# Patient Record
Sex: FEMALE | Race: WHITE | NOT HISPANIC OR LATINO
[De-identification: names, ages, dates, MRNs, and addresses within clinical notes are randomized per-mention and may not be internally consistent; named-entity substitution may affect disease eponyms.]

---

## 2018-03-18 ENCOUNTER — TRANSCRIPTION ENCOUNTER (OUTPATIENT)
Age: 73
End: 2018-03-18

## 2018-03-18 ENCOUNTER — INPATIENT (INPATIENT)
Facility: HOSPITAL | Age: 73
LOS: 3 days | Discharge: HOME | DRG: 481 | End: 2018-03-22
Attending: INTERNAL MEDICINE | Admitting: HOSPITALIST
Payer: MEDICARE

## 2018-03-18 VITALS — HEIGHT: 69 IN | WEIGHT: 179.9 LBS

## 2018-03-18 DIAGNOSIS — I77.9 DISORDER OF ARTERIES AND ARTERIOLES, UNSPECIFIED: Chronic | ICD-10-CM

## 2018-03-18 DIAGNOSIS — Z98.49 CATARACT EXTRACTION STATUS, UNSPECIFIED EYE: Chronic | ICD-10-CM

## 2018-03-18 DIAGNOSIS — Z90.49 ACQUIRED ABSENCE OF OTHER SPECIFIED PARTS OF DIGESTIVE TRACT: Chronic | ICD-10-CM

## 2018-03-18 DIAGNOSIS — Z98.51 TUBAL LIGATION STATUS: Chronic | ICD-10-CM

## 2018-03-18 PROCEDURE — 73562 X-RAY EXAM OF KNEE 3: CPT | Mod: 26,LT

## 2018-03-18 PROCEDURE — 99285 EMERGENCY DEPT VISIT HI MDM: CPT

## 2018-03-18 PROCEDURE — 73502 X-RAY EXAM HIP UNI 2-3 VIEWS: CPT | Mod: 26,LT

## 2018-03-18 PROCEDURE — 73552 X-RAY EXAM OF FEMUR 2/>: CPT | Mod: 26,LT

## 2018-03-18 NOTE — ED PROVIDER NOTE - PSH
Carotid artery disease  s/p carotid bypass right side  S/P cataract surgery  both eyes  S/P cholecystectomy    S/P tubal ligation

## 2018-03-18 NOTE — ED ADULT NURSE NOTE - OBJECTIVE STATEMENT
Patient A&Ox4, denies any pain or discomfort at this time. Stated pain occurs when she moves her left leg, describes as "sharp" that radiates to her groin. Stated slipped & fell at home, unable to bear weight on extremity. Positive PMS to extremity, negative obvious deformity noted.

## 2018-03-18 NOTE — ED PROVIDER NOTE - OBJECTIVE STATEMENT
73 y/o F pt with a hx of HTN presents to the ED with c/o groin and LLE pain s/p fall PTA. Pt states she was walking when she suddenly slipped and fell on the floor. Pt is ambulatory and took 2 Tylenols for pain PTA. Denies hitting head or LOC. denies fever. denies HA or neck pain. no chest pain or sob. no abd pain. no n/v/d. no urinary f/u/d. no back pain. no motor or sensory deficits. denies illicit drug use. no recent travel. no rash. no other acute issues symptoms or concerns

## 2018-03-19 DIAGNOSIS — I10 ESSENTIAL (PRIMARY) HYPERTENSION: ICD-10-CM

## 2018-03-19 DIAGNOSIS — R13.10 DYSPHAGIA, UNSPECIFIED: ICD-10-CM

## 2018-03-19 DIAGNOSIS — E78.5 HYPERLIPIDEMIA, UNSPECIFIED: ICD-10-CM

## 2018-03-19 DIAGNOSIS — D72.829 ELEVATED WHITE BLOOD CELL COUNT, UNSPECIFIED: ICD-10-CM

## 2018-03-19 DIAGNOSIS — R73.9 HYPERGLYCEMIA, UNSPECIFIED: ICD-10-CM

## 2018-03-19 DIAGNOSIS — I77.9 DISORDER OF ARTERIES AND ARTERIOLES, UNSPECIFIED: ICD-10-CM

## 2018-03-19 DIAGNOSIS — Z01.810 ENCOUNTER FOR PREPROCEDURAL CARDIOVASCULAR EXAMINATION: ICD-10-CM

## 2018-03-19 DIAGNOSIS — S72.002A FRACTURE OF UNSPECIFIED PART OF NECK OF LEFT FEMUR, INITIAL ENCOUNTER FOR CLOSED FRACTURE: ICD-10-CM

## 2018-03-19 DIAGNOSIS — I63.9 CEREBRAL INFARCTION, UNSPECIFIED: ICD-10-CM

## 2018-03-19 DIAGNOSIS — S72.90XA UNSPECIFIED FRACTURE OF UNSPECIFIED FEMUR, INITIAL ENCOUNTER FOR CLOSED FRACTURE: ICD-10-CM
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
## 2018-03-19 LAB
ALBUMIN SERPL ELPH-MCNC: 4.2 G/DL — SIGNIFICANT CHANGE UP (ref 3.3–5.2)
ALP SERPL-CCNC: 67 U/L — SIGNIFICANT CHANGE UP (ref 40–120)
ALT FLD-CCNC: 12 U/L — SIGNIFICANT CHANGE UP
ANION GAP SERPL CALC-SCNC: 14 MMOL/L — SIGNIFICANT CHANGE UP (ref 5–17)
ANION GAP SERPL CALC-SCNC: 14 MMOL/L — SIGNIFICANT CHANGE UP (ref 5–17)
APTT BLD: 28.2 SEC — SIGNIFICANT CHANGE UP (ref 27.5–37.4)
APTT BLD: 29.8 SEC — SIGNIFICANT CHANGE UP (ref 27.5–37.4)
AST SERPL-CCNC: 15 U/L — SIGNIFICANT CHANGE UP
BASOPHILS # BLD AUTO: 0 K/UL — SIGNIFICANT CHANGE UP (ref 0–0.2)
BASOPHILS # BLD AUTO: 0 K/UL — SIGNIFICANT CHANGE UP (ref 0–0.2)
BASOPHILS NFR BLD AUTO: 0.2 % — SIGNIFICANT CHANGE UP (ref 0–2)
BASOPHILS NFR BLD AUTO: 0.2 % — SIGNIFICANT CHANGE UP (ref 0–2)
BILIRUB SERPL-MCNC: 0.3 MG/DL — LOW (ref 0.4–2)
BLD GP AB SCN SERPL QL: SIGNIFICANT CHANGE UP
BUN SERPL-MCNC: 11 MG/DL — SIGNIFICANT CHANGE UP (ref 8–20)
BUN SERPL-MCNC: 13 MG/DL — SIGNIFICANT CHANGE UP (ref 8–20)
CALCIUM SERPL-MCNC: 9.2 MG/DL — SIGNIFICANT CHANGE UP (ref 8.6–10.2)
CALCIUM SERPL-MCNC: 9.8 MG/DL — SIGNIFICANT CHANGE UP (ref 8.6–10.2)
CHLORIDE SERPL-SCNC: 100 MMOL/L — SIGNIFICANT CHANGE UP (ref 98–107)
CHLORIDE SERPL-SCNC: 102 MMOL/L — SIGNIFICANT CHANGE UP (ref 98–107)
CO2 SERPL-SCNC: 22 MMOL/L — SIGNIFICANT CHANGE UP (ref 22–29)
CO2 SERPL-SCNC: 24 MMOL/L — SIGNIFICANT CHANGE UP (ref 22–29)
CREAT SERPL-MCNC: 0.84 MG/DL — SIGNIFICANT CHANGE UP (ref 0.5–1.3)
CREAT SERPL-MCNC: 0.91 MG/DL — SIGNIFICANT CHANGE UP (ref 0.5–1.3)
EOSINOPHIL # BLD AUTO: 0 K/UL — SIGNIFICANT CHANGE UP (ref 0–0.5)
EOSINOPHIL # BLD AUTO: 0.1 K/UL — SIGNIFICANT CHANGE UP (ref 0–0.5)
EOSINOPHIL NFR BLD AUTO: 0.3 % — SIGNIFICANT CHANGE UP (ref 0–6)
EOSINOPHIL NFR BLD AUTO: 0.5 % — SIGNIFICANT CHANGE UP (ref 0–6)
GLUCOSE SERPL-MCNC: 132 MG/DL — HIGH (ref 70–115)
GLUCOSE SERPL-MCNC: 142 MG/DL — HIGH (ref 70–115)
HBA1C BLD-MCNC: 5.5 % — SIGNIFICANT CHANGE UP (ref 4–5.6)
HCT VFR BLD CALC: 38.1 % — SIGNIFICANT CHANGE UP (ref 37–47)
HCT VFR BLD CALC: 41.2 % — SIGNIFICANT CHANGE UP (ref 37–47)
HGB BLD-MCNC: 12.5 G/DL — SIGNIFICANT CHANGE UP (ref 12–16)
HGB BLD-MCNC: 13.6 G/DL — SIGNIFICANT CHANGE UP (ref 12–16)
INR BLD: 1.04 RATIO — SIGNIFICANT CHANGE UP (ref 0.88–1.16)
INR BLD: 1.11 RATIO — SIGNIFICANT CHANGE UP (ref 0.88–1.16)
LYMPHOCYTES # BLD AUTO: 0.7 K/UL — LOW (ref 1–4.8)
LYMPHOCYTES # BLD AUTO: 1.1 K/UL — SIGNIFICANT CHANGE UP (ref 1–4.8)
LYMPHOCYTES # BLD AUTO: 5.4 % — LOW (ref 20–55)
LYMPHOCYTES # BLD AUTO: 6.2 % — LOW (ref 20–55)
MAGNESIUM SERPL-MCNC: 2.1 MG/DL — SIGNIFICANT CHANGE UP (ref 1.6–2.6)
MCHC RBC-ENTMCNC: 28.3 PG — SIGNIFICANT CHANGE UP (ref 27–31)
MCHC RBC-ENTMCNC: 28.7 PG — SIGNIFICANT CHANGE UP (ref 27–31)
MCHC RBC-ENTMCNC: 32.8 G/DL — SIGNIFICANT CHANGE UP (ref 32–36)
MCHC RBC-ENTMCNC: 33 G/DL — SIGNIFICANT CHANGE UP (ref 32–36)
MCV RBC AUTO: 86.4 FL — SIGNIFICANT CHANGE UP (ref 81–99)
MCV RBC AUTO: 86.9 FL — SIGNIFICANT CHANGE UP (ref 81–99)
MONOCYTES # BLD AUTO: 0.7 K/UL — SIGNIFICANT CHANGE UP (ref 0–0.8)
MONOCYTES # BLD AUTO: 0.8 K/UL — SIGNIFICANT CHANGE UP (ref 0–0.8)
MONOCYTES NFR BLD AUTO: 4.9 % — SIGNIFICANT CHANGE UP (ref 3–10)
MONOCYTES NFR BLD AUTO: 5.3 % — SIGNIFICANT CHANGE UP (ref 3–10)
NEUTROPHILS # BLD AUTO: 11.3 K/UL — HIGH (ref 1.8–8)
NEUTROPHILS # BLD AUTO: 15.2 K/UL — HIGH (ref 1.8–8)
NEUTROPHILS NFR BLD AUTO: 87.7 % — HIGH (ref 37–73)
NEUTROPHILS NFR BLD AUTO: 88.5 % — HIGH (ref 37–73)
PHOSPHATE SERPL-MCNC: 3.5 MG/DL — SIGNIFICANT CHANGE UP (ref 2.4–4.7)
PLATELET # BLD AUTO: 237 K/UL — SIGNIFICANT CHANGE UP (ref 150–400)
PLATELET # BLD AUTO: 255 K/UL — SIGNIFICANT CHANGE UP (ref 150–400)
POTASSIUM SERPL-MCNC: 4.2 MMOL/L — SIGNIFICANT CHANGE UP (ref 3.5–5.3)
POTASSIUM SERPL-MCNC: 4.5 MMOL/L — SIGNIFICANT CHANGE UP (ref 3.5–5.3)
POTASSIUM SERPL-SCNC: 4.2 MMOL/L — SIGNIFICANT CHANGE UP (ref 3.5–5.3)
POTASSIUM SERPL-SCNC: 4.5 MMOL/L — SIGNIFICANT CHANGE UP (ref 3.5–5.3)
PROT SERPL-MCNC: 6.9 G/DL — SIGNIFICANT CHANGE UP (ref 6.6–8.7)
PROTHROM AB SERPL-ACNC: 11.5 SEC — SIGNIFICANT CHANGE UP (ref 9.8–12.7)
PROTHROM AB SERPL-ACNC: 12.2 SEC — SIGNIFICANT CHANGE UP (ref 9.8–12.7)
RBC # BLD: 4.41 M/UL — SIGNIFICANT CHANGE UP (ref 4.4–5.2)
RBC # BLD: 4.74 M/UL — SIGNIFICANT CHANGE UP (ref 4.4–5.2)
RBC # FLD: 12.9 % — SIGNIFICANT CHANGE UP (ref 11–15.6)
RBC # FLD: 13 % — SIGNIFICANT CHANGE UP (ref 11–15.6)
SODIUM SERPL-SCNC: 138 MMOL/L — SIGNIFICANT CHANGE UP (ref 135–145)
SODIUM SERPL-SCNC: 138 MMOL/L — SIGNIFICANT CHANGE UP (ref 135–145)
TROPONIN T SERPL-MCNC: <0.01 NG/ML — SIGNIFICANT CHANGE UP (ref 0–0.06)
TYPE + AB SCN PNL BLD: SIGNIFICANT CHANGE UP
WBC # BLD: 12.8 K/UL — HIGH (ref 4.8–10.8)
WBC # BLD: 17.3 K/UL — HIGH (ref 4.8–10.8)
WBC # FLD AUTO: 12.8 K/UL — HIGH (ref 4.8–10.8)
WBC # FLD AUTO: 17.3 K/UL — HIGH (ref 4.8–10.8)

## 2018-03-19 PROCEDURE — 12345: CPT | Mod: NC

## 2018-03-19 PROCEDURE — 71045 X-RAY EXAM CHEST 1 VIEW: CPT | Mod: 26

## 2018-03-19 PROCEDURE — 99223 1ST HOSP IP/OBS HIGH 75: CPT | Mod: 57

## 2018-03-19 PROCEDURE — 99497 ADVNCD CARE PLAN 30 MIN: CPT | Mod: 25

## 2018-03-19 PROCEDURE — 99223 1ST HOSP IP/OBS HIGH 75: CPT

## 2018-03-19 PROCEDURE — 93010 ELECTROCARDIOGRAM REPORT: CPT

## 2018-03-19 PROCEDURE — 27235 TREAT THIGH FRACTURE: CPT | Mod: LT

## 2018-03-19 PROCEDURE — 73610 X-RAY EXAM OF ANKLE: CPT | Mod: 26,LT

## 2018-03-19 PROCEDURE — 76377 3D RENDER W/INTRP POSTPROCES: CPT | Mod: 26

## 2018-03-19 PROCEDURE — 72192 CT PELVIS W/O DYE: CPT | Mod: 26

## 2018-03-19 RX ORDER — MAGNESIUM HYDROXIDE 400 MG/1
30 TABLET, CHEWABLE ORAL DAILY
Qty: 0 | Refills: 0 | Status: DISCONTINUED | OUTPATIENT
Start: 2018-03-19 | End: 2018-03-22

## 2018-03-19 RX ORDER — METOPROLOL TARTRATE 50 MG
100 TABLET ORAL
Qty: 0 | Refills: 0 | Status: DISCONTINUED | OUTPATIENT
Start: 2018-03-19 | End: 2018-03-19

## 2018-03-19 RX ORDER — FENTANYL CITRATE 50 UG/ML
50 INJECTION INTRAVENOUS
Qty: 0 | Refills: 0 | Status: DISCONTINUED | OUTPATIENT
Start: 2018-03-19 | End: 2018-03-19

## 2018-03-19 RX ORDER — OXYCODONE HYDROCHLORIDE 5 MG/1
10 TABLET ORAL EVERY 4 HOURS
Qty: 0 | Refills: 0 | Status: DISCONTINUED | OUTPATIENT
Start: 2018-03-19 | End: 2018-03-22

## 2018-03-19 RX ORDER — CEFAZOLIN SODIUM 1 G
2000 VIAL (EA) INJECTION EVERY 8 HOURS
Qty: 0 | Refills: 0 | Status: COMPLETED | OUTPATIENT
Start: 2018-03-19 | End: 2018-03-20

## 2018-03-19 RX ORDER — SENNA PLUS 8.6 MG/1
2 TABLET ORAL AT BEDTIME
Qty: 0 | Refills: 0 | Status: DISCONTINUED | OUTPATIENT
Start: 2018-03-19 | End: 2018-03-22

## 2018-03-19 RX ORDER — ONDANSETRON 8 MG/1
4 TABLET, FILM COATED ORAL ONCE
Qty: 0 | Refills: 0 | Status: DISCONTINUED | OUTPATIENT
Start: 2018-03-19 | End: 2018-03-19

## 2018-03-19 RX ORDER — METHOCARBAMOL 500 MG/1
500 TABLET, FILM COATED ORAL ONCE
Qty: 0 | Refills: 0 | Status: COMPLETED | OUTPATIENT
Start: 2018-03-19 | End: 2018-03-19

## 2018-03-19 RX ORDER — SIMVASTATIN 20 MG/1
20 TABLET, FILM COATED ORAL AT BEDTIME
Qty: 0 | Refills: 0 | Status: DISCONTINUED | OUTPATIENT
Start: 2018-03-19 | End: 2018-03-19

## 2018-03-19 RX ORDER — ACETAMINOPHEN 500 MG
650 TABLET ORAL EVERY 6 HOURS
Qty: 0 | Refills: 0 | Status: DISCONTINUED | OUTPATIENT
Start: 2018-03-19 | End: 2018-03-19

## 2018-03-19 RX ORDER — LORATADINE 10 MG/1
10 TABLET ORAL DAILY
Qty: 0 | Refills: 0 | Status: DISCONTINUED | OUTPATIENT
Start: 2018-03-19 | End: 2018-03-19

## 2018-03-19 RX ORDER — HYDROMORPHONE HYDROCHLORIDE 2 MG/ML
2 INJECTION INTRAMUSCULAR; INTRAVENOUS; SUBCUTANEOUS EVERY 4 HOURS
Qty: 0 | Refills: 0 | Status: DISCONTINUED | OUTPATIENT
Start: 2018-03-19 | End: 2018-03-22

## 2018-03-19 RX ORDER — AMLODIPINE BESYLATE 2.5 MG/1
10 TABLET ORAL DAILY
Qty: 0 | Refills: 0 | Status: DISCONTINUED | OUTPATIENT
Start: 2018-03-19 | End: 2018-03-19

## 2018-03-19 RX ORDER — OXYCODONE HYDROCHLORIDE 5 MG/1
5 TABLET ORAL EVERY 4 HOURS
Qty: 0 | Refills: 0 | Status: DISCONTINUED | OUTPATIENT
Start: 2018-03-19 | End: 2018-03-22

## 2018-03-19 RX ORDER — ONDANSETRON 8 MG/1
4 TABLET, FILM COATED ORAL EVERY 6 HOURS
Qty: 0 | Refills: 0 | Status: DISCONTINUED | OUTPATIENT
Start: 2018-03-19 | End: 2018-03-19

## 2018-03-19 RX ORDER — ASPIRIN/CALCIUM CARB/MAGNESIUM 324 MG
81 TABLET ORAL DAILY
Qty: 0 | Refills: 0 | Status: DISCONTINUED | OUTPATIENT
Start: 2018-03-19 | End: 2018-03-19

## 2018-03-19 RX ORDER — DOCUSATE SODIUM 100 MG
100 CAPSULE ORAL THREE TIMES A DAY
Qty: 0 | Refills: 0 | Status: DISCONTINUED | OUTPATIENT
Start: 2018-03-19 | End: 2018-03-22

## 2018-03-19 RX ORDER — SODIUM CHLORIDE 9 MG/ML
1000 INJECTION, SOLUTION INTRAVENOUS
Qty: 0 | Refills: 0 | Status: DISCONTINUED | OUTPATIENT
Start: 2018-03-19 | End: 2018-03-19

## 2018-03-19 RX ORDER — HYDRALAZINE HCL 50 MG
50 TABLET ORAL THREE TIMES A DAY
Qty: 0 | Refills: 0 | Status: DISCONTINUED | OUTPATIENT
Start: 2018-03-19 | End: 2018-03-19

## 2018-03-19 RX ORDER — SODIUM CHLORIDE 9 MG/ML
1000 INJECTION INTRAMUSCULAR; INTRAVENOUS; SUBCUTANEOUS
Qty: 0 | Refills: 0 | Status: DISCONTINUED | OUTPATIENT
Start: 2018-03-19 | End: 2018-03-19

## 2018-03-19 RX ORDER — ONDANSETRON 8 MG/1
4 TABLET, FILM COATED ORAL EVERY 6 HOURS
Qty: 0 | Refills: 0 | Status: DISCONTINUED | OUTPATIENT
Start: 2018-03-19 | End: 2018-03-22

## 2018-03-19 RX ORDER — ACETAMINOPHEN 500 MG
975 TABLET ORAL EVERY 8 HOURS
Qty: 0 | Refills: 0 | Status: DISCONTINUED | OUTPATIENT
Start: 2018-03-19 | End: 2018-03-21

## 2018-03-19 RX ORDER — MORPHINE SULFATE 50 MG/1
4 CAPSULE, EXTENDED RELEASE ORAL EVERY 4 HOURS
Qty: 0 | Refills: 0 | Status: DISCONTINUED | OUTPATIENT
Start: 2018-03-19 | End: 2018-03-19

## 2018-03-19 RX ORDER — POLYETHYLENE GLYCOL 3350 17 G/17G
17 POWDER, FOR SOLUTION ORAL DAILY
Qty: 0 | Refills: 0 | Status: DISCONTINUED | OUTPATIENT
Start: 2018-03-19 | End: 2018-03-22

## 2018-03-19 RX ORDER — ENOXAPARIN SODIUM 100 MG/ML
40 INJECTION SUBCUTANEOUS DAILY
Qty: 0 | Refills: 0 | Status: DISCONTINUED | OUTPATIENT
Start: 2018-03-20 | End: 2018-03-22

## 2018-03-19 RX ORDER — MORPHINE SULFATE 50 MG/1
4 CAPSULE, EXTENDED RELEASE ORAL ONCE
Qty: 0 | Refills: 0 | Status: DISCONTINUED | OUTPATIENT
Start: 2018-03-19 | End: 2018-03-19

## 2018-03-19 RX ADMIN — Medication 100 MILLIGRAM(S): at 06:37

## 2018-03-19 RX ADMIN — Medication 50 MILLIGRAM(S): at 06:37

## 2018-03-19 RX ADMIN — MORPHINE SULFATE 4 MILLIGRAM(S): 50 CAPSULE, EXTENDED RELEASE ORAL at 06:59

## 2018-03-19 RX ADMIN — MORPHINE SULFATE 4 MILLIGRAM(S): 50 CAPSULE, EXTENDED RELEASE ORAL at 03:24

## 2018-03-19 RX ADMIN — METHOCARBAMOL 210 MILLIGRAM(S): 500 TABLET, FILM COATED ORAL at 05:45

## 2018-03-19 RX ADMIN — MORPHINE SULFATE 4 MILLIGRAM(S): 50 CAPSULE, EXTENDED RELEASE ORAL at 03:37

## 2018-03-19 RX ADMIN — Medication 100 MILLIGRAM(S): at 22:58

## 2018-03-19 RX ADMIN — MORPHINE SULFATE 4 MILLIGRAM(S): 50 CAPSULE, EXTENDED RELEASE ORAL at 06:37

## 2018-03-19 RX ADMIN — SODIUM CHLORIDE 80 MILLILITER(S): 9 INJECTION, SOLUTION INTRAVENOUS at 06:38

## 2018-03-19 NOTE — BRIEF OPERATIVE NOTE - PROCEDURE
<<-----Click on this checkbox to enter Procedure Femur fracture surgery  03/19/2018  CRPP left femoral neck fracture  Active  MLINN

## 2018-03-19 NOTE — H&P ADULT - PROBLEM SELECTOR PLAN 3
in setting of recent fall and hip fracture  IVF for gentle hydration   rpt cbc in AM  no s/s of infectious etiology-likely reactive due to trauma  hold abx unless fever or infectious source found  f/u official read of chest xray and urinalysis results

## 2018-03-19 NOTE — H&P ADULT - HISTORY OF PRESENT ILLNESS
73 y/o F pt with a pmh/o HLD CVA carotid artery stenosis dysphagia HTN who presents to the ED with 10/10, severe, constant, left sided, sharp groin and leg pain which occured s/p fall and was not alleviated by OTC medications. Pt was able to walk after fall and denies any preceding symptoms such as dizziness, palpitations, cp, diaphoresis, weakness, nausea, urinary or gi incontinence, HA, confusion, visual or speech changes, numbness, tingling, weakness, or having hit head. 73 y/o F pt with a pmh/o HLD CVA carotid artery stenosis dysphagia HTN who presents to the ED with 10/10, severe, constant, left sided, sharp groin and leg pain which occured s/p mechanical fall after tripping over slippers on carpet floor when transitioning from hard floor and was not alleviated by OTC medications. Pt was able to walk after fall and arrived in own vehicle with . Pt denies any preceding symptoms such as dizziness, palpitations, cp, diaphoresis, weakness, nausea, urinary or gi incontinence, HA, confusion, visual or speech changes, numbness, tingling, weakness, or having hit head. Pt reports previous abnormal stress test and CVA that affected after having endarterectomy however states repeat was normal.

## 2018-03-19 NOTE — H&P ADULT - PROBLEM SELECTOR PLAN 5
baseline s/p CVA  pt reports letting pills dissolve in liquid at home prior to taking medication  home medications ordered-may crush to administer  once diet reinstated would order soft diet with thin liquids and protein shake as pt has this diet at home vs speech and swallow consult for recs

## 2018-03-19 NOTE — CONSULT NOTE ADULT - PROBLEM SELECTOR RECOMMENDATION 9
1. Patient is cleared for hip fracture repair and related procedures from a cardiac perspective   2. Continue with beta-blocker, statin through the perioperative period  3. Cardiology will follow patient

## 2018-03-19 NOTE — H&P ADULT - NEUROLOGICAL DETAILS
responds to verbal commands/no spontaneous movement/responds to pain/sensation intact/cranial nerves intact/alert and oriented x 3

## 2018-03-19 NOTE — CONSULT NOTE ADULT - SUBJECTIVE AND OBJECTIVE BOX
Patient is a 72y old  Female who presents with a chief complaint of fall (19 Mar 2018 06:21)        PAST MEDICAL & SURGICAL HISTORY:  Carotid artery disease, unspecified laterality  Hyperlipidemia, unspecified hyperlipidemia type  Cerebrovascular accident (CVA), unspecified mechanism  Esophageal dysphagia  Hypertension  S/P tubal ligation  S/P cholecystectomy  S/P cataract surgery: both eyes  Carotid artery disease: s/p carotid bypass right side    MEDICATIONS  (STANDING):  acetaminophen  Suppository 650 milliGRAM(s) Rectal every 6 hours  amLODIPine   Tablet 10 milliGRAM(s) Oral daily  aspirin  chewable 81 milliGRAM(s) Oral daily  hydrALAZINE 50 milliGRAM(s) Oral three times a day  lactated ringers. 1000 milliLiter(s) (80 mL/Hr) IV Continuous <Continuous>  loratadine 10 milliGRAM(s) Oral daily  metoprolol     tartrate 100 milliGRAM(s) Oral two times a day  simvastatin 20 milliGRAM(s) Oral at bedtime    MEDICATIONS  (PRN):  bisacodyl Suppository 10 milliGRAM(s) Rectal daily PRN Constipation  morphine  - Injectable 4 milliGRAM(s) IV Push every 4 hours PRN Severe Pain (7 - 10)  ondansetron Injectable 4 milliGRAM(s) IV Push every 6 hours PRN Nausea      FAMILY HISTORY:  No pertinent family history in first degree relatives      CONSTITUTIONAL: No fever, weight loss, chills, shakes, or fatigue  EYES: No eye pain, visual disturbances, or discharge  ENMT:  No difficulty hearing, tinnitus, vertigo; No sinus or throat pain  NECK: No pain or stiffness  BREASTS: No pain, masses, or nipple discharge  RESPIRATORY: No cough, wheezing, hemoptysis, or shortness of breath  CARDIOVASCULAR: No chest pain, dyspnea, palpitations, dizziness, syncope, paroxysmal nocturnal dyspnea, orthopnea, or arm or leg swelling  GASTROINTESTINAL: No abdominal  or epigastric pain, nausea, vomiting, hematemesis, diarrhea, constipation, melena or bright red blood.  GENITOURINARY: No dysuria, nocturia, hematuria, or urinary incontinence  SKIN: No itching, burning, rashes, or lesions   LYMPH NODES: No enlarged glands  ENDOCRINE: No heat or cold intolerance, or hair loss  MUSCULOSKELETAL: +L hip pain  PSYCHIATRIC: No depression, anxiety, or difficulty sleeping  HEME/LYMPH: No easy bruising or bleeding gums  ALLERY AND IMMUNOLOGIC: No hives or rash.      Vital Signs Last 24 Hrs  T(C): 37.4 (19 Mar 2018 07:57), Max: 37.4 (19 Mar 2018 07:57)  T(F): 99.4 (19 Mar 2018 07:57), Max: 99.4 (19 Mar 2018 07:57)  HR: 76 (19 Mar 2018 07:57) (70 - 86)  BP: 129/55 (19 Mar 2018 07:57) (129/55 - 166/63)  BP(mean): --  RR: 17 (19 Mar 2018 07:57) (12 - 19)  SpO2: 97% (19 Mar 2018 07:57) (93% - 97%)    PHYSICAL EXAM:    GENERAL: In no apparent distress, well nourished, and hydrated.  NECK: +R CEA surgical scar  HEART: Regular rate and rhythm; +2/6 systolic murmur,rubs, or gallops.  PULMONARY: Clear to auscultation and perfusion.  No rales, wheezing, or rhonchi bilaterally.  ABDOMEN: Soft, Nontender, Nondistended; Bowel sounds present  EXTREMITIES:  2+ Peripheral Pulses, No clubbing, cyanosis, or edema; +L hip tenderness          INTERPRETATION OF TELEMETRY: *patient not on telemetry     ECG: sinus rhythm + nonspecific ST changes     I&O's Detail    18 Mar 2018 07:01  -  19 Mar 2018 07:00  --------------------------------------------------------  IN:    lactated ringers.: 80 mL  Total IN: 80 mL    OUT:  Total OUT: 0 mL    Total NET: 80 mL          LABS:                        12.5   12.8  )-----------( 237      ( 19 Mar 2018 07:43 )             38.1     03-19    138  |  102  |  11.0  ----------------------------<  132<H>  4.5   |  22.0  |  0.84    Ca    9.2      19 Mar 2018 07:43  Phos  3.5     03-19  Mg     2.1     03-19    TPro  6.9  /  Alb  4.2  /  TBili  0.3<L>  /  DBili  x   /  AST  15  /  ALT  12  /  AlkPhos  67  03-19    CARDIAC MARKERS ( 19 Mar 2018 07:43 )  x     / <0.01 ng/mL / x     / x     / x          PT/INR - ( 19 Mar 2018 07:43 )   PT: 12.2 sec;   INR: 1.11 ratio         PTT - ( 19 Mar 2018 07:43 )  PTT:28.2 sec    BNP  I&O's Detail    18 Mar 2018 07:01  -  19 Mar 2018 07:00  --------------------------------------------------------  IN:    lactated ringers.: 80 mL  Total IN: 80 mL    OUT:  Total OUT: 0 mL    Total NET: 80 mL        Daily Height in cm: 175.26 (19 Mar 2018 06:56)    Daily     RADIOLOGY & ADDITIONAL STUDIES:

## 2018-03-19 NOTE — H&P ADULT - PMH
Carotid artery disease, unspecified laterality    Cerebrovascular accident (CVA), unspecified mechanism    Esophageal dysphagia    Hyperlipidemia, unspecified hyperlipidemia type    Hypertension

## 2018-03-19 NOTE — H&P ADULT - PROBLEM SELECTOR PLAN 1
fracture and fall precautions  pain control  EKG  Chest x ray  coags, cmp, mg, phos  Troponin  TTE  Will need cardiac optimization for OR given pmhx  VCD boots for DVT prophylaxis  NPO for OR in AM  Pain control with IV morphine  IV robaxin x 1 dose for muscular spasm

## 2018-03-19 NOTE — H&P ADULT - ASSESSMENT
71 yo female with pmh/o HLD, HTN, CVA, carotid artery disease, who is admitted with intractable pain secondary to left femoral neck fracture s/p mechanical fall.

## 2018-03-20 DIAGNOSIS — Z29.9 ENCOUNTER FOR PROPHYLACTIC MEASURES, UNSPECIFIED: ICD-10-CM

## 2018-03-20 LAB
ANION GAP SERPL CALC-SCNC: 13 MMOL/L — SIGNIFICANT CHANGE UP (ref 5–17)
APPEARANCE UR: CLEAR — SIGNIFICANT CHANGE UP
BACTERIA # UR AUTO: ABNORMAL
BILIRUB UR-MCNC: NEGATIVE — SIGNIFICANT CHANGE UP
BUN SERPL-MCNC: 11 MG/DL — SIGNIFICANT CHANGE UP (ref 8–20)
CALCIUM SERPL-MCNC: 8.4 MG/DL — LOW (ref 8.6–10.2)
CHLORIDE SERPL-SCNC: 107 MMOL/L — SIGNIFICANT CHANGE UP (ref 98–107)
CO2 SERPL-SCNC: 22 MMOL/L — SIGNIFICANT CHANGE UP (ref 22–29)
COLOR SPEC: YELLOW — SIGNIFICANT CHANGE UP
CREAT SERPL-MCNC: 0.99 MG/DL — SIGNIFICANT CHANGE UP (ref 0.5–1.3)
DIFF PNL FLD: NEGATIVE — SIGNIFICANT CHANGE UP
EPI CELLS # UR: SIGNIFICANT CHANGE UP
GLUCOSE SERPL-MCNC: 134 MG/DL — HIGH (ref 70–115)
GLUCOSE UR QL: NEGATIVE MG/DL — SIGNIFICANT CHANGE UP
HCT VFR BLD CALC: 36.4 % — LOW (ref 37–47)
HGB BLD-MCNC: 11.8 G/DL — LOW (ref 12–16)
KETONES UR-MCNC: NEGATIVE — SIGNIFICANT CHANGE UP
LEUKOCYTE ESTERASE UR-ACNC: ABNORMAL
MCHC RBC-ENTMCNC: 28.3 PG — SIGNIFICANT CHANGE UP (ref 27–31)
MCHC RBC-ENTMCNC: 32.4 G/DL — SIGNIFICANT CHANGE UP (ref 32–36)
MCV RBC AUTO: 87.3 FL — SIGNIFICANT CHANGE UP (ref 81–99)
NITRITE UR-MCNC: NEGATIVE — SIGNIFICANT CHANGE UP
PH UR: 6.5 — SIGNIFICANT CHANGE UP (ref 5–8)
PLATELET # BLD AUTO: 191 K/UL — SIGNIFICANT CHANGE UP (ref 150–400)
POTASSIUM SERPL-MCNC: 3.8 MMOL/L — SIGNIFICANT CHANGE UP (ref 3.5–5.3)
POTASSIUM SERPL-SCNC: 3.8 MMOL/L — SIGNIFICANT CHANGE UP (ref 3.5–5.3)
PROT UR-MCNC: 15 MG/DL
RBC # BLD: 4.17 M/UL — LOW (ref 4.4–5.2)
RBC # FLD: 13.3 % — SIGNIFICANT CHANGE UP (ref 11–15.6)
RBC CASTS # UR COMP ASSIST: ABNORMAL /HPF (ref 0–4)
SODIUM SERPL-SCNC: 142 MMOL/L — SIGNIFICANT CHANGE UP (ref 135–145)
SP GR SPEC: 1 — LOW (ref 1.01–1.02)
UROBILINOGEN FLD QL: NEGATIVE MG/DL — SIGNIFICANT CHANGE UP
WBC # BLD: 12 K/UL — HIGH (ref 4.8–10.8)
WBC # FLD AUTO: 12 K/UL — HIGH (ref 4.8–10.8)
WBC UR QL: SIGNIFICANT CHANGE UP

## 2018-03-20 PROCEDURE — 99233 SBSQ HOSP IP/OBS HIGH 50: CPT

## 2018-03-20 PROCEDURE — 71045 X-RAY EXAM CHEST 1 VIEW: CPT | Mod: 26

## 2018-03-20 PROCEDURE — 99222 1ST HOSP IP/OBS MODERATE 55: CPT | Mod: GC

## 2018-03-20 RX ORDER — AMLODIPINE BESYLATE 2.5 MG/1
10 TABLET ORAL DAILY
Qty: 0 | Refills: 0 | Status: DISCONTINUED | OUTPATIENT
Start: 2018-03-20 | End: 2018-03-22

## 2018-03-20 RX ORDER — ASPIRIN/CALCIUM CARB/MAGNESIUM 324 MG
81 TABLET ORAL DAILY
Qty: 0 | Refills: 0 | Status: DISCONTINUED | OUTPATIENT
Start: 2018-03-20 | End: 2018-03-22

## 2018-03-20 RX ORDER — SIMVASTATIN 20 MG/1
20 TABLET, FILM COATED ORAL AT BEDTIME
Qty: 0 | Refills: 0 | Status: DISCONTINUED | OUTPATIENT
Start: 2018-03-20 | End: 2018-03-22

## 2018-03-20 RX ORDER — FOLIC ACID 0.8 MG
1 TABLET ORAL DAILY
Qty: 0 | Refills: 0 | Status: DISCONTINUED | OUTPATIENT
Start: 2018-03-20 | End: 2018-03-22

## 2018-03-20 RX ORDER — HYDRALAZINE HCL 50 MG
50 TABLET ORAL THREE TIMES A DAY
Qty: 0 | Refills: 0 | Status: DISCONTINUED | OUTPATIENT
Start: 2018-03-20 | End: 2018-03-21

## 2018-03-20 RX ORDER — LORATADINE 10 MG/1
10 TABLET ORAL DAILY
Qty: 0 | Refills: 0 | Status: DISCONTINUED | OUTPATIENT
Start: 2018-03-20 | End: 2018-03-22

## 2018-03-20 RX ORDER — FERROUS SULFATE 325(65) MG
325 TABLET ORAL DAILY
Qty: 0 | Refills: 0 | Status: DISCONTINUED | OUTPATIENT
Start: 2018-03-20 | End: 2018-03-22

## 2018-03-20 RX ORDER — METOPROLOL TARTRATE 50 MG
100 TABLET ORAL
Qty: 0 | Refills: 0 | Status: DISCONTINUED | OUTPATIENT
Start: 2018-03-20 | End: 2018-03-22

## 2018-03-20 RX ADMIN — Medication 100 MILLIGRAM(S): at 21:34

## 2018-03-20 RX ADMIN — Medication 50 MILLIGRAM(S): at 21:34

## 2018-03-20 RX ADMIN — Medication 1 TABLET(S): at 11:58

## 2018-03-20 RX ADMIN — Medication 100 MILLIGRAM(S): at 05:00

## 2018-03-20 RX ADMIN — ENOXAPARIN SODIUM 40 MILLIGRAM(S): 100 INJECTION SUBCUTANEOUS at 11:59

## 2018-03-20 RX ADMIN — Medication 50 MILLIGRAM(S): at 17:48

## 2018-03-20 RX ADMIN — AMLODIPINE BESYLATE 10 MILLIGRAM(S): 2.5 TABLET ORAL at 11:58

## 2018-03-20 RX ADMIN — SIMVASTATIN 20 MILLIGRAM(S): 20 TABLET, FILM COATED ORAL at 21:34

## 2018-03-20 RX ADMIN — Medication 1 MILLIGRAM(S): at 11:58

## 2018-03-20 RX ADMIN — LORATADINE 10 MILLIGRAM(S): 10 TABLET ORAL at 11:58

## 2018-03-20 RX ADMIN — Medication 100 MILLIGRAM(S): at 17:48

## 2018-03-20 RX ADMIN — Medication 81 MILLIGRAM(S): at 11:58

## 2018-03-20 RX ADMIN — Medication 325 MILLIGRAM(S): at 11:58

## 2018-03-20 NOTE — CONSULT NOTE ADULT - ATTENDING COMMENTS
Chart reviewed. Patient is a 72 year old previously independent female admitted after a fall and found to have a left hip subcapital fracture. Underwent orthopedic intervention and tolerated procedure. WBAT. On lovenox for DVT prophylaxis. Chart reviewed. Patient is a 72 year old previously independent female admitted after a fall and found to have a left hip subcapital fracture. Underwent orthopedic intervention and tolerated procedure. WBAT. On lovenox for DVT prophylaxis.  POD #1 today and PT evaluation limited due to symptoms of dizziness/ nausea    Most likely will need a course of inpatient rehab. will fu in am and make further recommendations.   Continue bedside therapy. Thank you.

## 2018-03-20 NOTE — PROGRESS NOTE ADULT - SUBJECTIVE AND OBJECTIVE BOX
Patient seen and eval at bedside. Patient has no complaints. Patient denies any pain. Denies CP, SOB, dizziness, numbness/tingling.    Vital Signs Last 24 Hrs  T(C): 38 (20 Mar 2018 04:45), Max: 38.1 (19 Mar 2018 23:13)  T(F): 100.4 (20 Mar 2018 04:45), Max: 100.5 (19 Mar 2018 23:13)  HR: 101 (20 Mar 2018 04:45) (70 - 101)  BP: 159/61 (20 Mar 2018 04:45) (129/55 - 179/81)  RR: 18 (20 Mar 2018 04:45) (14 - 23)  SpO2: 94% (20 Mar 2018 04:45) (90% - 98%)    PE: NAD, alert awake  Right LE: Dressing C/D/I, mild mikaela-incisional ecchymosis, no s/o infx, thigh soft, compressible, mild swelling  EHL/TA/GS/FHL intact, Gross SILT distally  DP pulse 2+, calf soft, NT B/L                          11.8   12.0  )-----------( 191      ( 20 Mar 2018 06:21 )             36.4     03-20    142  |  107  |  11.0  ----------------------------<  134<H>  3.8   |  22.0  |  0.99    A/P: s/p left hip CRPP POD#1  ·	pain control  ·	Tylenol PRN for fever  ·	DVT propx: Lov  ·	PT - WBAT  ·	Cont care as per primary team

## 2018-03-20 NOTE — PHYSICAL THERAPY INITIAL EVALUATION ADULT - PERTINENT HX OF CURRENT PROBLEM, REHAB EVAL
71 y/o F pt with a pmh/o HLD CVA carotid artery stenosis dysphagia HTN who presents to the ED with 10/10, severe, constant, left sided, sharp groin and leg pain which occurred s/p mechanical fall after tripping over slippers on carpet floor when transitioning from hard floor and was not alleviated by OTC medications. left femoral neck fx s/p ORIF.

## 2018-03-20 NOTE — PHYSICAL THERAPY INITIAL EVALUATION ADULT - CRITERIA FOR SKILLED THERAPEUTIC INTERVENTIONS
anticipated equipment needs at discharge/rehab potential/anticipated discharge recommendation/impairments found/functional limitations in following categories/risk reduction/prevention

## 2018-03-20 NOTE — PHYSICAL THERAPY INITIAL EVALUATION ADULT - ADDITIONAL COMMENTS
pt states she lives with her  in a 1-story house with 2 steps to enter (+left rail). pt was independent prior to admit. +driving.  works part-time. has cane at home.

## 2018-03-20 NOTE — PHYSICAL THERAPY INITIAL EVALUATION ADULT - RANGE OF MOTION EXAMINATION, REHAB EVAL
left hip 90 degrees in sitting, knee/ankle WNL/bilateral upper extremity ROM was WNL (within normal limits)/Right LE ROM was WNL (within normal limits)

## 2018-03-20 NOTE — PROGRESS NOTE ADULT - SUBJECTIVE AND OBJECTIVE BOX
HOSPITALIST PROGRESS NOTE    CHARANJIT GREENWOOD  [id number]  72yFemale    Patient is a 72y old  Female who presents with a chief complaint of fall (19 Mar 2018 06:21)      SUBJECTIVE:   Chart reviewed since last visit.  Patient seen and examined at bedside for left femur fracture.  Underwent Left Femur CRPP yesterday.  Denies any hip pain currently.  Denies any chest pain, dyspnea, cough or chills but feels cold  No flatus or BM yet, voiding on bedpan.  Hasn't mobilized yet      OBJECTIVE:  Vital Signs Last 24 Hrs  T(C): 37.8 (20 Mar 2018 07:43), Max: 38.1 (19 Mar 2018 23:13)  T(F): 100.1 (20 Mar 2018 07:43), Max: 100.5 (19 Mar 2018 23:13)  HR: 94 (20 Mar 2018 07:43) (70 - 101)  BP: 142/61 (20 Mar 2018 07:43) (134/63 - 179/81)   RR: 18 (20 Mar 2018 07:43) (14 - 23)  SpO2: 95% (20 Mar 2018 07:43) (90% - 98%)    PHYSICAL EXAMINATION  General: NAD[+]   Obese[+] Lying in bed  HEENT: AT/NC[]  PERRLA[]  EOMI[+]   Moist oral mucosa[+]  Pharyngeal exudates[]  NECK: Supple[+]  JVD[-] Carotid bruit[]  CVS: RRR[+]  Irregular[]  S1+S2[+]   Murmur[]  RESP: Fair air entry bilaterally[+]   Clear sounds[+]   poor effort []  wheeze[]   Crackles[]  GI: Soft[+]  Nondistended[]   Nontender[+]   Bowel Sounds[+]  Mass[]   HSM[]   Ascites[]  : suprapubic tenderness[-]   CVA Tenderness[]   Boone[-]  MS: Left hip dressing C/D/I  CNS: AAOx3[+]    Grossly intact  INTEG: Skin is Warm[+]  dry[] Lesion[] Decubitus[]  PSYCH: Fair Mood, Affect    MONITOR:  CAPILLARY BLOOD GLUCOSE            I&O's Summary                          11.8   12.0  )-----------( 191      ( 20 Mar 2018 06:21 )             36.4     PT/INR - ( 19 Mar 2018 07:43 )   PT: 12.2 sec;   INR: 1.11 ratio         PTT - ( 19 Mar 2018 07:43 )  PTT:28.2 sec      142  |  107  |  11.0  ----------------------------<  134<H>  3.8   |  22.0  |  0.99    Ca    8.4<L>      20 Mar 2018 06:21  Phos  3.5       Mg     2.1         TPro  6.9  /  Alb  4.2  /  TBili  0.3<L>  /  DBili  x   /  AST  15  /  ALT  12  /  AlkPhos  67      CARDIAC MARKERS ( 19 Mar 2018 07:43 )  x     / <0.01 ng/mL / x     / x     / x          TTE:  < from: TTE Echo Complete w/Doppler (18 @ 08:39) >    EXAM:  ECHO TRANSTHORACIC COMP W DOPP      PROCEDURE DATE:  Mar 19 2018   .      INTERPRETATION:  REPORT:    TRANSTHORACIC ECHOCARDIOGRAM REPORT         Patient Name:   CHARANJIT GREENWOOD Patient Location: Inpatient  Medical Rec #:  [id number]   Accession #:     [id number]  Account #:                 Height:           68.9 in 175.0 cm  YOB: 1945  Weight:           178.6 lb 81.00 kg  Patient Age:    72 years   BSA:              1.97 m²  Patient Gender: F          BP:               150/59 mmHg       Date of Exam:        3/19/2018 8:39:30 AM  Sonographer:         Beatriz Hernandez  Referring Physician: Jeaneth Napoles MD    Procedure:     2D Echo/Doppler/Color Doppler Complete.  Indications:   Chronic ischemic heart disease, unspecified - I25.9  Diagnosis:     Nonrheumatic mitral (valve) insufficiency - I34.0  Study Details: Technically adequate study.         2D AND M-MODE MEASUREMENTS (normal ranges within parentheses):  Left                Normal    Aorta/Left           Normal  Ventricle:                    Atrium:  IVSd (2D):    0.97  (0.7-1.1) Aortic Root  2.90 cm (2.4-3.7)                cm              (2D):  LVPWd (2D):   1.03  (0.7-1.1) Left Atrium  3.71 cm (1.9-4.0)                cm              (2D):  LVIDd (2D):   3.84(3.4-5.7) LA Volume    22.4                cm              Index        ml/m²  LVIDs (2D):   2.52            Right Ventricle:                cm              TAPSE:           2.50 cm  LV FS (2D):   34.3  (>25%)                %  Relative Wall 0.54  (<0.42)  Thickness    LV SYSTOLIC FUNCTION BY 2D PLANIMETRY (MOD):  EF-Biplane: 69 %    LV DIASTOLIC FUNCTION:  MV Peak E: 0.88 m/s e', MV Della: 0.08 m/s  MV Peak A: 1.04 m/s E/e' Ratio: 10.74  E/A Ratio: 0.85    SPECTRAL DOPPLER ANALYSIS (where applicable):  Aortic Valve: AoV Max [place]:  AoV Peak PG:  AoV Mean P.0 mmHg    LVOT Vmax:  LVOT VTI: 0.225 m LVOT Diameter: 1.97 cm    AoV Area, Vmax:  AoV Area, VTI: 2.30 cm² AoV Area, Vmn: 2.40 cm²  Ao VTI: 0.298     PHYSICIAN INTERPRETATION:  Left Ventricle: The left ventricular internal cavity size is normal.  Global LV systolic function was normal. Left ventricular ejection   fraction, by visual estimation, is 65 to 70%. Spectral Doppler shows   impaired relaxation pattern of left ventricular myocardial filling (Grade   I diastolic dysfunction).  Right Ventricle: The right ventricular size is normal. RV systolic   function is normal.  Left Atrium: Normal left atrial size.  Right Atrium: Normal right atrial size.  Pericardium: There is no evidence of pericardial effusion.  Mitral Valve: Thickening of the anterior and posterior mitral valve   leaflets. Mild mitral valve regurgitation is seen.  Tricuspid Valve: Trivial tricuspid regurgitation is visualized. Adequate   TR velocity was not obtained to accurately assess RVSP.  Aortic Valve: No evidence of aortic valve regurgitation is seen.  Pulmonic Valve: Structurally normal pulmonic valve, with normal leaflet   excursion. Trace pulmonic valve regurgitation.  Aorta: The aortic root is normal in size and structure.  Pulmonary Artery: The main pulmonary artery is normal in size.  Venous: The inferior vena cava was normal sized, with respiratory size   variation greater than 50%.       Summary:   1. Left ventricular ejection fraction, by visual estimation, is 65 to   70%.   2. Normal global left ventricular systolic function.   3. Spectral Doppler shows impaired relaxation pattern of left   ventricular myocardial filling (Grade I diastolic dysfunction).   4. There is no evidence of pericardial effusion.   5. Mild mitral valve regurgitation.   6. Thickening of the anterior and posterior mitral valve leaflets.    [id number] Boone Ho MD, MD Electronically signed on 3/19/2018 at   12:01:39 PM              *** Final ***                  BOONE HO   This document has been electronically signed. Mar 19 2018  8:39AM    < end of copied text >    RADIOLOGY        MEDICATIONS  (STANDING):  docusate sodium 100 milliGRAM(s) Oral three times a day  enoxaparin Injectable 40 milliGRAM(s) SubCutaneous daily  ferrous    sulfate 325 milliGRAM(s) Oral daily  folic acid 1 milliGRAM(s) Oral daily  multivitamin 1 Tablet(s) Oral daily  polyethylene glycol 3350 17 Gram(s) Oral daily      MEDICATIONS  (PRN):  acetaminophen   Tablet 975 milliGRAM(s) Oral every 8 hours PRN For Temp over 38.3 C (100.94 F)  aluminum hydroxide/magnesium hydroxide/simethicone Suspension 30 milliLiter(s) Oral four times a day PRN Indigestion  HYDROmorphone   Tablet 2 milliGRAM(s) Oral every 4 hours PRN Severe Pain (7 - 10)  magnesium hydroxide Suspension 30 milliLiter(s) Oral daily PRN Constipation  ondansetron Injectable 4 milliGRAM(s) IV Push every 6 hours PRN Nausea and/or Vomiting  oxyCODONE    IR 5 milliGRAM(s) Oral every 4 hours PRN Mild Pain  oxyCODONE    IR 10 milliGRAM(s) Oral every 4 hours PRN Moderate Pain  senna 2 Tablet(s) Oral at bedtime PRN Constipation HOSPITALIST PROGRESS NOTE    CHARANJIT GREENWOOD  [id number]  72yFemale    Patient is a 72y old  Female who presents with a chief complaint of fall (19 Mar 2018 06:21)      SUBJECTIVE:   Chart reviewed since last visit.  Patient seen and examined at bedside for left femur fracture.  Underwent Left Femur CRPP yesterday.  Denies any hip pain currently.  Denies any chest pain, dyspnea, cough or chills but feels cold  No flatus or BM yet, voiding on bedpan.  Hasn't mobilized yet      OBJECTIVE:  Vital Signs Last 24 Hrs  T(C): 37.8 (20 Mar 2018 07:43), Max: 38.1 (19 Mar 2018 23:13)  T(F): 100.1 (20 Mar 2018 07:43), Max: 100.5 (19 Mar 2018 23:13)  HR: 94 (20 Mar 2018 07:43) (70 - 101)  BP: 142/61 (20 Mar 2018 07:43) (134/63 - 179/81)   RR: 18 (20 Mar 2018 07:43) (14 - 23)  SpO2: 95% (20 Mar 2018 07:43) (90% - 98%)    PHYSICAL EXAMINATION  General: NAD[+]   Obese[+] Lying in bed  HEENT: AT/NC[]  PERRLA[]  EOMI[+]   Moist oral mucosa[+]  Pharyngeal exudates[]  NECK: Supple[+]  JVD[-] Akil neck well healed scar  CVS: RRR[+]  Irregular[]  S1+S2[+]   Murmur[]  RESP: Fair air entry bilaterally[+]   Clear sounds[+]   poor effort []  wheeze[]   Crackles[]  GI: Soft[+]  Nondistended[]   Nontender[+]   Bowel Sounds[+]  Mass[]   HSM[]   Ascites[]  : suprapubic tenderness[-]   CVA Tenderness[]   Boone[-]  MS: Left hip dressing C/D/I  CNS: AAOx3[+]    Grossly intact  INTEG: Skin is Warm[+]  dry[] Lesion[] Decubitus[]  PSYCH: Fair Mood, Affect    MONITOR:  CAPILLARY BLOOD GLUCOSE            I&O's Summary                          11.8   12.0  )-----------( 191      ( 20 Mar 2018 06:21 )             36.4     PT/INR - ( 19 Mar 2018 07:43 )   PT: 12.2 sec;   INR: 1.11 ratio         PTT - ( 19 Mar 2018 07:43 )  PTT:28.2 sec      142  |  107  |  11.0  ----------------------------<  134<H>  3.8   |  22.0  |  0.99    Ca    8.4<L>      20 Mar 2018 06:21  Phos  3.5       Mg     2.1         TPro  6.9  /  Alb  4.2  /  TBili  0.3<L>  /  DBili  x   /  AST  15  /  ALT  12  /  AlkPhos  67      CARDIAC MARKERS ( 19 Mar 2018 07:43 )  x     / <0.01 ng/mL / x     / x     / x          TTE:  < from: TTE Echo Complete w/Doppler (18 @ 08:39) >    EXAM:  ECHO TRANSTHORACIC COMP W DOPP      PROCEDURE DATE:  Mar 19 2018   .      INTERPRETATION:  REPORT:    TRANSTHORACIC ECHOCARDIOGRAM REPORT         Patient Name:   CHARANJIT GREENWOOD Patient Location: Inpatient  Medical Rec #:  [id number]   Accession #:     [id number]  Account #:                 Height:           68.9 in 175.0 cm  YOB: 1945  Weight:           178.6 lb 81.00 kg  Patient Age:    72 years   BSA:              1.97 m²  Patient Gender: F          BP:               150/59 mmHg       Date of Exam:        3/19/2018 8:39:30 AM  Sonographer:         Beatriz Hernandez  Referring Physician: Jeaneth Napoles MD    Procedure:     2D Echo/Doppler/Color Doppler Complete.  Indications:   Chronic ischemic heart disease, unspecified - I25.9  Diagnosis:     Nonrheumatic mitral (valve) insufficiency - I34.0  Study Details: Technically adequate study.         2D AND M-MODE MEASUREMENTS (normal ranges within parentheses):  Left                Normal    Aorta/Left           Normal  Ventricle:                    Atrium:  IVSd (2D):    0.97  (0.7-1.1) Aortic Root  2.90 cm (2.4-3.7)                cm              (2D):  LVPWd (2D):   1.03  (0.7-1.1) Left Atrium  3.71 cm (1.9-4.0)                cm              (2D):  LVIDd (2D):   3.84(3.4-5.7) LA Volume    22.4                cm              Index        ml/m²  LVIDs (2D):   2.52            Right Ventricle:                cm              TAPSE:           2.50 cm  LV FS (2D):   34.3  (>25%)                %  Relative Wall 0.54  (<0.42)  Thickness    LV SYSTOLIC FUNCTION BY 2D PLANIMETRY (MOD):  EF-Biplane: 69 %    LV DIASTOLIC FUNCTION:  MV Peak E: 0.88 m/s e', MV Della: 0.08 m/s  MV Peak A: 1.04 m/s E/e' Ratio: 10.74  E/A Ratio: 0.85    SPECTRAL DOPPLER ANALYSIS (where applicable):  Aortic Valve: AoV Max [place]:  AoV Peak PG:  AoV Mean P.0 mmHg    LVOT Vmax:  LVOT VTI: 0.225 m LVOT Diameter: 1.97 cm    AoV Area, Vmax:  AoV Area, VTI: 2.30 cm² AoV Area, Vmn: 2.40 cm²  Ao VTI: 0.298     PHYSICIAN INTERPRETATION:  Left Ventricle: The left ventricular internal cavity size is normal.  Global LV systolic function was normal. Left ventricular ejection   fraction, by visual estimation, is 65 to 70%. Spectral Doppler shows   impaired relaxation pattern of left ventricular myocardial filling (Grade   I diastolic dysfunction).  Right Ventricle: The right ventricular size is normal. RV systolic   function is normal.  Left Atrium: Normal left atrial size.  Right Atrium: Normal right atrial size.  Pericardium: There is no evidence of pericardial effusion.  Mitral Valve: Thickening of the anterior and posterior mitral valve   leaflets. Mild mitral valve regurgitation is seen.  Tricuspid Valve: Trivial tricuspid regurgitation is visualized. Adequate   TR velocity was not obtained to accurately assess RVSP.  Aortic Valve: No evidence of aortic valve regurgitation is seen.  Pulmonic Valve: Structurally normal pulmonic valve, with normal leaflet   excursion. Trace pulmonic valve regurgitation.  Aorta: The aortic root is normal in size and structure.  Pulmonary Artery: The main pulmonary artery is normal in size.  Venous: The inferior vena cava was normal sized, with respiratory size   variation greater than 50%.       Summary:   1. Left ventricular ejection fraction, by visual estimation, is 65 to   70%.   2. Normal global left ventricular systolic function.   3. Spectral Doppler shows impaired relaxation pattern of left   ventricular myocardial filling (Grade I diastolic dysfunction).   4. There is no evidence of pericardial effusion.   5. Mild mitral valve regurgitation.   6. Thickening of the anterior and posterior mitral valve leaflets.    [id number] Boone Ho MD, MD Electronically signed on 3/19/2018 at   12:01:39 PM              *** Final ***                  BOONE HO   This document has been electronically signed. Mar 19 2018  8:39AM    < end of copied text >    RADIOLOGY        MEDICATIONS  (STANDING):  docusate sodium 100 milliGRAM(s) Oral three times a day  enoxaparin Injectable 40 milliGRAM(s) SubCutaneous daily  ferrous    sulfate 325 milliGRAM(s) Oral daily  folic acid 1 milliGRAM(s) Oral daily  multivitamin 1 Tablet(s) Oral daily  polyethylene glycol 3350 17 Gram(s) Oral daily      MEDICATIONS  (PRN):  acetaminophen   Tablet 975 milliGRAM(s) Oral every 8 hours PRN For Temp over 38.3 C (100.94 F)  aluminum hydroxide/magnesium hydroxide/simethicone Suspension 30 milliLiter(s) Oral four times a day PRN Indigestion  HYDROmorphone   Tablet 2 milliGRAM(s) Oral every 4 hours PRN Severe Pain (7 - 10)  magnesium hydroxide Suspension 30 milliLiter(s) Oral daily PRN Constipation  ondansetron Injectable 4 milliGRAM(s) IV Push every 6 hours PRN Nausea and/or Vomiting  oxyCODONE    IR 5 milliGRAM(s) Oral every 4 hours PRN Mild Pain  oxyCODONE    IR 10 milliGRAM(s) Oral every 4 hours PRN Moderate Pain  senna 2 Tablet(s) Oral at bedtime PRN Constipation HOSPITALIST PROGRESS NOTE    CHARANJIT GREENWOOD  [id number]  72yFemale    Patient is a 72y old  Female who presents with a chief complaint of fall (19 Mar 2018 06:21)      SUBJECTIVE:   Chart reviewed since last visit.  Patient seen and examined at bedside for left femur fracture.  Underwent Left Femur CRPP yesterday.  Denies any hip pain currently.  Denies any chest pain, dyspnea, cough or chills but feels cold  No flatus or BM yet, voiding on bedpan without dysuria  Hasn't mobilized yet      OBJECTIVE:  Vital Signs Last 24 Hrs  T(C): 37.8 (20 Mar 2018 07:43), Max: 38.1 (19 Mar 2018 23:13)  T(F): 100.1 (20 Mar 2018 07:43), Max: 100.5 (19 Mar 2018 23:13)  HR: 94 (20 Mar 2018 07:43) (70 - 101)  BP: 142/61 (20 Mar 2018 07:43) (134/63 - 179/81)   RR: 18 (20 Mar 2018 07:43) (14 - 23)  SpO2: 95% (20 Mar 2018 07:43) (90% - 98%)    PHYSICAL EXAMINATION  General: NAD[+]   Obese[+] Lying in bed  HEENT: AT/NC[]  PERRLA[]  EOMI[+]   Moist oral mucosa[+]  Pharyngeal exudates[]  NECK: Supple[+]  JVD[-] Akil neck well healed scar  CVS: RRR[+]  Irregular[]  S1+S2[+]   Murmur[]  RESP: Fair air entry bilaterally[+]   Clear sounds[+]   poor effort []  wheeze[]   Crackles[]  GI: Soft[+]  Nondistended[]   Nontender[+]   Bowel Sounds[+]  Mass[]   HSM[]   Ascites[]  : suprapubic tenderness[-]   CVA Tenderness[]   Boone[-]  MS: Left hip dressing C/D/I  CNS: AAOx3[+]    Grossly intact  INTEG: Skin is Warm[+]  dry[] Lesion[] Decubitus[]  PSYCH: Fair Mood, Affect    MONITOR:  CAPILLARY BLOOD GLUCOSE            I&O's Summary                          11.8   12.0  )-----------( 191      ( 20 Mar 2018 06:21 )             36.4     PT/INR - ( 19 Mar 2018 07:43 )   PT: 12.2 sec;   INR: 1.11 ratio         PTT - ( 19 Mar 2018 07:43 )  PTT:28.2 sec      142  |  107  |  11.0  ----------------------------<  134<H>  3.8   |  22.0  |  0.99    Ca    8.4<L>      20 Mar 2018 06:21  Phos  3.5       Mg     2.1         TPro  6.9  /  Alb  4.2  /  TBili  0.3<L>  /  DBili  x   /  AST  15  /  ALT  12  /  AlkPhos  67      CARDIAC MARKERS ( 19 Mar 2018 07:43 )  x     / <0.01 ng/mL / x     / x     / x          TTE:  < from: TTE Echo Complete w/Doppler (18 @ 08:39) >    EXAM:  ECHO TRANSTHORACIC COMP W DOPP      PROCEDURE DATE:  Mar 19 2018   .      INTERPRETATION:  REPORT:    TRANSTHORACIC ECHOCARDIOGRAM REPORT         Patient Name:   CHARANJIT GREENWOOD Patient Location: Inpatient  Medical Rec #:  [id number]   Accession #:     [id number]  Account #:                 Height:           68.9 in 175.0 cm  YOB: 1945  Weight:           178.6 lb 81.00 kg  Patient Age:    72 years   BSA:              1.97 m²  Patient Gender: F          BP:               150/59 mmHg       Date of Exam:        3/19/2018 8:39:30 AM  Sonographer:         Beatriz Hernandez  Referring Physician: Jeaneth Napoles MD    Procedure:     2D Echo/Doppler/Color Doppler Complete.  Indications:   Chronic ischemic heart disease, unspecified - I25.9  Diagnosis:     Nonrheumatic mitral (valve) insufficiency - I34.0  Study Details: Technically adequate study.         2D AND M-MODE MEASUREMENTS (normal ranges within parentheses):  Left                Normal    Aorta/Left           Normal  Ventricle:                    Atrium:  IVSd (2D):    0.97  (0.7-1.1) Aortic Root  2.90 cm (2.4-3.7)                cm              (2D):  LVPWd (2D):   1.03  (0.7-1.1) Left Atrium  3.71 cm (1.9-4.0)                cm              (2D):  LVIDd (2D):   3.84(3.4-5.7) LA Volume    22.4                cm              Index        ml/m²  LVIDs (2D):   2.52            Right Ventricle:                cm              TAPSE:           2.50 cm  LV FS (2D):   34.3  (>25%)                %  Relative Wall 0.54  (<0.42)  Thickness    LV SYSTOLIC FUNCTION BY 2D PLANIMETRY (MOD):  EF-Biplane: 69 %    LV DIASTOLIC FUNCTION:  MV Peak E: 0.88 m/s e', MV Della: 0.08 m/s  MV Peak A: 1.04 m/s E/e' Ratio: 10.74  E/A Ratio: 0.85    SPECTRAL DOPPLER ANALYSIS (where applicable):  Aortic Valve: AoV Max [place]:  AoV Peak PG:  AoV Mean P.0 mmHg    LVOT Vmax:  LVOT VTI: 0.225 m LVOT Diameter: 1.97 cm    AoV Area, Vmax:  AoV Area, VTI: 2.30 cm² AoV Area, Vmn: 2.40 cm²  Ao VTI: 0.298     PHYSICIAN INTERPRETATION:  Left Ventricle: The left ventricular internal cavity size is normal.  Global LV systolic function was normal. Left ventricular ejection   fraction, by visual estimation, is 65 to 70%. Spectral Doppler shows   impaired relaxation pattern of left ventricular myocardial filling (Grade   I diastolic dysfunction).  Right Ventricle: The right ventricular size is normal. RV systolic   function is normal.  Left Atrium: Normal left atrial size.  Right Atrium: Normal right atrial size.  Pericardium: There is no evidence of pericardial effusion.  Mitral Valve: Thickening of the anterior and posterior mitral valve   leaflets. Mild mitral valve regurgitation is seen.  Tricuspid Valve: Trivial tricuspid regurgitation is visualized. Adequate   TR velocity was not obtained to accurately assess RVSP.  Aortic Valve: No evidence of aortic valve regurgitation is seen.  Pulmonic Valve: Structurally normal pulmonic valve, with normal leaflet   excursion. Trace pulmonic valve regurgitation.  Aorta: The aortic root is normal in size and structure.  Pulmonary Artery: The main pulmonary artery is normal in size.  Venous: The inferior vena cava was normal sized, with respiratory size   variation greater than 50%.       Summary:   1. Left ventricular ejection fraction, by visual estimation, is 65 to   70%.   2. Normal global left ventricular systolic function.   3. Spectral Doppler shows impaired relaxation pattern of left   ventricular myocardial filling (Grade I diastolic dysfunction).   4. There is no evidence of pericardial effusion.   5. Mild mitral valve regurgitation.   6. Thickening of the anterior and posterior mitral valve leaflets.    [id number] Boone Ho MD, MD Electronically signed on 3/19/2018 at   12:01:39 PM              *** Final ***                  BOONE HO   This document has been electronically signed. Mar 19 2018  8:39AM    < end of copied text >    RADIOLOGY        MEDICATIONS  (STANDING):  docusate sodium 100 milliGRAM(s) Oral three times a day  enoxaparin Injectable 40 milliGRAM(s) SubCutaneous daily  ferrous    sulfate 325 milliGRAM(s) Oral daily  folic acid 1 milliGRAM(s) Oral daily  multivitamin 1 Tablet(s) Oral daily  polyethylene glycol 3350 17 Gram(s) Oral daily      MEDICATIONS  (PRN):  acetaminophen   Tablet 975 milliGRAM(s) Oral every 8 hours PRN For Temp over 38.3 C (100.94 F)  aluminum hydroxide/magnesium hydroxide/simethicone Suspension 30 milliLiter(s) Oral four times a day PRN Indigestion  HYDROmorphone   Tablet 2 milliGRAM(s) Oral every 4 hours PRN Severe Pain (7 - 10)  magnesium hydroxide Suspension 30 milliLiter(s) Oral daily PRN Constipation  ondansetron Injectable 4 milliGRAM(s) IV Push every 6 hours PRN Nausea and/or Vomiting  oxyCODONE    IR 5 milliGRAM(s) Oral every 4 hours PRN Mild Pain  oxyCODONE    IR 10 milliGRAM(s) Oral every 4 hours PRN Moderate Pain  senna 2 Tablet(s) Oral at bedtime PRN Constipation

## 2018-03-20 NOTE — PROGRESS NOTE ADULT - SUBJECTIVE AND OBJECTIVE BOX
S: Patient denies CP/SOB.     TELEMETRY: sr    MEDICATIONS  (STANDING):  amLODIPine   Tablet 10 milliGRAM(s) Oral daily  aspirin  chewable 81 milliGRAM(s) Oral daily  docusate sodium 100 milliGRAM(s) Oral three times a day  enoxaparin Injectable 40 milliGRAM(s) SubCutaneous daily  ferrous    sulfate 325 milliGRAM(s) Oral daily  folic acid 1 milliGRAM(s) Oral daily  hydrALAZINE 50 milliGRAM(s) Oral three times a day  loratadine 10 milliGRAM(s) Oral daily  metoprolol     tartrate 100 milliGRAM(s) Oral two times a day  multivitamin 1 Tablet(s) Oral daily  polyethylene glycol 3350 17 Gram(s) Oral daily  simvastatin 20 milliGRAM(s) Oral at bedtime    MEDICATIONS  (PRN):  acetaminophen   Tablet 975 milliGRAM(s) Oral every 8 hours PRN For Temp over 38.3 C (100.94 F)  aluminum hydroxide/magnesium hydroxide/simethicone Suspension 30 milliLiter(s) Oral four times a day PRN Indigestion  HYDROmorphone   Tablet 2 milliGRAM(s) Oral every 4 hours PRN Severe Pain (7 - 10)  magnesium hydroxide Suspension 30 milliLiter(s) Oral daily PRN Constipation  ondansetron Injectable 4 milliGRAM(s) IV Push every 6 hours PRN Nausea and/or Vomiting  oxyCODONE    IR 5 milliGRAM(s) Oral every 4 hours PRN Mild Pain  oxyCODONE    IR 10 milliGRAM(s) Oral every 4 hours PRN Moderate Pain  senna 2 Tablet(s) Oral at bedtime PRN Constipation        Vital Signs Last 24 Hrs  T(C): 37.8 (20 Mar 2018 07:43), Max: 38.1 (19 Mar 2018 23:13)  T(F): 100.1 (20 Mar 2018 07:43), Max: 100.5 (19 Mar 2018 23:13)  HR: 94 (20 Mar 2018 07:43) (70 - 101)  BP: 142/61 (20 Mar 2018 07:43) (134/63 - 179/81)  BP(mean): --  RR: 18 (20 Mar 2018 07:43) (14 - 23)  SpO2: 95% (20 Mar 2018 07:43) (90% - 98%)        PHYSICAL EXAM:  Appearance: Normal, well nourished	  Neck: No JVD,   Cardiovascular: Normal S1 S2  Respiratory: Lungs clear to auscultation	  Gastrointestinal:  Soft, Non-tender, + BS, no bruits	  Neurologic: Grossly non-focal.  Extremities: No edema    LABS:                        11.8   12.0  )-----------( 191      ( 20 Mar 2018 06:21 )             36.4     03-20    142  |  107  |  11.0  ----------------------------<  134<H>  3.8   |  22.0  |  0.99    Ca    8.4<L>      20 Mar 2018 06:21  Phos  3.5     03-19  Mg     2.1     03-19    TPro  6.9  /  Alb  4.2  /  TBili  0.3<L>  /  DBili  x   /  AST  15  /  ALT  12  /  AlkPhos  67  03-19    CARDIAC MARKERS ( 19 Mar 2018 07:43 )  x     / <0.01 ng/mL / x     / x     / x          PT/INR - ( 19 Mar 2018 07:43 )   PT: 12.2 sec;   INR: 1.11 ratio         PTT - ( 19 Mar 2018 07:43 )  PTT:28.2 sec    I&O's Summary

## 2018-03-20 NOTE — PHYSICAL THERAPY INITIAL EVALUATION ADULT - MANUAL MUSCLE TESTING RESULTS, REHAB EVAL
except left hip flex 2/5, knee flex 2/5, knee ext 3-/5, ankle df 4+/5/no strength deficits were identified

## 2018-03-20 NOTE — CONSULT NOTE ADULT - SUBJECTIVE AND OBJECTIVE BOX
Patient is a 72y old  Female who presents with a chief complaint of fall (19 Mar 2018 06:21)      HPI:  71 y/o F pt with a pmh/o HLD CVA carotid artery stenosis dysphagia HTN who presents to the ED with 10/10, severe, constant, left sided, sharp groin and leg pain which occured s/p mechanical fall after tripping over slippers on carpet floor when transitioning from hard floor and was not alleviated by OTC medications. Pt was able to walk after fall and arrived in own vehicle with . Pt denies any preceding symptoms such as dizziness, palpitations, cp, diaphoresis, weakness, nausea, urinary or gi incontinence, HA, confusion, visual or speech changes, numbness, tingling, weakness, or having hit head. Pt reports previous abnormal stress test and CVA that affected after having endarterectomy however states repeat was normal. (19 Mar 2018 03:59)      PCP:     PT/OT EVALUATION:  BED MOBILITY:   TRANSFERS:   GAIT:   ADLS:     PAST MEDICAL & SURGICAL HISTORY:  Carotid artery disease, unspecified laterality  Hyperlipidemia, unspecified hyperlipidemia type  Cerebrovascular accident (CVA), unspecified mechanism  Esophageal dysphagia  Hypertension  S/P tubal ligation  S/P cholecystectomy  S/P cataract surgery: both eyes  Carotid artery disease: s/p carotid bypass right side      FAMILY HISTORY:  No pertinent family history in first degree relatives      SOCIAL HISTORY:  TOBACCO: denies history  ALCOHOL: denies abuse  IVDA: denies history    FUNCTIONAL, ENVIRONMENTAL HISTORY:  WORK HISTORY:   LIVES WITH:   HOME LAYOUT:   STAIRS TO ENTER:   STAIRS INSIDE:   FUNCTIONAL HISTORY: independent with ambulation and ADLs    Allergies    No Known Allergies    Intolerances    Biaxin (Other)      MEDICATIONS  (STANDING):  amLODIPine   Tablet 10 milliGRAM(s) Oral daily  aspirin  chewable 81 milliGRAM(s) Oral daily  docusate sodium 100 milliGRAM(s) Oral three times a day  enoxaparin Injectable 40 milliGRAM(s) SubCutaneous daily  ferrous    sulfate 325 milliGRAM(s) Oral daily  folic acid 1 milliGRAM(s) Oral daily  hydrALAZINE 50 milliGRAM(s) Oral three times a day  loratadine 10 milliGRAM(s) Oral daily  metoprolol     tartrate 100 milliGRAM(s) Oral two times a day  multivitamin 1 Tablet(s) Oral daily  polyethylene glycol 3350 17 Gram(s) Oral daily  simvastatin 20 milliGRAM(s) Oral at bedtime    MEDICATIONS  (PRN):  acetaminophen   Tablet 975 milliGRAM(s) Oral every 8 hours PRN For Temp over 38.3 C (100.94 F)  aluminum hydroxide/magnesium hydroxide/simethicone Suspension 30 milliLiter(s) Oral four times a day PRN Indigestion  HYDROmorphone   Tablet 2 milliGRAM(s) Oral every 4 hours PRN Severe Pain (7 - 10)  magnesium hydroxide Suspension 30 milliLiter(s) Oral daily PRN Constipation  ondansetron Injectable 4 milliGRAM(s) IV Push every 6 hours PRN Nausea and/or Vomiting  oxyCODONE    IR 5 milliGRAM(s) Oral every 4 hours PRN Mild Pain  oxyCODONE    IR 10 milliGRAM(s) Oral every 4 hours PRN Moderate Pain  senna 2 Tablet(s) Oral at bedtime PRN Constipation      REVIEW OF SYSTEMS:    CONSTITUTIONAL: No fever  EYES: No visual disturbances  ENMT:  No difficulty hearing, No throat pain  RESPIRATORY: No cough, No shortness of breath  CARDIOVASCULAR: No chest pain, No palpitations  GASTROINTESTINAL: No abdominal pain, No nausea, No vomiting, No diarrhea, No constipation  GENITOURINARY: No dysuria, No frequency, No incontinence  NEUROLOGICAL: No headaches, No dizziness, No loss of strength, No numbness  SKIN: No itching, No rashes  MUSCULOSKELETAL: No joint/muscle pain; No back pain  PSYCHIATRIC: No depression, No anxiety    Vital Signs Last 24 Hrs  T(C): 37.8 (20 Mar 2018 07:43), Max: 38.1 (19 Mar 2018 23:13)  T(F): 100.1 (20 Mar 2018 07:43), Max: 100.5 (19 Mar 2018 23:13)  HR: 94 (20 Mar 2018 07:43) (70 - 101)  BP: 142/61 (20 Mar 2018 07:43) (134/63 - 179/81)  BP(mean): --  RR: 18 (20 Mar 2018 07:43) (14 - 23)  SpO2: 95% (20 Mar 2018 07:43) (93% - 98%)    PHYSICAL EXAM:    GENERAL: NAD, well-groomed, well-developed  HEENT:  Atraumatic, normocephalic, conjunctiva clear, moist mucous membranes  HEART: Regular rate and rhythm, No murmurs  CHEST/LUNG: Clear to auscultation bilaterally, normal respiratory effort  ABDOMEN: Soft, Nontender, Nondistended, Bowel sounds present  EXTREMITIES:  2+ Peripheral Pulses, No edema  SKIN: No rashes or lesions  NERVOUS SYSTEM:  Alert & Oriented X3, speech intact  CNs II-XII grossly intact  Motor Strength 5/5 B/L upper and lower extremities  Sensation intact to light touch symmetrically  DTRs 2+ intact and symmetric  FUNCTIONAL EXAM:      LABS:                        11.8   12.0  )-----------( 191      ( 20 Mar 2018 06:21 )             36.4     03-    142  |  107  |  11.0  ----------------------------<  134<H>  3.8   |  22.0  |  0.99    Ca    8.4<L>      20 Mar 2018 06:21  Phos  3.5       Mg     2.1         TPro  6.9  /  Alb  4.2  /  TBili  0.3<L>  /  DBili  x   /  AST  15  /  ALT  12  /  AlkPhos  67      PT/INR - ( 19 Mar 2018 07:43 )   PT: 12.2 sec;   INR: 1.11 ratio         PTT - ( 19 Mar 2018 07:43 )  PTT:28.2 sec  Urinalysis Basic - ( 20 Mar 2018 13:20 )    Color: Yellow / Appearance: Clear / S.005 / pH: x  Gluc: x / Ketone: Negative  / Bili: Negative / Urobili: Negative mg/dL   Blood: x / Protein: 15 mg/dL / Nitrite: Negative   Leuk Esterase: Trace / RBC: 3-5 /HPF / WBC 3-5   Sq Epi: x / Non Sq Epi: Few / Bacteria: Few        RADIOLOGY & ADDITIONAL STUDIES:  3/19/18 TTE Summary:    1. Left ventricular ejection fraction, by visual estimation, is 65 to 70%.    2. Normal global left ventricular systolic function.    3. Spectral Doppler shows impaired relaxation pattern of left ventricular myocardial filling (Grade I diastolic dysfunction).    4. There is no evidence of pericardial effusion.    5. Mild mitral valve regurgitation.    6. Thickening of the anterior and posterior mitral valve leaflets.     3/19/18 Left Ankle XR Impression:   No evidence of fracture.    3/19/18 CT Pelvis IMPRESSION:   Impacted subcapital fracture of the left proximal femur which extends slightly distally into the transcervical region of the femoral neck.    3/18/18 Left Hip/Femur XR Impression:   Acute fracture of the left femoral neck.    3/18/18 Left Knee XR Impression:   Unremarkable exam.      ASSESSMENT & PLAN:  72F with functional deficits s/p closed reduction percutaneous pinning of left femur due to left femoral neck fracture s/p mechanical fall.  ~Medical management as per primary team  ~OT eval ordered  ~Rehab: Anticipate that patient may be able to make functional gains with daily PT/OT while in acute hospital that she will be able to be discharged to home with home care. Will follow along and monitor functional progress to keep rehab dispo updated. Patient is a 72y old  Female who presents with a chief complaint of fall (19 Mar 2018 06:21)      HPI:  71 yo RHD woman w/PMHx of HTN, HLD, CAD, and CVA with residual esophageal dysphagia who presented to [facility] on 3/18/18 s/p mechanical fall. She recalls slipping on her slippers when floor surface changed and fell onto her left side. Denies head trauma/LOC. She was able to walk after the fall with 's cane and arrived to [facility] ED in own vehicle, driven by . Denies syncope, CP, palpitations, headaches, or other symptoms prior to falling. Had a fall where she slipped due to footwear years ago but does not fall often.    She was found to have impacted subcapital fracture of the left proximal femur which extends slightly distally into the transcervical region of the femoral neck. On 3/19/18, she underwent uncomplicated closed reduction percutaneous pinning of left femur by Dr. Mayer. Post-op, she had an episode of dizziness with vomiting with improvement of symptoms post-emesis, which she attributes to the anesthesia as similar episode happened in the past.    Currently, she feels well and offers no complaints.      PCP: Dr. Celestin  Cardiologist: Dr. Dudley    PT/OT EVALUATION:  BED MOBILITY: min assist  TRANSFERS: sit-to-stand min assist  GAIT: 5' RW min assist (limited by lightheadedness/dizziness)  ADLS: eval pending    PAST MEDICAL & SURGICAL HISTORY:  Carotid artery disease, unspecified laterality  Hyperlipidemia, unspecified hyperlipidemia type  Cerebrovascular accident (CVA), unspecified mechanism  Esophageal dysphagia  Hypertension  S/P tubal ligation  S/P cholecystectomy  S/P cataract surgery: both eyes  Carotid artery disease: s/p carotid bypass right side      FAMILY HISTORY:  No pertinent family history in first degree relatives      SOCIAL HISTORY:  TOBACCO: denies history  ALCOHOL: denies abuse  IVDA: denies history    FUNCTIONAL, ENVIRONMENTAL HISTORY:  WORK HISTORY: Retired   LIVES WITH: spouse ( works part time as school  but is taking time off to help at home and can do so indefinitely)  HOME LAYOUT: 1-story private home  STAIRS TO ENTER: 2-3 (+HR)  STAIRS INSIDE: none  FUNCTIONAL HISTORY: independent without AD for ambulation, transfers, and ADLs; +    Allergies    No Known Allergies    Intolerances    Biaxin (Other)      MEDICATIONS  (STANDING):  amLODIPine   Tablet 10 milliGRAM(s) Oral daily  aspirin  chewable 81 milliGRAM(s) Oral daily  docusate sodium 100 milliGRAM(s) Oral three times a day  enoxaparin Injectable 40 milliGRAM(s) SubCutaneous daily  ferrous    sulfate 325 milliGRAM(s) Oral daily  folic acid 1 milliGRAM(s) Oral daily  hydrALAZINE 50 milliGRAM(s) Oral three times a day  loratadine 10 milliGRAM(s) Oral daily  metoprolol     tartrate 100 milliGRAM(s) Oral two times a day  multivitamin 1 Tablet(s) Oral daily  polyethylene glycol 3350 17 Gram(s) Oral daily  simvastatin 20 milliGRAM(s) Oral at bedtime    MEDICATIONS  (PRN):  acetaminophen   Tablet 975 milliGRAM(s) Oral every 8 hours PRN For Temp over 38.3 C (100.94 F)  aluminum hydroxide/magnesium hydroxide/simethicone Suspension 30 milliLiter(s) Oral four times a day PRN Indigestion  HYDROmorphone   Tablet 2 milliGRAM(s) Oral every 4 hours PRN Severe Pain (7 - 10)  magnesium hydroxide Suspension 30 milliLiter(s) Oral daily PRN Constipation  ondansetron Injectable 4 milliGRAM(s) IV Push every 6 hours PRN Nausea and/or Vomiting  oxyCODONE    IR 5 milliGRAM(s) Oral every 4 hours PRN Mild Pain  oxyCODONE    IR 10 milliGRAM(s) Oral every 4 hours PRN Moderate Pain  senna 2 Tablet(s) Oral at bedtime PRN Constipation      REVIEW OF SYSTEMS:    CONSTITUTIONAL: No fever  EYES: No visual disturbances  ENMT:  No difficulty hearing, No throat pain  RESPIRATORY: No cough, No shortness of breath  CARDIOVASCULAR: No chest pain, No palpitations  GASTROINTESTINAL: No abdominal pain, No nausea, No vomiting, No diarrhea, No constipation  GENITOURINARY: No dysuria, No frequency, No incontinence  NEUROLOGICAL: +Lightheadedness/dizziness when standing up with episode of vomiting but feels fine now; No headaches, No loss of strength, No numbness  SKIN: No itching, No rashes  MUSCULOSKELETAL: +Incisional pain with movement, No joint/muscle pain; No back pain  PSYCHIATRIC: No depression, No anxiety    Vital Signs Last 24 Hrs  T(C): 37.8 (20 Mar 2018 07:43), Max: 38.1 (19 Mar 2018 23:13)  T(F): 100.1 (20 Mar 2018 07:43), Max: 100.5 (19 Mar 2018 23:13)  HR: 94 (20 Mar 2018 07:43) (70 - 101)  BP: 142/61 (20 Mar 2018 07:43) (134/63 - 179/81)  BP(mean): --  RR: 18 (20 Mar 2018 07:43) (14 - 23)  SpO2: 95% (20 Mar 2018 07:43) (93% - 98%)    PHYSICAL EXAM:    GENERAL: NAD, well-groomed, well-developed  HEENT:  Atraumatic, normocephalic  HEART: S1/S2  CHEST/LUNG: Normal respiratory effort  ABDOMEN: Soft, Nontender  EXTREMITIES:  2+ Peripheral Pulses, No edema  SKIN: Left hip surgical dressing c/d/i  NERVOUS SYSTEM:  Alert & Oriented X3, speech intact  CNs II-XII grossly intact  Motor Strength 5/5 B/L upper and lower extremities except left hip flexion <3/5  Sensation intact to light touch symmetrically  DTRs 2+ intact and symmetric    LABS:                        11.8   12.0  )-----------( 191      ( 20 Mar 2018 06:21 )             36.4     03-20    142  |  107  |  11.0  ----------------------------<  134<H>  3.8   |  22.0  |  0.99    Ca    8.4<L>      20 Mar 2018 06:21  Phos  3.5     -19  Mg     2.1     -19    TPro  6.9  /  Alb  4.2  /  TBili  0.3<L>  /  DBili  x   /  AST  15  /  ALT  12  /  AlkPhos  67  03-19    PT/INR - ( 19 Mar 2018 07:43 )   PT: 12.2 sec;   INR: 1.11 ratio         PTT - ( 19 Mar 2018 07:43 )  PTT:28.2 sec  Urinalysis Basic - ( 20 Mar 2018 13:20 )    Color: Yellow / Appearance: Clear / S.005 / pH: x  Gluc: x / Ketone: Negative  / Bili: Negative / Urobili: Negative mg/dL   Blood: x / Protein: 15 mg/dL / Nitrite: Negative   Leuk Esterase: Trace / RBC: 3-5 /HPF / WBC 3-5   Sq Epi: x / Non Sq Epi: Few / Bacteria: Few        RADIOLOGY & ADDITIONAL STUDIES:  3/19/18 TTE Summary:    1. Left ventricular ejection fraction, by visual estimation, is 65 to 70%.    2. Normal global left ventricular systolic function.    3. Spectral Doppler shows impaired relaxation pattern of left ventricular myocardial filling (Grade I diastolic dysfunction).    4. There is no evidence of pericardial effusion.    5. Mild mitral valve regurgitation.    6. Thickening of the anterior and posterior mitral valve leaflets.     3/19/18 Left Ankle XR Impression:   No evidence of fracture.    3/19/18 CT Pelvis IMPRESSION:   Impacted subcapital fracture of the left proximal femur which extends slightly distally into the transcervical region of the femoral neck.    3/18/18 Left Hip/Femur XR Impression:   Acute fracture of the left femoral neck.    3/18/18 Left Knee XR Impression:   Unremarkable exam.      ASSESSMENT & PLAN:  72F with functional deficits s/p closed reduction percutaneous pinning of left femur due to left femoral neck fracture s/p mechanical fall.  ~Medical management as per primary team  ~OT eval ordered, would benefit from daily PT/OT while at [facility]  ~Rehab: Will follow along and monitor functional progress to update rehab dispo recommendation. Possible that patient makes enough functional gain while in hospital for discharge to home with home care; if not, would be acute rehab candidate, as she can tolerate and would benefit from 3 hours PT/OT per day. cc: rehab evaluation : Patient is a 72y old  Female who presents with a chief complaint of fall       HPI:  73 yo RHD woman w/PMHx of HTN, HLD, CAD, and CVA with residual esophageal dysphagia who presented to [facility] on 3/18/18 s/p mechanical fall. She recalls slipping on her slippers when floor surface changed and fell onto her left side. Denies head trauma/LOC. She was able to walk after the fall with 's cane and arrived to [facility] ED in own vehicle, driven by . Denies syncope, CP, palpitations, headaches, or other symptoms prior to falling. Had a fall where she slipped due to footwear years ago but does not fall often.    She was found to have impacted subcapital fracture of the left proximal femur which extends slightly distally into the transcervical region of the femoral neck. On 3/19/18, she underwent uncomplicated closed reduction percutaneous pinning of left femur by Dr. Mayer. Post-op, she had an episode of dizziness with vomiting with improvement of symptoms post-emesis, which she attributes to the anesthesia as similar episode happened in the past.    Currently, she feels well and offers no complaints.      PCP: Dr. Celestin  Cardiologist: Dr. Dudley    PT/OT EVALUATION:  BED MOBILITY: min assist  TRANSFERS: sit-to-stand min assist  GAIT: 5' RW min assist (limited by lightheadedness/dizziness)  ADLS: eval pending    PAST MEDICAL & SURGICAL HISTORY:  Carotid artery disease, unspecified laterality  Hyperlipidemia, unspecified hyperlipidemia type  Cerebrovascular accident (CVA), unspecified mechanism  Esophageal dysphagia  Hypertension  S/P tubal ligation  S/P cholecystectomy  S/P cataract surgery: both eyes  Carotid artery disease: s/p carotid bypass right side      FAMILY HISTORY:  No pertinent family history in first degree relatives      SOCIAL HISTORY:  TOBACCO: denies history  ALCOHOL: denies abuse  IVDA: denies history    FUNCTIONAL, ENVIRONMENTAL HISTORY:  WORK HISTORY: Retired   LIVES WITH: spouse ( works part time as school  but is taking time off to help at home and can do so indefinitely)  HOME LAYOUT: 1-story private home  STAIRS TO ENTER: 2-3 (+HR)  STAIRS INSIDE: none  FUNCTIONAL HISTORY: independent without AD for ambulation, transfers, and ADLs; +    Allergies    No Known Allergies    Intolerances    Biaxin (Other)      MEDICATIONS  (STANDING):  amLODIPine   Tablet 10 milliGRAM(s) Oral daily  aspirin  chewable 81 milliGRAM(s) Oral daily  docusate sodium 100 milliGRAM(s) Oral three times a day  enoxaparin Injectable 40 milliGRAM(s) SubCutaneous daily  ferrous    sulfate 325 milliGRAM(s) Oral daily  folic acid 1 milliGRAM(s) Oral daily  hydrALAZINE 50 milliGRAM(s) Oral three times a day  loratadine 10 milliGRAM(s) Oral daily  metoprolol     tartrate 100 milliGRAM(s) Oral two times a day  multivitamin 1 Tablet(s) Oral daily  polyethylene glycol 3350 17 Gram(s) Oral daily  simvastatin 20 milliGRAM(s) Oral at bedtime    MEDICATIONS  (PRN):  acetaminophen   Tablet 975 milliGRAM(s) Oral every 8 hours PRN For Temp over 38.3 C (100.94 F)  aluminum hydroxide/magnesium hydroxide/simethicone Suspension 30 milliLiter(s) Oral four times a day PRN Indigestion  HYDROmorphone   Tablet 2 milliGRAM(s) Oral every 4 hours PRN Severe Pain (7 - 10)  magnesium hydroxide Suspension 30 milliLiter(s) Oral daily PRN Constipation  ondansetron Injectable 4 milliGRAM(s) IV Push every 6 hours PRN Nausea and/or Vomiting  oxyCODONE    IR 5 milliGRAM(s) Oral every 4 hours PRN Mild Pain  oxyCODONE    IR 10 milliGRAM(s) Oral every 4 hours PRN Moderate Pain  senna 2 Tablet(s) Oral at bedtime PRN Constipation      REVIEW OF SYSTEMS:    CONSTITUTIONAL: No fever  EYES: No visual disturbances  ENMT:  No difficulty hearing, No throat pain  RESPIRATORY: No cough, No shortness of breath  CARDIOVASCULAR: No chest pain, No palpitations  GASTROINTESTINAL: No abdominal pain, No nausea, No vomiting, No diarrhea, No constipation  GENITOURINARY: No dysuria, No frequency, No incontinence  NEUROLOGICAL: +Lightheadedness/dizziness when standing up with episode of vomiting but feels fine now; No headaches, No loss of strength, No numbness  SKIN: No itching, No rashes  MUSCULOSKELETAL: +Incisional pain with movement, No joint/muscle pain; No back pain  PSYCHIATRIC: No depression, No anxiety    Vital Signs Last 24 Hrs  T(C): 37.8 (20 Mar 2018 07:43), Max: 38.1 (19 Mar 2018 23:13)  T(F): 100.1 (20 Mar 2018 07:43), Max: 100.5 (19 Mar 2018 23:13)  HR: 94 (20 Mar 2018 07:43) (70 - 101)  BP: 142/61 (20 Mar 2018 07:43) (134/63 - 179/81)  BP(mean): --  RR: 18 (20 Mar 2018 07:43) (14 - 23)  SpO2: 95% (20 Mar 2018 07:43) (93% - 98%)    PHYSICAL EXAM:    GENERAL: NAD, well-groomed, well-developed  HEENT:  Atraumatic, normocephalic  HEART: S1/S2  CHEST/LUNG: Normal respiratory effort  ABDOMEN: Soft, Nontender  EXTREMITIES:  2+ Peripheral Pulses, No edema  SKIN: Left hip surgical dressing c/d/i  NERVOUS SYSTEM:  Alert & Oriented X3, speech intact  CNs II-XII grossly intact  Motor Strength 5/5 B/L upper and lower extremities except left hip flexion <3/5  Sensation intact to light touch symmetrically  DTRs 2+ intact and symmetric    LABS:                        11.8   12.0  )-----------( 191      ( 20 Mar 2018 06:21 )             36.4     03-20    142  |  107  |  11.0  ----------------------------<  134<H>  3.8   |  22.0  |  0.99    Ca    8.4<L>      20 Mar 2018 06:21  Phos  3.5     -19  Mg     2.1     -19    TPro  6.9  /  Alb  4.2  /  TBili  0.3<L>  /  DBili  x   /  AST  15  /  ALT  12  /  AlkPhos  67  03-19    PT/INR - ( 19 Mar 2018 07:43 )   PT: 12.2 sec;   INR: 1.11 ratio         PTT - ( 19 Mar 2018 07:43 )  PTT:28.2 sec  Urinalysis Basic - ( 20 Mar 2018 13:20 )    Color: Yellow / Appearance: Clear / S.005 / pH: x  Gluc: x / Ketone: Negative  / Bili: Negative / Urobili: Negative mg/dL   Blood: x / Protein: 15 mg/dL / Nitrite: Negative   Leuk Esterase: Trace / RBC: 3-5 /HPF / WBC 3-5   Sq Epi: x / Non Sq Epi: Few / Bacteria: Few        RADIOLOGY & ADDITIONAL STUDIES:  3/19/18 TTE Summary:    1. Left ventricular ejection fraction, by visual estimation, is 65 to 70%.    2. Normal global left ventricular systolic function.    3. Spectral Doppler shows impaired relaxation pattern of left ventricular myocardial filling (Grade I diastolic dysfunction).    4. There is no evidence of pericardial effusion.    5. Mild mitral valve regurgitation.    6. Thickening of the anterior and posterior mitral valve leaflets.     3/19/18 Left Ankle XR Impression:   No evidence of fracture.    3/19/18 CT Pelvis IMPRESSION:   Impacted subcapital fracture of the left proximal femur which extends slightly distally into the transcervical region of the femoral neck.    3/18/18 Left Hip/Femur XR Impression:   Acute fracture of the left femoral neck.    3/18/18 Left Knee XR Impression:   Unremarkable exam.      ASSESSMENT & PLAN:  72F with functional deficits s/p closed reduction percutaneous pinning of left femur due to left femoral neck fracture s/p mechanical fall.  ~Medical management as per primary team  ~OT eval ordered, would benefit from daily PT/OT while at [facility]  ~Rehab: Will follow along and monitor functional progress to update rehab dispo recommendation. Possible that patient makes enough functional gain while in hospital for discharge to home with home care; if not, would be acute rehab candidate, as she can tolerate and would benefit from 3 hours PT/OT per day. cc: rehab evaluation : Patient is a 72y old  Female who presents with a chief complaint of fall       HPI:  71 yo RHD woman w/PMHx of HTN, HLD, CAD, and CVA with residual esophageal dysphagia who presented to [facility] on 3/18/18 s/p mechanical fall. She recalls slipping on her slippers when floor surface changed and fell onto her left side. Denies head trauma/LOC. She was able to walk after the fall with 's cane and arrived to [facility] ED in own vehicle, driven by . Denies syncope, CP, palpitations, headaches, or other symptoms prior to falling. Had a fall where she slipped due to footwear years ago but does not fall often.    She was found to have impacted subcapital fracture of the left proximal femur which extends slightly distally into the transcervical region of the femoral neck. On 3/19/18, she underwent uncomplicated closed reduction percutaneous pinning of left femur by Dr. Mayer. Post-op, she had an episode of dizziness with vomiting with improvement of symptoms post-emesis, which she attributes to the anesthesia as similar episode happened in the past.    Currently, she feels well and offers no complaints.      PCP: Dr. Celestin  Cardiologist: Dr. Dudley    PT/OT EVALUATION:  BED MOBILITY: min assist  TRANSFERS: sit-to-stand min assist  GAIT: 5' RW min assist (limited by lightheadedness/dizziness)  ADLS: eval pending    PAST MEDICAL & SURGICAL HISTORY:  Carotid artery disease, unspecified laterality  Hyperlipidemia, unspecified hyperlipidemia type  Cerebrovascular accident (CVA), unspecified mechanism  Esophageal dysphagia  Hypertension  S/P tubal ligation  S/P cholecystectomy  S/P cataract surgery: both eyes  Carotid artery disease: s/p carotid bypass right side      FAMILY HISTORY:  No pertinent family history in first degree relatives      SOCIAL HISTORY:  TOBACCO: denies history  ALCOHOL: denies abuse  IVDA: denies history    FUNCTIONAL, ENVIRONMENTAL HISTORY:  WORK HISTORY: Retired   LIVES WITH: spouse ( works part time as school  but is taking time off to help at home and can do so indefinitely)  HOME LAYOUT: 1-story private home  STAIRS TO ENTER: 2-3 (+HR)  STAIRS INSIDE: none  FUNCTIONAL HISTORY: independent without AD for ambulation, transfers, and ADLs; +    Allergies    No Known Allergies    Intolerances    Biaxin (Other)      MEDICATIONS  (STANDING):  amLODIPine   Tablet 10 milliGRAM(s) Oral daily  aspirin  chewable 81 milliGRAM(s) Oral daily  docusate sodium 100 milliGRAM(s) Oral three times a day  enoxaparin Injectable 40 milliGRAM(s) SubCutaneous daily  ferrous    sulfate 325 milliGRAM(s) Oral daily  folic acid 1 milliGRAM(s) Oral daily  hydrALAZINE 50 milliGRAM(s) Oral three times a day  loratadine 10 milliGRAM(s) Oral daily  metoprolol     tartrate 100 milliGRAM(s) Oral two times a day  multivitamin 1 Tablet(s) Oral daily  polyethylene glycol 3350 17 Gram(s) Oral daily  simvastatin 20 milliGRAM(s) Oral at bedtime    MEDICATIONS  (PRN):  acetaminophen   Tablet 975 milliGRAM(s) Oral every 8 hours PRN For Temp over 38.3 C (100.94 F)  aluminum hydroxide/magnesium hydroxide/simethicone Suspension 30 milliLiter(s) Oral four times a day PRN Indigestion  HYDROmorphone   Tablet 2 milliGRAM(s) Oral every 4 hours PRN Severe Pain (7 - 10)  magnesium hydroxide Suspension 30 milliLiter(s) Oral daily PRN Constipation  ondansetron Injectable 4 milliGRAM(s) IV Push every 6 hours PRN Nausea and/or Vomiting  oxyCODONE    IR 5 milliGRAM(s) Oral every 4 hours PRN Mild Pain  oxyCODONE    IR 10 milliGRAM(s) Oral every 4 hours PRN Moderate Pain  senna 2 Tablet(s) Oral at bedtime PRN Constipation      REVIEW OF SYSTEMS:    CONSTITUTIONAL: No fever  EYES: No visual disturbances  ENMT:  No difficulty hearing, No throat pain  RESPIRATORY: No cough, No shortness of breath  CARDIOVASCULAR: No chest pain, No palpitations  GASTROINTESTINAL: No abdominal pain, No nausea, No vomiting, No diarrhea, No constipation  GENITOURINARY: No dysuria, No frequency, No incontinence  NEUROLOGICAL: +Lightheadedness/dizziness when standing up with episode of vomiting but feels fine now; No headaches, No loss of strength, No numbness  SKIN: No itching, No rashes  MUSCULOSKELETAL: +Incisional pain with movement, No joint/muscle pain; No back pain  PSYCHIATRIC: No depression, No anxiety    Vital Signs Last 24 Hrs  T(C): 37.8 (20 Mar 2018 07:43), Max: 38.1 (19 Mar 2018 23:13)  T(F): 100.1 (20 Mar 2018 07:43), Max: 100.5 (19 Mar 2018 23:13)  HR: 94 (20 Mar 2018 07:43) (70 - 101)  BP: 142/61 (20 Mar 2018 07:43) (134/63 - 179/81)  BP(mean): --  RR: 18 (20 Mar 2018 07:43) (14 - 23)  SpO2: 95% (20 Mar 2018 07:43) (93% - 98%)    PHYSICAL EXAM:    GENERAL: NAD, well-groomed, well-developed  HEENT:  Atraumatic, normocephalic  HEART: S1/S2  CHEST/LUNG: Normal respiratory effort  ABDOMEN: Soft, Nontender  EXTREMITIES:  2+ Peripheral Pulses, No edema  SKIN: Left hip surgical dressing c/d/i  NERVOUS SYSTEM:  Alert & Oriented X3, speech intact  CNs II-XII grossly intact  Motor Strength 5/5 B/L upper and lower extremities except left hip flexion <3/5  Sensation intact to light touch symmetrically  DTRs 2+ intact and symmetric    LABS:                        11.8   12.0  )-----------( 191      ( 20 Mar 2018 06:21 )             36.4     03-20    142  |  107  |  11.0  ----------------------------<  134<H>  3.8   |  22.0  |  0.99    Ca    8.4<L>      20 Mar 2018 06:21  Phos  3.5     -19  Mg     2.1     -19    TPro  6.9  /  Alb  4.2  /  TBili  0.3<L>  /  DBili  x   /  AST  15  /  ALT  12  /  AlkPhos  67  03-19    PT/INR - ( 19 Mar 2018 07:43 )   PT: 12.2 sec;   INR: 1.11 ratio         PTT - ( 19 Mar 2018 07:43 )  PTT:28.2 sec  Urinalysis Basic - ( 20 Mar 2018 13:20 )    Color: Yellow / Appearance: Clear / S.005 / pH: x  Gluc: x / Ketone: Negative  / Bili: Negative / Urobili: Negative mg/dL   Blood: x / Protein: 15 mg/dL / Nitrite: Negative   Leuk Esterase: Trace / RBC: 3-5 /HPF / WBC 3-5   Sq Epi: x / Non Sq Epi: Few / Bacteria: Few        RADIOLOGY & ADDITIONAL STUDIES:  3/19/18 TTE Summary:    1. Left ventricular ejection fraction, by visual estimation, is 65 to 70%.    2. Normal global left ventricular systolic function.    3. Spectral Doppler shows impaired relaxation pattern of left ventricular myocardial filling (Grade I diastolic dysfunction).    4. There is no evidence of pericardial effusion.    5. Mild mitral valve regurgitation.    6. Thickening of the anterior and posterior mitral valve leaflets.     3/19/18 Left Ankle XR Impression:   No evidence of fracture.    3/19/18 CT Pelvis IMPRESSION:   Impacted subcapital fracture of the left proximal femur which extends slightly distally into the transcervical region of the femoral neck.    3/18/18 Left Hip/Femur XR Impression:   Acute fracture of the left femoral neck.    3/18/18 Left Knee XR Impression:   Unremarkable exam.      ASSESSMENT & PLAN:  72F with functional deficits s/p closed reduction percutaneous pinning of left femur due to left femoral neck fracture s/p mechanical fall.  ~Medical management as per primary team  ~OT eval ordered, would benefit from daily PT/OT while at [facility]  ~Rehab: Will follow along and monitor functional progress to update rehab dispo recommendation.

## 2018-03-21 ENCOUNTER — TRANSCRIPTION ENCOUNTER (OUTPATIENT)
Age: 73
End: 2018-03-21

## 2018-03-21 LAB
CULTURE RESULTS: SIGNIFICANT CHANGE UP
HCT VFR BLD CALC: 34.3 % — LOW (ref 37–47)
HGB BLD-MCNC: 11.1 G/DL — LOW (ref 12–16)
MCHC RBC-ENTMCNC: 28.2 PG — SIGNIFICANT CHANGE UP (ref 27–31)
MCHC RBC-ENTMCNC: 32.4 G/DL — SIGNIFICANT CHANGE UP (ref 32–36)
MCV RBC AUTO: 87.3 FL — SIGNIFICANT CHANGE UP (ref 81–99)
PLATELET # BLD AUTO: 172 K/UL — SIGNIFICANT CHANGE UP (ref 150–400)
RBC # BLD: 3.93 M/UL — LOW (ref 4.4–5.2)
RBC # FLD: 13.2 % — SIGNIFICANT CHANGE UP (ref 11–15.6)
SPECIMEN SOURCE: SIGNIFICANT CHANGE UP
WBC # BLD: 9.3 K/UL — SIGNIFICANT CHANGE UP (ref 4.8–10.8)
WBC # FLD AUTO: 9.3 K/UL — SIGNIFICANT CHANGE UP (ref 4.8–10.8)

## 2018-03-21 PROCEDURE — 99233 SBSQ HOSP IP/OBS HIGH 50: CPT

## 2018-03-21 PROCEDURE — 99232 SBSQ HOSP IP/OBS MODERATE 35: CPT | Mod: GC

## 2018-03-21 RX ORDER — HYDRALAZINE HCL 50 MG
50 TABLET ORAL EVERY 8 HOURS
Qty: 0 | Refills: 0 | Status: DISCONTINUED | OUTPATIENT
Start: 2018-03-21 | End: 2018-03-22

## 2018-03-21 RX ORDER — ACETAMINOPHEN 500 MG
650 TABLET ORAL EVERY 6 HOURS
Qty: 0 | Refills: 0 | Status: DISCONTINUED | OUTPATIENT
Start: 2018-03-21 | End: 2018-03-22

## 2018-03-21 RX ADMIN — SIMVASTATIN 20 MILLIGRAM(S): 20 TABLET, FILM COATED ORAL at 22:23

## 2018-03-21 RX ADMIN — Medication 100 MILLIGRAM(S): at 17:21

## 2018-03-21 RX ADMIN — Medication 81 MILLIGRAM(S): at 12:01

## 2018-03-21 RX ADMIN — AMLODIPINE BESYLATE 10 MILLIGRAM(S): 2.5 TABLET ORAL at 05:56

## 2018-03-21 RX ADMIN — Medication 100 MILLIGRAM(S): at 12:01

## 2018-03-21 RX ADMIN — Medication 100 MILLIGRAM(S): at 05:56

## 2018-03-21 RX ADMIN — ENOXAPARIN SODIUM 40 MILLIGRAM(S): 100 INJECTION SUBCUTANEOUS at 12:01

## 2018-03-21 RX ADMIN — Medication 1 TABLET(S): at 12:01

## 2018-03-21 RX ADMIN — Medication 100 MILLIGRAM(S): at 05:55

## 2018-03-21 RX ADMIN — Medication 325 MILLIGRAM(S): at 12:01

## 2018-03-21 RX ADMIN — Medication 650 MILLIGRAM(S): at 20:07

## 2018-03-21 RX ADMIN — Medication 50 MILLIGRAM(S): at 05:55

## 2018-03-21 RX ADMIN — Medication 1 MILLIGRAM(S): at 12:01

## 2018-03-21 RX ADMIN — Medication 650 MILLIGRAM(S): at 21:07

## 2018-03-21 NOTE — DISCHARGE NOTE ADULT - CARE PROVIDER_API CALL
Joe Mayer), Orthopaedic Surgery  [address] [place] [address]  Phone: [phone]  Fax: [phone]    Alisa Beal), Cardiovascular Disease; Internal Medicine; Nuclear Cardiology  [facility] [place] [address]  Phone: [phone]  Fax: [phone]    primary care,   Phone: (   )    -  Fax: (   )    -

## 2018-03-21 NOTE — DISCHARGE NOTE ADULT - SECONDARY DIAGNOSIS.
Carotid artery disease Essential hypertension Esophageal dysphagia Hyperlipidemia, unspecified hyperlipidemia type Leukocytosis, unspecified type

## 2018-03-21 NOTE — OCCUPATIONAL THERAPY INITIAL EVALUATION ADULT - ADDITIONAL COMMENTS
Pt lives in single level private home with 2 SANDRA and no steps inside. Bathroom has tub with curtains with 2 grab bars. Pt does not own any DME. Pt is right handed. Pt drives. Pt's  is retired and drives; pt states  is able and available to assist upon discharge if needed.

## 2018-03-21 NOTE — DISCHARGE NOTE ADULT - ADDITIONAL INSTRUCTIONS
The patient will be seen in the office between 2-3 weeks for wound check.  Patient may remove dressing on 3/29/18. The patient will contact the office if the wound becomes red, has increasing pain, develops bleeding or discharge, an injury occurs, or has other concerns. The patient will continue LOVENOX while inpatient and then begin ASPIRIN when discharged for DVTP. The patient will take Oxycodone and Tylenol for pain control and titrate according to prescription and patient needs. The patient is weight bearing as tolerated on the left lower extremity. The patient is recommended to elevated the affected extremity to reduce swelling.

## 2018-03-21 NOTE — DISCHARGE NOTE ADULT - MEDICATION SUMMARY - MEDICATIONS TO TAKE
I will START or STAY ON the medications listed below when I get home from the hospital:    Rolling walker   -- ICD 10 - S72.90XA  -- Indication: For Closed fracture of femur    commode  -- ICD 10 - S72.90XA  -- Indication: For Closed fracture of femur    acetaminophen 325 mg oral tablet  -- 2 tab(s) by mouth every 6 hours, As needed, pain  -- Indication: For Closed fracture of femur    aspirin 325 mg oral delayed release tablet  -- 1 tab(s) by mouth 2 times a day   -- Swallow whole.  Do not crush.  Take with food or milk.    -- Indication: For DVT PPX for 4 weeks     Claritin 10 mg oral tablet  -- 1 tab(s) by mouth once a day  -- Indication: For Home medication    simvastatin 20 mg oral tablet  -- 1 tab(s) by mouth once a day (at bedtime)  -- Indication: For Hld    metoprolol tartrate 100 mg oral tablet  -- 1 tab(s) by mouth 2 times a day  -- Indication: For Htn    amLODIPine 10 mg oral tablet  -- 1 tab(s) by mouth once a day  -- Indication: For Htn    NexIUM 40 mg oral delayed release capsule  -- 1 cap(s) by mouth once a day  -- Indication: For Prophylactic measure    hydrALAZINE 50 mg oral tablet  -- 50 milligram(s) by mouth 3 times a day  -- Indication: For Htn

## 2018-03-21 NOTE — DISCHARGE NOTE ADULT - HOSPITAL COURSE
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 Pt was chin by PT and acute rehab, clear by PT to go home and patient wishes to go home. Denied chest pain, SOB, abd. pain, nausea and vomiting, urinary problem,      Problem/Plan - 1:  ·  Problem: Closed fracture of neck of left femur, initial encounter.  Plan: appreciate ortho input, s/p  CRPP left femur by Dr Mayer on 03/19/18,  discussed with Ortho PA - clear to discharge home, suggest aspirin 325 bid for 4 weeks for DVT PPX  clear by PT - home with home care      Problem/Plan - 2:  ·  Problem: Essential hypertension.  Plan: Resume home antihypertensives.      Problem/Plan - 3:  ·  Problem: Leukocytosis, unspecified type.  Plan: resolved  xray negative, u/a negative   afebrile today.      Problem/Plan - 4:  ·  Problem: Esophageal dysphagia.  Plan: Continue diet  Add Ensure three times a day (initially patient concerned that Ensure rises K level and she has hyperkalemia - after reviewed labs agrees to Ensure.      Problem/Plan - 5:  ·  Problem: Carotid artery disease, unspecified laterality.  Plan: Resume ASA, Statin.     ICU Vital Signs Last 24 Hrs  T(C): 36.7 (22 Mar 2018 09:40), Max: 37.4 (21 Mar 2018 20:15)  T(F): 98 (22 Mar 2018 09:40), Max: 99.3 (21 Mar 2018 20:15)  HR: 86 (22 Mar 2018 09:40) (65 - 86)  BP: 132/55 (22 Mar 2018 09:40) (106/40 - 143/57)  RR: 18 (22 Mar 2018 09:40) (18 - 22)  SpO2: 93% (22 Mar 2018 09:40) (93% - 97%)    PHYSICAL EXAM:    GENERAL: NAD,  CHEST/LUNG: CTA b.l   HEART: s1/s2 audible   ABDOMEN: Soft, Nontender, Nondistended; Bowel sounds present  EXTREMITIES:  no edema     Time spent 36minutes

## 2018-03-21 NOTE — DISCHARGE NOTE ADULT - CARE PLAN
Principal Discharge DX:	Closed fracture of neck of left femur, initial encounter  Goal:	.  Assessment and plan of treatment:	s/p  CRPP left femur by Dr Mayer on 03/19/18  c/w aspirin 325 bid for 4 weeks plus nexium  f/u Orthpedic  Secondary Diagnosis:	Carotid artery disease  Assessment and plan of treatment:	c/w aspirin and statin  Secondary Diagnosis:	Essential hypertension  Assessment and plan of treatment:	c/w home medication  Secondary Diagnosis:	Esophageal dysphagia  Secondary Diagnosis:	Hyperlipidemia, unspecified hyperlipidemia type  Secondary Diagnosis:	Leukocytosis, unspecified type  Assessment and plan of treatment:	resolved

## 2018-03-21 NOTE — PROGRESS NOTE ADULT - PROBLEM SELECTOR PLAN 6
VTE ppx - LMWH  Post-perative PNA/Atelectasis - Incentive spirometry  Constipation - Bowel regimen
VTE ppx - LMWH  Post-perative PNA/Atelectasis - Incentive spirometry  Constipation - Bowel regimen

## 2018-03-21 NOTE — PROGRESS NOTE ADULT - SUBJECTIVE AND OBJECTIVE BOX
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 Patient doing well post-op. Patient should have f/u at [facility] over next few weeks.

## 2018-03-21 NOTE — DISCHARGE NOTE ADULT - PATIENT PORTAL LINK FT
You can access the [facility] Patient Portal, offered by [facility], by registering with the following website: [email or web address]

## 2018-03-21 NOTE — PROGRESS NOTE ADULT - SUBJECTIVE AND OBJECTIVE BOX
Ortho Progress note    Name: CHARANJIT GREENWOOD    MR #: [id number]    Procedure: left hip CRPP  Surgeon: Dr. Mayer    Pt comfortable without complaints, pain controlled  states she is doing well  Denies CP, SOB, N/V, numbness/tingling     General Exam:  Vital Signs Last 24 Hrs  T(C): 37.5 (03-21-18 @ 05:09), Max: 37.5 (03-21-18 @ 05:09)  T(F): 99.5 (03-21-18 @ 05:09), Max: 99.5 (03-21-18 @ 05:09)  HR: 77 (03-21-18 @ 05:09) (77 - 77)  BP: 137/58 (03-21-18 @ 05:09) (137/58 - 137/58)  BP(mean): --  RR: 18 (03-21-18 @ 05:09) (18 - 18)  SpO2: 94% (03-21-18 @ 05:09) (94% - 94%)    General: Pt Alert and oriented, NAD, controlled pain.  Dressing C/D/I.   Dressing removed  Incision healing well  No drainage  New dressing applied  Pulses: 2+ dorsalis pedis pulse. Cap refill < 2 sec.  Sensation: Grossly intact to light touch without deficit.  Motor: + EHL/FHL/TA/GS                          11.1   9.3   )-----------( 172      ( 21 Mar 2018 06:10 )             34.3   20 Mar 2018 06:21    142    |  107    |  11.0   ----------------------------<  134    3.8     |  22.0   |  0.99     Phos  3.5       19 Mar 2018 07:43  Mg     2.1       19 Mar 2018 07:43    TPro  6.9    /  Alb  4.2    /  TBili  0.3    /  DBili  x      /  AST  15     /  ALT  12     /  AlkPhos  67     19 Mar 2018 07:43    A/P: 72yFemale POD#2 s/p left hip CRPP    - Pain Control  - DVT ppx: Lovenox 40 QD, SCD's  - Continue PT  - Weight bearing status: WBAT LLE   - Continue care per primary team  - Discharge planning

## 2018-03-21 NOTE — PROGRESS NOTE ADULT - PROBLEM SELECTOR PLAN 1
Continue analgesics as needed  Mobilize with PT  Orthopedics follow up
appreciate ortho input, s/p  CRPP left femur by Dr Mayer on 03/19/18,  PT  pain control  Lovenox for DVT PPX

## 2018-03-21 NOTE — PROGRESS NOTE ADULT - SUBJECTIVE AND OBJECTIVE BOX
Internal Medicine Hospitalist - Dr. Karoline GREENWOOD    [id number]    72y      Female    Patient is a 72y old  Female who presents with a chief complaint of fall (21 Mar 2018 07:29)    INTERVAL HPI/ OVERNIGHT EVENTS: Patient is seen and examined, report mild to moderate hip pain, worse with activity, denied fever, chills, cough, dysuria     REVIEW OF SYSTEMS:    Denied fever, chills, abd. pain, nausea, vomiting, chest pain, SOB, headache, dizziness    PHYSICAL EXAM:    Vital Signs Last 24 Hrs  T(C): 37.6 (21 Mar 2018 08:58), Max: 37.7 (20 Mar 2018 21:30)  T(F): 99.7 (21 Mar 2018 08:58), Max: 99.9 (20 Mar 2018 21:30)  HR: 68 (21 Mar 2018 08:58) (68 - 93)  BP: 136/56 (21 Mar 2018 08:58) (136/56 - 144/65)  BP(mean): --  RR: 18 (21 Mar 2018 08:58) (17 - 18)  SpO2: 97% (21 Mar 2018 08:58) (94% - 98%)    GENERAL: NAD  HEENT: EOMI  Neck: supple  CHEST/LUNG: CTA b/l   HEART: S1S2+ audible  ABDOMEN: Soft, Nontender, Nondistended; Bowel sounds present  EXTREMITIES:  left hip dressing   CNS: AAO X 3  Psychiatry: normal mood    LABS:                        11.1   9.3   )-----------( 172      ( 21 Mar 2018 06:10 )             34.3     03-20    142  |  107  |  11.0  ----------------------------<  134<H>  3.8   |  22.0  |  0.99    Ca    8.4<L>      20 Mar 2018 06:21        Urinalysis Basic - ( 20 Mar 2018 13:20 )    Color: Yellow / Appearance: Clear / S.005 / pH: x  Gluc: x / Ketone: Negative  / Bili: Negative / Urobili: Negative mg/dL   Blood: x / Protein: 15 mg/dL / Nitrite: Negative   Leuk Esterase: Trace / RBC: 3-5 /HPF / WBC 3-5   Sq Epi: x / Non Sq Epi: Few / Bacteria: Few          MEDICATIONS  (STANDING):  amLODIPine   Tablet 10 milliGRAM(s) Oral daily  aspirin  chewable 81 milliGRAM(s) Oral daily  docusate sodium 100 milliGRAM(s) Oral three times a day  enoxaparin Injectable 40 milliGRAM(s) SubCutaneous daily  ferrous    sulfate 325 milliGRAM(s) Oral daily  folic acid 1 milliGRAM(s) Oral daily  hydrALAZINE 50 milliGRAM(s) Oral three times a day  loratadine 10 milliGRAM(s) Oral daily  metoprolol     tartrate 100 milliGRAM(s) Oral two times a day  multivitamin 1 Tablet(s) Oral daily  polyethylene glycol 3350 17 Gram(s) Oral daily  simvastatin 20 milliGRAM(s) Oral at bedtime    MEDICATIONS  (PRN):  acetaminophen   Tablet 650 milliGRAM(s) Oral every 6 hours PRN For Temp greater than 38 C (100.4 F)  acetaminophen   Tablet. 650 milliGRAM(s) Oral every 6 hours PRN Mild Pain (1 - 3)  aluminum hydroxide/magnesium hydroxide/simethicone Suspension 30 milliLiter(s) Oral four times a day PRN Indigestion  bisacodyl Suppository 10 milliGRAM(s) Rectal daily PRN If no bowel movement by postoperative day #2  HYDROmorphone   Tablet 2 milliGRAM(s) Oral every 4 hours PRN Severe Pain (7 - 10)  magnesium hydroxide Suspension 30 milliLiter(s) Oral daily PRN Constipation  ondansetron Injectable 4 milliGRAM(s) IV Push every 6 hours PRN Nausea and/or Vomiting  oxyCODONE    IR 5 milliGRAM(s) Oral every 4 hours PRN Mild Pain  oxyCODONE    IR 10 milliGRAM(s) Oral every 4 hours PRN Moderate Pain  senna 2 Tablet(s) Oral at bedtime PRN Constipation      RADIOLOGY & ADDITIONAL TEST    < from: Xray Chest 1 View- PORTABLE-Routine (18 @ 10:12) >    IMPRESSION:   No evidence of active chest disease.          < end of copied text >

## 2018-03-21 NOTE — PROGRESS NOTE ADULT - PROBLEM SELECTOR PROBLEM 1
Closed fracture of neck of left femur, initial encounter
Closed fracture of neck of left femur, initial encounter

## 2018-03-21 NOTE — PROGRESS NOTE ADULT - PROBLEM SELECTOR PLAN 4
Continue diet  Add Ensure three times a day (initially patient concerned that Ensure rises K level and she has hyperkalemia - after reviewed labs agrees to Ensure
Continue diet  Add Ensure three times a day (initially patient concerned that Ensure rises K level and she has hyperkalemia - after reviewed labs agrees to Ensure

## 2018-03-21 NOTE — PROGRESS NOTE ADULT - ASSESSMENT
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  CRPP left femur by Dr Mayer on 03/19/18, without any significant complications.  Leukocytosis likely reactive - stable, though now with low grade fevers, without any apparent source.  Mild anemia, asymptomatic  HTN - suboptimal control  Dysphagia, on soft diet.  Constipation, post operative.
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 Left femur fracture after  mechanical fall.  2. s/p CRPP left femur by Dr Mayer on 03/19/18, without any significant complications.  3. Uncontrolled HTN.     CV:  Home antihypertensives resumed.   Case discussed with Dr. Perez at bedside.
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

## 2018-03-21 NOTE — DISCHARGE NOTE ADULT - PROVIDER TOKENS
TOKEN:'[id number]',TOKEN:'[id number]',FREE:[LAST:[primary care],PHONE:[(   )    -],FAX:[(   )    -]]

## 2018-03-21 NOTE — DISCHARGE NOTE ADULT - CARE PROVIDERS DIRECT ADDRESSES
,[email or web address]

## 2018-03-21 NOTE — PROGRESS NOTE ADULT - SUBJECTIVE AND OBJECTIVE BOX
CURRENT FUNCTIONAL STATUS      REVIEW OF SYSTEMS  Constitutional - No fever, No weight loss, No fatigue  HEENT - No eye pain, No visual disturbances, No difficulty hearing, No tinnitus, No vertigo, No neck pain  Neurological - No headaches, No memory loss, No loss of strength, No numbness, No tremors  Skin - No itching, No rashes, No lesions   Musculoskeletal - No joint pain, No joint swelling, No muscle pain  Psychiatric - No depression, No anxiety      VITALS  T(C): 37.6 (18 @ 08:58), Max: 37.7 (18 @ 21:30)  HR: 68 (18 @ 08:58) (68 - 93)  BP: 136/56 (18 @ 08:58) (136/56 - 144/65)  RR: 18 (18 @ 08:58) (17 - 18)  SpO2: 97% (18 @ 08:58) (94% - 98%)  Wt(kg): --      PHYSICAL EXAM        RECENT LABS/IMAGING  CBC Full  -  ( 21 Mar 2018 06:10 )  WBC Count : 9.3 K/uL  Hemoglobin : 11.1 g/dL  Hematocrit : 34.3 %  Platelet Count - Automated : 172 K/uL  Mean Cell Volume : 87.3 fl  Mean Cell Hemoglobin : 28.2 pg  Mean Cell Hemoglobin Concentration : 32.4 g/dL  Auto Neutrophil # : x  Auto Lymphocyte # : x  Auto Monocyte # : x  Auto Eosinophil # : x  Auto Basophil # : x  Auto Neutrophil % : x  Auto Lymphocyte % : x  Auto Monocyte % : x  Auto Eosinophil % : x  Auto Basophil % : x        142  |  107  |  11.0  ----------------------------<  134<H>  3.8   |  22.0  |  0.99    Ca    8.4<L>      20 Mar 2018 06:21      Urinalysis Basic - ( 20 Mar 2018 13:20 )    Color: Yellow / Appearance: Clear / S.005 / pH: x  Gluc: x / Ketone: Negative  / Bili: Negative / Urobili: Negative mg/dL   Blood: x / Protein: 15 mg/dL / Nitrite: Negative   Leuk Esterase: Trace / RBC: 3-5 /HPF / WBC 3-5   Sq Epi: x / Non Sq Epi: Few / Bacteria: Few    No new imaging        MEDICATIONS   acetaminophen   Tablet 650 milliGRAM(s) every 6 hours PRN  acetaminophen   Tablet. 650 milliGRAM(s) every 6 hours PRN  aluminum hydroxide/magnesium hydroxide/simethicone Suspension 30 milliLiter(s) four times a day PRN  amLODIPine   Tablet 10 milliGRAM(s) daily  aspirin  chewable 81 milliGRAM(s) daily  bisacodyl Suppository 10 milliGRAM(s) daily PRN  docusate sodium 100 milliGRAM(s) three times a day  enoxaparin Injectable 40 milliGRAM(s) daily  ferrous    sulfate 325 milliGRAM(s) daily  folic acid 1 milliGRAM(s) daily  hydrALAZINE 50 milliGRAM(s) three times a day  HYDROmorphone   Tablet 2 milliGRAM(s) every 4 hours PRN  loratadine 10 milliGRAM(s) daily  magnesium hydroxide Suspension 30 milliLiter(s) daily PRN  metoprolol     tartrate 100 milliGRAM(s) two times a day  multivitamin 1 Tablet(s) daily  ondansetron Injectable 4 milliGRAM(s) every 6 hours PRN  oxyCODONE    IR 5 milliGRAM(s) every 4 hours PRN  oxyCODONE    IR 10 milliGRAM(s) every 4 hours PRN  polyethylene glycol 3350 17 Gram(s) daily  senna 2 Tablet(s) at bedtime PRN  simvastatin 20 milliGRAM(s) at bedtime      --------------------------------------------------------------------      ASSESSMENT & PLAN:  72F with functional deficits s/p closed reduction percutaneous pinning of left femur due to left femoral neck fracture s/p mechanical fall.  ~Medical management as per primary team  ~Rehab: When medically stable, recommend acute rehab. Patient would benefit from short course of AR and can tolerate 3 hours PT/OT per day. INTERVAL HISTORY  No acute events overnight. She reports feeling a bit of dizziness when she stood up with OT, but it was significantly better than yesterday and seems to be resolving. She would like to go home and reports that she has good support and assistance at home ( and daughter). She reports no pain at rest and some left hip/buttock pain with walking though the pain is tolerable.        CURRENT FUNCTIONAL STATUS  Gait: PT pending  Transfers: contact guard  ADLs: supervision    REVIEW OF SYSTEMS  Constitutional - No fever, No weight loss, No fatigue  HEENT - No eye pain, No visual disturbances, No difficulty hearing, No tinnitus, No vertigo, No neck pain  Neurological - +Dizziness with standing significantly improved from yesterday, No headaches, No memory loss, No loss of strength, No numbness, No tremors  Skin - No itching, No rashes, No lesions   Musculoskeletal - +Left hip pain only with walking but tolerable, No joint pain, No joint swelling, No muscle pain  Psychiatric - No depression, No anxiety      VITALS  T(C): 37.6 (18 @ 08:58), Max: 37.7 (18 @ 21:30)  HR: 68 (18 @ 08:58) (68 - 93)  BP: 136/56 (18 @ 08:58) (136/56 - 144/65)  RR: 18 (18 @ 08:58) (17 - 18)  SpO2: 97% (18 @ 08:58) (94% - 98%)  Wt(kg): --      PHYSICAL EXAM  General: NAD, sitting comfortably  HEENT: NCAT  Cardio: S1/S2, warm & well perfused  Pulm: Normal respiratory effort  Abd: Soft  Ext: 2+ peripheral pulses, no edema  Neuro: A+Ox3, moves extremities well, no focal deficits      RECENT LABS/IMAGING  CBC Full  -  ( 21 Mar 2018 06:10 )  WBC Count : 9.3 K/uL  Hemoglobin : 11.1 g/dL  Hematocrit : 34.3 %  Platelet Count - Automated : 172 K/uL  Mean Cell Volume : 87.3 fl  Mean Cell Hemoglobin : 28.2 pg  Mean Cell Hemoglobin Concentration : 32.4 g/dL  Auto Neutrophil # : x  Auto Lymphocyte # : x  Auto Monocyte # : x  Auto Eosinophil # : x  Auto Basophil # : x  Auto Neutrophil % : x  Auto Lymphocyte % : x  Auto Monocyte % : x  Auto Eosinophil % : x  Auto Basophil % : x    03-    142  |  107  |  11.0  ----------------------------<  134<H>  3.8   |  22.0  |  0.99    Ca    8.4<L>      20 Mar 2018 06:21      Urinalysis Basic - ( 20 Mar 2018 13:20 )    Color: Yellow / Appearance: Clear / S.005 / pH: x  Gluc: x / Ketone: Negative  / Bili: Negative / Urobili: Negative mg/dL   Blood: x / Protein: 15 mg/dL / Nitrite: Negative   Leuk Esterase: Trace / RBC: 3-5 /HPF / WBC 3-5   Sq Epi: x / Non Sq Epi: Few / Bacteria: Few    No new imaging        MEDICATIONS   acetaminophen   Tablet 650 milliGRAM(s) every 6 hours PRN  acetaminophen   Tablet. 650 milliGRAM(s) every 6 hours PRN  aluminum hydroxide/magnesium hydroxide/simethicone Suspension 30 milliLiter(s) four times a day PRN  amLODIPine   Tablet 10 milliGRAM(s) daily  aspirin  chewable 81 milliGRAM(s) daily  bisacodyl Suppository 10 milliGRAM(s) daily PRN  docusate sodium 100 milliGRAM(s) three times a day  enoxaparin Injectable 40 milliGRAM(s) daily  ferrous    sulfate 325 milliGRAM(s) daily  folic acid 1 milliGRAM(s) daily  hydrALAZINE 50 milliGRAM(s) three times a day  HYDROmorphone   Tablet 2 milliGRAM(s) every 4 hours PRN  loratadine 10 milliGRAM(s) daily  magnesium hydroxide Suspension 30 milliLiter(s) daily PRN  metoprolol     tartrate 100 milliGRAM(s) two times a day  multivitamin 1 Tablet(s) daily  ondansetron Injectable 4 milliGRAM(s) every 6 hours PRN  oxyCODONE    IR 5 milliGRAM(s) every 4 hours PRN  oxyCODONE    IR 10 milliGRAM(s) every 4 hours PRN  polyethylene glycol 3350 17 Gram(s) daily  senna 2 Tablet(s) at bedtime PRN  simvastatin 20 milliGRAM(s) at bedtime      --------------------------------------------------------------------      ASSESSMENT & PLAN:  72F with functional deficits s/p closed reduction percutaneous pinning of left femur due to left femoral neck fracture s/p mechanical fall.  ~Medical management as per primary team  ~Rehab: When medically stable, recommend acute rehab. Patient would benefit from short course of AR and can tolerate 3 hours PT/OT per day. INTERVAL HISTORY  No acute events overnight. She reports feeling a bit of dizziness when she stood up with OT, but it was significantly better than yesterday and seems to be resolving. She would like to go home and reports that she has good support and assistance at home ( and daughter). She reports no pain at rest and some left hip/buttock pain with walking though the pain is tolerable.        CURRENT FUNCTIONAL STATUS  Transfers: contact guard  ADLs: supervision    REVIEW OF SYSTEMS  Constitutional - No fever, No weight loss, No fatigue  HEENT - No eye pain, No visual disturbances, No difficulty hearing, No tinnitus, No vertigo, No neck pain  Neurological - +Dizziness with standing significantly improved from yesterday, No headaches, No memory loss, No loss of strength, No numbness, No tremors  Skin - No itching, No rashes, No lesions   Musculoskeletal - +Left hip pain only with walking but tolerable, No joint pain, No joint swelling, No muscle pain  Psychiatric - No depression, No anxiety      VITALS  T(C): 37.6 (18 @ 08:58), Max: 37.7 (18 @ 21:30)  HR: 68 (18 @ 08:58) (68 - 93)  BP: 136/56 (18 @ 08:58) (136/56 - 144/65)  RR: 18 (18 @ 08:58) (17 - 18)  SpO2: 97% (18 @ 08:58) (94% - 98%)  Wt(kg): --      PHYSICAL EXAM  General: NAD, sitting comfortably  HEENT: NCAT  Cardio: S1/S2, warm & well perfused  Pulm: Normal respiratory effort  Abd: Soft  Ext: 2+ peripheral pulses, no edema  Neuro: A+Ox3, moves extremities well, no focal deficits      RECENT LABS/IMAGING  CBC Full  -  ( 21 Mar 2018 06:10 )  WBC Count : 9.3 K/uL  Hemoglobin : 11.1 g/dL  Hematocrit : 34.3 %  Platelet Count - Automated : 172 K/uL  Mean Cell Volume : 87.3 fl  Mean Cell Hemoglobin : 28.2 pg  Mean Cell Hemoglobin Concentration : 32.4 g/dL  Auto Neutrophil # : x  Auto Lymphocyte # : x  Auto Monocyte # : x  Auto Eosinophil # : x  Auto Basophil # : x  Auto Neutrophil % : x  Auto Lymphocyte % : x  Auto Monocyte % : x  Auto Eosinophil % : x  Auto Basophil % : x    03-    142  |  107  |  11.0  ----------------------------<  134<H>  3.8   |  22.0  |  0.99    Ca    8.4<L>      20 Mar 2018 06:21      Urinalysis Basic - ( 20 Mar 2018 13:20 )    Color: Yellow / Appearance: Clear / S.005 / pH: x  Gluc: x / Ketone: Negative  / Bili: Negative / Urobili: Negative mg/dL   Blood: x / Protein: 15 mg/dL / Nitrite: Negative   Leuk Esterase: Trace / RBC: 3-5 /HPF / WBC 3-5   Sq Epi: x / Non Sq Epi: Few / Bacteria: Few    No new imaging        MEDICATIONS   acetaminophen   Tablet 650 milliGRAM(s) every 6 hours PRN  acetaminophen   Tablet. 650 milliGRAM(s) every 6 hours PRN  aluminum hydroxide/magnesium hydroxide/simethicone Suspension 30 milliLiter(s) four times a day PRN  amLODIPine   Tablet 10 milliGRAM(s) daily  aspirin  chewable 81 milliGRAM(s) daily  bisacodyl Suppository 10 milliGRAM(s) daily PRN  docusate sodium 100 milliGRAM(s) three times a day  enoxaparin Injectable 40 milliGRAM(s) daily  ferrous    sulfate 325 milliGRAM(s) daily  folic acid 1 milliGRAM(s) daily  hydrALAZINE 50 milliGRAM(s) three times a day  HYDROmorphone   Tablet 2 milliGRAM(s) every 4 hours PRN  loratadine 10 milliGRAM(s) daily  magnesium hydroxide Suspension 30 milliLiter(s) daily PRN  metoprolol     tartrate 100 milliGRAM(s) two times a day  multivitamin 1 Tablet(s) daily  ondansetron Injectable 4 milliGRAM(s) every 6 hours PRN  oxyCODONE    IR 5 milliGRAM(s) every 4 hours PRN  oxyCODONE    IR 10 milliGRAM(s) every 4 hours PRN  polyethylene glycol 3350 17 Gram(s) daily  senna 2 Tablet(s) at bedtime PRN  simvastatin 20 milliGRAM(s) at bedtime      --------------------------------------------------------------------      ASSESSMENT & PLAN:  72F with functional deficits s/p closed reduction percutaneous pinning of left femur due to left femoral neck fracture s/p mechanical fall.  ~Medical management as per primary team  ~Rehab: When medically stable, recommend acute rehab. Patient would benefit from short course of AR and can tolerate 3 hours PT/OT per day.

## 2018-03-21 NOTE — DISCHARGE NOTE ADULT - PLAN OF CARE
. s/p  CRPP left femur by Dr Mayer on 03/19/18  c/w aspirin 325 bid for 4 weeks plus nexium  f/u Orthpedic c/w aspirin and statin c/w home medication resolved

## 2018-03-22 VITALS
HEART RATE: 81 BPM | DIASTOLIC BLOOD PRESSURE: 63 MMHG | SYSTOLIC BLOOD PRESSURE: 145 MMHG | TEMPERATURE: 98 F | RESPIRATION RATE: 18 BRPM | OXYGEN SATURATION: 96 %

## 2018-03-22 PROCEDURE — 93306 TTE W/DOPPLER COMPLETE: CPT

## 2018-03-22 PROCEDURE — 97535 SELF CARE MNGMENT TRAINING: CPT

## 2018-03-22 PROCEDURE — 85027 COMPLETE CBC AUTOMATED: CPT

## 2018-03-22 PROCEDURE — 99239 HOSP IP/OBS DSCHRG MGMT >30: CPT

## 2018-03-22 PROCEDURE — 99285 EMERGENCY DEPT VISIT HI MDM: CPT | Mod: 25

## 2018-03-22 PROCEDURE — 71045 X-RAY EXAM CHEST 1 VIEW: CPT

## 2018-03-22 PROCEDURE — 83036 HEMOGLOBIN GLYCOSYLATED A1C: CPT

## 2018-03-22 PROCEDURE — 73610 X-RAY EXAM OF ANKLE: CPT

## 2018-03-22 PROCEDURE — 96374 THER/PROPH/DIAG INJ IV PUSH: CPT

## 2018-03-22 PROCEDURE — 97163 PT EVAL HIGH COMPLEX 45 MIN: CPT

## 2018-03-22 PROCEDURE — 97530 THERAPEUTIC ACTIVITIES: CPT

## 2018-03-22 PROCEDURE — 76000 FLUOROSCOPY <1 HR PHYS/QHP: CPT

## 2018-03-22 PROCEDURE — 86901 BLOOD TYPING SEROLOGIC RH(D): CPT

## 2018-03-22 PROCEDURE — 86850 RBC ANTIBODY SCREEN: CPT

## 2018-03-22 PROCEDURE — 97116 GAIT TRAINING THERAPY: CPT

## 2018-03-22 PROCEDURE — 36415 COLL VENOUS BLD VENIPUNCTURE: CPT

## 2018-03-22 PROCEDURE — 72192 CT PELVIS W/O DYE: CPT

## 2018-03-22 PROCEDURE — 83735 ASSAY OF MAGNESIUM: CPT

## 2018-03-22 PROCEDURE — C1713: CPT

## 2018-03-22 PROCEDURE — 84100 ASSAY OF PHOSPHORUS: CPT

## 2018-03-22 PROCEDURE — 73552 X-RAY EXAM OF FEMUR 2/>: CPT

## 2018-03-22 PROCEDURE — 86900 BLOOD TYPING SEROLOGIC ABO: CPT

## 2018-03-22 PROCEDURE — 81001 URINALYSIS AUTO W/SCOPE: CPT

## 2018-03-22 PROCEDURE — 97167 OT EVAL HIGH COMPLEX 60 MIN: CPT

## 2018-03-22 PROCEDURE — 80048 BASIC METABOLIC PNL TOTAL CA: CPT

## 2018-03-22 PROCEDURE — 76377 3D RENDER W/INTRP POSTPROCES: CPT

## 2018-03-22 PROCEDURE — 85730 THROMBOPLASTIN TIME PARTIAL: CPT

## 2018-03-22 PROCEDURE — 93005 ELECTROCARDIOGRAM TRACING: CPT

## 2018-03-22 PROCEDURE — 80053 COMPREHEN METABOLIC PANEL: CPT

## 2018-03-22 PROCEDURE — 73502 X-RAY EXAM HIP UNI 2-3 VIEWS: CPT

## 2018-03-22 PROCEDURE — 73562 X-RAY EXAM OF KNEE 3: CPT

## 2018-03-22 PROCEDURE — 84484 ASSAY OF TROPONIN QUANT: CPT

## 2018-03-22 PROCEDURE — 87086 URINE CULTURE/COLONY COUNT: CPT

## 2018-03-22 PROCEDURE — 85610 PROTHROMBIN TIME: CPT

## 2018-03-22 RX ORDER — ASPIRIN/CALCIUM CARB/MAGNESIUM 324 MG
1 TABLET ORAL
Qty: 60 | Refills: 0
Start: 2018-03-22 | End: 2018-04-20

## 2018-03-22 RX ORDER — ACETAMINOPHEN 500 MG
2 TABLET ORAL
Qty: 0 | Refills: 0 | DISCHARGE
Start: 2018-03-22

## 2018-03-22 RX ORDER — ASPIRIN/CALCIUM CARB/MAGNESIUM 324 MG
1 TABLET ORAL
Qty: 0 | Refills: 0 | COMMUNITY

## 2018-03-22 RX ADMIN — Medication 325 MILLIGRAM(S): at 11:54

## 2018-03-22 RX ADMIN — Medication 1 TABLET(S): at 11:54

## 2018-03-22 RX ADMIN — Medication 100 MILLIGRAM(S): at 11:54

## 2018-03-22 RX ADMIN — Medication 81 MILLIGRAM(S): at 11:54

## 2018-03-22 RX ADMIN — Medication 1 MILLIGRAM(S): at 11:54

## 2018-03-22 RX ADMIN — LORATADINE 10 MILLIGRAM(S): 10 TABLET ORAL at 11:54

## 2018-03-22 RX ADMIN — ENOXAPARIN SODIUM 40 MILLIGRAM(S): 100 INJECTION SUBCUTANEOUS at 11:55

## 2018-03-22 RX ADMIN — Medication 650 MILLIGRAM(S): at 11:55

## 2018-03-22 RX ADMIN — Medication 50 MILLIGRAM(S): at 14:40

## 2018-03-22 RX ADMIN — Medication 650 MILLIGRAM(S): at 12:55

## 2018-03-22 NOTE — PROGRESS NOTE ADULT - SUBJECTIVE AND OBJECTIVE BOX
[facility]    [id number]    History:  The patient is status post left hip CRPP, POD # 3. Patient is doing well. The patient's pain is controlled using the prescribed pain medications. The patient is participating in physical therapy. She ambulated with PT yesterday and did a few stairs. Denies nausea, vomiting, chest pain, shortness of breath, abdominal pain or fever. No new complaints. No acute motor or sensory changes are reported.    Vital Signs Last 24 Hrs  T(C): 37.3 (22 Mar 2018 06:13), Max: 37.6 (21 Mar 2018 08:58)  T(F): 99.1 (22 Mar 2018 06:13), Max: 99.7 (21 Mar 2018 08:58)  HR: 69 (22 Mar 2018 06:13) (65 - 83)  BP: 143/57 (22 Mar 2018 06:13) (106/40 - 143/57)  BP(mean): --  RR: 22 (22 Mar 2018 06:13) (18 - 22)  SpO2: 97% (22 Mar 2018 06:13) (95% - 97%)  I&O's Summary    21 Mar 2018 07:01  -  22 Mar 2018 07:00  --------------------------------------------------------  IN: 720 mL / OUT: 0 mL / NET: 720 mL                              11.1   9.3   )-----------( 172      ( 21 Mar 2018 06:10 )             34.3             MEDICATIONS  (STANDING):  amLODIPine   Tablet 10 milliGRAM(s) Oral daily  aspirin  chewable 81 milliGRAM(s) Oral daily  docusate sodium 100 milliGRAM(s) Oral three times a day  enoxaparin Injectable 40 milliGRAM(s) SubCutaneous daily  ferrous    sulfate 325 milliGRAM(s) Oral daily  folic acid 1 milliGRAM(s) Oral daily  hydrALAZINE 50 milliGRAM(s) Oral every 8 hours  loratadine 10 milliGRAM(s) Oral daily  metoprolol     tartrate 100 milliGRAM(s) Oral two times a day  multivitamin 1 Tablet(s) Oral daily  polyethylene glycol 3350 17 Gram(s) Oral daily  simvastatin 20 milliGRAM(s) Oral at bedtime    MEDICATIONS  (PRN):  acetaminophen   Tablet 650 milliGRAM(s) Oral every 6 hours PRN For Temp greater than 38 C (100.4 F)  acetaminophen   Tablet. 650 milliGRAM(s) Oral every 6 hours PRN Mild Pain (1 - 3)  aluminum hydroxide/magnesium hydroxide/simethicone Suspension 30 milliLiter(s) Oral four times a day PRN Indigestion  bisacodyl Suppository 10 milliGRAM(s) Rectal daily PRN If no bowel movement by postoperative day #2  HYDROmorphone   Tablet 2 milliGRAM(s) Oral every 4 hours PRN Severe Pain (7 - 10)  magnesium hydroxide Suspension 30 milliLiter(s) Oral daily PRN Constipation  ondansetron Injectable 4 milliGRAM(s) IV Push every 6 hours PRN Nausea and/or Vomiting  oxyCODONE    IR 5 milliGRAM(s) Oral every 4 hours PRN Mild Pain  oxyCODONE    IR 10 milliGRAM(s) Oral every 4 hours PRN Moderate Pain  senna 2 Tablet(s) Oral at bedtime PRN Constipation    NAD  Physical exam: Left lower extremity- The dressing is clean, dry and intact. No wound erythema, discharge, drainage is noted. Calf soft. No calf tenderness. Sensation to light touch is grossly intact distally. Motor function distally is 5/5. +ehl/fhl. No foot drop. 2+ dorsalis pedis pulse. Capillary refill is less than 2 seconds. No cyanosis.    Primary Orthopedic Assessment:  • S/P Left hip CRPP, POD#3    Plan:   • DVT prophylaxis as prescribed- Lovenox, including use of compression devices and ankle pumps  • Continue physical therapy  • WBAT  • Pain control as clinically indicated  • Incentive spirometry encouraged  • Discharge planning – anticipated discharge is Home when cleared by PT

## 2018-04-02 PROBLEM — Z00.00 ENCOUNTER FOR PREVENTIVE HEALTH EXAMINATION: Status: ACTIVE | Noted: 2018-04-02

## 2018-04-09 PROBLEM — S72.002A HIP FRACTURE, LEFT: Status: ACTIVE | Noted: 2018-04-09

## 2018-04-10 ENCOUNTER — TRANSCRIPTION ENCOUNTER (OUTPATIENT)
Age: 73
End: 2018-04-10

## 2018-04-10 ENCOUNTER — APPOINTMENT (OUTPATIENT)
Dept: ORTHOPEDIC SURGERY | Facility: CLINIC | Age: 73
End: 2018-04-10
Payer: MEDICARE

## 2018-04-10 DIAGNOSIS — Z78.9 OTHER SPECIFIED HEALTH STATUS: ICD-10-CM

## 2018-04-10 DIAGNOSIS — S72.002A FRACTURE OF UNSPECIFIED PART OF NECK OF LEFT FEMUR, INITIAL ENCOUNTER FOR CLOSED FRACTURE: ICD-10-CM

## 2018-04-10 DIAGNOSIS — Z86.79 PERSONAL HISTORY OF OTHER DISEASES OF THE CIRCULATORY SYSTEM: ICD-10-CM

## 2018-04-10 DIAGNOSIS — Z86.73 PERSONAL HISTORY OF TRANSIENT ISCHEMIC ATTACK (TIA), AND CEREBRAL INFARCTION W/OUT RESIDUAL DEFICITS: ICD-10-CM

## 2018-04-10 PROCEDURE — 73502 X-RAY EXAM HIP UNI 2-3 VIEWS: CPT | Mod: LT

## 2018-04-10 PROCEDURE — 99024 POSTOP FOLLOW-UP VISIT: CPT

## 2018-04-10 RX ORDER — METOPROLOL TARTRATE 100 MG/1
100 TABLET, FILM COATED ORAL
Refills: 0 | Status: ACTIVE | COMMUNITY

## 2018-04-10 RX ORDER — ESOMEPRAZOLE MAGNESIUM 10 MG/1
10 GRANULE, DELAYED RELEASE ORAL
Refills: 0 | Status: ACTIVE | COMMUNITY

## 2018-04-10 RX ORDER — ASPIRIN 325 MG/1
325 TABLET, FILM COATED ORAL
Refills: 0 | Status: ACTIVE | COMMUNITY

## 2018-04-26 ENCOUNTER — OUTPATIENT (OUTPATIENT)
Dept: OUTPATIENT SERVICES | Facility: HOSPITAL | Age: 73
LOS: 1 days | End: 2018-04-26
Payer: MEDICARE

## 2018-04-26 DIAGNOSIS — S72.002D FRACTURE OF UNSPECIFIED PART OF NECK OF LEFT FEMUR, SUBSEQUENT ENCOUNTER FOR CLOSED FRACTURE WITH ROUTINE HEALING: ICD-10-CM

## 2018-04-26 DIAGNOSIS — Z51.89 ENCOUNTER FOR OTHER SPECIFIED AFTERCARE: ICD-10-CM

## 2018-04-26 DIAGNOSIS — Z98.51 TUBAL LIGATION STATUS: Chronic | ICD-10-CM

## 2018-04-26 DIAGNOSIS — I77.9 DISORDER OF ARTERIES AND ARTERIOLES, UNSPECIFIED: Chronic | ICD-10-CM

## 2018-04-26 DIAGNOSIS — Z90.49 ACQUIRED ABSENCE OF OTHER SPECIFIED PARTS OF DIGESTIVE TRACT: Chronic | ICD-10-CM

## 2018-04-26 DIAGNOSIS — Z98.49 CATARACT EXTRACTION STATUS, UNSPECIFIED EYE: Chronic | ICD-10-CM

## 2018-06-28 PROCEDURE — 97163 PT EVAL HIGH COMPLEX 45 MIN: CPT

## 2018-06-28 PROCEDURE — 97110 THERAPEUTIC EXERCISES: CPT

## 2018-06-28 PROCEDURE — G8979: CPT | Mod: CJ

## 2018-06-28 PROCEDURE — 97010 HOT OR COLD PACKS THERAPY: CPT

## 2018-06-28 PROCEDURE — G8978: CPT | Mod: CK

## 2018-07-23 PROBLEM — Z86.73 HISTORY OF STROKE: Status: RESOLVED | Noted: 2018-04-10 | Resolved: 2018-07-23

## 2018-09-13 ENCOUNTER — OTHER (OUTPATIENT)
Age: 73
End: 2018-09-13

## 2019-07-05 PROBLEM — I63.9 CEREBRAL INFARCTION, UNSPECIFIED: Chronic | Status: ACTIVE | Noted: 2018-03-19

## 2019-07-05 PROBLEM — I77.9 DISORDER OF ARTERIES AND ARTERIOLES, UNSPECIFIED: Chronic | Status: ACTIVE | Noted: 2018-03-19

## 2019-07-05 PROBLEM — R13.10 DYSPHAGIA, UNSPECIFIED: Chronic | Status: ACTIVE | Noted: 2018-03-19

## 2019-07-10 ENCOUNTER — APPOINTMENT (OUTPATIENT)
Dept: ORTHOPEDIC SURGERY | Facility: CLINIC | Age: 74
End: 2019-07-10
Payer: MEDICARE

## 2019-07-10 VITALS
WEIGHT: 168 LBS | BODY MASS INDEX: 24.88 KG/M2 | SYSTOLIC BLOOD PRESSURE: 150 MMHG | HEART RATE: 59 BPM | DIASTOLIC BLOOD PRESSURE: 61 MMHG | HEIGHT: 69 IN

## 2019-07-10 DIAGNOSIS — S72.002D FRACTURE OF UNSPECIFIED PART OF NECK OF LEFT FEMUR, SUBSEQUENT ENCOUNTER FOR CLOSED FRACTURE WITH ROUTINE HEALING: ICD-10-CM

## 2019-07-10 PROCEDURE — 73502 X-RAY EXAM HIP UNI 2-3 VIEWS: CPT | Mod: LT

## 2019-07-10 PROCEDURE — 99214 OFFICE O/P EST MOD 30 MIN: CPT

## 2019-07-10 NOTE — PHYSICAL EXAM
[de-identified] : Physical Exam:\par General: Well appearing, no acute distress, A&O\par Neurologic: A&Ox3, No focal deficits\par Head: NCAT without abrasions, lacerations, or ecchymosis to head, face, or scalp\par Respiratory: Equal chest wall expansion bilaterally, no accessory muscle use\par Lymphatic: No lymphadenopathy palpated\par Skin: Warm and dry\par Psychiatric: Normal mood and affect\par \par Lower Extremities:\par RIGHT LE: No deformities, abrasions, or other signs of trauma at hip, thigh, knee, leg, ankle or foot.  Full ROM without pain at hip, knee, ankle and toe with negative log-roll and Stinchfield tests.\par \par RLE strength: 		Hip flexion		5/5\par 			Quads			5/5\par 			Hamstrings		5/5\par 			Tib Anterior		5/5\par 			Gastroc		5/5\par 			EHL			5/5\par 			FHL			5/5\par RLE sensation intact to sural/saphenous/sup peroneal/deep peroneal/post tib distributions\par 2+ DP/PT pulses with good cap refill distally\par \par LEFT LE: No deformities, abrasions, or other signs of trauma at hip, thigh, knee, leg, ankle or foot.  Full ROM without pain at hip, knee, ankle and toe with negative log-roll and Stinchfield tests.\par \par LLE strength: 		Hip flexion		5/5\par 			Quads			5/5\par 			Hamstrings		5/5\par 			Tib Anterior		5/5\par 			Gastroc		5/5\par 			EHL			5/5\par 			FHL			5/5\par LLE sensation intact to sural/saphenous/sup peroneal/deep peroneal/post tib distributions\par 2+ DP/PT pulses with good cap refill distally [de-identified] : plain films of the left hip were obtained today. They show a well-healed valgus impacted femoral neck fracture. There is some slight irregularity of the femoral head possibly concerning for avascular necrosis

## 2019-07-10 NOTE — DISCUSSION/SUMMARY
[de-identified] : 74-year-old female with left hip pain. We discussed that she may have some arthritis or early avascular necrosis of the hip. I recommend starting with conservative therapy as well as physical therapy. If this is unsuccessful, we will refer her to the arthroplasty service for discussion of alternative treatments.  \par \par Conservative measures of treatment include rest until asymptomatic, activity avoidance, NSAID's PRN, acetaminophen, application to ice to the area 2-3x daily for 20 minutes, with gradual return to activities.\par \par The patient was given the opportunity to ask questions and all questions were answered to their satisfaction.\par \par Joe Mayer MD\par Orthopaedic Trauma Surgeon\par [facility]\par [facility] [place]\par \par \par \par

## 2019-07-10 NOTE — REASON FOR VISIT
[Follow-Up Visit] : a follow-up visit for [Femur Fracture] : femur fracture [FreeTextEntry2] : S/P closed reduction percutaneous pinning of left femoral neck fracture. DOS 3/19/18.

## 2019-07-10 NOTE — HISTORY OF PRESENT ILLNESS
[de-identified] : 74 year old female presents for long-term followup after CRPP left hip. She has been having some increasing complaints of left groin pain and pain with range of motion. She also describes some left buttock pain. She has not done any physical therapy lately.The patient states the pain is made worse with activity and relieved with rest.  Aching, 4/10

## 2019-09-20 ENCOUNTER — OTHER (OUTPATIENT)
Age: 74
End: 2019-09-20

## 2019-09-27 ENCOUNTER — APPOINTMENT (OUTPATIENT)
Dept: ORTHOPEDIC SURGERY | Facility: CLINIC | Age: 74
End: 2019-09-27

## 2020-01-17 NOTE — ED ADULT NURSE NOTE - PLAN OF CARE DISCUSSED WITH:
01/17/20 1551   Discharge Reassessment   Assessment Type Discharge Planning Reassessment   Provided patient/caregiver education on the expected discharge date and the discharge plan Yes   TN spoke with son-in-law, Dav and informed him that [facility] did no accept patient.  The only accepting facility is [facility].  TN called Molly at [facility] who stated that she will call him to schedule a tour.      1403:  Message received from Molly at [facility] that Mr Demarco will meet with her on Monday to tour and sign paper work.     Patient

## 2020-03-17 DIAGNOSIS — M25.559 PAIN IN UNSPECIFIED HIP: ICD-10-CM

## 2020-03-19 ENCOUNTER — APPOINTMENT (OUTPATIENT)
Dept: ORTHOPEDIC SURGERY | Facility: CLINIC | Age: 75
End: 2020-03-19
Payer: MEDICARE

## 2020-03-19 VITALS
WEIGHT: 175 LBS | BODY MASS INDEX: 25.92 KG/M2 | HEIGHT: 69 IN | HEART RATE: 60 BPM | SYSTOLIC BLOOD PRESSURE: 190 MMHG | DIASTOLIC BLOOD PRESSURE: 67 MMHG

## 2020-03-19 PROCEDURE — 99215 OFFICE O/P EST HI 40 MIN: CPT

## 2020-03-19 PROCEDURE — 73521 X-RAY EXAM HIPS BI 2 VIEWS: CPT

## 2020-03-19 NOTE — PHYSICAL EXAM
[de-identified] : The patient appears well nourished  and in no apparent distress.  The patient is alert and oriented to person, place, and time.   Affect and mood appear normal. The head is normocephalic and atraumatic.  The eyes reveal normal sclera and extra ocular muscles are intact. The tongue is midline with no apparent lesions.  Skin shows normal turgor with no evidence of eczema or psoriasis.  No respiratory distress noted.  Sensation grossly intact.\par   [de-identified] : Exam of the left hip shows a healed scar over the lateral hip. Pain with hip flexion of 90 degrees, hip external rotation of 30 degrees, internal rotation of 10 degrees. Pain in the groin extending the hip from a flexed position.  Severe groin pain with any left hip range of motion.  The left leg is short by 2 cm compared to the right.\par Exam of the right hip shows no pain with hip ROM. 5/5 motor strength bilaterally distally. Sensation intact distally.  [de-identified] : Xray- AP pelvis and 2 views bilateral hips shows well-preserved joint space of the right hip.  Previous screw fixation of the left femoral neck fracture with avascular necrosis and femoral head collapse with the superior screw prominent at the joint space.

## 2020-03-19 NOTE — REVIEW OF SYSTEMS
[Joint Pain] : joint pain [Joint Stiffness] : joint stiffness [Feeling Tired] : fatigue [Urinary Frequency] : urinary frequency [Sleep Disturbances] : ~T sleep disturbances [Feeling Weak] : feeling weak [Muscle Weakness] : muscle weakness

## 2020-03-19 NOTE — CONSULT LETTER
[Dear  ___] : Dear  [unfilled], [Consult Letter:] : I had the pleasure of evaluating your patient, [unfilled]. [Please see my note below.] : Please see my note below. [Consult Closing:] : Thank you very much for allowing me to participate in the care of this patient.  If you have any questions, please do not hesitate to contact me. [Sincerely,] : Sincerely, [FreeTextEntry2] : MERCY ALFARO MD\par  [FreeTextEntry3] : Osmin Cisneros MD\par Chief of Joint Replacement\par [facility] \par [facility] [place]\par \par

## 2020-03-19 NOTE — DISCUSSION/SUMMARY
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  The screw hardware is now prominent in the joint space.  She is having severe pain from this.  Based upon the patients continued symptoms and failure to respond to conservative treatment I have recommended a conversion left total hip replacement for the patient. A discussion was had with the patient regarding a left total hip replacement. Anterior and posterior exposures were discussed.  A posterior approach would be more appropriate for this so that we can access the screws for removal of the hardware.  A long discussion was had with the patient as what the total joint replacement would entail. A model was used to demonstrate the operation and to discuss bearing surfaces of the implants. The hospitalization and rehabilitation were discussed. The use of perioperative antibiotics and DVT prophylaxis were discussed. The risks, benefits and alternatives to surgical intervention were discussed at length with the patient. Specific risks discussed included: infection, wound breakdown, numbness and damage to nerves, tendon, muscle, arteries or other blood vessels, and the possibility of leg length discrepancy. The possibility of recurrent pain, no improvement in pain and actual worsening of the pain were also mentioned in conversation with the patient. Medical complications related to the patient's general medical health including deep vein thrombosis, pulmonary embolus, heart attack, stroke, death and other complications from anesthesia were discussed as well. The patient was told that we will take steps to minimize these risks by using sterile technique, antibiotics and DVT prophylaxis when appropriate and following the patient postoperatively in the clinic setting to monitor progress. The benefits of surgery were discussed with the patient including the potential to improve the current clinical condition through operative intervention. Alternatives to surgical intervention include continued conservative management which may yield less than optimal results in this particular patient. All questions were answered to the satisfaction of the patient. Models were used as an educational tool. We did discuss implant choices and fixation, with shared decision making with the patient.\par \par Due to the coronavirus outbreak as of now we are not scheduling nonemergent procedures at the hospital.  This currently is effective through April 15.  I will have the patient return in 2 weeks for a follow-up x-ray and if the screw penetration is progressing and worsening we may consider moving the surgery sooner on a more urgent basis.  Otherwise we will wait until after April 15 for surgery.\par

## 2020-03-19 NOTE — ADDENDUM
[FreeTextEntry1] : This note was authored by [place]ico [place] working as a medical scribe for Dr. Osmin Cisneros. The note was reviewed, edited, and revised by Dr. Osmin Cisneros whom is in agreement with the exam findings, imaging findings, and treatment plan. 03/19/2020.

## 2020-03-19 NOTE — HISTORY OF PRESENT ILLNESS
[de-identified] : The patient is a 74 year old female being seen for evaluation of her bilateral hips. She denies injury, trauma, or change of activity. She is 2 years s/p closed reduction percutaneous pinning of left femoral neck fracture. She reports pain has progressed and become severe recently. She is ambulating and transferring with a cane and notes severe pain and stiffness. She reports limping at times. She reports pain is centered in the back with radiation down both legs laterally and into the groin of the left hip. She reports feelings of weakness of the left hip. Pain is worse with transferring and weightbearing activities, most especially stair climbing. She has difficulty donning/doffing shoes and socks and difficulty with transitioning in and out of the car. She reports attending physical therapy with mild relief of her symptoms. She reports a history of stroke in 2013. She reports her quality of life is severely diminished due to her left hip pain. She comes in today for evaluation of her left hip and for treatment options.

## 2020-04-02 ENCOUNTER — APPOINTMENT (OUTPATIENT)
Dept: ORTHOPEDIC SURGERY | Facility: CLINIC | Age: 75
End: 2020-04-02

## 2020-04-09 PROBLEM — M25.559 HIP PAIN: Status: ACTIVE | Noted: 2020-03-17

## 2020-04-16 ENCOUNTER — APPOINTMENT (OUTPATIENT)
Dept: ORTHOPEDIC SURGERY | Facility: CLINIC | Age: 75
End: 2020-04-16
Payer: MEDICARE

## 2020-04-16 VITALS — BODY MASS INDEX: 25.92 KG/M2 | WEIGHT: 175 LBS | HEIGHT: 69 IN

## 2020-04-16 PROCEDURE — 99214 OFFICE O/P EST MOD 30 MIN: CPT

## 2020-04-16 PROCEDURE — 73502 X-RAY EXAM HIP UNI 2-3 VIEWS: CPT | Mod: LT

## 2020-04-16 NOTE — HISTORY OF PRESENT ILLNESS
[de-identified] : The patient is a 74 year old female being seen for evaluation of her left hip. 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 She is 2 years s/p closed reduction percutaneous pinning of left femoral neck fracture. She is ambulating and transferring with a cane and notes severe pain and stiffness. She reports limping at times. She reports pain is centered in the back with radiation down the left leg and into the groin of the left hip. She reports difficulty standing from a seated position. She has difficulty donning/doffing shoes and socks and difficulty with transitioning in and out of the car. Pain is worse with transferring and weightbearing activities, most especially stair climbing. She reports her left hip pain has severely limited her quality of life at this time.  Since last office visit her pain has not improved.  She feels it is getting worse.  She comes in today for repeat evaluation of her left hip and for further treatment options.

## 2020-04-16 NOTE — PHYSICAL EXAM
[de-identified] : The patient appears well nourished  and in no apparent distress.  The patient is alert and oriented to person, place, and time.   Affect and mood appear normal. The head is normocephalic and atraumatic.  The eyes reveal normal sclera and extra ocular muscles are intact. The tongue is midline with no apparent lesions.  Skin shows normal turgor with no evidence of eczema or psoriasis.  No respiratory distress noted.  Sensation grossly intact.\par   [de-identified] : Exam of the left hip shows she is unable to flex the hip actively in a sitting position due to pain.  Groin pain with hip attempted range of motion.  5/5 motor strength bilaterally distally. Sensation intact distally.  [de-identified] : Xray- AP pelvis and 2 views left hip shows previous screw fixation of the left femoral neck fracture with avascular necrosis and femoral head collapse with the superior screw prominence at the joint space.  There does not appear to be further screw penetration compared to prior x-rays.

## 2020-04-16 NOTE — ADDENDUM
[FreeTextEntry1] : This note was authored by Federico Beach working as a medical scribe for Dr. Osmin Cisneros. The note was reviewed, edited, and revised by Dr. Osmin Cisneros whom is in agreement with the exam findings, imaging findings, and treatment plan. 04/16/2020.

## 2020-04-16 NOTE — DISCUSSION/SUMMARY
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 She was recommended to use a walker and to limit left leg weightbearing as much as possible.  She is using a cane in the office today.  Compared to x-rays at the last visit there does not appear to be further penetration of the screw in the intra-articular space.  Because of this I think it is safe to wait a few more weeks for surgery given the coronavirus outbreak.  I would like to see her back in 3 weeks for another follow-up x-ray.  If she develops a significant acute increase in her pain she will call us.  We discussed that we are hoping to be able to proceed with surgery sometime in May.  Another follow-up x-ray will help us determine if we need to do her surgery more urgently.  \par \par Because of patient's progressive pain with avascular necrosis and collapse of the femoral head with prominent intra-articular hardware, I have recommended a conversion left posterior total hip replacement.  We will plan for removal of the screws and conversion to total hip replacement.  We will plan to do this via a posterior approach.  A long discussion was had with the patient as what the total joint replacement would entail. A model was used to demonstrate the operation and to discuss bearing surfaces of the implants. The hospitalization and rehabilitation were discussed. The use of perioperative antibiotics and DVT prophylaxis were discussed. The risks, benefits and alternatives to surgical intervention were discussed at length with the patient. Specific risks discussed included: infection, wound breakdown, numbness and damage to nerves, tendon, muscle, arteries or other blood vessels, and the possibility of leg length discrepancy. The possibility of recurrent pain, no improvement in pain and actual worsening of the pain were also mentioned in conversation with the patient. Medical complications related to the patient's general medical health including deep vein thrombosis, pulmonary embolus, heart attack, stroke, death and other complications from anesthesia were discussed as well. The patient was told that we will take steps to minimize these risks by using sterile technique, antibiotics and DVT prophylaxis when appropriate and following the patient postoperatively in the clinic setting to monitor progress. The benefits of surgery were discussed with the patient including the potential to improve the current clinical condition through operative intervention. Alternatives to surgical intervention include continued conservative management which may yield less than optimal results in this particular patient. All questions were answered to the satisfaction of the patient. Models were used as an educational tool. We did discuss implant choices and fixation, with shared decision making with the patient.\par

## 2020-05-04 ENCOUNTER — APPOINTMENT (OUTPATIENT)
Dept: ORTHOPEDIC SURGERY | Facility: CLINIC | Age: 75
End: 2020-05-04
Payer: MEDICARE

## 2020-05-04 VITALS — BODY MASS INDEX: 25.92 KG/M2 | HEIGHT: 69 IN | WEIGHT: 175 LBS

## 2020-05-04 DIAGNOSIS — M87.052 IDIOPATHIC ASEPTIC NECROSIS OF LEFT FEMUR: ICD-10-CM

## 2020-05-04 PROCEDURE — 99213 OFFICE O/P EST LOW 20 MIN: CPT

## 2020-05-04 PROCEDURE — 73502 X-RAY EXAM HIP UNI 2-3 VIEWS: CPT | Mod: LT

## 2020-05-04 NOTE — ADDENDUM
[FreeTextEntry1] : This note was authored by Federico Beach working as a medical scribe for Dr. Osmin Cisneros. The note was reviewed, edited, and revised by Dr. Osimn Cisneros whom is in agreement with the exam findings, imaging findings, and treatment plan. 05/04/2020.

## 2020-05-04 NOTE — HISTORY OF PRESENT ILLNESS
[de-identified] : The patient is a 74 year old female being seen for evaluation of her left hip. 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 She reports pain has been progressive over the last month. She is ambulating and transferring with a cane and notes severe pain and stiffness. She reports limping all the time. She reports she is unable to walk without a cane at this time.  The last 2 weeks have been significantly worse.  She can walk only minimal short distances.

## 2020-05-04 NOTE — PHYSICAL EXAM
[de-identified] : The patient appears well nourished  and in no apparent distress.  The patient is alert and oriented to person, place, and time.   Affect and mood appear normal. The head is normocephalic and atraumatic.  The eyes reveal normal sclera and extra ocular muscles are intact. The tongue is midline with no apparent lesions.  Skin shows normal turgor with no evidence of eczema or psoriasis.  No respiratory distress noted.  Sensation grossly intact.\par   [de-identified] : Exam of the left hip shows hip external rotation of 20 degrees, internal rotation is neutral and painful, unable to fully extend the left leg. 5/5 motor strength bilaterally distally. Sensation intact distally. Difficulty getting legs onto the exam table. [de-identified] : Xray- AP pelvis and 2 views left hip shows previous screw fixation of the left femoral neck fracture with avascular necrosis and femoral head collapse with the superior screw prominent at the joint space.

## 2020-05-04 NOTE — DISCUSSION/SUMMARY
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  The patient's pain is progressing and at this point is unbearable.  She is walking minimal short distances.  Her surgery has been postponed because of the coronavirus outbreak.  We have put her on our list of surgical cases with the highest level priority so that we can proceed with her surgery as soon as possible.

## 2020-06-08 ENCOUNTER — OUTPATIENT (OUTPATIENT)
Dept: OUTPATIENT SERVICES | Facility: HOSPITAL | Age: 75
LOS: 1 days | End: 2020-06-08
Payer: MEDICARE

## 2020-06-08 VITALS
TEMPERATURE: 98 F | HEIGHT: 69 IN | WEIGHT: 167.55 LBS | SYSTOLIC BLOOD PRESSURE: 146 MMHG | HEART RATE: 67 BPM | RESPIRATION RATE: 18 BRPM | DIASTOLIC BLOOD PRESSURE: 58 MMHG

## 2020-06-08 DIAGNOSIS — Z01.818 ENCOUNTER FOR OTHER PREPROCEDURAL EXAMINATION: ICD-10-CM

## 2020-06-08 DIAGNOSIS — Z91.89 OTHER SPECIFIED PERSONAL RISK FACTORS, NOT ELSEWHERE CLASSIFIED: ICD-10-CM

## 2020-06-08 DIAGNOSIS — Z98.49 CATARACT EXTRACTION STATUS, UNSPECIFIED EYE: Chronic | ICD-10-CM

## 2020-06-08 DIAGNOSIS — I63.9 CEREBRAL INFARCTION, UNSPECIFIED: ICD-10-CM

## 2020-06-08 DIAGNOSIS — Z90.49 ACQUIRED ABSENCE OF OTHER SPECIFIED PARTS OF DIGESTIVE TRACT: Chronic | ICD-10-CM

## 2020-06-08 DIAGNOSIS — M87.052 IDIOPATHIC ASEPTIC NECROSIS OF LEFT FEMUR: ICD-10-CM

## 2020-06-08 DIAGNOSIS — I77.9 DISORDER OF ARTERIES AND ARTERIOLES, UNSPECIFIED: Chronic | ICD-10-CM

## 2020-06-08 DIAGNOSIS — Z98.51 TUBAL LIGATION STATUS: Chronic | ICD-10-CM

## 2020-06-08 LAB
A1C WITH ESTIMATED AVERAGE GLUCOSE RESULT: 5.3 % — SIGNIFICANT CHANGE UP (ref 4–5.6)
ANION GAP SERPL CALC-SCNC: 12 MMOL/L — SIGNIFICANT CHANGE UP (ref 5–17)
APTT BLD: 29.1 SEC — SIGNIFICANT CHANGE UP (ref 27.5–36.3)
BASOPHILS # BLD AUTO: 0.07 K/UL — SIGNIFICANT CHANGE UP (ref 0–0.2)
BASOPHILS NFR BLD AUTO: 0.9 % — SIGNIFICANT CHANGE UP (ref 0–2)
BLD GP AB SCN SERPL QL: SIGNIFICANT CHANGE UP
BUN SERPL-MCNC: 16 MG/DL — SIGNIFICANT CHANGE UP (ref 8–20)
CALCIUM SERPL-MCNC: 9.9 MG/DL — SIGNIFICANT CHANGE UP (ref 8.6–10.2)
CHLORIDE SERPL-SCNC: 98 MMOL/L — SIGNIFICANT CHANGE UP (ref 98–107)
CO2 SERPL-SCNC: 25 MMOL/L — SIGNIFICANT CHANGE UP (ref 22–29)
CREAT SERPL-MCNC: 0.97 MG/DL — SIGNIFICANT CHANGE UP (ref 0.5–1.3)
EOSINOPHIL # BLD AUTO: 0.22 K/UL — SIGNIFICANT CHANGE UP (ref 0–0.5)
EOSINOPHIL NFR BLD AUTO: 2.8 % — SIGNIFICANT CHANGE UP (ref 0–6)
ESTIMATED AVERAGE GLUCOSE: 105 MG/DL — SIGNIFICANT CHANGE UP (ref 68–114)
GLUCOSE SERPL-MCNC: 93 MG/DL — SIGNIFICANT CHANGE UP (ref 70–99)
HCT VFR BLD CALC: 44.1 % — SIGNIFICANT CHANGE UP (ref 34.5–45)
HGB BLD-MCNC: 14.1 G/DL — SIGNIFICANT CHANGE UP (ref 11.5–15.5)
IMM GRANULOCYTES NFR BLD AUTO: 0.4 % — SIGNIFICANT CHANGE UP (ref 0–1.5)
LYMPHOCYTES # BLD AUTO: 1.21 K/UL — SIGNIFICANT CHANGE UP (ref 1–3.3)
LYMPHOCYTES # BLD AUTO: 15.6 % — SIGNIFICANT CHANGE UP (ref 13–44)
MCHC RBC-ENTMCNC: 29 PG — SIGNIFICANT CHANGE UP (ref 27–34)
MCHC RBC-ENTMCNC: 32 GM/DL — SIGNIFICANT CHANGE UP (ref 32–36)
MCV RBC AUTO: 90.7 FL — SIGNIFICANT CHANGE UP (ref 80–100)
MONOCYTES # BLD AUTO: 0.59 K/UL — SIGNIFICANT CHANGE UP (ref 0–0.9)
MONOCYTES NFR BLD AUTO: 7.6 % — SIGNIFICANT CHANGE UP (ref 2–14)
MRSA PCR RESULT.: SIGNIFICANT CHANGE UP
NEUTROPHILS # BLD AUTO: 5.66 K/UL — SIGNIFICANT CHANGE UP (ref 1.8–7.4)
NEUTROPHILS NFR BLD AUTO: 72.7 % — SIGNIFICANT CHANGE UP (ref 43–77)
PLATELET # BLD AUTO: 337 K/UL — SIGNIFICANT CHANGE UP (ref 150–400)
POTASSIUM SERPL-MCNC: 4.5 MMOL/L — SIGNIFICANT CHANGE UP (ref 3.5–5.3)
POTASSIUM SERPL-SCNC: 4.5 MMOL/L — SIGNIFICANT CHANGE UP (ref 3.5–5.3)
RBC # BLD: 4.86 M/UL — SIGNIFICANT CHANGE UP (ref 3.8–5.2)
RBC # FLD: 13.5 % — SIGNIFICANT CHANGE UP (ref 10.3–14.5)
S AUREUS DNA NOSE QL NAA+PROBE: SIGNIFICANT CHANGE UP
SODIUM SERPL-SCNC: 135 MMOL/L — SIGNIFICANT CHANGE UP (ref 135–145)
WBC # BLD: 7.78 K/UL — SIGNIFICANT CHANGE UP (ref 3.8–10.5)
WBC # FLD AUTO: 7.78 K/UL — SIGNIFICANT CHANGE UP (ref 3.8–10.5)

## 2020-06-08 PROCEDURE — 93005 ELECTROCARDIOGRAM TRACING: CPT

## 2020-06-08 PROCEDURE — 93010 ELECTROCARDIOGRAM REPORT: CPT

## 2020-06-08 PROCEDURE — G0463: CPT

## 2020-06-08 RX ORDER — SODIUM CHLORIDE 9 MG/ML
3 INJECTION INTRAMUSCULAR; INTRAVENOUS; SUBCUTANEOUS EVERY 8 HOURS
Refills: 0 | Status: DISCONTINUED | OUTPATIENT
Start: 2020-06-29 | End: 2020-06-29

## 2020-06-08 NOTE — ASU PATIENT PROFILE, ADULT - HEALTHCARE QUESTIONS, PROFILE
Patient called stating she did not get a prescription for a muscle relaxant that was discussed at [address] yesterday. Advised Rx was sent to Pharmacy yesterday evening. Instructed to check with Pharmacy today for Rx and to return call if they don't have it. none

## 2020-06-08 NOTE — H&P PST ADULT - NSICDXPROBLEM_GEN_ALL_CORE_FT
PROBLEM DIAGNOSES  Problem: Idiopathic aseptic necrosis of femur, left  Assessment and Plan:     Problem: Cerebrovascular accident (CVA), unspecified mechanism  Assessment and Plan:     Problem: At moderate risk for venous thromboembolism (VTE)  Assessment and Plan: caprini score is moderate VTE risk surgical team to assess the need for pharm proph PROBLEM DIAGNOSES  Problem: Cerebrovascular accident (CVA), unspecified mechanism  Assessment and Plan:  going for medical clearance     Problem: Idiopathic aseptic necrosis of femur, left  Assessment and Plan: pt is having left hip repair with Dr Cisneros     Problem: At moderate risk for venous thromboembolism (VTE)  Assessment and Plan: caprini score is moderate VTE risk surgical team to assess the need for pharm proph

## 2020-06-08 NOTE — H&P PST ADULT - ASSESSMENT
OPIOID RISK TOOL    JANE EACH BOX THAT APPLIES AND ADD TOTALS AT THE END    FAMILY HISTORY OF SUBSTANCE ABUSE                 FEMALE         MALE                                                Alcohol                             [  ]1 pt          [  ]3pts                                               Illegal Drugs                     [  ]2 pts        [  ]3pts                                               Rx Drugs                           [  ]4 pts        [  ]4 pts    PERSONAL HISTORY OF SUBSTANCE ABUSE                                                                                          Alcohol                             [  ]3 pts       [  ]3 pts                                               Illegal Drugs                     [  ]4 pts        [  ]4 pts                                               Rx Drugs                           [  ]5 pts        [  ]5 pts    AGE BETWEEN 16-45 YEARS                                      [  ]1 pt         [  ]1 pt    HISTORY OF PREADOLESCENT   SEXUAL ABUSE                                                             [  ]3 pts        [  ]0pts    PSYCHOLOGICAL DISEASE                     ADD, OCD, Bipolar, Schizophrenia        [  ]2 pts         [  ]2 pts                      Depression                                               [  ]1 pt           [  ]1 pt           SCORING TOTAL   (add numbers and type here)              (*0**)                                     CAPRINI SCORE [CLOT]    AGE RELATED RISK FACTORS                                                       MOBILITY RELATED FACTORS  [ ] Age 41-60 years                                            (1 Point)                  [ ] Bed rest                                                        (1 Point)  [ ] Age: 61-74 years                                           (2 Points)                 [ ] Plaster cast                                                   (2 Points)  [x ] Age= 75 years                                              (3 Points)                 [ ] Bed bound for more than 72 hours                 (2 Points)    DISEASE RELATED RISK FACTORS                                               GENDER SPECIFIC FACTORS  [ ] Edema in the lower extremities                       (1 Point)                  [ ] Pregnancy                                                     (1 Point)  [ ] Varicose veins                                               (1 Point)                  [ ] Post-partum < 6 weeks                                   (1 Point)             [ x] BMI > 25 Kg/m2                                            (1 Point)                  [ ] Hormonal therapy  or oral contraception          (1 Point)                 [ ] Sepsis (in the previous month)                        (1 Point)                  [ ] History of pregnancy complications                 (1 point)  [ ] Pneumonia or serious lung disease                                               [ ] Unexplained or recurrent                     (1 Point)           (in the previous month)                               (1 Point)  [ ] Abnormal pulmonary function test                     (1 Point)                 SURGERY RELATED RISK FACTORS  [ ] Acute myocardial infarction                              (1 Point)                 [ ]  Section                                             (1 Point)  [ ] Congestive heart failure (in the previous month)  (1 Point)               [ ] Minor surgery                                                  (1 Point)   [ ] Inflammatory bowel disease                             (1 Point)                 [ ] Arthroscopic surgery                                        (2 Points)  [ ] Central venous access                                      (2 Points)                [x ] General surgery lasting more than 45 minutes   (2 Points)       [ ] Stroke (in the previous month)                          (5 Points)               [ ] Elective arthroplasty                                         (5 Points)                                                                                                                                               HEMATOLOGY RELATED FACTORS                                                 TRAUMA RELATED RISK FACTORS  [ ] Prior episodes of VTE                                     (3 Points)                [ ] Fracture of the hip, pelvis, or leg                       (5 Points)  [ ] Positive family history for VTE                         (3 Points)                 [ ] Acute spinal cord injury (in the previous month)  (5 Points)  [ ] Prothrombin 00285 A                                     (3 Points)                 [ ] Paralysis  (less than 1 month)                             (5 Points)  [ ] Factor V Leiden                                             (3 Points)                  [ ] Multiple Trauma within 1 month                        (5 Points)  [ ] Lupus anticoagulants                                     (3 Points)                                                           [ ] Anticardiolipin antibodies                               (3 Points)                                                       [ ] High homocysteine in the blood                      (3 Points)                                             [ ] Other congenital or acquired thrombophilia      (3 Points)                                                [ ] Heparin induced thrombocytopenia                  (3 Points)                                          Total Score [   6       ]    Caprini Score 0 - 2:  Low Risk, No VTE Prophylaxis required for most patients, encourage ambulation  Caprini Score 3 - 6:  At Risk, pharmacologic VTE prophylaxis is indicated for most patients (in the absence of a contraindication)  Caprini Score Greater than or = 7:  High Risk, pharmacologic VTE prophylaxis is indicated for most patients (in the absence of a contraindication)  A score of 3 or lower indicated LOW risk for future opioid abuse  A score of 4 to 7 indicated moderate risk for future opioid abuse  A score of 8 or higher indicates a high risk for opioid abuse    Patient is a 76 y/o female aox3, retired  .Patient uses a cane for balance walks with limp. Patient c/o left hip pain. Patient reports having a fall in 3/18/18  patient fell to the floor causing a femoral neck fracture" .Patient had surgery with  Dr Mayer for repair  . Patient  reports that last year she started to feel pain and discomfort walking and standing .Patient tried physical therapy with no relief from pain ,her pain level ranges from 4/10 to 7/10. Patient reports relief from rest and OTC medication. Patient went to DR Cisneros for a consult and was sent for an  x ray with findings as per patient of a" Pin in her hip moved  ". Patient presents to [facility] for evaluation for a left total hip replacement , posterior approach removal of hardware with Dr Cisneros on 20     .  Patient educated on pre op prep with written and verbal instructions

## 2020-06-08 NOTE — H&P PST ADULT - NSICDXPASTMEDICALHX_GEN_ALL_CORE_FT
Awake/Alert PAST MEDICAL HISTORY:  Carotid artery disease, unspecified laterality     Cerebrovascular accident (CVA), unspecified mechanism     Esophageal dysphagia     Hyperlipidemia, unspecified hyperlipidemia type     Hypertension

## 2020-06-08 NOTE — H&P PST ADULT - HISTORY OF PRESENT ILLNESS
Patient is a 76 y/o female aox3 . Patient c./o left hip pain         History of Present Illness   The patient is a 74 year old female being seen for evaluation of her left hip. 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 She reports pain has been progressive over the last month. She is ambulating and transferring with a cane and notes severe pain and stiffness. She reports limping all the time. She reports she is unable to walk without a cane at this time. The last 2 weeks have been significantly worse. She can walk only minimal short distances.     History of Present Illness   The patient is a 74 year old female being seen for evaluation of her left hip. 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 She is 2 years s/p closed reduction percutaneous pinning of left femoral neck fracture. She is ambulating and transferring with a cane and notes severe pain and stiffness. She reports limping at times. She reports pain is centered in the back with radiation down the left leg and into the groin of the left hip. She reports difficulty standing from a seated position. She has difficulty donning/doffing shoes and socks and difficulty with transitioning in and out of the car. Pain is worse with transferring and weightbearing activities, most especially stair climbing. She reports her left hip pain has severely limited her quality of life at this time. Since last office visit her pain has not improved. She feels it is getting worse. She comes in today for repeat evaluation of her left hip and for further treatment options. Patient is a 74 y/o female aox3, retired  .Patient uses a cane for balance walks with limp. Patient c/o left hip pain. Patient reports having a fall in 3/18/18  patient fell to the floor causing a femoral neck fracture" .Patient had surgery with  Dr Mayer for repair  . Patient  reports that last year she started to feel pain and discomfort walking and standing .Patient tried physical therapy with no relief from pain ,her pain level ranges from 4/10 to 7/10. Patient reports relief from rest and OTC medication. Patient went to DR Cisneros for a consult and was sent for an  x ray with findings as per patient of a" Pin in her hip moved  ". Patient presents to [facility] for evaluation for a left total hip replacement , posterior approach removal of hardware with Dr Cisneros on 06/29/20     .

## 2020-06-08 NOTE — ASU PATIENT PROFILE, ADULT - VISION (WITH CORRECTIVE LENSES IF THE PATIENT USUALLY WEARS THEM):
Partially impaired: cannot see medication labels or newsprint, but can see obstacles in path, and the surrounding layout; can count fingers at arm's length/wears glasses as needed

## 2020-06-08 NOTE — H&P PST ADULT - NSICDXPASTSURGICALHX_GEN_ALL_CORE_FT
PAST SURGICAL HISTORY:  Carotid artery disease s/p carotid bypass right side    S/P cataract surgery both eyes    S/P cholecystectomy     S/P tubal ligation

## 2020-06-09 PROBLEM — E78.5 HYPERLIPIDEMIA, UNSPECIFIED: Chronic | Status: ACTIVE | Noted: 2018-03-19

## 2020-06-16 NOTE — OCCUPATIONAL THERAPY INITIAL EVALUATION ADULT - PERSONAL SAFETY AND JUDGMENT, REHAB EVAL
intact Double O-Z Flap Text: The defect edges were debeveled with a #15 scalpel blade.  Given the location of the defect, shape of the defect and the proximity to free margins a Double O-Z flap was deemed most appropriate.  Using a sterile surgical marker, an appropriate transposition flap was drawn incorporating the defect and placing the expected incisions within the relaxed skin tension lines where possible. The area thus outlined was incised deep to adipose tissue with a #15 scalpel blade.  The skin margins were undermined to an appropriate distance in all directions utilizing iris scissors.

## 2020-06-24 DIAGNOSIS — Z01.818 ENCOUNTER FOR OTHER PREPROCEDURAL EXAMINATION: ICD-10-CM

## 2020-06-24 RX ORDER — CEFAZOLIN SODIUM 1 G
2000 VIAL (EA) INJECTION ONCE
Refills: 0 | Status: DISCONTINUED | OUTPATIENT
Start: 2020-06-29 | End: 2020-06-29

## 2020-06-26 ENCOUNTER — APPOINTMENT (OUTPATIENT)
Dept: DISASTER EMERGENCY | Facility: CLINIC | Age: 75
End: 2020-06-26

## 2020-06-26 RX ORDER — TRANEXAMIC ACID 100 MG/ML
1 INJECTION, SOLUTION INTRAVENOUS ONCE
Refills: 0 | Status: DISCONTINUED | OUTPATIENT
Start: 2020-06-29 | End: 2020-06-29

## 2020-06-27 LAB — SARS-COV-2 N GENE NPH QL NAA+PROBE: NOT DETECTED

## 2020-06-28 ENCOUNTER — TRANSCRIPTION ENCOUNTER (OUTPATIENT)
Age: 75
End: 2020-06-28

## 2020-06-29 ENCOUNTER — APPOINTMENT (OUTPATIENT)
Dept: ORTHOPEDIC SURGERY | Facility: HOSPITAL | Age: 75
End: 2020-06-29

## 2020-06-29 ENCOUNTER — TRANSCRIPTION ENCOUNTER (OUTPATIENT)
Age: 75
End: 2020-06-29

## 2020-06-29 ENCOUNTER — INPATIENT (INPATIENT)
Facility: HOSPITAL | Age: 75
LOS: 1 days | Discharge: HOME | DRG: 470 | End: 2020-07-01
Attending: ORTHOPAEDIC SURGERY | Admitting: ORTHOPAEDIC SURGERY
Payer: MEDICARE

## 2020-06-29 ENCOUNTER — RESULT REVIEW (OUTPATIENT)
Age: 75
End: 2020-06-29

## 2020-06-29 VITALS
HEART RATE: 73 BPM | RESPIRATION RATE: 18 BRPM | WEIGHT: 175.93 LBS | DIASTOLIC BLOOD PRESSURE: 70 MMHG | HEIGHT: 69 IN | TEMPERATURE: 98 F | OXYGEN SATURATION: 97 % | SYSTOLIC BLOOD PRESSURE: 170 MMHG

## 2020-06-29 DIAGNOSIS — I77.9 DISORDER OF ARTERIES AND ARTERIOLES, UNSPECIFIED: Chronic | ICD-10-CM

## 2020-06-29 DIAGNOSIS — Z90.49 ACQUIRED ABSENCE OF OTHER SPECIFIED PARTS OF DIGESTIVE TRACT: Chronic | ICD-10-CM

## 2020-06-29 DIAGNOSIS — Z98.51 TUBAL LIGATION STATUS: Chronic | ICD-10-CM

## 2020-06-29 DIAGNOSIS — Z98.49 CATARACT EXTRACTION STATUS, UNSPECIFIED EYE: Chronic | ICD-10-CM

## 2020-06-29 DIAGNOSIS — M87.052 IDIOPATHIC ASEPTIC NECROSIS OF LEFT FEMUR: ICD-10-CM

## 2020-06-29 LAB
BLD GP AB SCN SERPL QL: SIGNIFICANT CHANGE UP
GLUCOSE BLDC GLUCOMTR-MCNC: 129 MG/DL — HIGH (ref 70–99)
GLUCOSE BLDC GLUCOMTR-MCNC: 91 MG/DL — SIGNIFICANT CHANGE UP (ref 70–99)

## 2020-06-29 PROCEDURE — 27132 TOTAL HIP ARTHROPLASTY: CPT | Mod: AS,LT

## 2020-06-29 PROCEDURE — 88300 SURGICAL PATH GROSS: CPT | Mod: 26

## 2020-06-29 PROCEDURE — 71045 X-RAY EXAM CHEST 1 VIEW: CPT | Mod: 26

## 2020-06-29 PROCEDURE — 27132 TOTAL HIP ARTHROPLASTY: CPT | Mod: LT

## 2020-06-29 PROCEDURE — 73501 X-RAY EXAM HIP UNI 1 VIEW: CPT | Mod: 26,LT

## 2020-06-29 RX ORDER — POLYETHYLENE GLYCOL 3350 17 G/17G
17 POWDER, FOR SOLUTION ORAL DAILY
Refills: 0 | Status: DISCONTINUED | OUTPATIENT
Start: 2020-06-29 | End: 2020-07-01

## 2020-06-29 RX ORDER — HYDROMORPHONE HYDROCHLORIDE 2 MG/ML
4 INJECTION INTRAMUSCULAR; INTRAVENOUS; SUBCUTANEOUS
Refills: 0 | Status: DISCONTINUED | OUTPATIENT
Start: 2020-06-29 | End: 2020-07-01

## 2020-06-29 RX ORDER — SIMVASTATIN 20 MG/1
20 TABLET, FILM COATED ORAL AT BEDTIME
Refills: 0 | Status: DISCONTINUED | OUTPATIENT
Start: 2020-06-29 | End: 2020-07-01

## 2020-06-29 RX ORDER — PANTOPRAZOLE SODIUM 20 MG/1
40 TABLET, DELAYED RELEASE ORAL
Refills: 0 | Status: DISCONTINUED | OUTPATIENT
Start: 2020-06-29 | End: 2020-07-01

## 2020-06-29 RX ORDER — CEFAZOLIN SODIUM 1 G
2000 VIAL (EA) INJECTION
Refills: 0 | Status: COMPLETED | OUTPATIENT
Start: 2020-06-29 | End: 2020-06-30

## 2020-06-29 RX ORDER — OXYCODONE HYDROCHLORIDE 5 MG/1
5 TABLET ORAL
Refills: 0 | Status: DISCONTINUED | OUTPATIENT
Start: 2020-06-29 | End: 2020-07-01

## 2020-06-29 RX ORDER — HYDRALAZINE HCL 50 MG
50 TABLET ORAL THREE TIMES A DAY
Refills: 0 | Status: DISCONTINUED | OUTPATIENT
Start: 2020-07-01 | End: 2020-07-01

## 2020-06-29 RX ORDER — METOPROLOL TARTRATE 50 MG
100 TABLET ORAL
Refills: 0 | Status: DISCONTINUED | OUTPATIENT
Start: 2020-06-30 | End: 2020-07-01

## 2020-06-29 RX ORDER — ACETAMINOPHEN 500 MG
975 TABLET ORAL ONCE
Refills: 0 | Status: COMPLETED | OUTPATIENT
Start: 2020-06-29 | End: 2020-06-29

## 2020-06-29 RX ORDER — ONDANSETRON 8 MG/1
4 TABLET, FILM COATED ORAL EVERY 6 HOURS
Refills: 0 | Status: DISCONTINUED | OUTPATIENT
Start: 2020-06-29 | End: 2020-07-01

## 2020-06-29 RX ORDER — SODIUM CHLORIDE 9 MG/ML
1000 INJECTION, SOLUTION INTRAVENOUS
Refills: 0 | Status: DISCONTINUED | OUTPATIENT
Start: 2020-06-29 | End: 2020-06-29

## 2020-06-29 RX ORDER — FENTANYL CITRATE 50 UG/ML
25 INJECTION INTRAVENOUS
Refills: 0 | Status: DISCONTINUED | OUTPATIENT
Start: 2020-06-29 | End: 2020-06-29

## 2020-06-29 RX ORDER — SODIUM CHLORIDE 9 MG/ML
500 INJECTION INTRAMUSCULAR; INTRAVENOUS; SUBCUTANEOUS ONCE
Refills: 0 | Status: COMPLETED | OUTPATIENT
Start: 2020-06-29 | End: 2020-06-29

## 2020-06-29 RX ORDER — HYDROMORPHONE HYDROCHLORIDE 2 MG/ML
0.5 INJECTION INTRAMUSCULAR; INTRAVENOUS; SUBCUTANEOUS
Refills: 0 | Status: DISCONTINUED | OUTPATIENT
Start: 2020-06-29 | End: 2020-07-01

## 2020-06-29 RX ORDER — SODIUM CHLORIDE 9 MG/ML
1000 INJECTION INTRAMUSCULAR; INTRAVENOUS; SUBCUTANEOUS
Refills: 0 | Status: DISCONTINUED | OUTPATIENT
Start: 2020-06-29 | End: 2020-07-01

## 2020-06-29 RX ORDER — KETOROLAC TROMETHAMINE 30 MG/ML
15 SYRINGE (ML) INJECTION EVERY 6 HOURS
Refills: 0 | Status: DISCONTINUED | OUTPATIENT
Start: 2020-06-29 | End: 2020-06-30

## 2020-06-29 RX ORDER — ONDANSETRON 8 MG/1
4 TABLET, FILM COATED ORAL ONCE
Refills: 0 | Status: DISCONTINUED | OUTPATIENT
Start: 2020-06-29 | End: 2020-06-29

## 2020-06-29 RX ORDER — OXYCODONE HYDROCHLORIDE 5 MG/1
10 TABLET ORAL
Refills: 0 | Status: DISCONTINUED | OUTPATIENT
Start: 2020-06-29 | End: 2020-07-01

## 2020-06-29 RX ORDER — MAGNESIUM HYDROXIDE 400 MG/1
30 TABLET, CHEWABLE ORAL AT BEDTIME
Refills: 0 | Status: DISCONTINUED | OUTPATIENT
Start: 2020-06-29 | End: 2020-07-01

## 2020-06-29 RX ORDER — ACETAMINOPHEN 500 MG
975 TABLET ORAL EVERY 8 HOURS
Refills: 0 | Status: DISCONTINUED | OUTPATIENT
Start: 2020-06-29 | End: 2020-07-01

## 2020-06-29 RX ORDER — ENOXAPARIN SODIUM 100 MG/ML
40 INJECTION SUBCUTANEOUS DAILY
Refills: 0 | Status: DISCONTINUED | OUTPATIENT
Start: 2020-06-30 | End: 2020-07-01

## 2020-06-29 RX ORDER — CELECOXIB 200 MG/1
200 CAPSULE ORAL
Refills: 0 | Status: DISCONTINUED | OUTPATIENT
Start: 2020-07-01 | End: 2020-07-01

## 2020-06-29 RX ORDER — AMLODIPINE BESYLATE 2.5 MG/1
10 TABLET ORAL DAILY
Refills: 0 | Status: DISCONTINUED | OUTPATIENT
Start: 2020-07-01 | End: 2020-07-01

## 2020-06-29 RX ORDER — CELECOXIB 200 MG/1
400 CAPSULE ORAL ONCE
Refills: 0 | Status: COMPLETED | OUTPATIENT
Start: 2020-06-29 | End: 2020-06-29

## 2020-06-29 RX ORDER — LORATADINE 10 MG/1
10 TABLET ORAL DAILY
Refills: 0 | Status: DISCONTINUED | OUTPATIENT
Start: 2020-06-29 | End: 2020-07-01

## 2020-06-29 RX ORDER — APREPITANT 80 MG/1
40 CAPSULE ORAL ONCE
Refills: 0 | Status: COMPLETED | OUTPATIENT
Start: 2020-06-29 | End: 2020-06-29

## 2020-06-29 RX ADMIN — Medication 975 MILLIGRAM(S): at 13:33

## 2020-06-29 RX ADMIN — APREPITANT 40 MILLIGRAM(S): 80 CAPSULE ORAL at 13:29

## 2020-06-29 RX ADMIN — SODIUM CHLORIDE 500 MILLILITER(S): 9 INJECTION INTRAMUSCULAR; INTRAVENOUS; SUBCUTANEOUS at 21:30

## 2020-06-29 RX ADMIN — Medication 15 MILLIGRAM(S): at 23:40

## 2020-06-29 RX ADMIN — Medication 100 MILLIGRAM(S): at 22:21

## 2020-06-29 RX ADMIN — SODIUM CHLORIDE 3 MILLILITER(S): 9 INJECTION INTRAMUSCULAR; INTRAVENOUS; SUBCUTANEOUS at 13:44

## 2020-06-29 RX ADMIN — CELECOXIB 400 MILLIGRAM(S): 200 CAPSULE ORAL at 13:30

## 2020-06-29 NOTE — DISCHARGE NOTE PROVIDER - NSDCFUADDINST_GEN_ALL_CORE_FT
The patient will be seen in the office between 2-3 weeks for wound check. PLEASE CONTACT OFFICE TO ARRANGE FOLLOW-UP APPOINTMENT DATE. Sutures/Staples/Tape will be removed at that time. Patient may shower after post-op day #5 (7/4/20). The dressing is to be removed on post op day #9 (7/11/20). IF THE DRESSING BECOMES SOILED BEFORE THE REMOVAL DATE, CHANGE WITH A SIMILAR DRESSING. IF THE DRESSING BECOMES STAINED WITH DISCHARGE, CONTACT THE OFFICE FOR FURTHER DIRECTIONS.  The patient will contact the office if the wound becomes red, has increasing pain, develops bleeding or discharge, an injury occurs, or has other concerns. The patient will continue PT consistent with posterior total hip replacement protocol. The patient will continue to practice posterior total hip precautions for a minimum of 6 week. The patient will continue LOVENOX for 2 weeks and then begin ASPIRIN for blood clot prevention. The patient will take OXYCODONE AND TYLENOL for pain control and titrate according to prescription and patient needs. The patient will take Senna-S while taking oxycodone to prevent narcotic associated constipation.  Additionally, increase water intake (drink at least 8 glasses of water daily) and try adding fiber to the diet by eating fruits, vegetables and foods that are rich in grains. If constipation is experienced, contact the medical/primary care provider to discuss further treatment options. The patient is PARTIAL weight bearing of the LEFT LOWER EXTREMITY. The patient will be seen in the office between 2-3 weeks for wound check.   **Your first post-operative visit has been scheduled prior to your admission. PLEASE CONTACT OFFICE TO CONFIRM THE APPOINTMENT DATE. Sutures/Staples/Tape will be removed at that time.  **  The silver based dressing is to be removed 7 days from the date of surgery (7/6/20).   ** CONTACT THE OFFICE IF THE FOLLOWING DEVELOP:  - the dressing becomes soiled or saturated  - you develop a fever greater that 101F  - the wound becomes red or you develop blistering around the wound  * Patient may shower after post-op day #3 (7/2/20).   * The patient will continue home PT consistent with posterior total hip replacement protocol and will continue to practice posterior total hip precautions for a minimum of 6 week. Transition to outpatient PT will occur at the time of the first office visit.   * The patient is PARTIAL weight bearing of the LEFT LOWER EXTREMITY.  * The patient will continue LOVENOX for 2 weeks and then begin ASPIRIN for blood clot prevention. *** While on aspirin, the patient will take daily omeprazole or other similar medication to protect the stomach from irritation.   * The patient will take OXYCODONE AND TYLENOL for pain control and adjust according to prescription and patient needs. Contact the office if pain increases while taking prescribed pain medications or related concerns develop.  * Celebrex will be taken twice daily for 3 weeks for pain control and prevention of excessive bone growth. Additional prescription may be requested at your office follow-up visit.  * The patient will take Senna S while taking oxycodone to prevent narcotic associated constipation.  Additionally, increase water intake (drink at least 8 glasses of water daily) and try adding fiber to the diet by eating fruits, vegetables and foods that are rich in grains. If constipation is experienced, contact the medical/primary care provider to discuss further treatment options.  * To avoid injury at home:  - continue use of rolling walker until cleared by physical therapist  - have family or friend remove all throw rug or objects in hallways that may present a trip hazard.  - if you experience any dizziness or medical concerns, call your medical doctor or  911.  * The implant may activate metal detection devices.

## 2020-06-29 NOTE — DISCHARGE NOTE PROVIDER - NSDCFUSCHEDAPPT_GEN_ALL_CORE_FT
CHARANJIT GREENWOOD ; 07/23/2020 ; [facility] [address] CHARANJIT [address] ; 08/13/2020 ; [facility] [address]

## 2020-06-29 NOTE — DISCHARGE NOTE PROVIDER - NSDCCPCAREPLAN_GEN_ALL_CORE_FT
PRINCIPAL DISCHARGE DIAGNOSIS  Diagnosis: S/P total hip arthroplasty  Assessment and Plan of Treatment:       SECONDARY DISCHARGE DIAGNOSES  Diagnosis: S/P hardware removal  Assessment and Plan of Treatment:

## 2020-06-29 NOTE — DISCHARGE NOTE PROVIDER - CARE PROVIDER_API CALL
Osmin Cisneros  ORTHOPAEDIC SURGERY  [address]  [place] [address]  Phone: [phone]  Fax: [phone]  Follow Up Time:

## 2020-06-29 NOTE — PROGRESS NOTE ADULT - SUBJECTIVE AND OBJECTIVE BOX
Orthopedic PA Postop Note  Patient S/P LEFT POSTERIOR ANSON  Patient in bed comfortable   LEFT Leg  Dressing C/D/I  DP Pulse intact  Calf Soft NT  Dorsi/Plantar Flexion/EHL/FHL Intact  Sensation intact to light touch  Abduction Pillow in place     Vital Signs Last 24 Hrs  T(C): 36.3 (29 Jun 2020 20:25), Max: 36.5 (29 Jun 2020 13:08)  T(F): 97.4 (29 Jun 2020 20:25), Max: 97.7 (29 Jun 2020 13:08)  HR: 80 (29 Jun 2020 22:00) (66 - 82)  BP: 106/48 (29 Jun 2020 22:00) (106/48 - 170/70)  BP(mean): 63 (29 Jun 2020 22:00) (63 - 84)  RR: 19 (29 Jun 2020 22:00) (12 - 19)  SpO2: 96% (29 Jun 2020 22:00) (95% - 98%)    A/P:  S/P LEFT POSTERIOR ANSON  1. DVTP - LOVENOX  2. Physical Therapy - Posterior Precautions - PWB LLE  3. Pain Control as clinically indicated

## 2020-06-29 NOTE — DISCHARGE NOTE PROVIDER - NSDCMRMEDTOKEN_GEN_ALL_CORE_FT
acetaminophen 325 mg oral tablet: 2 tab(s) orally every 6 hours, As needed, pain  amLODIPine 10 mg oral tablet: 1 tab(s) orally once a day  aspirin 81 mg oral tablet, chewable: 1 tab(s) orally once a day  Claritin 10 mg oral tablet: 1 tab(s) orally once a day  commode: ICD 10 - S72.90XA  hydrALAZINE 50 mg oral tablet: 50 milligram(s) orally 3 times a day  metoprolol tartrate 100 mg oral tablet: 1 tab(s) orally 2 times a day  NexIUM 40 mg oral delayed release capsule: 1 cap(s) orally once a day  Rolling walker : ICD 10 - S72.90XA  simvastatin 20 mg oral tablet: 1 tab(s) orally once a day (at bedtime) acetaminophen 325 mg oral tablet: 3 tab(s) orally every 8 hours  amLODIPine 10 mg oral tablet: 1 tab(s) orally once a day  aspirin 81 mg oral tablet, chewable: 1 tab(s) orally once a day  Aspirin Enteric Coated 325 mg oral delayed release tablet: 1 tab(s) orally 2 times a day x 4 weeks, to begin after completion of Lovenox/ home dose Aspirin, may resume home dose after completion  celecoxib 200 mg oral capsule: 1 cap(s) orally 2 times a day  Claritin 10 mg oral tablet: 1 tab(s) orally once a day  commode: ICD 10 - S72.90XA  hydrALAZINE 50 mg oral tablet: 50 milligram(s) orally 3 times a day  Lovenox 40 mg/0.4 mL injectable solution: 40 milligram(s) subcutaneously once a day   metoprolol tartrate 100 mg oral tablet: 1 tab(s) orally 2 times a day  NexIUM 40 mg oral delayed release capsule: 1 cap(s) orally once a day  oxyCODONE 5 mg oral tablet: 1-2 tab(s) orally every 4 to 6 hours, As Needed -Mild to Moderate Pain (1 - 3) MDD:6  Rolling walker : ICD 10 - S72.90XA  Senna S 50 mg-8.6 mg oral tablet: 2 tab(s) orally once a day (at bedtime)   simvastatin 20 mg oral tablet: 1 tab(s) orally once a day (at bedtime)

## 2020-06-29 NOTE — DISCHARGE NOTE PROVIDER - HOSPITAL COURSE
The patient underwent a REMOVAL OF LEFT HIP HARDWARE & CONVERSION TO POSTERIOR TOTAL HIP REPLACEMENT on 6/29/20. The patient received antibiotics consistent with SCIP guidelines. The patient underwent the procedure and had no intra-operative complications. Post-operatively, the patient was seen by medicine and PT. The patient received LOVENOX for DVTP. The patient received pain medications per orthopedic pain management pathway and the pain was appropriately controlled. Patient was instructed on posterior total hip precautions by PT. The patient did not have any post-operative medical complications. The patient was discharged in stable condition.

## 2020-06-29 NOTE — DISCHARGE NOTE PROVIDER - NSDCCPTREATMENT_GEN_ALL_CORE_FT
PRINCIPAL PROCEDURE  Procedure: Conversion to total prosthetic replacement of hip joint  Findings and Treatment:

## 2020-06-29 NOTE — BRIEF OPERATIVE NOTE - NSICDXBRIEFPROCEDURE_GEN_ALL_CORE_FT
PROCEDURES:  Conversion to total prosthetic replacement of hip joint 29-Jun-2020 19:47:15  Osmin Cisneros

## 2020-06-30 ENCOUNTER — TRANSCRIPTION ENCOUNTER (OUTPATIENT)
Age: 75
End: 2020-06-30

## 2020-06-30 DIAGNOSIS — D62 ACUTE POSTHEMORRHAGIC ANEMIA: ICD-10-CM

## 2020-06-30 DIAGNOSIS — I65.29 OCCLUSION AND STENOSIS OF UNSPECIFIED CAROTID ARTERY: ICD-10-CM

## 2020-06-30 DIAGNOSIS — R13.10 DYSPHAGIA, UNSPECIFIED: ICD-10-CM

## 2020-06-30 DIAGNOSIS — Z96.642 PRESENCE OF LEFT ARTIFICIAL HIP JOINT: ICD-10-CM

## 2020-06-30 DIAGNOSIS — E78.5 HYPERLIPIDEMIA, UNSPECIFIED: ICD-10-CM

## 2020-06-30 DIAGNOSIS — I10 ESSENTIAL (PRIMARY) HYPERTENSION: ICD-10-CM

## 2020-06-30 DIAGNOSIS — Z98.890 OTHER SPECIFIED POSTPROCEDURAL STATES: ICD-10-CM

## 2020-06-30 DIAGNOSIS — D72.829 ELEVATED WHITE BLOOD CELL COUNT, UNSPECIFIED: ICD-10-CM

## 2020-06-30 DIAGNOSIS — M87.052 IDIOPATHIC ASEPTIC NECROSIS OF LEFT FEMUR: ICD-10-CM

## 2020-06-30 LAB
ANION GAP SERPL CALC-SCNC: 10 MMOL/L — SIGNIFICANT CHANGE UP (ref 5–17)
BUN SERPL-MCNC: 10 MG/DL — SIGNIFICANT CHANGE UP (ref 8–20)
CALCIUM SERPL-MCNC: 8.4 MG/DL — LOW (ref 8.6–10.2)
CHLORIDE SERPL-SCNC: 106 MMOL/L — SIGNIFICANT CHANGE UP (ref 98–107)
CO2 SERPL-SCNC: 22 MMOL/L — SIGNIFICANT CHANGE UP (ref 22–29)
CREAT SERPL-MCNC: 0.88 MG/DL — SIGNIFICANT CHANGE UP (ref 0.5–1.3)
GLUCOSE SERPL-MCNC: 113 MG/DL — HIGH (ref 70–99)
HCT VFR BLD CALC: 31.9 % — LOW (ref 34.5–45)
HGB BLD-MCNC: 10.4 G/DL — LOW (ref 11.5–15.5)
MCHC RBC-ENTMCNC: 29.2 PG — SIGNIFICANT CHANGE UP (ref 27–34)
MCHC RBC-ENTMCNC: 32.6 GM/DL — SIGNIFICANT CHANGE UP (ref 32–36)
MCV RBC AUTO: 89.6 FL — SIGNIFICANT CHANGE UP (ref 80–100)
PLATELET # BLD AUTO: 232 K/UL — SIGNIFICANT CHANGE UP (ref 150–400)
POTASSIUM SERPL-MCNC: 4.1 MMOL/L — SIGNIFICANT CHANGE UP (ref 3.5–5.3)
POTASSIUM SERPL-SCNC: 4.1 MMOL/L — SIGNIFICANT CHANGE UP (ref 3.5–5.3)
RBC # BLD: 3.56 M/UL — LOW (ref 3.8–5.2)
RBC # FLD: 13.2 % — SIGNIFICANT CHANGE UP (ref 10.3–14.5)
SODIUM SERPL-SCNC: 138 MMOL/L — SIGNIFICANT CHANGE UP (ref 135–145)
WBC # BLD: 11.93 K/UL — HIGH (ref 3.8–10.5)
WBC # FLD AUTO: 11.93 K/UL — HIGH (ref 3.8–10.5)

## 2020-06-30 PROCEDURE — 99222 1ST HOSP IP/OBS MODERATE 55: CPT

## 2020-06-30 RX ADMIN — Medication 975 MILLIGRAM(S): at 21:13

## 2020-06-30 RX ADMIN — Medication 100 MILLIGRAM(S): at 05:15

## 2020-06-30 RX ADMIN — Medication 975 MILLIGRAM(S): at 13:08

## 2020-06-30 RX ADMIN — SIMVASTATIN 20 MILLIGRAM(S): 20 TABLET, FILM COATED ORAL at 21:12

## 2020-06-30 RX ADMIN — PANTOPRAZOLE SODIUM 40 MILLIGRAM(S): 20 TABLET, DELAYED RELEASE ORAL at 05:16

## 2020-06-30 RX ADMIN — Medication 100 MILLIGRAM(S): at 17:39

## 2020-06-30 RX ADMIN — Medication 15 MILLIGRAM(S): at 05:15

## 2020-06-30 RX ADMIN — Medication 15 MILLIGRAM(S): at 17:39

## 2020-06-30 RX ADMIN — LORATADINE 10 MILLIGRAM(S): 10 TABLET ORAL at 13:08

## 2020-06-30 RX ADMIN — ENOXAPARIN SODIUM 40 MILLIGRAM(S): 100 INJECTION SUBCUTANEOUS at 13:12

## 2020-06-30 RX ADMIN — POLYETHYLENE GLYCOL 3350 17 GRAM(S): 17 POWDER, FOR SOLUTION ORAL at 13:08

## 2020-06-30 RX ADMIN — Medication 15 MILLIGRAM(S): at 13:07

## 2020-06-30 RX ADMIN — Medication 975 MILLIGRAM(S): at 05:15

## 2020-06-30 NOTE — OCCUPATIONAL THERAPY INITIAL EVALUATION ADULT - VISUAL ACUITY
Patient states wears glasses for distance; patient does not offer any complaints or changes in vision

## 2020-06-30 NOTE — OCCUPATIONAL THERAPY INITIAL EVALUATION ADULT - PHYSICAL ASSIST/NONPHYSICAL ASSIST:DRESS LOWER BODY, OT EVAL
verbal cues/Instruction in use of LB dressing devices, reminders for PWB status of Left LE and Posterior THP/1 person assist

## 2020-06-30 NOTE — PHYSICAL THERAPY INITIAL EVALUATION ADULT - ACTIVE RANGE OF MOTION EXAMINATION, REHAB EVAL
left hip grossly 2/5 throughout/bilateral upper extremity Active ROM was WFL (within functional limits)/deficits as listed below/bilateral  lower extremity Active ROM was WFL (within functional limits)

## 2020-06-30 NOTE — DISCHARGE NOTE NURSING/CASE MANAGEMENT/SOCIAL WORK - PATIENT PORTAL LINK FT
You can access the FollowMyHealth Patient Portal offered by [facility] by registering at the following website: [email or web address]. By joining [facility] [employer]’s FollowMyHealth portal, you will also be able to view your health information using other applications (apps) compatible with our system.

## 2020-06-30 NOTE — OCCUPATIONAL THERAPY INITIAL EVALUATION ADULT - LIVES WITH, PROFILE
spouse/Patient lives in private house with  who is retired and available to assist as needed upon discharge. Patient states 3 SANDRA with left handrail, no steps inside to negotiate

## 2020-06-30 NOTE — OCCUPATIONAL THERAPY INITIAL EVALUATION ADULT - PLANNED THERAPY INTERVENTIONS, OT EVAL
bed mobility training/motor coordination training/neuromuscular re-education/ADL retraining/balance training/transfer training

## 2020-06-30 NOTE — OCCUPATIONAL THERAPY INITIAL EVALUATION ADULT - SENSORY TESTS
Patient with +capillary refill in Left toes. Patient with +pedal pulse in Left foot. Patient does not offer any complaints or changes in sensation

## 2020-06-30 NOTE — OCCUPATIONAL THERAPY INITIAL EVALUATION ADULT - PHYSICAL ASSIST/NONPHYSICAL ASSIST: TOILET, REHAB EVAL
Cues for sequencing of movement and for safety for proper hand placement prior to/during transfer. Cues/reminders to maintain PWB status/verbal cues

## 2020-06-30 NOTE — CONSULT NOTE ADULT - PROBLEM SELECTOR RECOMMENDATION 9
s/p fracture and repair 2 yrs ago ,  c/o pain getting worst for more - no S/S of infection - likely reactive

## 2020-06-30 NOTE — CONSULT NOTE ADULT - PROBLEM SELECTOR RECOMMENDATION 2
PT/OT/pain mgmt  DVT prophylaxis- as per ortho  Abx as per SCIP- given   Incentive spirometry  Prophylaxis of opioid  induced constipation.

## 2020-06-30 NOTE — PHYSICAL THERAPY INITIAL EVALUATION ADULT - ADDITIONAL COMMENTS
Pt lives in a house  with 3 steps to enter with  rails and 0 stairs inside with  rails.  Pt owns medical equipment: RW, SAC, Commode  Pt lives with: Spouse   Someone is always available to provide assist.

## 2020-06-30 NOTE — PHYSICAL THERAPY INITIAL EVALUATION ADULT - GAIT TRAINING, PT EVAL
Time Frame:   1-2  days   Goal: Modified Independent   with RW x  150  feet / Stairs: pt will navigate 6+6  stairs with 1 rail independently with device

## 2020-06-30 NOTE — PROGRESS NOTE ADULT - SUBJECTIVE AND OBJECTIVE BOX
CHARANJIT [facility]    [id number]    History: Patient is status post left posterior total hip arthroplasty with removal of hardware on 6/29/2020. Patient is doing well. The patient's pain is controlled using the prescribed pain medications. The patient is participating in physical therapy. Denies nausea, vomiting, chest pain, shortness of breath, abdominal pain or fever. No new complaints.    Vital Signs Last 24 Hrs  T(C): 36.8 (30 Jun 2020 04:58), Max: 36.8 (30 Jun 2020 04:58)  T(F): 98.2 (30 Jun 2020 04:58), Max: 98.2 (30 Jun 2020 04:58)  HR: 75 (30 Jun 2020 04:58) (66 - 82)  BP: 119/56 (30 Jun 2020 04:58) (106/48 - 170/70)  BP(mean): 73 (29 Jun 2020 22:30) (63 - 84)  RR: 18 (30 Jun 2020 04:58) (12 - 19)  SpO2: 95% (30 Jun 2020 04:58) (93% - 98%)      MEDICATIONS  (STANDING):  acetaminophen   Tablet .. 975 milliGRAM(s) Oral every 8 hours  enoxaparin Injectable 40 milliGRAM(s) SubCutaneous daily  ketorolac   Injectable 15 milliGRAM(s) IV Push every 6 hours  loratadine 10 milliGRAM(s) Oral daily  metoprolol tartrate 100 milliGRAM(s) Oral two times a day  pantoprazole    Tablet 40 milliGRAM(s) Oral before breakfast  polyethylene glycol 3350 17 Gram(s) Oral daily  simvastatin 20 milliGRAM(s) Oral at bedtime  sodium chloride 0.9%. 1000 milliLiter(s) (100 mL/Hr) IV Continuous <Continuous>    MEDICATIONS  (PRN):  aluminum hydroxide/magnesium hydroxide/simethicone Suspension 30 milliLiter(s) Oral four times a day PRN Indigestion  HYDROmorphone   Tablet 4 milliGRAM(s) Oral every 3 hours PRN Severe Pain (7 - 10)  HYDROmorphone  Injectable 0.5 milliGRAM(s) IV Push every 3 hours PRN breakthrough  magnesium hydroxide Suspension 30 milliLiter(s) Oral at bedtime PRN Constipation  ondansetron Injectable 4 milliGRAM(s) IV Push every 6 hours PRN Nausea and/or Vomiting  oxyCODONE    IR 5 milliGRAM(s) Oral every 3 hours PRN Mild Pain (1 - 3)  oxyCODONE    IR 10 milliGRAM(s) Oral every 3 hours PRN Moderate Pain (4 - 6)      Physical exam: The left hip dressing is clean, dry and intact. No drainage or discharge. No erythema is noted. No blistering. No ecchymosis. The calf is supple nontender. Passive range of motion is acceptable to due postoperative pain. No calf tenderness. Sensation to light touch is grossly intact distally. Motor function distally is 5/5. No foot drop. 2+ dorsalis pedis pulse. Capillary refill is less than 2 seconds. No cyanosis.    Primary Orthopedic Assessment:  • s/p LEFT POSTERIOR total hip replacement    Secondary  Orthopedic Assessment(s):   •     Secondary  Medical Assessment(s):   Cerebrovascular accident (CVA), unspecified mechanism        Plan:   • DVT prophylaxis as prescribed, including use of compression devices and ankle pumps  • Continue physical therapy  • PARTIAL Weightbearing as tolerated of the LEFT lower extremity with assistance of a walker  • Incentive spirometry encouraged  • Pain control as clinically indicated  • Posterior hip precautions reviewed with patient  • Discharge planning – anticipated discharge is Home tomorrow

## 2020-06-30 NOTE — CONSULT NOTE ADULT - SUBJECTIVE AND OBJECTIVE BOX
PMD: Dr Carlos Amaya    CC: Left hip pain    HPI:  74 y/o female with PMH HTN, HLD, CVA, CAD, CABG, left femoral neck fracture after fall, s/p repair, presents with worsening left hip pain x1year, now s/p Left ANSON with removal of hardware POD#1.       PAST MEDICAL & SURGICAL HISTORY:  Carotid artery disease, unspecified laterality  Hyperlipidemia, unspecified hyperlipidemia type  Cerebrovascular accident (CVA), unspecified mechanism  Esophageal dysphagia  Hypertension  S/P tubal ligation  S/P cholecystectomy  S/P cataract surgery: both eyes  Carotid artery disease: s/p carotid bypass right side    FAMILY HISTORY:  FH: heart disease  FH: HTN (hypertension)    SOCIAL HISTORY:  Lives with spouse  Tobacco - Quit, 2PPD [id number]  ETOH - Denies  Drug use - Denies    ALLERGIES:   Biaxin (AMS)    HOME MEDICATIONS:  acetaminophen 325 mg oral tablet: 2 tab(s) orally every 6 hours, As needed, pain (29 Jun 2020 13:25)  amLODIPine 10 mg oral tablet: 1 tab(s) orally once a day (29 Jun 2020 13:25)  aspirin 81 mg oral tablet, chewable: 1 tab(s) orally once a day (29 Jun 2020 13:25)  Claritin 10 mg oral tablet: 1 tab(s) orally once a day (29 Jun 2020 13:25)  hydrALAZINE 50 mg oral tablet: 50 milligram(s) orally 3 times a day (29 Jun 2020 13:25)  metoprolol tartrate 100 mg oral tablet: 1 tab(s) orally 2 times a day (29 Jun 2020 13:25)  NexIUM 40 mg oral delayed release capsule: 1 cap(s) orally once a day (29 Jun 2020 13:25)  simvastatin 20 mg oral tablet: 1 tab(s) orally once a day (at bedtime) (29 Jun 2020 13:25)    MEDICATIONS  (STANDING):  acetaminophen   Tablet .. 975 milliGRAM(s) Oral every 8 hours  enoxaparin Injectable 40 milliGRAM(s) SubCutaneous daily  ketorolac   Injectable 15 milliGRAM(s) IV Push every 6 hours  loratadine 10 milliGRAM(s) Oral daily  metoprolol tartrate 100 milliGRAM(s) Oral two times a day  pantoprazole    Tablet 40 milliGRAM(s) Oral before breakfast  polyethylene glycol 3350 17 Gram(s) Oral daily  simvastatin 20 milliGRAM(s) Oral at bedtime  sodium chloride 0.9%. 1000 milliLiter(s) (100 mL/Hr) IV Continuous <Continuous>    MEDICATIONS  (PRN):  aluminum hydroxide/magnesium hydroxide/simethicone Suspension 30 milliLiter(s) Oral four times a day PRN Indigestion  HYDROmorphone   Tablet 4 milliGRAM(s) Oral every 3 hours PRN Severe Pain (7 - 10)  HYDROmorphone  Injectable 0.5 milliGRAM(s) IV Push every 3 hours PRN breakthrough  magnesium hydroxide Suspension 30 milliLiter(s) Oral at bedtime PRN Constipation  ondansetron Injectable 4 milliGRAM(s) IV Push every 6 hours PRN Nausea and/or Vomiting  oxyCODONE    IR 5 milliGRAM(s) Oral every 3 hours PRN Mild Pain (1 - 3)  oxyCODONE    IR 10 milliGRAM(s) Oral every 3 hours PRN Moderate Pain (4 - 6)    REVIEW OF SYSTEMS      General:	    Skin/Breast:  	  Ophthalmologic:  	  ENMT:	    Respiratory and Thorax:  	  Cardiovascular:	    Gastrointestinal:	    Genitourinary:	    Musculoskeletal:	    Neurological:	    Psychiatric:	    Hematology/Lymphatics:	    Endocrine:	    Allergic/Immunologic:	      Vital Signs Last 24 Hrs  T(C): 36.7 (30 Jun 2020 07:23), Max: 36.8 (30 Jun 2020 04:58)  T(F): 98.1 (30 Jun 2020 07:23), Max: 98.2 (30 Jun 2020 04:58)  HR: 62 (30 Jun 2020 07:23) (62 - 82)  BP: 145/63 (30 Jun 2020 07:23) (106/48 - 170/70)  BP(mean): 73 (29 Jun 2020 22:30) (63 - 84)  RR: 18 (30 Jun 2020 07:23) (12 - 19)  SpO2: 96% (30 Jun 2020 07:23) (93% - 98%)    PHYSICAL EXAM:      Constitutional:    Eyes:    ENMT:    Neck:    Breasts:    Back:    Respiratory:    Cardiovascular:    Gastrointestinal:    Genitourinary:    Rectal:    Extremities:    Vascular:    Neurological:    Skin:    Lymph Nodes:    Musculoskeletal:    Psychiatric:        LABS:                        10.4   11.93 )-----------( 232      ( 30 Jun 2020 07:35 )             31.9     06-30    138  |  106  |  10.0  ----------------------------<  113<H>  4.1   |  22.0  |  0.88    Ca    8.4<L>      30 Jun 2020 07:35            RADIOLOGY & ADDITIONAL STUDIES:  < from: Xray Chest 1 View- PORTABLE-Urgent (06.29.20 @ 21:11) >   EXAM:  XR CHEST PORTABLE URGENT 1V                          PROCEDURE DATE:  06/29/2020          INTERPRETATION:  Portable chest radiograph        CLINICAL INFORMATION:   A postoperative chest radiograph    TECHNIQUE:  Portable  AP view of the chest was obtained.    COMPARISON: 3/20/2018 available for review.    FINDINGS:   The lungs are clear of airspace consolidations or effusions. No pneumothorax. There is mild diffuse vascular congestion.     The heart and mediastinum are within normal limits.    Visualized osseous structures are intact.        IMPRESSION:   No evidence of active chest disease.                    VALENTINO JAMES M.D., ATTENDING RADIOLOGIST  This document has been electronically signed. Jun 30 2020  9:01AM        < end of copied text >    < from: Xray Hip w/ Pelvis 1 View, Left (06.29.20 @ 21:10) >   EXAM:  XR HIP WITH PELV 1V LT                          PROCEDURE DATE:  06/29/2020          INTERPRETATION:  HISTORY: Total hip replacement    3 views of the LEFT hip are submitted.    Evaluation demonstrates both femoral and acetabular componentswell-seated and in good anatomic alignment. There is no evidence of fracture. The visualized pelvis appears within normal limits.  Impression:  Hip replacement as described above.                VALENTINO JAMES M.D., ATTENDING RADIOLOGIST  This document has been electronically signed. Jun 30 2020  9:03AM                < end of copied text > PMD: Dr Carlos Amaya    Patient seen and examined , s/p L ANSON with hardware removal . Pain well controlled , states she felt some chest pressure yesterday post op , resolved with use of neb  , c/o chronic cough due to post CVC esophagal dysphagia  , no n/v , voiding without difficulties .     CC: Left hip pain , chronic dysphagia     HPI:  76 y/o female with PMH HTN, HLD, CVA, CAD, CABG, left femoral neck fracture after fall, s/p repair( 3/18 )- 3 screws placed  , had physical therapy couple times , presents with worsening left hip pain x1year,  now s/p Left ANSON with removal of hardware POD#1.       PAST MEDICAL & SURGICAL HISTORY:  Carotid artery disease, unspecified laterality  Hyperlipidemia, unspecified hyperlipidemia type  Cerebrovascular accident (CVA), unspecified mechanism  Esophageal dysphagia  Hypertension  S/P tubal ligation  S/P cholecystectomy  S/P cataract surgery: both eyes  Carotid artery disease: s/p carotid bypass right side    FAMILY HISTORY:  FH: heart disease  FH: HTN (hypertension)    SOCIAL HISTORY:  Lives with spouse  Tobacco - Quit, 2PPD [id number]  ETOH - Denies  Drug use - Denies    ALLERGIES:   Biaxin (AMS)    HOME MEDICATIONS:  acetaminophen 325 mg oral tablet: 2 tab(s) orally every 6 hours, As needed, pain (29 Jun 2020 13:25)  amLODIPine 10 mg oral tablet: 1 tab(s) orally once a day (29 Jun 2020 13:25)  aspirin 81 mg oral tablet, chewable: 1 tab(s) orally once a day (29 Jun 2020 13:25)  Claritin 10 mg oral tablet: 1 tab(s) orally once a day (29 Jun 2020 13:25)  hydrALAZINE 50 mg oral tablet: 50 milligram(s) orally 3 times a day (29 Jun 2020 13:25)  metoprolol tartrate 100 mg oral tablet: 1 tab(s) orally 2 times a day (29 Jun 2020 13:25)  NexIUM 40 mg oral delayed release capsule: 1 cap(s) orally once a day (29 Jun 2020 13:25)  simvastatin 20 mg oral tablet: 1 tab(s) orally once a day (at bedtime) (29 Jun 2020 13:25)    MEDICATIONS  (STANDING):  acetaminophen   Tablet .. 975 milliGRAM(s) Oral every 8 hours  enoxaparin Injectable 40 milliGRAM(s) SubCutaneous daily  ketorolac   Injectable 15 milliGRAM(s) IV Push every 6 hours  loratadine 10 milliGRAM(s) Oral daily  metoprolol tartrate 100 milliGRAM(s) Oral two times a day  pantoprazole    Tablet 40 milliGRAM(s) Oral before breakfast  polyethylene glycol 3350 17 Gram(s) Oral daily  simvastatin 20 milliGRAM(s) Oral at bedtime  sodium chloride 0.9%. 1000 milliLiter(s) (100 mL/Hr) IV Continuous <Continuous>    MEDICATIONS  (PRN):  aluminum hydroxide/magnesium hydroxide/simethicone Suspension 30 milliLiter(s) Oral four times a day PRN Indigestion  HYDROmorphone   Tablet 4 milliGRAM(s) Oral every 3 hours PRN Severe Pain (7 - 10)  HYDROmorphone  Injectable 0.5 milliGRAM(s) IV Push every 3 hours PRN breakthrough  magnesium hydroxide Suspension 30 milliLiter(s) Oral at bedtime PRN Constipation  ondansetron Injectable 4 milliGRAM(s) IV Push every 6 hours PRN Nausea and/or Vomiting  oxyCODONE    IR 5 milliGRAM(s) Oral every 3 hours PRN Mild Pain (1 - 3)  oxyCODONE    IR 10 milliGRAM(s) Oral every 3 hours PRN Moderate Pain (4 - 6)    REVIEW OF SYSTEMS    As     Vital Signs Last 24 Hrs  T(C): 36.7 (30 Jun 2020 07:23), Max: 36.8 (30 Jun 2020 04:58)  T(F): 98.1 (30 Jun 2020 07:23), Max: 98.2 (30 Jun 2020 04:58)  HR: 62 (30 Jun 2020 07:23) (62 - 82)  BP: 145/63 (30 Jun 2020 07:23) (106/48 - 170/70)  BP(mean): 73 (29 Jun 2020 22:30) (63 - 84)  RR: 18 (30 Jun 2020 07:23) (12 - 19)  SpO2: 96% (30 Jun 2020 07:23) (93% - 98%)    PHYSICAL EXAM:      LABS:                        10.4   11.93 )-----------( 232      ( 30 Jun 2020 07:35 )             31.9     06-30    138  |  106  |  10.0  ----------------------------<  113<H>  4.1   |  22.0  |  0.88    Ca    8.4<L>      30 Jun 2020 07:35            RADIOLOGY & ADDITIONAL STUDIES:  < from: Xray Chest 1 View- PORTABLE-Urgent (06.29.20 @ 21:11) >   EXAM:  XR CHEST PORTABLE URGENT 1V                          PROCEDURE DATE:  06/29/2020          INTERPRETATION:  Portable chest radiograph        CLINICAL INFORMATION:   A postoperative chest radiograph    TECHNIQUE:  Portable  AP view of the chest was obtained.    COMPARISON: 3/20/2018 available for review.    FINDINGS:   The lungs are clear of airspace consolidations or effusions. No pneumothorax. There is mild diffuse vascular congestion.     The heart and mediastinum are within normal limits.    Visualized osseous structures are intact.        IMPRESSION:   No evidence of active chest disease.                    VALENTINO JAMES M.D., ATTENDING RADIOLOGIST  This document has been electronically signed. Jun 30 2020  9:01AM        < end of copied text >    < from: Xray Hip w/ Pelvis 1 View, Left (06.29.20 @ 21:10) >   EXAM:  XR HIP WITH PELV 1V LT                          PROCEDURE DATE:  06/29/2020          INTERPRETATION:  HISTORY: Total hip replacement    3 views of the LEFT hip are submitted.    Evaluation demonstrates both femoral and acetabular components well-seated and in good anatomic alignment. There is no evidence of fracture. The visualized pelvis appears within normal limits.  Impression:  Hip replacement as described above.    VALENTINO JAMES M.D., ATTENDING RADIOLOGIST  This document has been electronically signed. Jun 30 2020  9:03AM        < end of copied text >    < from: Xray Chest 1 View- PORTABLE-Urgent (06.29.20 @ 21:11) >     EXAM:  XR CHEST PORTABLE URGENT 1V                          PROCEDURE DATE:  06/29/2020          INTERPRETATION:  Portable chest radiograph        CLINICAL INFORMATION:   A postoperative chest radiograph    TECHNIQUE:  Portable  AP view of the chest was obtained.    COMPARISON: 3/20/2018 available for review.    FINDINGS:   The lungs are clear of airspace consolidations or effusions. No pneumothorax. There is mild diffuse vascular congestion.     The heart and mediastinum are within normal limits.    Visualized osseous structures are intact.        IMPRESSION:   No evidence of active chest disease.      VALENTINO JAMES M.D., ATTENDING RADIOLOGIST  This document has been electronically signed. Jun 30 2020  9:01AM        < end of copied text > PMD: Dr Carlos Amaya    Patient seen and examined , s/p L ANSON with hardware removal . Pain well controlled , states she felt some chest pressure yesterday post op , resolved with use of neb  , c/o chronic cough due to post CVC esophagal dysphagia  , no n/v , voiding without difficulties .     CC: Left hip pain , chronic dysphagia     HPI:  76 y/o female with PMH HTN, HLD, CVA, CAD, CABG, left femoral neck fracture after fall, s/p repair( 3/18 )- 3 screws placed  , had physical therapy couple times , presents with worsening left hip pain x1year,  now s/p Left ANSON with removal of hardware POD#1.       PAST MEDICAL & SURGICAL HISTORY:  Carotid artery disease, unspecified laterality  Hyperlipidemia, unspecified hyperlipidemia type  Cerebrovascular accident (CVA), unspecified mechanism  Esophageal dysphagia  Hypertension  S/P tubal ligation  S/P cholecystectomy  S/P cataract surgery: both eyes  Carotid artery disease: s/p carotid bypass right side    FAMILY HISTORY:  FH: heart disease  FH: HTN (hypertension)    SOCIAL HISTORY:  Lives with spouse  Tobacco - Quit, 2PPD [id number]  ETOH - Denies  Drug use - Denies    ALLERGIES:   Biaxin (AMS)    HOME MEDICATIONS:  acetaminophen 325 mg oral tablet: 2 tab(s) orally every 6 hours, As needed, pain (29 Jun 2020 13:25)  amLODIPine 10 mg oral tablet: 1 tab(s) orally once a day (29 Jun 2020 13:25)  aspirin 81 mg oral tablet, chewable: 1 tab(s) orally once a day (29 Jun 2020 13:25)  Claritin 10 mg oral tablet: 1 tab(s) orally once a day (29 Jun 2020 13:25)  hydrALAZINE 50 mg oral tablet: 50 milligram(s) orally 3 times a day (29 Jun 2020 13:25)  metoprolol tartrate 100 mg oral tablet: 1 tab(s) orally 2 times a day (29 Jun 2020 13:25)  NexIUM 40 mg oral delayed release capsule: 1 cap(s) orally once a day (29 Jun 2020 13:25)  simvastatin 20 mg oral tablet: 1 tab(s) orally once a day (at bedtime) (29 Jun 2020 13:25)    MEDICATIONS  (STANDING):  acetaminophen   Tablet .. 975 milliGRAM(s) Oral every 8 hours  enoxaparin Injectable 40 milliGRAM(s) SubCutaneous daily  ketorolac   Injectable 15 milliGRAM(s) IV Push every 6 hours  loratadine 10 milliGRAM(s) Oral daily  metoprolol tartrate 100 milliGRAM(s) Oral two times a day  pantoprazole    Tablet 40 milliGRAM(s) Oral before breakfast  polyethylene glycol 3350 17 Gram(s) Oral daily  simvastatin 20 milliGRAM(s) Oral at bedtime  sodium chloride 0.9%. 1000 milliLiter(s) (100 mL/Hr) IV Continuous <Continuous>    MEDICATIONS  (PRN):  aluminum hydroxide/magnesium hydroxide/simethicone Suspension 30 milliLiter(s) Oral four times a day PRN Indigestion  HYDROmorphone   Tablet 4 milliGRAM(s) Oral every 3 hours PRN Severe Pain (7 - 10)  HYDROmorphone  Injectable 0.5 milliGRAM(s) IV Push every 3 hours PRN breakthrough  magnesium hydroxide Suspension 30 milliLiter(s) Oral at bedtime PRN Constipation  ondansetron Injectable 4 milliGRAM(s) IV Push every 6 hours PRN Nausea and/or Vomiting  oxyCODONE    IR 5 milliGRAM(s) Oral every 3 hours PRN Mild Pain (1 - 3)  oxyCODONE    IR 10 milliGRAM(s) Oral every 3 hours PRN Moderate Pain (4 - 6)    REVIEW OF SYSTEMS    As above , all other systems are reviewed and are negative .     PHYSICAL EXAM :   GENERAL: NAD, well-groomed, well-developed  HEAD:  Atraumatic, Normocephalic  EYES: EOMI, PERRLA, conjunctiva and sclera clear  NECK: Supple, No JVD, Normal thyroid  NERVOUS SYSTEM:  Alert & Oriented X3, no focal deficit  CHEST/LUNG: CTA b/l ,  no  rales, rhonchi, wheezing, or rubs  HEART: Regular rate and rhythm; No murmurs, rubs, or gallops  ABDOMEN: Soft, Nontender, Nondistended; Bowel sounds present  EXTREMITIES:  2+ Peripheral Pulses, No clubbing, cyanosis, or edema , L hip dressing + , clean and dry   LYMPH: No lymphadenopathy noted  SKIN: No rashes     Vital Signs Last 24 Hrs  T(C): 36.7 (30 Jun 2020 07:23), Max: 36.8 (30 Jun 2020 04:58)  T(F): 98.1 (30 Jun 2020 07:23), Max: 98.2 (30 Jun 2020 04:58)  HR: 62 (30 Jun 2020 07:23) (62 - 82)  BP: 145/63 (30 Jun 2020 07:23) (106/48 - 170/70)  BP(mean): 73 (29 Jun 2020 22:30) (63 - 84)  RR: 18 (30 Jun 2020 07:23) (12 - 19)  SpO2: 96% (30 Jun 2020 07:23) (93% - 98%)    PHYSICAL EXAM:      LABS:                        10.4   11.93 )-----------( 232      ( 30 Jun 2020 07:35 )             31.9     06-30    138  |  106  |  10.0  ----------------------------<  113<H>  4.1   |  22.0  |  0.88    Ca    8.4<L>      30 Jun 2020 07:35            RADIOLOGY & ADDITIONAL STUDIES:  < from: Xray Chest 1 View- PORTABLE-Urgent (06.29.20 @ 21:11) >   EXAM:  XR CHEST PORTABLE URGENT 1V                          PROCEDURE DATE:  06/29/2020          INTERPRETATION:  Portable chest radiograph        CLINICAL INFORMATION:   A postoperative chest radiograph    TECHNIQUE:  Portable  AP view of the chest was obtained.    COMPARISON: 3/20/2018 available for review.    FINDINGS:   The lungs are clear of airspace consolidations or effusions. No pneumothorax. There is mild diffuse vascular congestion.     The heart and mediastinum are within normal limits.    Visualized osseous structures are intact.        IMPRESSION:   No evidence of active chest disease.                    VALENTINO JAMES M.D., ATTENDING RADIOLOGIST  This document has been electronically signed. Jun 30 2020  9:01AM        < end of copied text >    < from: Xray Hip w/ Pelvis 1 View, Left (06.29.20 @ 21:10) >   EXAM:  XR HIP WITH PELV 1V LT                          PROCEDURE DATE:  06/29/2020          INTERPRETATION:  HISTORY: Total hip replacement    3 views of the LEFT hip are submitted.    Evaluation demonstrates both femoral and acetabular components well-seated and in good anatomic alignment. There is no evidence of fracture. The visualized pelvis appears within normal limits.  Impression:  Hip replacement as described above.    VALENTINO JAMES M.D., ATTENDING RADIOLOGIST  This document has been electronically signed. Jun 30 2020  9:03AM        < end of copied text >    < from: Xray Chest 1 View- PORTABLE-Urgent (06.29.20 @ 21:11) >     EXAM:  XR CHEST PORTABLE URGENT 1V                          PROCEDURE DATE:  06/29/2020          INTERPRETATION:  Portable chest radiograph        CLINICAL INFORMATION:   A postoperative chest radiograph    TECHNIQUE:  Portable  AP view of the chest was obtained.    COMPARISON: 3/20/2018 available for review.    FINDINGS:   The lungs are clear of airspace consolidations or effusions. No pneumothorax. There is mild diffuse vascular congestion.     The heart and mediastinum are within normal limits.    Visualized osseous structures are intact.        IMPRESSION:   No evidence of active chest disease.      VALENTINO JAMES M.D., ATTENDING RADIOLOGIST  This document has been electronically signed. Jun 30 2020  9:01AM        < end of copied text >

## 2020-06-30 NOTE — CONSULT NOTE ADULT - ASSESSMENT
76 y/o female with PMH HTN, HLD, CVA, CAD, CABG, left femoral neck fracture after fall, s/p repair( 3/18 )- 3 screws placed  , had physical therapy couple times , presents with worsening left hip pain x1year,  now s/p Left ASNON with removal of hardware POD#1.

## 2020-06-30 NOTE — CONSULT NOTE ADULT - ATTENDING COMMENTS
Patient seen and examined ,   s/p s/p L ANSON , POD # 1 ,   vitals stable , pain well controlled .   XR noted , above noted and reviewed ,   spoke to NP Ritu ,   agree with above .   Thank you for the courtesy of this consult ,   will follow .

## 2020-07-01 VITALS
HEART RATE: 85 BPM | DIASTOLIC BLOOD PRESSURE: 61 MMHG | TEMPERATURE: 98 F | SYSTOLIC BLOOD PRESSURE: 125 MMHG | RESPIRATION RATE: 18 BRPM | OXYGEN SATURATION: 90 %

## 2020-07-01 LAB
ANION GAP SERPL CALC-SCNC: 12 MMOL/L — SIGNIFICANT CHANGE UP (ref 5–17)
BUN SERPL-MCNC: 12 MG/DL — SIGNIFICANT CHANGE UP (ref 8–20)
CALCIUM SERPL-MCNC: 8.4 MG/DL — LOW (ref 8.6–10.2)
CHLORIDE SERPL-SCNC: 101 MMOL/L — SIGNIFICANT CHANGE UP (ref 98–107)
CO2 SERPL-SCNC: 21 MMOL/L — LOW (ref 22–29)
CREAT SERPL-MCNC: 0.86 MG/DL — SIGNIFICANT CHANGE UP (ref 0.5–1.3)
GLUCOSE SERPL-MCNC: 96 MG/DL — SIGNIFICANT CHANGE UP (ref 70–99)
HCT VFR BLD CALC: 27.2 % — LOW (ref 34.5–45)
HGB BLD-MCNC: 8.8 G/DL — LOW (ref 11.5–15.5)
MCHC RBC-ENTMCNC: 29.4 PG — SIGNIFICANT CHANGE UP (ref 27–34)
MCHC RBC-ENTMCNC: 32.4 GM/DL — SIGNIFICANT CHANGE UP (ref 32–36)
MCV RBC AUTO: 91 FL — SIGNIFICANT CHANGE UP (ref 80–100)
PLATELET # BLD AUTO: 185 K/UL — SIGNIFICANT CHANGE UP (ref 150–400)
POTASSIUM SERPL-MCNC: 4.1 MMOL/L — SIGNIFICANT CHANGE UP (ref 3.5–5.3)
POTASSIUM SERPL-SCNC: 4.1 MMOL/L — SIGNIFICANT CHANGE UP (ref 3.5–5.3)
RBC # BLD: 2.99 M/UL — LOW (ref 3.8–5.2)
RBC # FLD: 13.5 % — SIGNIFICANT CHANGE UP (ref 10.3–14.5)
SODIUM SERPL-SCNC: 134 MMOL/L — LOW (ref 135–145)
SURGICAL PATHOLOGY STUDY: SIGNIFICANT CHANGE UP
WBC # BLD: 11.99 K/UL — HIGH (ref 3.8–10.5)
WBC # FLD AUTO: 11.99 K/UL — HIGH (ref 3.8–10.5)

## 2020-07-01 PROCEDURE — 97167 OT EVAL HIGH COMPLEX 60 MIN: CPT

## 2020-07-01 PROCEDURE — 99232 SBSQ HOSP IP/OBS MODERATE 35: CPT

## 2020-07-01 PROCEDURE — 71045 X-RAY EXAM CHEST 1 VIEW: CPT

## 2020-07-01 PROCEDURE — C1713: CPT

## 2020-07-01 PROCEDURE — 88300 SURGICAL PATH GROSS: CPT

## 2020-07-01 PROCEDURE — 97163 PT EVAL HIGH COMPLEX 45 MIN: CPT

## 2020-07-01 PROCEDURE — 80048 BASIC METABOLIC PNL TOTAL CA: CPT

## 2020-07-01 PROCEDURE — 82962 GLUCOSE BLOOD TEST: CPT

## 2020-07-01 PROCEDURE — 36415 COLL VENOUS BLD VENIPUNCTURE: CPT

## 2020-07-01 PROCEDURE — 86900 BLOOD TYPING SEROLOGIC ABO: CPT

## 2020-07-01 PROCEDURE — 86850 RBC ANTIBODY SCREEN: CPT

## 2020-07-01 PROCEDURE — 86901 BLOOD TYPING SEROLOGIC RH(D): CPT

## 2020-07-01 PROCEDURE — 97110 THERAPEUTIC EXERCISES: CPT

## 2020-07-01 PROCEDURE — 85027 COMPLETE CBC AUTOMATED: CPT

## 2020-07-01 PROCEDURE — 73501 X-RAY EXAM HIP UNI 1 VIEW: CPT

## 2020-07-01 PROCEDURE — C1776: CPT

## 2020-07-01 PROCEDURE — 97116 GAIT TRAINING THERAPY: CPT

## 2020-07-01 RX ORDER — SENNOSIDES/DOCUSATE SODIUM 8.6MG-50MG
2 TABLET ORAL
Qty: 28 | Refills: 0
Start: 2020-07-01 | End: 2020-07-14

## 2020-07-01 RX ORDER — CELECOXIB 200 MG/1
1 CAPSULE ORAL
Qty: 42 | Refills: 0
Start: 2020-07-01 | End: 2020-07-21

## 2020-07-01 RX ORDER — OXYCODONE HYDROCHLORIDE 5 MG/1
1 TABLET ORAL
Qty: 42 | Refills: 0
Start: 2020-07-01 | End: 2020-07-07

## 2020-07-01 RX ORDER — ACETAMINOPHEN 500 MG
3 TABLET ORAL
Qty: 0 | Refills: 0 | DISCHARGE
Start: 2020-07-01

## 2020-07-01 RX ADMIN — Medication 975 MILLIGRAM(S): at 05:21

## 2020-07-01 RX ADMIN — Medication 50 MILLIGRAM(S): at 05:21

## 2020-07-01 RX ADMIN — AMLODIPINE BESYLATE 10 MILLIGRAM(S): 2.5 TABLET ORAL at 05:21

## 2020-07-01 RX ADMIN — Medication 100 MILLIGRAM(S): at 05:21

## 2020-07-01 RX ADMIN — ENOXAPARIN SODIUM 40 MILLIGRAM(S): 100 INJECTION SUBCUTANEOUS at 10:56

## 2020-07-01 RX ADMIN — CELECOXIB 200 MILLIGRAM(S): 200 CAPSULE ORAL at 05:21

## 2020-07-01 RX ADMIN — PANTOPRAZOLE SODIUM 40 MILLIGRAM(S): 20 TABLET, DELAYED RELEASE ORAL at 05:22

## 2020-07-01 NOTE — PROGRESS NOTE ADULT - ASSESSMENT
76 y/o female with PMH HTN, HLD, CVA, CAD, CABG, left femoral neck fracture after fall, s/p repair( 3/18 )- 3 screws placed  , had physical therapy couple times , presents with worsening left hip pain x1year,  now s/p Left ANSON with removal of hardware POD#2.      Problem/Recommendation - 1:  Problem: Avascular necrosis of femoral head, left. Recommendation: s/p fracture and repair 2 yrs ago ,  c/o pain getting worst for more.     Problem/Recommendation - 2:  ·  Problem: Status post left hip replacement.  Recommendation: PT/OT/pain mgmt  DVT prophylaxis- as per ortho  Abx as per SCIP- given   Incentive spirometry  Prophylaxis of opioid  induced constipation.      Problem/Recommendation - 3:  ·  Problem: S/P hardware removal.  Recommendation: as above.      Problem/Recommendation - 4:  ·  Problem: Essential hypertension.  Recommendation: continue home meds with parameters.      Problem/Recommendation - 5:  ·  Problem: Hyperlipidemia, unspecified hyperlipidemia type.  Recommendation: continue statins.      Problem/Recommendation - 6:  Problem: Cerebrovascular accident (CVA), unspecified mechanism. Recommendation: - hx of - continue statins/ ASA .      Problem/Recommendation - 7:  Problem: Carotid stenosis. Recommendation: Hx of , s/p R  CEA - continue statins , ASA.     Problem/Recommendation - 8:  Problem: Esophageal dysphagia. Recommendation: - post CEA / CVA - chronic , with chronic cough - takes pills dissolved in water.     Problem/Recommendation - 9:  Problem: Leukocytosis, unspecified type. Recommendation: - no S/S of infection - likely reactive.     Problem/Recommendation - 10:  Problem: Acute blood loss as cause of postoperative anemia. Recommendation: - asymptomatic    Problem / Plan - 11: Hx of CAD - continue ASA / statins / BB .   Problem / Plan - 12: DVT prophylaxis  - as per ortho protocol- on Lovenox   Opioid induced constipation  prophylaxis - bowel regimen      Medically stable to d/c once cleared by physical therapy / ortho .

## 2020-07-01 NOTE — PROGRESS NOTE ADULT - SUBJECTIVE AND OBJECTIVE BOX
CHARANJIT [facility]    [id number]    History: Patient is status post left hip removal of hardware, posterior total hip arthroplasty on 6/29, POD#2.  Patient is doing well. The patient's pain is controlled using the prescribed pain medications. The patient is participating in physical therapy, PWB LLE. Denies nausea, vomiting, chest pain, shortness of breath, abdominal pain or fever. No new complaints.                         8.8    11.99 )-----------( 185      ( 01 Jul 2020 07:31 )             27.2       07-01    134<L>  |  101  |  12.0  ----------------------------<  96  4.1   |  21.0<L>  |  0.86    Ca    8.4<L>      01 Jul 2020 07:31          MEDICATIONS  (STANDING):  acetaminophen   Tablet .. 975 milliGRAM(s) Oral every 8 hours  amLODIPine   Tablet 10 milliGRAM(s) Oral daily  celecoxib 200 milliGRAM(s) Oral two times a day  enoxaparin Injectable 40 milliGRAM(s) SubCutaneous daily  hydrALAZINE 50 milliGRAM(s) Oral three times a day  loratadine 10 milliGRAM(s) Oral daily  metoprolol tartrate 100 milliGRAM(s) Oral two times a day  pantoprazole    Tablet 40 milliGRAM(s) Oral before breakfast  polyethylene glycol 3350 17 Gram(s) Oral daily  simvastatin 20 milliGRAM(s) Oral at bedtime  sodium chloride 0.9%. 1000 milliLiter(s) (100 mL/Hr) IV Continuous <Continuous>    MEDICATIONS  (PRN):  aluminum hydroxide/magnesium hydroxide/simethicone Suspension 30 milliLiter(s) Oral four times a day PRN Indigestion  HYDROmorphone   Tablet 4 milliGRAM(s) Oral every 3 hours PRN Severe Pain (7 - 10)  HYDROmorphone  Injectable 0.5 milliGRAM(s) IV Push every 3 hours PRN breakthrough  magnesium hydroxide Suspension 30 milliLiter(s) Oral at bedtime PRN Constipation  ondansetron Injectable 4 milliGRAM(s) IV Push every 6 hours PRN Nausea and/or Vomiting  oxyCODONE    IR 5 milliGRAM(s) Oral every 3 hours PRN Mild Pain (1 - 3)  oxyCODONE    IR 10 milliGRAM(s) Oral every 3 hours PRN Moderate Pain (4 - 6)      Physical exam: The left hip silverton dressing is clean, dry and intact. No drainage or discharge. No erythema is noted. No blistering. No ecchymosis. The calf is supple nontender. Passive range of motion is acceptable to due postoperative pain. No calf tenderness. Sensation to light touch is grossly intact distally. Motor function distally is 5/5. No foot drop. 2+ dorsalis pedis pulse. Capillary refill is less than 2 seconds. No cyanosis.    Primary Orthopedic Assessment:  • s/p LEFT POSTERIOR total hip replacement, Removal of hardware    Secondary  Orthopedic Assessment(s):   •     Secondary  Medical Assessment(s):   •     Plan:   • DVT prophylaxis as prescribed, including use of compression devices and ankle pumps  • Continue physical therapy  • PWB LLE lower extremity with assistance of a walker  • Incentive spirometry encouraged  • Pain control as clinically indicated  • Posterior hip precautions reviewed with patient  • Discharge planning – anticipated discharge is Home today

## 2020-07-01 NOTE — PROGRESS NOTE ADULT - SUBJECTIVE AND OBJECTIVE BOX
Patient seen and examined . S/p L ANSON  , POD # 2. Doing well post op , no n/v , voiding without difficulties ,   participating with physical therapy .     CC : L hip pian well controlled .         MEDICATIONS  (STANDING):  acetaminophen   Tablet .. 975 milliGRAM(s) Oral every 8 hours  amLODIPine   Tablet 10 milliGRAM(s) Oral daily  celecoxib 200 milliGRAM(s) Oral two times a day  enoxaparin Injectable 40 milliGRAM(s) SubCutaneous daily  hydrALAZINE 50 milliGRAM(s) Oral three times a day  loratadine 10 milliGRAM(s) Oral daily  metoprolol tartrate 100 milliGRAM(s) Oral two times a day  pantoprazole    Tablet 40 milliGRAM(s) Oral before breakfast  polyethylene glycol 3350 17 Gram(s) Oral daily  simvastatin 20 milliGRAM(s) Oral at bedtime  sodium chloride 0.9%. 1000 milliLiter(s) (100 mL/Hr) IV Continuous <Continuous>    MEDICATIONS  (PRN):  aluminum hydroxide/magnesium hydroxide/simethicone Suspension 30 milliLiter(s) Oral four times a day PRN Indigestion  HYDROmorphone   Tablet 4 milliGRAM(s) Oral every 3 hours PRN Severe Pain (7 - 10)  HYDROmorphone  Injectable 0.5 milliGRAM(s) IV Push every 3 hours PRN breakthrough  magnesium hydroxide Suspension 30 milliLiter(s) Oral at bedtime PRN Constipation  ondansetron Injectable 4 milliGRAM(s) IV Push every 6 hours PRN Nausea and/or Vomiting  oxyCODONE    IR 5 milliGRAM(s) Oral every 3 hours PRN Mild Pain (1 - 3)  oxyCODONE    IR 10 milliGRAM(s) Oral every 3 hours PRN Moderate Pain (4 - 6)      LABS:                          8.8    11.99 )-----------( 185      ( 01 Jul 2020 07:31 )             27.2     07-01    134<L>  |  101  |  12.0  ----------------------------<  96  4.1   |  21.0<L>  |  0.86    Ca    8.4<L>      01 Jul 2020 07:31      REVIEW OF SYSTEMS:    As above , all other systems reviewed and are negative .     Vital Signs Last 24 Hrs  T(C): 36.5 (01 Jul 2020 07:43), Max: 37.6 (30 Jun 2020 15:52)  T(F): 97.7 (01 Jul 2020 07:43), Max: 99.6 (30 Jun 2020 15:52)  HR: 85 (01 Jul 2020 07:43) (78 - 87)  BP: 125/61 (01 Jul 2020 07:43) (125/61 - 163/54)  BP(mean): --  RR: 18 (01 Jul 2020 07:43) (18 - 18)  SpO2: 90% (01 Jul 2020 07:43) (90% - 93%)    PHYSICAL EXAM:    GENERAL: NAD, well-groomed, well-developed  HEAD:  Atraumatic, Normocephalic  EYES: EOMI, PERRLA, conjunctiva and sclera clear  NECK: Supple, No JVD, Normal thyroid  NERVOUS SYSTEM:  Alert & Oriented X3, no focal deficit  CHEST/LUNG: CTA b/l ,  no  rales, rhonchi, wheezing, or rubs  HEART: Regular rate and rhythm; No murmurs, rubs, or gallops  ABDOMEN: Soft, Nontender, Nondistended; Bowel sounds present  EXTREMITIES:  2+ Peripheral Pulses, No clubbing, cyanosis, or edema , L hip dressing + , clean and dry   LYMPH: No lymphadenopathy noted  SKIN: No rashes or lesions

## 2020-07-02 RX ORDER — ENOXAPARIN SODIUM 100 MG/ML
40 INJECTION SUBCUTANEOUS
Qty: 1212 | Refills: 0
Start: 2020-07-02 | End: 2020-07-13

## 2020-07-05 ENCOUNTER — EMERGENCY (EMERGENCY)
Facility: HOSPITAL | Age: 75
LOS: 1 days | Discharge: HOME | End: 2020-07-05
Attending: EMERGENCY MEDICINE
Payer: MEDICARE

## 2020-07-05 VITALS
DIASTOLIC BLOOD PRESSURE: 62 MMHG | TEMPERATURE: 98 F | OXYGEN SATURATION: 95 % | WEIGHT: 166.89 LBS | HEART RATE: 84 BPM | HEIGHT: 69 IN | SYSTOLIC BLOOD PRESSURE: 148 MMHG | RESPIRATION RATE: 20 BRPM

## 2020-07-05 VITALS
TEMPERATURE: 99 F | SYSTOLIC BLOOD PRESSURE: 185 MMHG | OXYGEN SATURATION: 95 % | HEART RATE: 93 BPM | DIASTOLIC BLOOD PRESSURE: 72 MMHG | RESPIRATION RATE: 18 BRPM

## 2020-07-05 DIAGNOSIS — Z98.51 TUBAL LIGATION STATUS: Chronic | ICD-10-CM

## 2020-07-05 DIAGNOSIS — Z98.49 CATARACT EXTRACTION STATUS, UNSPECIFIED EYE: Chronic | ICD-10-CM

## 2020-07-05 DIAGNOSIS — Z90.49 ACQUIRED ABSENCE OF OTHER SPECIFIED PARTS OF DIGESTIVE TRACT: Chronic | ICD-10-CM

## 2020-07-05 DIAGNOSIS — I77.9 DISORDER OF ARTERIES AND ARTERIOLES, UNSPECIFIED: Chronic | ICD-10-CM

## 2020-07-05 LAB
ALBUMIN SERPL ELPH-MCNC: 3.8 G/DL — SIGNIFICANT CHANGE UP (ref 3.3–5.2)
ALP SERPL-CCNC: 79 U/L — SIGNIFICANT CHANGE UP (ref 40–120)
ALT FLD-CCNC: 7 U/L — SIGNIFICANT CHANGE UP
ANION GAP SERPL CALC-SCNC: 15 MMOL/L — SIGNIFICANT CHANGE UP (ref 5–17)
APPEARANCE UR: CLEAR — SIGNIFICANT CHANGE UP
APTT BLD: 28 SEC — SIGNIFICANT CHANGE UP (ref 27.5–35.5)
AST SERPL-CCNC: 14 U/L — SIGNIFICANT CHANGE UP
BACTERIA # UR AUTO: ABNORMAL
BASOPHILS # BLD AUTO: 0.07 K/UL — SIGNIFICANT CHANGE UP (ref 0–0.2)
BASOPHILS NFR BLD AUTO: 0.8 % — SIGNIFICANT CHANGE UP (ref 0–2)
BILIRUB SERPL-MCNC: 0.4 MG/DL — SIGNIFICANT CHANGE UP (ref 0.4–2)
BILIRUB UR-MCNC: NEGATIVE — SIGNIFICANT CHANGE UP
BUN SERPL-MCNC: 8 MG/DL — SIGNIFICANT CHANGE UP (ref 8–20)
CALCIUM SERPL-MCNC: 9.2 MG/DL — SIGNIFICANT CHANGE UP (ref 8.6–10.2)
CHLORIDE SERPL-SCNC: 103 MMOL/L — SIGNIFICANT CHANGE UP (ref 98–107)
CO2 SERPL-SCNC: 23 MMOL/L — SIGNIFICANT CHANGE UP (ref 22–29)
COLOR SPEC: YELLOW — SIGNIFICANT CHANGE UP
CREAT SERPL-MCNC: 0.71 MG/DL — SIGNIFICANT CHANGE UP (ref 0.5–1.3)
DIFF PNL FLD: NEGATIVE — SIGNIFICANT CHANGE UP
EOSINOPHIL # BLD AUTO: 0.17 K/UL — SIGNIFICANT CHANGE UP (ref 0–0.5)
EOSINOPHIL NFR BLD AUTO: 1.8 % — SIGNIFICANT CHANGE UP (ref 0–6)
EPI CELLS # UR: SIGNIFICANT CHANGE UP
GLUCOSE SERPL-MCNC: 108 MG/DL — HIGH (ref 70–99)
GLUCOSE UR QL: NEGATIVE MG/DL — SIGNIFICANT CHANGE UP
HCT VFR BLD CALC: 31.5 % — LOW (ref 34.5–45)
HGB BLD-MCNC: 10.4 G/DL — LOW (ref 11.5–15.5)
IMM GRANULOCYTES NFR BLD AUTO: 3.6 % — HIGH (ref 0–1.5)
INR BLD: 1.18 RATIO — HIGH (ref 0.88–1.16)
KETONES UR-MCNC: NEGATIVE — SIGNIFICANT CHANGE UP
LEUKOCYTE ESTERASE UR-ACNC: NEGATIVE — SIGNIFICANT CHANGE UP
LIDOCAIN IGE QN: 32 U/L — SIGNIFICANT CHANGE UP (ref 22–51)
LYMPHOCYTES # BLD AUTO: 1.31 K/UL — SIGNIFICANT CHANGE UP (ref 1–3.3)
LYMPHOCYTES # BLD AUTO: 14.2 % — SIGNIFICANT CHANGE UP (ref 13–44)
MCHC RBC-ENTMCNC: 29.5 PG — SIGNIFICANT CHANGE UP (ref 27–34)
MCHC RBC-ENTMCNC: 33 GM/DL — SIGNIFICANT CHANGE UP (ref 32–36)
MCV RBC AUTO: 89.2 FL — SIGNIFICANT CHANGE UP (ref 80–100)
MONOCYTES # BLD AUTO: 0.98 K/UL — HIGH (ref 0–0.9)
MONOCYTES NFR BLD AUTO: 10.6 % — SIGNIFICANT CHANGE UP (ref 2–14)
NEUTROPHILS # BLD AUTO: 6.39 K/UL — SIGNIFICANT CHANGE UP (ref 1.8–7.4)
NEUTROPHILS NFR BLD AUTO: 69 % — SIGNIFICANT CHANGE UP (ref 43–77)
NITRITE UR-MCNC: NEGATIVE — SIGNIFICANT CHANGE UP
NT-PROBNP SERPL-SCNC: 663 PG/ML — HIGH (ref 0–300)
PH UR: 7 — SIGNIFICANT CHANGE UP (ref 5–8)
PLATELET # BLD AUTO: 395 K/UL — SIGNIFICANT CHANGE UP (ref 150–400)
POTASSIUM SERPL-MCNC: 3.4 MMOL/L — LOW (ref 3.5–5.3)
POTASSIUM SERPL-SCNC: 3.4 MMOL/L — LOW (ref 3.5–5.3)
PROT SERPL-MCNC: 6.7 G/DL — SIGNIFICANT CHANGE UP (ref 6.6–8.7)
PROT UR-MCNC: 15 MG/DL
PROTHROM AB SERPL-ACNC: 13.6 SEC — SIGNIFICANT CHANGE UP (ref 10.6–13.6)
RBC # BLD: 3.53 M/UL — LOW (ref 3.8–5.2)
RBC # FLD: 13.6 % — SIGNIFICANT CHANGE UP (ref 10.3–14.5)
RBC CASTS # UR COMP ASSIST: NEGATIVE /HPF — SIGNIFICANT CHANGE UP (ref 0–4)
SODIUM SERPL-SCNC: 141 MMOL/L — SIGNIFICANT CHANGE UP (ref 135–145)
SP GR SPEC: 1 — LOW (ref 1.01–1.02)
TROPONIN T SERPL-MCNC: <0.01 NG/ML — SIGNIFICANT CHANGE UP (ref 0–0.06)
UROBILINOGEN FLD QL: NEGATIVE MG/DL — SIGNIFICANT CHANGE UP
WBC # BLD: 9.25 K/UL — SIGNIFICANT CHANGE UP (ref 3.8–10.5)
WBC # FLD AUTO: 9.25 K/UL — SIGNIFICANT CHANGE UP (ref 3.8–10.5)
WBC UR QL: SIGNIFICANT CHANGE UP

## 2020-07-05 PROCEDURE — 80053 COMPREHEN METABOLIC PANEL: CPT

## 2020-07-05 PROCEDURE — 71046 X-RAY EXAM CHEST 2 VIEWS: CPT

## 2020-07-05 PROCEDURE — 87184 SC STD DISK METHOD PER PLATE: CPT

## 2020-07-05 PROCEDURE — 81001 URINALYSIS AUTO W/SCOPE: CPT

## 2020-07-05 PROCEDURE — 87186 SC STD MICRODIL/AGAR DIL: CPT

## 2020-07-05 PROCEDURE — 71275 CT ANGIOGRAPHY CHEST: CPT

## 2020-07-05 PROCEDURE — 85610 PROTHROMBIN TIME: CPT

## 2020-07-05 PROCEDURE — 93005 ELECTROCARDIOGRAM TRACING: CPT

## 2020-07-05 PROCEDURE — 96374 THER/PROPH/DIAG INJ IV PUSH: CPT | Mod: XU

## 2020-07-05 PROCEDURE — 93971 EXTREMITY STUDY: CPT | Mod: 26,LT

## 2020-07-05 PROCEDURE — 93010 ELECTROCARDIOGRAM REPORT: CPT

## 2020-07-05 PROCEDURE — 83880 ASSAY OF NATRIURETIC PEPTIDE: CPT

## 2020-07-05 PROCEDURE — 85730 THROMBOPLASTIN TIME PARTIAL: CPT

## 2020-07-05 PROCEDURE — 87086 URINE CULTURE/COLONY COUNT: CPT

## 2020-07-05 PROCEDURE — 99284 EMERGENCY DEPT VISIT MOD MDM: CPT | Mod: 25

## 2020-07-05 PROCEDURE — U0003: CPT

## 2020-07-05 PROCEDURE — 71275 CT ANGIOGRAPHY CHEST: CPT | Mod: 26

## 2020-07-05 PROCEDURE — 71046 X-RAY EXAM CHEST 2 VIEWS: CPT | Mod: 26

## 2020-07-05 PROCEDURE — 93971 EXTREMITY STUDY: CPT

## 2020-07-05 PROCEDURE — 99285 EMERGENCY DEPT VISIT HI MDM: CPT

## 2020-07-05 PROCEDURE — 36415 COLL VENOUS BLD VENIPUNCTURE: CPT

## 2020-07-05 PROCEDURE — 85027 COMPLETE CBC AUTOMATED: CPT

## 2020-07-05 PROCEDURE — 83690 ASSAY OF LIPASE: CPT

## 2020-07-05 PROCEDURE — 84484 ASSAY OF TROPONIN QUANT: CPT

## 2020-07-05 RX ORDER — CEFTRIAXONE 500 MG/1
1000 INJECTION, POWDER, FOR SOLUTION INTRAMUSCULAR; INTRAVENOUS ONCE
Refills: 0 | Status: COMPLETED | OUTPATIENT
Start: 2020-07-05 | End: 2020-07-05

## 2020-07-05 RX ORDER — CEFDINIR 250 MG/5ML
1 POWDER, FOR SUSPENSION ORAL
Qty: 20 | Refills: 0
Start: 2020-07-05 | End: 2020-07-14

## 2020-07-05 RX ADMIN — Medication 100 MILLIGRAM(S): at 16:42

## 2020-07-05 RX ADMIN — CEFTRIAXONE 100 MILLIGRAM(S): 500 INJECTION, POWDER, FOR SOLUTION INTRAMUSCULAR; INTRAVENOUS at 17:22

## 2020-07-05 NOTE — ED PROVIDER NOTE - OBJECTIVE STATEMENT
Pt is a 75 y.o. F hx of CAD, HLD, CVA, esophageal dysphagia secondary to CVA, HTN, carotid surgery, presenting with dyspnea for one week. The pt had surgery one week ago, hip replacement, and since then she has had shortness of breath, and chest tightness for one week. Tightness is exacerbated by coughing. Denies fever, sweats, chills, chest pain, abdominal pain, nausea, vomiting, diarrhea or constipation. Denies dysuria or hematuria.

## 2020-07-05 NOTE — ED PROVIDER NOTE - NSFOLLOWUPINSTRUCTIONS_ED_ALL_ED_FT
Follow up with your primary medical doctor this week.  Follow up with your orthopaedic surgeon as scheduled regularly.  Antibiotics may cause GI upset (upset stomach, diarrhea), make sure to maintain good hydration.    General Info:    Community-Acquired Pneumonia, Adult    Pneumonia is an infection of the lungs. One type of pneumonia can happen while a person is in a hospital. A different type can happen when a person is not in a hospital (community-acquired pneumonia). It is easy for this kind to spread from person to person. It can spread to you if you breathe near an infected person who coughs or sneezes. Some symptoms include:     A dry cough.  A wet (productive) cough.  Fever.  Sweating.  Chest pain.    Follow these instructions at home:  Take over-the-counter and prescription medicines only as told by your doctor.  Only take cough medicine if you are losing sleep.  If you were prescribed an antibiotic medicine, take it as told by your doctor. Do not stop taking the antibiotic even if you start to feel better.  Sleep with your head and neck raised (elevated). You can do this by putting a few pillows under your head, or you can sleep in a recliner.  Do not use tobacco products. These include cigarettes, chewing tobacco, and e-cigarettes. If you need help quitting, ask your doctor.  Drink enough water to keep your pee (urine) clear or pale yellow.    A shot (vaccine) can help prevent pneumonia. Shots are often suggested for:    People older than 65 years of age.  People older than 19 years of age:  Who are having cancer treatment.  Who have long-term (chronic) lung disease.  Who have problems with their body's defense system (immune system).    You may also prevent pneumonia if you take these actions:    Get the flu (influenza) shot every year.  Go to the dentist as often as told.  Wash your hands often. If soap and water are not available, use hand .    Contact a doctor if:  You have a fever.  You lose sleep because your cough medicine does not help.    Get help right away if:  You are short of breath and it gets worse.  You have more chest pain.  Your sickness gets worse. This is very serious if:  You are an older adult.  Your body's defense system is weak.  You cough up blood.    ADDITIONAL NOTES AND INSTRUCTIONS    Please follow up with your Primary MD in 24-48 hr.  Seek immediate medical care for any new/worsening signs or symptoms.

## 2020-07-05 NOTE — ED PROVIDER NOTE - PATIENT PORTAL LINK FT
You can access the FollowMyHealth Patient Portal offered by [facility] by registering at the following website: [email or web address]. By joining [employer]’s FollowMyHealth portal, you will also be able to view your health information using other applications (apps) compatible with our system.

## 2020-07-05 NOTE — ED ADULT NURSE NOTE - OBJECTIVE STATEMENT
Pt. complaining of worsening sob since Monday.  Pt. with recent left hip replacement surgery Monday states "I woke up from surgery and I felt like a heavy feeling in my chest and they gave me a nebulizer and I felt a bit better but over the last few days I am short of breath and I have this heavy deep cough."  Pt. complaining of right rib pain with cough; Cough stated to be productive with subjective brown to white sputum. Dressing to left hip cdi.  Pt. denies chest pain but states she feels a heaviness across her upper abdomen.  Pt. complaining of diarrhea that began after surgery but has now become soft. Pt. denies fever/chills/n/v.

## 2020-07-05 NOTE — ED PROVIDER NOTE - ATTENDING CONTRIBUTION TO CARE
Dr. Watters : I have personally seen and examined this patient at the bedside. I have fully participated in the care of this patient. I have reviewed all pertinent clinical information, including history, physical exam, plan and the Resident's note and agree except as noted.     76yo F hx of cva, htn, cad, hld, esophageal dysphagea sp right hip replacement 1 week ago pw sob and chest pressure since the surgery notes that was given a neb tx and improved while was in the hospital but never fully went away. also notes sometimes mild brown sputum + cough and right sided rib pain when coughs.     Denies f/c/n/v/palpitations/abd.pain/d/c/dysuria/hematuria. no sick contacts/recent travel.    PE:  head; atraumatic normocephalic  eyes: perrla  Heart: rrr s1s2  lungs: ctab  abd: soft, nt nd + bs no rebound/guarding no cva ttp  le: left le swelling no calf ttp  back: no midline cervical/thoracic/lumbar ttp      -->pe vs pna vs acs given cad hx will fu labs cta to ro pe given recent sx--labs ekg trop bnp  reassess

## 2020-07-05 NOTE — ED PROVIDER NOTE - FAMILY HISTORY
Grandparent  Still living? Unknown  FH: HTN (hypertension), Age at diagnosis: Age Unknown  FH: heart disease, Age at diagnosis: Age Unknown

## 2020-07-05 NOTE — ED PROVIDER NOTE - CLINICAL SUMMARY MEDICAL DECISION MAKING FREE TEXT BOX
Pt is a 75 y.o. F recent hip surgery, presenting with shortness of breath and chest pressure for one week. Labs, meds, ekg, imaging. Discussed results and findings with patient, findings suggestive of pneumonia, antibiotics given. Prescribed with antibiotics, return precautions verbalized to patient.

## 2020-07-05 NOTE — ED ADULT TRIAGE NOTE - CHIEF COMPLAINT QUOTE
Pt c/o increased mucous and some sob since yesterday.  Pt was recently discharged from hospital with hip surgery.

## 2020-07-05 NOTE — ED PROVIDER NOTE - PHYSICAL EXAMINATION
General: well appearing, NAD  Head:  NC, AT  Eyes: EOMI, PERRLA, no scleral icterus  Ears: no erythema/drainage  Nose: midline, no bleeding/drainage  Throat: MMM  Cardiac: RRR, no m/r/g, no lower extremity edema  Respiratory: CTABL, no wheezes/rales/rhonchi, equal chest wall expansions  Abdomen: soft, ND, NT, no rebound tenderness, no guarding, nonperitonitic  MSK/Vascular: full ROM, distal pulses intact, soft compartments, warm extremities  Neuro: AAOx3, motor/sensory intact  Psych: calm, cooperative, normal affect

## 2020-07-05 NOTE — ED ADULT NURSE NOTE - CAS ELECT INFOMATION PROVIDED
bill understands symptoms to return as provided on instructions.  patient understands need to take HTN medication upon arrival home.  missed dose.  no complaints at this time.

## 2020-07-05 NOTE — ED PROVIDER NOTE - NS ED ROS FT
Constitutional: no fever, sweats, and no chills.  Eyes: no pain, no redness, and no visual changes.  ENMT: no ear pain and no hearing problems, no nasal congestion/drainage, no dysphagia, and no throat pain, no neck pain, no stiffness  CV: no chest pain, no edema.  Resp: +cough, +dyspnea  GI: no abdominal pain, no bloating, no constipation, no diarrhea, no nausea and no vomiting.  : no dysuria, no hematuria  MSK: no msk pain, no weakness  Skin: no lesions, and no rashes.  Neuro: no LOC, no headache, no sensory deficits, and no weakness.

## 2020-07-06 LAB — SARS-COV-2 RNA SPEC QL NAA+PROBE: SIGNIFICANT CHANGE UP

## 2020-07-07 ENCOUNTER — INPATIENT (INPATIENT)
Facility: HOSPITAL | Age: 75
LOS: 2 days | Discharge: HOME | DRG: 178 | End: 2020-07-10
Attending: FAMILY MEDICINE | Admitting: HOSPITALIST
Payer: MEDICARE

## 2020-07-07 VITALS
HEART RATE: 83 BPM | SYSTOLIC BLOOD PRESSURE: 159 MMHG | TEMPERATURE: 98 F | DIASTOLIC BLOOD PRESSURE: 64 MMHG | HEIGHT: 69 IN | OXYGEN SATURATION: 96 % | WEIGHT: 166.89 LBS | RESPIRATION RATE: 18 BRPM

## 2020-07-07 DIAGNOSIS — Z98.51 TUBAL LIGATION STATUS: Chronic | ICD-10-CM

## 2020-07-07 DIAGNOSIS — Z98.49 CATARACT EXTRACTION STATUS, UNSPECIFIED EYE: Chronic | ICD-10-CM

## 2020-07-07 DIAGNOSIS — Z90.49 ACQUIRED ABSENCE OF OTHER SPECIFIED PARTS OF DIGESTIVE TRACT: Chronic | ICD-10-CM

## 2020-07-07 DIAGNOSIS — I77.9 DISORDER OF ARTERIES AND ARTERIOLES, UNSPECIFIED: Chronic | ICD-10-CM

## 2020-07-07 DIAGNOSIS — J18.9 PNEUMONIA, UNSPECIFIED ORGANISM: ICD-10-CM

## 2020-07-07 LAB
ALBUMIN SERPL ELPH-MCNC: 3.6 G/DL — SIGNIFICANT CHANGE UP (ref 3.3–5.2)
ALP SERPL-CCNC: 81 U/L — SIGNIFICANT CHANGE UP (ref 40–120)
ALT FLD-CCNC: 7 U/L — SIGNIFICANT CHANGE UP
ANION GAP SERPL CALC-SCNC: 14 MMOL/L — SIGNIFICANT CHANGE UP (ref 5–17)
AST SERPL-CCNC: 14 U/L — SIGNIFICANT CHANGE UP
BILIRUB SERPL-MCNC: 0.4 MG/DL — SIGNIFICANT CHANGE UP (ref 0.4–2)
BUN SERPL-MCNC: 10 MG/DL — SIGNIFICANT CHANGE UP (ref 8–20)
CALCIUM SERPL-MCNC: 9.2 MG/DL — SIGNIFICANT CHANGE UP (ref 8.6–10.2)
CHLORIDE SERPL-SCNC: 100 MMOL/L — SIGNIFICANT CHANGE UP (ref 98–107)
CO2 SERPL-SCNC: 23 MMOL/L — SIGNIFICANT CHANGE UP (ref 22–29)
CREAT SERPL-MCNC: 0.83 MG/DL — SIGNIFICANT CHANGE UP (ref 0.5–1.3)
GLUCOSE SERPL-MCNC: 98 MG/DL — SIGNIFICANT CHANGE UP (ref 70–99)
HCT VFR BLD CALC: 31.1 % — LOW (ref 34.5–45)
HGB BLD-MCNC: 10.2 G/DL — LOW (ref 11.5–15.5)
LACTATE BLDV-MCNC: 0.6 MMOL/L — SIGNIFICANT CHANGE UP (ref 0.5–2)
MCHC RBC-ENTMCNC: 29.5 PG — SIGNIFICANT CHANGE UP (ref 27–34)
MCHC RBC-ENTMCNC: 32.8 GM/DL — SIGNIFICANT CHANGE UP (ref 32–36)
MCV RBC AUTO: 89.9 FL — SIGNIFICANT CHANGE UP (ref 80–100)
PLATELET # BLD AUTO: 454 K/UL — HIGH (ref 150–400)
POTASSIUM SERPL-MCNC: 3.9 MMOL/L — SIGNIFICANT CHANGE UP (ref 3.5–5.3)
POTASSIUM SERPL-SCNC: 3.9 MMOL/L — SIGNIFICANT CHANGE UP (ref 3.5–5.3)
PROT SERPL-MCNC: 6.5 G/DL — LOW (ref 6.6–8.7)
RBC # BLD: 3.46 M/UL — LOW (ref 3.8–5.2)
RBC # FLD: 13.7 % — SIGNIFICANT CHANGE UP (ref 10.3–14.5)
SODIUM SERPL-SCNC: 137 MMOL/L — SIGNIFICANT CHANGE UP (ref 135–145)
TROPONIN T SERPL-MCNC: <0.01 NG/ML — SIGNIFICANT CHANGE UP (ref 0–0.06)
TROPONIN T SERPL-MCNC: <0.01 NG/ML — SIGNIFICANT CHANGE UP (ref 0–0.06)
WBC # BLD: 10.93 K/UL — HIGH (ref 3.8–10.5)
WBC # FLD AUTO: 10.93 K/UL — HIGH (ref 3.8–10.5)

## 2020-07-07 PROCEDURE — 99285 EMERGENCY DEPT VISIT HI MDM: CPT

## 2020-07-07 PROCEDURE — 71250 CT THORAX DX C-: CPT | Mod: 26

## 2020-07-07 PROCEDURE — 93010 ELECTROCARDIOGRAM REPORT: CPT

## 2020-07-07 PROCEDURE — 99232 SBSQ HOSP IP/OBS MODERATE 35: CPT

## 2020-07-07 PROCEDURE — 71045 X-RAY EXAM CHEST 1 VIEW: CPT | Mod: 26

## 2020-07-07 PROCEDURE — 99222 1ST HOSP IP/OBS MODERATE 55: CPT | Mod: 57,24

## 2020-07-07 PROCEDURE — 22310 CLOSED TX VERT FX W/O MANJ: CPT

## 2020-07-07 RX ORDER — PIPERACILLIN AND TAZOBACTAM 4; .5 G/20ML; G/20ML
3.38 INJECTION, POWDER, LYOPHILIZED, FOR SOLUTION INTRAVENOUS ONCE
Refills: 0 | Status: COMPLETED | OUTPATIENT
Start: 2020-07-07 | End: 2020-07-07

## 2020-07-07 RX ORDER — SODIUM CHLORIDE 9 MG/ML
1000 INJECTION INTRAMUSCULAR; INTRAVENOUS; SUBCUTANEOUS ONCE
Refills: 0 | Status: COMPLETED | OUTPATIENT
Start: 2020-07-07 | End: 2020-07-07

## 2020-07-07 RX ORDER — VANCOMYCIN HCL 1 G
1000 VIAL (EA) INTRAVENOUS ONCE
Refills: 0 | Status: COMPLETED | OUTPATIENT
Start: 2020-07-07 | End: 2020-07-07

## 2020-07-07 RX ADMIN — PIPERACILLIN AND TAZOBACTAM 200 GRAM(S): 4; .5 INJECTION, POWDER, LYOPHILIZED, FOR SOLUTION INTRAVENOUS at 22:58

## 2020-07-07 RX ADMIN — SODIUM CHLORIDE 1000 MILLILITER(S): 9 INJECTION INTRAMUSCULAR; INTRAVENOUS; SUBCUTANEOUS at 23:44

## 2020-07-07 RX ADMIN — PIPERACILLIN AND TAZOBACTAM 3.38 GRAM(S): 4; .5 INJECTION, POWDER, LYOPHILIZED, FOR SOLUTION INTRAVENOUS at 23:28

## 2020-07-07 RX ADMIN — Medication 250 MILLIGRAM(S): at 23:43

## 2020-07-07 RX ADMIN — SODIUM CHLORIDE 1000 MILLILITER(S): 9 INJECTION INTRAMUSCULAR; INTRAVENOUS; SUBCUTANEOUS at 22:58

## 2020-07-07 NOTE — ED ADULT TRIAGE NOTE - CHIEF COMPLAINT QUOTE
Pt recently treated at [facility] for double pneumonia, today c/o difficulty breathing and pain in back, denies fever/chills, still currently taking oral abx, able to speak in full sentences

## 2020-07-07 NOTE — ED PROVIDER NOTE - PROGRESS NOTE DETAILS
CT showed multiple area of infiltrate concern for pneumonia. CXR looked worse compared to prior. Also showed compression fracture of T11. Ortho consulted for spine. GIven that patient was recently admitted to hospital and not improving on oral abx. Will give vanc and zosyn for hospital acquired pneumonia.

## 2020-07-07 NOTE — H&P ADULT - NSHPLABSRESULTS_GEN_ALL_CORE
10.2   10.93 )-----------( 454      ( 07 Jul 2020 18:39 )             31.1   07-07    137  |  100  |  10.0  ----------------------------<  98  3.9   |  23.0  |  0.83    Ca    9.2      07 Jul 2020 18:39    TPro  6.5<L>  /  Alb  3.6  /  TBili  0.4  /  DBili  x   /  AST  14  /  ALT  7   /  AlkPhos  81  07-07        Lactate Trend    CARDIAC MARKERS ( 07 Jul 2020 22:17 )  x     / <0.01 ng/mL / x     / x     / x      CARDIAC MARKERS ( 07 Jul 2020 18:39 )  x     / <0.01 ng/mL / x     / x     / x        CAPILLARY BLOOD GLUCOSE  Culture Results:   10,000 - 49,000 CFU/mL Gram Negative Rods (07-05 @ 19:40)      RADIOLOGY, EKG & ADDITIONAL TESTS: Reviewed.     EKG- NSR w/ PAC    < from: CT Chest No Cont (07.07.20 @ 21:07) >    IMPRESSION:     1. Small patchy airspace opacities in the right upper and right lower lobes and to a lesser extent in the right middle lobe and superior segment of the left lower lobe, not significantly changed, likely infectious in etiology.  2. New superior endplate compression fracture deformity of the T11 vertebral body.    < end of copied text >

## 2020-07-07 NOTE — ED PROVIDER NOTE - CLINICAL SUMMARY MEDICAL DECISION MAKING FREE TEXT BOX
76 yo F p/w persistent sob. She was recently found to have pneumonia, not improving on cefnidir and doxycycline. Repeat cxr and CT showed worsening pneumonia. ordered vanc and zosyn. Patient also had T11 compression fracture, ortho consulted. Will admit.

## 2020-07-07 NOTE — ED PROVIDER NOTE - PHYSICAL EXAMINATION
VITAL SIGNS: I have reviewed nursing notes and confirm.  CONSTITUTIONAL: Well-developed; well-nourished; in no acute distress.  SKIN: Skin exam is warm and dry, no acute rash.  HEAD: Normocephalic; atraumatic.  EYES: PERRL, EOM intact; conjunctiva and sclera clear.  ENT: No nasal discharge; airway clear. Throat clear.  NECK: Supple; non tender.    CARD: S1, S2 normal; Regular rate and rhythm.  RESP: No wheezes,(+) rhonchi of b/l lung  ABD:  soft; non-distended; non-tender;   EXT: Normal ROM. No clubbing, cyanosis or edema.  NEURO: Alert, oriented. Grossly unremarkable. No focal deficits. no facial droop, moves all extremities,  normal gait   PSYCH: Cooperative, appropriate.

## 2020-07-07 NOTE — CONSULT NOTE ADULT - ATTENDING COMMENTS
Attending statement:  I have personally seen this patient, and formed a face to face diagnostic evaluation on this patient on this date.  I have reviewed the PA, NP and or Medical/PA student and/or Resident documentation and agree with the history, physical exam and plan of care except if noted otherwise.     Patient's CAT scan has been reviewed that shows thoracic compression fractures. He is in the lower thoracic elements. MRI is currently pending we'll initiate conservative fracture management with a TLSO brace. Initiation of PT OTand medical management of her acute diagnosis of pneumonia Attending statement:  I have personally seen this patient, and formed a face to face diagnostic evaluation on this patient on this date.  I have reviewed the PA, NP and or Medical/PA student and/or Resident documentation and agree with the history, physical exam and plan of care except if noted otherwise.     Patient's CAT scan has been reviewed that shows thoracic compression fractures. In the lower thoracic elements. MRI is currently pending we'll initiate conservative fracture management with a TLSO brace. Initiation of PT OT and medical management of her acute diagnosis of pneumonia

## 2020-07-07 NOTE — H&P ADULT - ASSESSMENT
75F hx Carotid Endarectomy complicated by CVA, Esophageal dysphagia, HTN, HLD, GERD presents with worsening shortness of breath found to have persistent pna, failed outpatient treatment.

## 2020-07-07 NOTE — CONSULT NOTE ADULT - SUBJECTIVE AND OBJECTIVE BOX
Pt Name: CHARANJIT GREENWOOD    MRN: [id number]      Patient is a 75y Female s/p L total hip arthroplasty on 6/29/20 with Dr. Cisneros presenting to the emergency department with a chief complaint of cough & shortness of breath x 1 week. Pt was seen on 7/5-- chest CTA shows b/l PNA with no PE. Pt presents again today with similar complaints and states that symptoms have not improved and she is now experiencing mild mid to lower back pain that began about two days ago when she was getting out of the shower. Pt otherwise denies recent fall/trauma, numbness/tingling, saddle anesthesia, urinary/bowel dysfunction and has no other complaints.    HEALTH ISSUES - PROBLEM Dx:      .      REVIEW OF SYSTEMS      General: Alert, responsive, in NAD    Skin/Breast: No rashes, no pruritis   	  Ophthalmologic: No visual changes. No redness.   	  ENMT:	No discharge. No swelling.    Respiratory and Thorax: + difficulty breathing. + cough.  	   Cardiovascular:	No chest pain. No palpitations.    Gastrointestinal:	 No abdominal pain. No diarrhea.     Genitourinary: No dysuria. No bleeding.    Musculoskeletal: SEE HPI.    Neurological: No sensory or motor changes.     Psychiatric: No anxiety or depression.    Hematology/Lymphatics: No swelling.    Endocrine: No Hx of diabetes.    ROS is otherwise negative.    PAST MEDICAL & SURGICAL HISTORY:  PAST MEDICAL & SURGICAL HISTORY:  Carotid artery disease, unspecified laterality  Hyperlipidemia, unspecified hyperlipidemia type  Cerebrovascular accident (CVA), unspecified mechanism  Esophageal dysphagia  Hypertension  S/P tubal ligation  S/P cholecystectomy  S/P cataract surgery: both eyes  Carotid artery disease: s/p carotid bypass right side      Allergies: Biaxin (Other)  No Known Allergies      Medications: piperacillin/tazobactam IVPB... 3.375 Gram(s) IV Intermittent once  vancomycin  IVPB. 1000 milliGRAM(s) IV Intermittent once      FAMILY HISTORY:  FH: heart disease (Grandparent)  FH: HTN (hypertension) (Grandparent)  : non-contributory    Social History: Denies IVDA, denies ETOH.    Ambulation: Walking independently [ x ] With Cane [ ] With Walker [ ]  Bed / wheelchairbound [ ]                           10.2   10.93 )-----------( 454      ( 07 Jul 2020 18:39 )             31.1     07-07    137  |  100  |  10.0  ----------------------------<  98  3.9   |  23.0  |  0.83    Ca    9.2      07 Jul 2020 18:39    TPro  6.5<L>  /  Alb  3.6  /  TBili  0.4  /  DBili  x   /  AST  14  /  ALT  7   /  AlkPhos  81  07-07      PHYSICAL EXAM:    Vital Signs Last 24 Hrs  T(C): 36.8 (07 Jul 2020 17:45), Max: 36.8 (07 Jul 2020 17:45)  T(F): 98.3 (07 Jul 2020 17:45), Max: 98.3 (07 Jul 2020 17:45)  HR: 83 (07 Jul 2020 17:45) (83 - 83)  BP: 159/64 (07 Jul 2020 17:45) (159/64 - 159/64)  BP(mean): --  RR: 18 (07 Jul 2020 17:45) (18 - 18)  SpO2: 96% (07 Jul 2020 17:45) (96% - 96%)  Daily Height in cm: 175.26 (07 Jul 2020 17:45)    Daily     Appearance: Alert, responsive, in no acute distress.    Skin: no rash on visible skin. Skin is clean, dry and intact. No bleeding. No abrasions. No ulcerations. Left hip incision C/D/I.    Vascular: 2+ distal DP/PT/radial pulses. Cap refill < 2 sec. No signs of venous insuffiency or stasis. No extremity ulcerations. No cyanosis.    Musculoskeletal:         Back: No spinal deformities or step offs, mild mid thoracic/lumbar midline TTP.       Left Upper Extremity: Atraumatic with normal alignment NROM. No crepitus. No bony tenderness.        Right Upper Extremity: Atraumatic with normal alignment NROM. No crepitus. No bony tenderness.        Left Lower Extremity: Atraumatic with normal alignment NROM. No crepitus. No bony tenderness.        Right Lower Extremity: Atraumatic with normal alignment NROM. No crepitus. No bony tenderness.     Neurological: Sensation is grossly intact to light touch. No focal deficits or weaknesses found.    Pathologic reflexes: negative babinski, negative clonus           Motor exam: [  ]          [ ] Upper extremity                    Bi(c5)  WE(c6)  EE(c7)   FF(c8)                                                R         5/5        5/5        5/5       5/5                                               L          5/5        5/5        5/5       5/5         [ ] Lower extremeity                  HF(l2)     KE(l3)    TA(l4)   EHL(l5)  GS(s1)                                                 R        5/5        5/5        5/5       5/5         5/5                                               L         5/5        5/5       5/5       5/5          5/5       Imaging Studies: < from: CT Chest No Cont (07.07.20 @ 21:07) >   EXAM:  CT CHEST                          PROCEDURE DATE:  07/07/2020          INTERPRETATION:  CLINICAL INFORMATION: Shortness of breath.     COMPARISON: CT pulmonary angiogram from 7/5/2020.    PROCEDURE:   CT of the Chest was performed without intravenous contrast.  Sagittal and coronal reformats were performed.    FINDINGS:    LUNGS AND AIRWAYS: Patent central airways. Small patchy airspace opacities in the right upper and right lower lobes and to a lesser extent in the right middle lobe and superior segment of the left lower lobe, not significantly changed, likely infectious in etiology. Biapical scarring.  PLEURA: No pleural effusion.  MEDIASTINUM AND ABDELRAHMAN: No lymphadenopathy.  VESSELS: Mild calcification of the coronary arteries and aortic valve annulus.  HEART: Heart size is normal. No pericardial effusion.  CHEST WALL AND LOWER NECK: Within normal limits.  VISUALIZED UPPER ABDOMEN: Cholecystectomy. Left renal cyst.  BONES: Compression fracture deformities of the T12, L1 and L2 vertebral bodies, not significantly changed. New superior endplate compression fracture deformity of the T11 vertebral body resulting in less than 50% loss of vertebral body height.    IMPRESSION:     1. Small patchy airspace opacities in the right upper and right lower lobes and to a lesser extent in the right middle lobe and superior segment of the left lower lobe, not significantly changed, likely infectious in etiology.  2. New superior endplate compression fracture deformity of the T11 vertebral body.                DEBO FINNEY M.D., ATTENDING RADIOLOGIST  This document has been electronically signed. Jul 7 2020  9:41PM          < end of copied text >        A/P:  Pt is a  75y Female with an acute superior endplate T11 fx, old/unchanged T12, L1, L2 compression fractures.    PLAN:  * Pain control as clinically indicated  * Treatment plan to be finalized after review of imaging studies by Dr. Marks  * Dr. Cisneros made aware of pt return to hospital

## 2020-07-07 NOTE — ED ADULT NURSE REASSESSMENT NOTE - NS ED NURSE REASSESS COMMENT FT1
pt A+Ox4 at this time, in no apparent distress. pt breathing even and unlabored. KANG well x4. remains on cardiac monitor and . pt placed on bedpan to urinate at this time. pt turned and repositioned for comfort. pending CT scan at this time. pt educated on POC, verbalized understanding. pt safety measures maintained.

## 2020-07-07 NOTE — H&P ADULT - NSHPREVIEWOFSYSTEMS_GEN_ALL_CORE
REVIEW OF SYSTEMS:    CONSTITUTIONAL: No weakness, fevers or chills  EYES/ENT: No visual changes;  No vertigo or throat pain   NECK: No pain or stiffness  RESPIRATORY: see HPI  CARDIOVASCULAR: see HPI  GASTROINTESTINAL: No abdominal or epigastric pain. No nausea, vomiting, or hematemesis; No diarrhea or constipation. No melena or hematochezia.  GENITOURINARY: No dysuria, frequency or hematuria  NEUROLOGICAL: No numbness or weakness  SKIN: No itching, burning, rashes, or lesions   All other review of systems is negative unless indicated above.

## 2020-07-07 NOTE — H&P ADULT - NSICDXPASTMEDICALHX_GEN_ALL_CORE_FT
PAST MEDICAL HISTORY:  Carotid artery disease, unspecified laterality     Cerebrovascular accident (CVA), unspecified mechanism     Esophageal dysphagia     Hyperlipidemia, unspecified hyperlipidemia type     Hypertension

## 2020-07-07 NOTE — ED PROVIDER NOTE - OBJECTIVE STATEMENT
74 yo F hx of CAD, HLD, CVA, esophageal dysphagia secondary to CVA, HTN, carotid surgery, hip surgery was recently p/w sob. Patient was seen 2 days prior. CTA showed no PE but found to have b/l pneumonia. She was sent home on doxycycline and cefnidir. She report she still have sob. no fever.     cards: [employer] cards

## 2020-07-07 NOTE — ED PROVIDER NOTE - NS ED ROS FT
Review of Systems  •	CONSTITUTIONAL - no  fever, no diaphoresis, no weight change  •	SKIN - no rash  •	HEMATOLOGIC - no bleeding, no bruising  •	EYES - no eye pain, no blurred vision  •	ENT - no change in hearing, no pain  •	RESPIRATORY -  (+) shortness of breath, no cough  •	CARDIAC - no chest pain, no palpitations  •	GI - no abd pain, no nausea, no vomiting, no diarrhea, no constipation, no bleeding  •	GENITO-URINARY - no discharge, no dysuria; no hematuria,   •	ENDO - no polydipsia, no polyuria, no heat/no cold intolerance  •	MUSCULOSKELETAL - no joint pain, no swelling, no redness  •	NEUROLOGIC - no weakness, no headache, no anesthesia, no paresthesias  •	PSYCH - no anxiety, non suicidal, non homicidal, no hallucination, no depression

## 2020-07-07 NOTE — H&P ADULT - NSHPPHYSICALEXAM_GEN_ALL_CORE
Vital Signs Last 24 Hrs  T(C): 37.1 (07 Jul 2020 22:57), Max: 37.1 (07 Jul 2020 22:57)  T(F): 98.7 (07 Jul 2020 22:57), Max: 98.7 (07 Jul 2020 22:57)  HR: 96 (07 Jul 2020 22:57) (83 - 96)  BP: 154/71 (07 Jul 2020 22:57) (154/71 - 159/64)  BP(mean): --  RR: 17 (07 Jul 2020 22:57) (17 - 18)  SpO2: 97% (07 Jul 2020 22:57) (96% - 97%)    GENERAL: NAD, well-developed  HEENT:  Atraumatic, Normocephalic, MMM, no oropharyngeal lesions  EYES: EOMI, PERRLA, conjunctiva and sclera clear  NECK: Supple, No JVD, no throat tenderness.  CHEST/LUNG: rhonchi at RLL, otherwise no wheeze or rales  HEART: Regular rate and rhythm; No murmurs, rubs, or gallops  ABDOMEN: Soft, Nontender, Nondistended; Bowel sounds present  EXTREMITIES:  2+ Peripheral Pulses, No clubbing, cyanosis, or edema  PSYCH: AAOx3, normal affect  NEUROLOGY: moves all extremities spontaneously. no sensory deficits  SKIN: No rashes or lesions

## 2020-07-07 NOTE — H&P ADULT - NSICDXFAMILYHX_GEN_ALL_CORE_FT
FAMILY HISTORY:  Grandparent  Still living? Unknown  FH: heart disease, Age at diagnosis: Age Unknown  FH: HTN (hypertension), Age at diagnosis: Age Unknown

## 2020-07-07 NOTE — DISCHARGE NOTE ADULT - CASE MANAGER'S NAME
07/15/2020      Nita Church  [address]  [facility] [address]        Dear Nita:    Our records indicate that you are due for a colonoscopy.  Your physician has recommended that you schedule this important test.     Colon cancer is often treatable and curable if caught early.  [facility] is committed to screening our patients for diseases we can prevent or can treat when caught in the early stages.      Colonoscopy is the most complete test for detecting cancer and larger polyps but other options can be discussed with your physician.  Please remember, early detection can save your life.    In most cases, colorectal cancer screening should be performed for men and women 50 years of age or older, however there are some cases where the screening will need to be done sooner depending on family history, or a personal history of pre-cancerous polyps.    • For a person with one first-degree relative with colorectal cancer or advanced         adenoma diagnosed at age >=60 years, follow the same guidelines as        an average-risk  person (start screening at 50 years old).  · For a person with one first-degree relative with colorectal cancer or advanced adenoma diagnosed at age <60 years or two first-degree relatives with colorectal cancer or advanced adenomas, colonoscopy beginning at age 40 years or 10 years younger than age at diagnosis of the youngest affected relative.  · Screening should begin at age 45 in  Americans.     90% of colon cancer is detected after age 50    · Most patients do not experience any symptoms prior to detection  · Routine colonoscopies can decrease the risk of colon cancer up to 90%!     Please contact us to schedule your appointment at [phone] Monday through Friday 8:00 am to 4:30 pm.    If you already have a pending colonoscopy appointment, thank you, and you may disregard this letter.    Sincerely,    Tory Jha MD   The Gastroenterology Team  at  [facility]     Raymundo Brewer RN

## 2020-07-07 NOTE — ED STATDOCS - PROGRESS NOTE DETAILS
76 y/o F pt with recent hip replacement at end of June, c/o continued difficulty breathing and back pain. Patient was here 2 days ago and diagnosed with bilateral PNA, where she was discharged with oral antibiotics. Patient returned because her symptoms persisted, and she reports she just feels unwell. On exam: left basilar crackles, but breathing comfortably and satting normally. Initial orders placed. Will send to main for further workup.

## 2020-07-07 NOTE — ED ADULT NURSE NOTE - CHIEF COMPLAINT QUOTE
Pt recently treated at [facility] for double pneumonia, today c/o difficulty breathing and pain in back, denies fever/chills, still currently taking oral abx, able to speak in full sentences

## 2020-07-07 NOTE — H&P ADULT - HISTORY OF PRESENT ILLNESS
75F hx Carotid Endarectomy complicated by CVA, Esophageal dysphagia, HTN, HLD, GERD presents with worsening shortness of breath. Patient had left hip replacment one week ago. She was discharged home and participating in physical therapy. This 4 days ago she started feeling unwell and having difficulty breathing. Due to esophageal dismotility always has secretions, but since surgery has had increasing secretions and coughing. She eats pureed diet.  She came to ED 3 days ago where had CTA which was negative for PE, but concerning for pneumonia. She was discharged on Cefdinir and doxycycline. She says she initially felt better, but then today again suddenly felt shortness of breath. This time accompanied by chest pressure with radiation to the back. She denies any history of heart disease, copd or asthma. Denies sick contacts, fever, chills, n/v/d.     In ED, pt afebrile, p- 96, bp- 154/71, RR- 17 and spo2 97 on RA. CT concerning for persistent pna. Pt received vancomycin and zosyn. Vancomycin infusion stopped due to itching.

## 2020-07-08 DIAGNOSIS — I63.9 CEREBRAL INFARCTION, UNSPECIFIED: ICD-10-CM

## 2020-07-08 DIAGNOSIS — J18.9 PNEUMONIA, UNSPECIFIED ORGANISM: ICD-10-CM

## 2020-07-08 DIAGNOSIS — S22.089A UNSPECIFIED FRACTURE OF T11-T12 VERTEBRA, INITIAL ENCOUNTER FOR CLOSED FRACTURE: ICD-10-CM

## 2020-07-08 DIAGNOSIS — I10 ESSENTIAL (PRIMARY) HYPERTENSION: ICD-10-CM

## 2020-07-08 DIAGNOSIS — R13.10 DYSPHAGIA, UNSPECIFIED: ICD-10-CM

## 2020-07-08 LAB
-  AMIKACIN: SIGNIFICANT CHANGE UP
-  AMPICILLIN/SULBACTAM: SIGNIFICANT CHANGE UP
-  CEFEPIME: SIGNIFICANT CHANGE UP
-  CEFTAZIDIME: SIGNIFICANT CHANGE UP
-  CEFTRIAXONE: SIGNIFICANT CHANGE UP
-  CIPROFLOXACIN: SIGNIFICANT CHANGE UP
-  GENTAMICIN: SIGNIFICANT CHANGE UP
-  IMIPENEM: SIGNIFICANT CHANGE UP
-  IMIPENEM: SIGNIFICANT CHANGE UP
-  LEVOFLOXACIN: SIGNIFICANT CHANGE UP
-  MEROPENEM: SIGNIFICANT CHANGE UP
-  PIPERACILLIN/TAZOBACTAM: SIGNIFICANT CHANGE UP
-  PIPERACILLIN/TAZOBACTAM: SIGNIFICANT CHANGE UP
-  TOBRAMYCIN: SIGNIFICANT CHANGE UP
-  TRIMETHOPRIM/SULFAMETHOXAZOLE: SIGNIFICANT CHANGE UP
ANION GAP SERPL CALC-SCNC: 13 MMOL/L — SIGNIFICANT CHANGE UP (ref 5–17)
APPEARANCE UR: CLEAR — SIGNIFICANT CHANGE UP
BILIRUB UR-MCNC: NEGATIVE — SIGNIFICANT CHANGE UP
BUN SERPL-MCNC: 8 MG/DL — SIGNIFICANT CHANGE UP (ref 8–20)
CALCIUM SERPL-MCNC: 9.1 MG/DL — SIGNIFICANT CHANGE UP (ref 8.6–10.2)
CHLORIDE SERPL-SCNC: 99 MMOL/L — SIGNIFICANT CHANGE UP (ref 98–107)
CO2 SERPL-SCNC: 25 MMOL/L — SIGNIFICANT CHANGE UP (ref 22–29)
COLOR SPEC: YELLOW — SIGNIFICANT CHANGE UP
CREAT SERPL-MCNC: 0.7 MG/DL — SIGNIFICANT CHANGE UP (ref 0.5–1.3)
CULTURE RESULTS: SIGNIFICANT CHANGE UP
DIFF PNL FLD: NEGATIVE — SIGNIFICANT CHANGE UP
GLUCOSE SERPL-MCNC: 108 MG/DL — HIGH (ref 70–99)
GLUCOSE UR QL: NEGATIVE MG/DL — SIGNIFICANT CHANGE UP
HCT VFR BLD CALC: 32.8 % — LOW (ref 34.5–45)
HGB BLD-MCNC: 10.6 G/DL — LOW (ref 11.5–15.5)
KETONES UR-MCNC: NEGATIVE — SIGNIFICANT CHANGE UP
LEUKOCYTE ESTERASE UR-ACNC: NEGATIVE — SIGNIFICANT CHANGE UP
MAGNESIUM SERPL-MCNC: 2 MG/DL — SIGNIFICANT CHANGE UP (ref 1.6–2.6)
MCHC RBC-ENTMCNC: 28.9 PG — SIGNIFICANT CHANGE UP (ref 27–34)
MCHC RBC-ENTMCNC: 32.3 GM/DL — SIGNIFICANT CHANGE UP (ref 32–36)
MCV RBC AUTO: 89.4 FL — SIGNIFICANT CHANGE UP (ref 80–100)
METHOD TYPE: SIGNIFICANT CHANGE UP
METHOD TYPE: SIGNIFICANT CHANGE UP
NITRITE UR-MCNC: NEGATIVE — SIGNIFICANT CHANGE UP
ORGANISM # SPEC MICROSCOPIC CNT: SIGNIFICANT CHANGE UP
PH UR: 6.5 — SIGNIFICANT CHANGE UP (ref 5–8)
PHOSPHATE SERPL-MCNC: 3.7 MG/DL — SIGNIFICANT CHANGE UP (ref 2.4–4.7)
PLATELET # BLD AUTO: 513 K/UL — HIGH (ref 150–400)
POTASSIUM SERPL-MCNC: 4.1 MMOL/L — SIGNIFICANT CHANGE UP (ref 3.5–5.3)
POTASSIUM SERPL-SCNC: 4.1 MMOL/L — SIGNIFICANT CHANGE UP (ref 3.5–5.3)
PROT UR-MCNC: 15 MG/DL
RBC # BLD: 3.67 M/UL — LOW (ref 3.8–5.2)
RBC # FLD: 13.9 % — SIGNIFICANT CHANGE UP (ref 10.3–14.5)
SARS-COV-2 IGG SERPL QL IA: NEGATIVE — SIGNIFICANT CHANGE UP
SARS-COV-2 IGM SERPL IA-ACNC: <3.8 AU/ML — SIGNIFICANT CHANGE UP
SARS-COV-2 RNA SPEC QL NAA+PROBE: SIGNIFICANT CHANGE UP
SODIUM SERPL-SCNC: 137 MMOL/L — SIGNIFICANT CHANGE UP (ref 135–145)
SP GR SPEC: 1.01 — SIGNIFICANT CHANGE UP (ref 1.01–1.02)
SPECIMEN SOURCE: SIGNIFICANT CHANGE UP
UROBILINOGEN FLD QL: NEGATIVE MG/DL — SIGNIFICANT CHANGE UP
WBC # BLD: 14.01 K/UL — HIGH (ref 3.8–10.5)
WBC # FLD AUTO: 14.01 K/UL — HIGH (ref 3.8–10.5)

## 2020-07-08 PROCEDURE — 99233 SBSQ HOSP IP/OBS HIGH 50: CPT

## 2020-07-08 RX ORDER — HYDRALAZINE HCL 50 MG
75 TABLET ORAL THREE TIMES A DAY
Refills: 0 | Status: DISCONTINUED | OUTPATIENT
Start: 2020-07-08 | End: 2020-07-10

## 2020-07-08 RX ORDER — DIPHENHYDRAMINE HCL 50 MG
50 CAPSULE ORAL ONCE
Refills: 0 | Status: COMPLETED | OUTPATIENT
Start: 2020-07-08 | End: 2020-07-08

## 2020-07-08 RX ORDER — DIPHENHYDRAMINE HCL 50 MG
25 CAPSULE ORAL ONCE
Refills: 0 | Status: COMPLETED | OUTPATIENT
Start: 2020-07-08 | End: 2020-07-08

## 2020-07-08 RX ORDER — AMLODIPINE BESYLATE 2.5 MG/1
10 TABLET ORAL DAILY
Refills: 0 | Status: DISCONTINUED | OUTPATIENT
Start: 2020-07-08 | End: 2020-07-10

## 2020-07-08 RX ORDER — FAMOTIDINE 10 MG/ML
20 INJECTION INTRAVENOUS ONCE
Refills: 0 | Status: COMPLETED | OUTPATIENT
Start: 2020-07-08 | End: 2020-07-08

## 2020-07-08 RX ORDER — ACETAMINOPHEN 500 MG
650 TABLET ORAL EVERY 6 HOURS
Refills: 0 | Status: DISCONTINUED | OUTPATIENT
Start: 2020-07-08 | End: 2020-07-10

## 2020-07-08 RX ORDER — METOPROLOL TARTRATE 50 MG
100 TABLET ORAL
Refills: 0 | Status: DISCONTINUED | OUTPATIENT
Start: 2020-07-08 | End: 2020-07-10

## 2020-07-08 RX ORDER — HYDRALAZINE HCL 50 MG
50 TABLET ORAL
Qty: 0 | Refills: 0 | DISCHARGE

## 2020-07-08 RX ORDER — LORATADINE 10 MG/1
1 TABLET ORAL
Qty: 0 | Refills: 0 | DISCHARGE

## 2020-07-08 RX ORDER — PIPERACILLIN AND TAZOBACTAM 4; .5 G/20ML; G/20ML
3.38 INJECTION, POWDER, LYOPHILIZED, FOR SOLUTION INTRAVENOUS EVERY 8 HOURS
Refills: 0 | Status: DISCONTINUED | OUTPATIENT
Start: 2020-07-08 | End: 2020-07-09

## 2020-07-08 RX ORDER — PANTOPRAZOLE SODIUM 20 MG/1
40 TABLET, DELAYED RELEASE ORAL
Refills: 0 | Status: DISCONTINUED | OUTPATIENT
Start: 2020-07-08 | End: 2020-07-10

## 2020-07-08 RX ORDER — ASPIRIN/CALCIUM CARB/MAGNESIUM 324 MG
81 TABLET ORAL DAILY
Refills: 0 | Status: DISCONTINUED | OUTPATIENT
Start: 2020-07-08 | End: 2020-07-10

## 2020-07-08 RX ORDER — CIPROFLOXACIN LACTATE 400MG/40ML
500 VIAL (ML) INTRAVENOUS DAILY
Refills: 0 | Status: DISCONTINUED | OUTPATIENT
Start: 2020-07-08 | End: 2020-07-08

## 2020-07-08 RX ORDER — SIMVASTATIN 20 MG/1
20 TABLET, FILM COATED ORAL AT BEDTIME
Refills: 0 | Status: DISCONTINUED | OUTPATIENT
Start: 2020-07-08 | End: 2020-07-10

## 2020-07-08 RX ORDER — ENOXAPARIN SODIUM 100 MG/ML
40 INJECTION SUBCUTANEOUS DAILY
Refills: 0 | Status: DISCONTINUED | OUTPATIENT
Start: 2020-07-08 | End: 2020-07-10

## 2020-07-08 RX ADMIN — PIPERACILLIN AND TAZOBACTAM 25 GRAM(S): 4; .5 INJECTION, POWDER, LYOPHILIZED, FOR SOLUTION INTRAVENOUS at 05:17

## 2020-07-08 RX ADMIN — Medication 75 MILLIGRAM(S): at 05:16

## 2020-07-08 RX ADMIN — PIPERACILLIN AND TAZOBACTAM 25 GRAM(S): 4; .5 INJECTION, POWDER, LYOPHILIZED, FOR SOLUTION INTRAVENOUS at 11:26

## 2020-07-08 RX ADMIN — AMLODIPINE BESYLATE 10 MILLIGRAM(S): 2.5 TABLET ORAL at 05:16

## 2020-07-08 RX ADMIN — Medication 25 MILLIGRAM(S): at 20:56

## 2020-07-08 RX ADMIN — Medication 650 MILLIGRAM(S): at 10:31

## 2020-07-08 RX ADMIN — Medication 81 MILLIGRAM(S): at 11:25

## 2020-07-08 RX ADMIN — Medication 75 MILLIGRAM(S): at 21:12

## 2020-07-08 RX ADMIN — PANTOPRAZOLE SODIUM 40 MILLIGRAM(S): 20 TABLET, DELAYED RELEASE ORAL at 05:16

## 2020-07-08 RX ADMIN — ENOXAPARIN SODIUM 40 MILLIGRAM(S): 100 INJECTION SUBCUTANEOUS at 11:25

## 2020-07-08 RX ADMIN — Medication 100 MILLIGRAM(S): at 17:19

## 2020-07-08 RX ADMIN — PIPERACILLIN AND TAZOBACTAM 25 GRAM(S): 4; .5 INJECTION, POWDER, LYOPHILIZED, FOR SOLUTION INTRAVENOUS at 21:12

## 2020-07-08 RX ADMIN — Medication 50 MILLIGRAM(S): at 00:45

## 2020-07-08 RX ADMIN — SIMVASTATIN 20 MILLIGRAM(S): 20 TABLET, FILM COATED ORAL at 21:12

## 2020-07-08 RX ADMIN — FAMOTIDINE 20 MILLIGRAM(S): 10 INJECTION INTRAVENOUS at 01:47

## 2020-07-08 RX ADMIN — Medication 100 MILLIGRAM(S): at 05:16

## 2020-07-08 RX ADMIN — Medication 75 MILLIGRAM(S): at 11:25

## 2020-07-08 NOTE — PROGRESS NOTE ADULT - SUBJECTIVE AND OBJECTIVE BOX
Patient is a 75y old  Female who presents with a chief complaint of pneumonia (2020 16:17)      INTERVAL HPI/OVERNIGHT EVENTS:      MEDICATIONS  (STANDING):  amLODIPine   Tablet 10 milliGRAM(s) Oral daily  aspirin  chewable 81 milliGRAM(s) Oral daily  enoxaparin Injectable 40 milliGRAM(s) SubCutaneous daily  hydrALAZINE 75 milliGRAM(s) Oral three times a day  metoprolol tartrate 100 milliGRAM(s) Oral two times a day  pantoprazole    Tablet 40 milliGRAM(s) Oral before breakfast  piperacillin/tazobactam IVPB.. 3.375 Gram(s) IV Intermittent every 8 hours  simvastatin 20 milliGRAM(s) Oral at bedtime    MEDICATIONS  (PRN):  acetaminophen   Tablet .. 650 milliGRAM(s) Oral every 6 hours PRN Temp greater or equal to 38C (100.4F), Mild Pain (1 - 3), Moderate Pain (4 - 6)      Allergies    No Known Allergies    Intolerances    Biaxin (Other)      REVIEW OF SYSTEMS:  CONSTITUTIONAL: No fever, weight loss, or fatigue  EYES: No eye pain, visual disturbances, or discharge  ENMT:  No difficulty hearing, tinnitus, vertigo; No sinus or throat pain  NECK: No pain or stiffness  BREASTS: No pain, masses, or nipple discharge  RESPIRATORY: No cough, wheezing, chills or hemoptysis; No shortness of breath  CARDIOVASCULAR: No chest pain, palpitations, or lightheadedness  GASTROINTESTINAL: No abdominal or epigastric pain. No nausea, vomiting, or hematemesis; No diarrhea or constipation. No melena or hematochezia.  GENITOURINARY: No dysuria, frequency, hematuria, or incontinence  NEUROLOGICAL: No headaches, vertigo, memory loss, loss of strength, numbness, or tremors  SKIN: No itching, burning, rashes, or lesions   LYMPH NODES: No enlarged glands  ENDOCRINE: No heat or cold intolerance; No hair loss; No polydipsia or polyuria  MUSCULOSKELETAL: No back pain  PSYCHIATRIC: No depression, anxiety, or mood swings  HEME/LYMPH: No easy bruising, or bleeding gums  ALLERGY AND IMMUNOLOGIC: No hives or eczema    PHYSICAL EXAM:  GENERAL: NAD, well-groomed, well-developed  HEAD:  Atraumatic, Normocephalic  EYES: EOMI, PERRLA, conjunctiva and sclera clear  ENMT: Moist mucous membranes, Good dentition, No lesions; No tonsillar erythema, exudates, or enlargement   NECK: Supple, No JVD appreciated, Normal thyroid  NERVOUS SYSTEM:  Alert & Oriented X3, Good concentration; Bilateral LE mobile, sensation to light touch intact  CHEST/LUNG: Clear to auscultation bilaterally; No rales, rhonchi, wheezing, or rubs  HEART: Regular rate and rhythm; No murmurs, rubs, or gallops  ABDOMEN: Soft, Nontender, Nondistended; Bowel sounds present  EXTREMITIES:  2+ Peripheral Pulses, No clubbing or cyanosis  LYMPH: No lymphadenopathy noted  SKIN: No rashes or lesions  INCISION:  Dressing dry and intact    Vital Signs Last 24 Hrs  T(C): 37.4 (2020 23:39), Max: 37.4 (2020 23:39)  T(F): 99.3 (2020 23:39), Max: 99.3 (2020 23:39)  HR: 62 (2020 23:39) (62 - 101)  BP: 138/61 (2020 23:39) (133/60 - 163/63)  BP(mean): --  RR: 18 (2020 23:39) (18 - 20)  SpO2: 95% (2020 23:39) (93% - 95%)    LABS:                        10.6   14.01 )-----------( 513      ( 2020 08:08 )             32.8     2020 08:08    137    |  99     |  8.0    ----------------------------<  108    4.1     |  25.0   |  0.70     Ca    9.1        2020 08:08  Phos  3.7       2020 08:08  Mg     2.0       2020 08:08        Urinalysis Basic - ( 2020 21:39 )    Color: Yellow / Appearance: Clear / S.010 / pH: x  Gluc: x / Ketone: Negative  / Bili: Negative / Urobili: Negative mg/dL   Blood: x / Protein: 15 mg/dL / Nitrite: Negative   Leuk Esterase: Negative / RBC: x / WBC x   Sq Epi: x / Non Sq Epi: x / Bacteria: x      CAPILLARY BLOOD GLUCOSE               [x] Consultant(s) Notes Reviewed Patient is a 75y old  Female who presents with a chief complaint of pneumonia (2020 16:17)      INTERVAL HPI/OVERNIGHT EVENTS:  Patient seen awake in bed, she reports back pain, sharp pain across back and chest on movement of trunk. Denies anesthesia of lower limbs or incontinence except during episode of coughing.  Reports improved breathing, denies SOB, fever or chills. Reports history of dysphagia after CVA, reports previous choking on food and regurgitation, reports previous workup as outpatient by GI and ENT.       MEDICATIONS  (STANDING):  amLODIPine   Tablet 10 milliGRAM(s) Oral daily  aspirin  chewable 81 milliGRAM(s) Oral daily  enoxaparin Injectable 40 milliGRAM(s) SubCutaneous daily  hydrALAZINE 75 milliGRAM(s) Oral three times a day  metoprolol tartrate 100 milliGRAM(s) Oral two times a day  pantoprazole    Tablet 40 milliGRAM(s) Oral before breakfast  piperacillin/tazobactam IVPB.. 3.375 Gram(s) IV Intermittent every 8 hours  simvastatin 20 milliGRAM(s) Oral at bedtime    MEDICATIONS  (PRN):  acetaminophen   Tablet .. 650 milliGRAM(s) Oral every 6 hours PRN Temp greater or equal to 38C (100.4F), Mild Pain (1 - 3), Moderate Pain (4 - 6)      Allergies    No Known Allergies    Intolerances    Biaxin (Other)      REVIEW OF SYSTEMS:  CONSTITUTIONAL: No fever, weight loss, or fatigue  EYES: No eye pain, visual disturbances, or discharge  ENMT:  No difficulty hearing, tinnitus, vertigo; No sinus or throat pain  NECK: No pain or stiffness  BREASTS: No pain, masses, or nipple discharge  RESPIRATORY: No cough, wheezing, chills or hemoptysis; No shortness of breath  CARDIOVASCULAR: No chest pain, palpitations, or lightheadedness  GASTROINTESTINAL: No abdominal or epigastric pain. No nausea, vomiting, or hematemesis; No diarrhea or constipation. No melena or hematochezia.  GENITOURINARY: No dysuria, frequency, hematuria, or incontinence  NEUROLOGICAL: No headaches, vertigo, memory loss, loss of strength, numbness, or tremors  SKIN: No itching, burning, rashes, or lesions   LYMPH NODES: No enlarged glands  ENDOCRINE: No heat or cold intolerance; No hair loss; No polydipsia or polyuria  MUSCULOSKELETAL: back pain  PSYCHIATRIC: No depression, anxiety, or mood swings  HEME/LYMPH: No easy bruising, or bleeding gums  ALLERGY AND IMMUNOLOGIC: No hives or eczema    PHYSICAL EXAM:  GENERAL: NAD, well-groomed, well-developed  HEAD:  Atraumatic, Normocephalic  EYES: EOMI, PERRLA, conjunctiva and sclera clear  ENMT: Moist mucous membranes, Good dentition, No lesions; No tonsillar erythema, exudates, or enlargement   NECK: Supple, No JVD appreciated  NERVOUS SYSTEM:  Alert & Oriented X3, Good concentration; Bilateral LE mobile, sensation to light touch intact  CHEST/LUNG: Clear to auscultation bilaterally; No rales, rhonchi, wheezing, or rubs  HEART: Regular rate and rhythm; No murmurs, rubs, or gallops  ABDOMEN: Soft, Nontender, Nondistended; Bowel sounds present  EXTREMITIES:  2+ Peripheral Pulses, No clubbing or cyanosis  LYMPH: No lymphadenopathy noted  SKIN: No rashes or lesions      Vital Signs Last 24 Hrs  T(C): 37.4 (2020 23:39), Max: 37.4 (2020 23:39)  T(F): 99.3 (2020 23:39), Max: 99.3 (2020 23:39)  HR: 62 (2020 23:39) (62 - 101)  BP: 138/61 (2020 23:39) (133/60 - 163/63)  BP(mean): --  RR: 18 (2020 23:39) (18 - 20)  SpO2: 95% (2020 23:39) (93% - 95%)    LABS:                        10.6   14.01 )-----------( 513      ( 2020 08:08 )             32.8     2020 08:08    137    |  99     |  8.0    ----------------------------<  108    4.1     |  25.0   |  0.70     Ca    9.1        2020 08:08  Phos  3.7       2020 08:08  Mg     2.0       2020 08:08        Urinalysis Basic - ( 2020 21:39 )    Color: Yellow / Appearance: Clear / S.010 / pH: x  Gluc: x / Ketone: Negative  / Bili: Negative / Urobili: Negative mg/dL   Blood: x / Protein: 15 mg/dL / Nitrite: Negative   Leuk Esterase: Negative / RBC: x / WBC x   Sq Epi: x / Non Sq Epi: x / Bacteria: x      CAPILLARY BLOOD GLUCOSE               [x] Consultant(s) Notes Reviewed

## 2020-07-08 NOTE — CHART NOTE - NSCHARTNOTEFT_GEN_A_CORE
Called by RN for pt report of mild itching after receiving dose of Zosyn. Patient requesting benadryl prior to next dose.  Patient also had some itching with dose of vancomycin received in ED, which was discontinued.   Ordered Benadryl 25mg x1, and will monitor how patient does after next dose of Zosyn.  Notify PA of any changes in patient status. Called by RN for pt report of mild itching after receiving dose of Zosyn. Patient requesting benadryl prior to next dose.  Patient also had some itching with dose of vancomycin received in ED, which was discontinued.   Ordered Benadryl 25mg x1, and will monitor how patient does after next dose of Zosyn.  Chart reviewed, and patient had positive urine culture from ED visit on 7/5 which grew out acinetobacter baumanii with negative Urinalysis. Will repeat stat urinalysis since WBC has been trending up despite treatment for PNA.   Continue to monitor patient.  Notify PA of any changes in patient status.

## 2020-07-08 NOTE — PROGRESS NOTE ADULT - ASSESSMENT
Pt. requires TLSO with rigid anterior, posterior and lateral panels.  Device to limit ROM, support spine, support Fx, reduce discomfort, promote healing.

## 2020-07-08 NOTE — PROGRESS NOTE ADULT - ASSESSMENT
75F hx Carotid Endarectomy complicated by CVA, Esophageal dysphagia, HTN, HLD, GERD presents with worsening shortness of breath found to have persistent pna, failed outpatient treatment. Pt is a 76 yo F with Hx of Carotid Endarectomy complicated by CVA, Esophageal dysphagia, HTN, HLD, GERD presents with worsening shortness of breath found to have persistent PNA and lumbar fracture.    Pneumonia, concern for aspiration PNA  minimal opacity on chest ct, but worsening wbc and symptoms on abx  will treat with zosyn  history esophageal dysmotility will get swallow exam  pt had itching with vancomycin even though rate slowed, will hold off for now as unlikely mrsa given recent negative mrsa swab  Covid swab negative x2  continue to monitor pulse ox    Thoracic vertebrae fracture  ortho recs appreciated  tylenol prn pain, will consider increase to gabapentin or meloxicam  PT/OT    Esophageal dysphagia  concern for aspiration causing PNA  continue puree diet  swallow eval ordered  swallow exam  continue PPI    HTN  bp controlled  c/w hydralazine, amlodipine, metoprolol    Hx CVA  c/w statin, aspirin    DVT ppx: lovenox Pt is a 76 yo F with Hx of Carotid Endarectomy complicated by CVA, Esophageal dysphagia, HTN, HLD, GERD presents with worsening shortness of breath found to have persistent PNA and lumbar fracture.    Pneumonia, concern for aspiration PNA  minimal opacity on chest ct, but worsening wbc and symptoms on abx  will treat with zosyn  history esophageal dysmotility will get swallow exam  pt had itching with vancomycin even though rate slowed, will hold off for now as unlikely mrsa given recent negative mrsa swab  Covid swab negative x2  continue to monitor pulse ox    Compression fracture of Thoracic vertebrae   no alarm signs  ortho recs appreciated  tylenol prn pain, will consider increase to gabapentin or meloxicam  PT/OT    Esophageal dysphagia  concern for aspiration causing PNA  continue puree diet  swallow eval ordered  swallow exam  continue PPI    HTN  bp controlled  c/w hydralazine, amlodipine, metoprolol    Hx CVA  c/w statin, aspirin    DVT ppx: lovenox  GI ppx: pantoprazole

## 2020-07-08 NOTE — CHART NOTE - NSCHARTNOTESELECT_GEN_ALL_CORE
Event Note
Detail Level: Zone
Note Text (......Xxx Chief Complaint.): This diagnosis correlates with the
Other (Free Text): Insurance benefits will be check for cost to remove skin tags. Patient will be called with price and to schedule, if patient desires.

## 2020-07-09 DIAGNOSIS — R13.10 DYSPHAGIA, UNSPECIFIED: ICD-10-CM

## 2020-07-09 LAB
ANION GAP SERPL CALC-SCNC: 11 MMOL/L — SIGNIFICANT CHANGE UP (ref 5–17)
BUN SERPL-MCNC: 7 MG/DL — LOW (ref 8–20)
CALCIUM SERPL-MCNC: 8.7 MG/DL — SIGNIFICANT CHANGE UP (ref 8.6–10.2)
CHLORIDE SERPL-SCNC: 103 MMOL/L — SIGNIFICANT CHANGE UP (ref 98–107)
CO2 SERPL-SCNC: 23 MMOL/L — SIGNIFICANT CHANGE UP (ref 22–29)
CREAT SERPL-MCNC: 0.78 MG/DL — SIGNIFICANT CHANGE UP (ref 0.5–1.3)
GLUCOSE SERPL-MCNC: 105 MG/DL — HIGH (ref 70–99)
HCT VFR BLD CALC: 28.5 % — LOW (ref 34.5–45)
HGB BLD-MCNC: 9.4 G/DL — LOW (ref 11.5–15.5)
MAGNESIUM SERPL-MCNC: 2 MG/DL — SIGNIFICANT CHANGE UP (ref 1.8–2.6)
MCHC RBC-ENTMCNC: 29.5 PG — SIGNIFICANT CHANGE UP (ref 27–34)
MCHC RBC-ENTMCNC: 33 GM/DL — SIGNIFICANT CHANGE UP (ref 32–36)
MCV RBC AUTO: 89.3 FL — SIGNIFICANT CHANGE UP (ref 80–100)
PLATELET # BLD AUTO: 445 K/UL — HIGH (ref 150–400)
POTASSIUM SERPL-MCNC: 3.7 MMOL/L — SIGNIFICANT CHANGE UP (ref 3.5–5.3)
POTASSIUM SERPL-SCNC: 3.7 MMOL/L — SIGNIFICANT CHANGE UP (ref 3.5–5.3)
RBC # BLD: 3.19 M/UL — LOW (ref 3.8–5.2)
RBC # FLD: 13.7 % — SIGNIFICANT CHANGE UP (ref 10.3–14.5)
SODIUM SERPL-SCNC: 137 MMOL/L — SIGNIFICANT CHANGE UP (ref 135–145)
WBC # BLD: 8.99 K/UL — SIGNIFICANT CHANGE UP (ref 3.8–10.5)
WBC # FLD AUTO: 8.99 K/UL — SIGNIFICANT CHANGE UP (ref 3.8–10.5)

## 2020-07-09 PROCEDURE — 99223 1ST HOSP IP/OBS HIGH 75: CPT

## 2020-07-09 PROCEDURE — 99233 SBSQ HOSP IP/OBS HIGH 50: CPT

## 2020-07-09 RX ORDER — TRAMADOL HYDROCHLORIDE 50 MG/1
50 TABLET ORAL
Refills: 0 | Status: DISCONTINUED | OUTPATIENT
Start: 2020-07-09 | End: 2020-07-10

## 2020-07-09 RX ORDER — DIPHENHYDRAMINE HCL 50 MG
25 CAPSULE ORAL EVERY 6 HOURS
Refills: 0 | Status: DISCONTINUED | OUTPATIENT
Start: 2020-07-09 | End: 2020-07-10

## 2020-07-09 RX ORDER — DIPHENHYDRAMINE HCL 50 MG
25 CAPSULE ORAL ONCE
Refills: 0 | Status: COMPLETED | OUTPATIENT
Start: 2020-07-09 | End: 2020-07-09

## 2020-07-09 RX ORDER — LACTOBACILLUS ACIDOPHILUS 100MM CELL
1 CAPSULE ORAL DAILY
Refills: 0 | Status: DISCONTINUED | OUTPATIENT
Start: 2020-07-09 | End: 2020-07-10

## 2020-07-09 RX ORDER — CEFEPIME 1 G/1
2000 INJECTION, POWDER, FOR SOLUTION INTRAMUSCULAR; INTRAVENOUS EVERY 8 HOURS
Refills: 0 | Status: DISCONTINUED | OUTPATIENT
Start: 2020-07-09 | End: 2020-07-10

## 2020-07-09 RX ADMIN — Medication 650 MILLIGRAM(S): at 17:44

## 2020-07-09 RX ADMIN — SIMVASTATIN 20 MILLIGRAM(S): 20 TABLET, FILM COATED ORAL at 21:00

## 2020-07-09 RX ADMIN — AMLODIPINE BESYLATE 10 MILLIGRAM(S): 2.5 TABLET ORAL at 06:02

## 2020-07-09 RX ADMIN — Medication 1 TABLET(S): at 12:11

## 2020-07-09 RX ADMIN — CEFEPIME 100 MILLIGRAM(S): 1 INJECTION, POWDER, FOR SOLUTION INTRAMUSCULAR; INTRAVENOUS at 21:00

## 2020-07-09 RX ADMIN — Medication 81 MILLIGRAM(S): at 12:11

## 2020-07-09 RX ADMIN — Medication 75 MILLIGRAM(S): at 06:02

## 2020-07-09 RX ADMIN — Medication 25 MILLIGRAM(S): at 09:09

## 2020-07-09 RX ADMIN — PIPERACILLIN AND TAZOBACTAM 25 GRAM(S): 4; .5 INJECTION, POWDER, LYOPHILIZED, FOR SOLUTION INTRAVENOUS at 06:03

## 2020-07-09 RX ADMIN — Medication 75 MILLIGRAM(S): at 15:15

## 2020-07-09 RX ADMIN — PANTOPRAZOLE SODIUM 40 MILLIGRAM(S): 20 TABLET, DELAYED RELEASE ORAL at 06:02

## 2020-07-09 RX ADMIN — Medication 100 MILLIGRAM(S): at 06:02

## 2020-07-09 RX ADMIN — Medication 100 MILLIGRAM(S): at 17:44

## 2020-07-09 RX ADMIN — ENOXAPARIN SODIUM 40 MILLIGRAM(S): 100 INJECTION SUBCUTANEOUS at 12:11

## 2020-07-09 RX ADMIN — Medication 75 MILLIGRAM(S): at 21:00

## 2020-07-09 NOTE — PHYSICAL THERAPY INITIAL EVALUATION ADULT - MANUAL MUSCLE TESTING RESULTS, REHAB EVAL
UEs-see OT eval; right hip flex 4+/5, knee ext 4+/5, ankle df 4+/5; left hip flex 3-/5, knee ext 4-/5, ankle df 4+/5

## 2020-07-09 NOTE — SWALLOW BEDSIDE ASSESSMENT ADULT - COMMENTS
As per MD note: "Pt is a 76 yo F with Hx of Carotid Endarectomy complicated by CVA, Esophageal dysphagia, HTN, HLD, GERD presents with worsening shortness of breath found to have persistent PNA and lumbar fracture." Pt denied food sticking, however, reported "if I take more, it will stick, I know my limit"

## 2020-07-09 NOTE — OCCUPATIONAL THERAPY INITIAL EVALUATION ADULT - BATHING, PREVIOUS LEVEL OF FUNCTION, OT EVAL
needed assist/needs device/Pt states using shower chair &  assist secondary to recent hip sx needed assist/Pt states using shower chair &  assist secondary to recent hip surgery/needs device

## 2020-07-09 NOTE — PHYSICAL THERAPY INITIAL EVALUATION ADULT - ACTIVE RANGE OF MOTION EXAMINATION, REHAB EVAL
left hip flex limited to 90 degrees due to posterior THR precautions, knee/ankle WFL/Right LE Active ROM was WFL (within functional limits)

## 2020-07-09 NOTE — PHYSICAL THERAPY INITIAL EVALUATION ADULT - PERTINENT HX OF CURRENT PROBLEM, REHAB EVAL
75F hx Carotid Endarectomy complicated by CVA, Esophageal dysphagia, HTN, HLD, GERD presents with worsening shortness of breath found to have persistent pna, failed outpatient treatment. now with PNA and T11 fx(TLSO). pt with left posterior THR 6/29/20 PWB 50% left LE

## 2020-07-09 NOTE — OCCUPATIONAL THERAPY INITIAL EVALUATION ADULT - TRANSFER SAFETY CONCERNS NOTED: TOILET, REHAB EVAL
inability to maintain weight-bearing restrictions w/o assist/decreased weight-shifting ability/decreased sequencing ability

## 2020-07-09 NOTE — OCCUPATIONAL THERAPY INITIAL EVALUATION ADULT - DIAGNOSIS, OT EVAL
75y F admitted with bilateral PNA, T11 compression fx. Pt is s/p Left ANSON posterior approach (6/29/20). 75y F admitted with bilateral PNA and T11 compression fx. Pt is s/p Left ANSON posterior approach (6/29/20).

## 2020-07-09 NOTE — PROGRESS NOTE ADULT - SUBJECTIVE AND OBJECTIVE BOX
Pt Name: CHARANJIT GREENWOOD    MRN: [id number]    Patient is a being followed for T11 compression fracture. Pt is admitted for bilateral pneumonia, s/p left total hip arthroplasty DAA by Dr Cisneros 6/29/20. Pt states she has chronic back pain at baseline, not worsening. Pt has been progressing with physical therapy at home. Denies any back pain at this time. Pt informed that ortho spine reccomending MRI T/L spine due to compression fx findings. Pt does not think she will be able to tolerate MRI without sedation. Pt denies any motor sensory changes.     PAST MEDICAL & SURGICAL HISTORY:  PAST MEDICAL & SURGICAL HISTORY:  Carotid artery disease, unspecified laterality  Hyperlipidemia, unspecified hyperlipidemia type  Cerebrovascular accident (CVA), unspecified mechanism  Esophageal dysphagia  Hypertension  S/P tubal ligation  S/P cholecystectomy  S/P cataract surgery: both eyes  Carotid artery disease: s/p carotid bypass right side      Allergies: Biaxin (Other)  No Known Allergies      Medications: acetaminophen   Tablet .. 650 milliGRAM(s) Oral every 6 hours PRN  amLODIPine   Tablet 10 milliGRAM(s) Oral daily  aspirin  chewable 81 milliGRAM(s) Oral daily  enoxaparin Injectable 40 milliGRAM(s) SubCutaneous daily  hydrALAZINE 75 milliGRAM(s) Oral three times a day  metoprolol tartrate 100 milliGRAM(s) Oral two times a day  pantoprazole    Tablet 40 milliGRAM(s) Oral before breakfast  piperacillin/tazobactam IVPB.. 3.375 Gram(s) IV Intermittent every 8 hours  simvastatin 20 milliGRAM(s) Oral at bedtime  traMADol 50 milliGRAM(s) Oral two times a day PRN                          10.6   14.01 )-----------( 513      ( 08 Jul 2020 08:08 )             32.8     07-08    137  |  99  |  8.0  ----------------------------<  108<H>  4.1   |  25.0  |  0.70    Ca    9.1      08 Jul 2020 08:08  Phos  3.7     07-08  Mg     2.0     07-08    TPro  6.5<L>  /  Alb  3.6  /  TBili  0.4  /  DBili  x   /  AST  14  /  ALT  7   /  AlkPhos  81  07-07      PHYSICAL EXAM:    Vital Signs Last 24 Hrs  T(C): 37.4 (08 Jul 2020 23:39), Max: 37.4 (08 Jul 2020 23:39)  T(F): 99.3 (08 Jul 2020 23:39), Max: 99.3 (08 Jul 2020 23:39)  HR: 71 (09 Jul 2020 04:54) (62 - 78)  BP: 147/68 (09 Jul 2020 04:54) (133/60 - 163/63)  BP(mean): --  RR: 18 (08 Jul 2020 23:39) (18 - 20)  SpO2: 95% (08 Jul 2020 23:39) (93% - 95%)  Daily     Daily     Appearance: Alert, responsive, in no acute distress.  Skin: no rash on visible skin. Skin is clean, dry and intact. No bleeding. No abrasions. No ulcerations. Left hip incision C/D/I. +ecchymosis present  Vascular: 2+ distal DP/PT/radial pulses. Cap refill < 2 sec. No signs of venous insuffiency or stasis. No extremity ulcerations. No cyanosis.  Musculoskeletal:       Back: No spinal deformities or step offs, mild mid thoracic/lumbar midline TTP.     Left Upper Extremity: Atraumatic with normal alignment NROM. No crepitus. No bony tenderness.      Right Upper Extremity: Atraumatic with normal alignment NROM. No crepitus. No bony tenderness.      Left Lower Extremity: Atraumatic with normal alignment NROM. No crepitus. No bony tenderness.      Right Lower Extremity: Atraumatic with normal alignment NROM. No crepitus. No bony tenderness.     Neurological: Sensation is grossly intact to light touch. No focal deficits or weaknesses found.  Pathologic reflexes: negative babinski, negative clonus    Motor exam: [  ]          [ ] Upper extremity                    Bi(c5)  WE(c6)  EE(c7)   FF(c8)                                                R         5/5        5/5        5/5       5/5                                               L          5/5        5/5        5/5       5/5         [ ] Lower extremeity                  HF(l2)     KE(l3)    TA(l4)   EHL(l5)  GS(s1)                                                 R        5/5        5/5        5/5       5/5         5/5                                               L         5/5        5/5       5/5       5/5          5/5       A/P:  Pt is a  75y Female with T11 compression fx, s/p left total hip arthroplasty DAA 6/29/20    PLAN:   * consider sedation for MRI T/L spine  * Pain Control  * WBAT, continue anterior precautions  * TLSO at bedside  * cont. care per medical team Pt Name: CHARANJIT GREENWOOD    MRN: [id number]    Patient is a being followed for T11 compression fracture. Pt is admitted for bilateral pneumonia, s/p left total hip arthroplasty DAA by Dr Cisneros 6/29/20. Pt states she has chronic back pain at baseline, not worsening. Pt has been progressing with physical therapy at home. Denies any back pain at this time. Pt informed that ortho spine reccomending MRI T/L spine due to compression fx findings. Pt does not think she will be able to tolerate MRI without sedation. Pt denies any motor sensory changes.     PAST MEDICAL & SURGICAL HISTORY:  PAST MEDICAL & SURGICAL HISTORY:  Carotid artery disease, unspecified laterality  Hyperlipidemia, unspecified hyperlipidemia type  Cerebrovascular accident (CVA), unspecified mechanism  Esophageal dysphagia  Hypertension  S/P tubal ligation  S/P cholecystectomy  S/P cataract surgery: both eyes  Carotid artery disease: s/p carotid bypass right side      Allergies: Biaxin (Other)  No Known Allergies      Medications: acetaminophen   Tablet .. 650 milliGRAM(s) Oral every 6 hours PRN  amLODIPine   Tablet 10 milliGRAM(s) Oral daily  aspirin  chewable 81 milliGRAM(s) Oral daily  enoxaparin Injectable 40 milliGRAM(s) SubCutaneous daily  hydrALAZINE 75 milliGRAM(s) Oral three times a day  metoprolol tartrate 100 milliGRAM(s) Oral two times a day  pantoprazole    Tablet 40 milliGRAM(s) Oral before breakfast  piperacillin/tazobactam IVPB.. 3.375 Gram(s) IV Intermittent every 8 hours  simvastatin 20 milliGRAM(s) Oral at bedtime  traMADol 50 milliGRAM(s) Oral two times a day PRN                          10.6   14.01 )-----------( 513      ( 08 Jul 2020 08:08 )             32.8     07-08    137  |  99  |  8.0  ----------------------------<  108<H>  4.1   |  25.0  |  0.70    Ca    9.1      08 Jul 2020 08:08  Phos  3.7     07-08  Mg     2.0     07-08    TPro  6.5<L>  /  Alb  3.6  /  TBili  0.4  /  DBili  x   /  AST  14  /  ALT  7   /  AlkPhos  81  07-07      PHYSICAL EXAM:    Vital Signs Last 24 Hrs  T(C): 37.4 (08 Jul 2020 23:39), Max: 37.4 (08 Jul 2020 23:39)  T(F): 99.3 (08 Jul 2020 23:39), Max: 99.3 (08 Jul 2020 23:39)  HR: 71 (09 Jul 2020 04:54) (62 - 78)  BP: 147/68 (09 Jul 2020 04:54) (133/60 - 163/63)  BP(mean): --  RR: 18 (08 Jul 2020 23:39) (18 - 20)  SpO2: 95% (08 Jul 2020 23:39) (93% - 95%)  Daily     Daily     Appearance: Alert, responsive, in no acute distress.  Skin: no rash on visible skin. Skin is clean, dry and intact. No bleeding. No abrasions. No ulcerations. Left hip incision C/D/I. +ecchymosis present  Vascular: 2+ distal DP/PT/radial pulses. Cap refill < 2 sec. No signs of venous insuffiency or stasis. No extremity ulcerations. No cyanosis.  Musculoskeletal:       Back: No spinal deformities or step offs, mild mid thoracic/lumbar midline TTP.     Left Upper Extremity: Atraumatic with normal alignment NROM. No crepitus. No bony tenderness.      Right Upper Extremity: Atraumatic with normal alignment NROM. No crepitus. No bony tenderness.      Left Lower Extremity: Atraumatic with normal alignment NROM. No crepitus. No bony tenderness.      Right Lower Extremity: Atraumatic with normal alignment NROM. No crepitus. No bony tenderness.     Neurological: Sensation is grossly intact to light touch. No focal deficits or weaknesses found.  Pathologic reflexes: negative babinski, negative clonus    Motor exam: [  ]          [ ] Upper extremity                    Bi(c5)  WE(c6)  EE(c7)   FF(c8)                                                R         5/5        5/5        5/5       5/5                                               L          5/5        5/5        5/5       5/5         [ ] Lower extremeity                  HF(l2)     KE(l3)    TA(l4)   EHL(l5)  GS(s1)                                                 R        5/5        5/5        5/5       5/5         5/5                                               L         5/5        5/5       5/5       5/5          5/5       A/P:  Pt is a  75y Female with T11 compression fx, s/p left total hip arthroplasty DAA 6/29/20    PLAN:   * consider sedation for MRI T/L spine  * Pain Control  * PWB, continue posterior hip precautions  * TLSO at bedside  * cont. care per medical team Pt Name: CHARANJIT GREENWOOD    MRN: [id number]    Patient is a being followed for T11 compression fracture. Pt is admitted for bilateral pneumonia, s/p left total hip arthroplasty by Dr Cisneros 6/29/20. Pt states she has chronic back pain at baseline, not worsening. Pt has been progressing with physical therapy at home. Denies any back pain at this time. Pt informed that ortho spine reccomending MRI T/L spine due to compression fx findings. Pt does not think she will be able to tolerate MRI without sedation. Pt denies any motor sensory changes.     PAST MEDICAL & SURGICAL HISTORY:  PAST MEDICAL & SURGICAL HISTORY:  Carotid artery disease, unspecified laterality  Hyperlipidemia, unspecified hyperlipidemia type  Cerebrovascular accident (CVA), unspecified mechanism  Esophageal dysphagia  Hypertension  S/P tubal ligation  S/P cholecystectomy  S/P cataract surgery: both eyes  Carotid artery disease: s/p carotid bypass right side      Allergies: Biaxin (Other)  No Known Allergies      Medications: acetaminophen   Tablet .. 650 milliGRAM(s) Oral every 6 hours PRN  amLODIPine   Tablet 10 milliGRAM(s) Oral daily  aspirin  chewable 81 milliGRAM(s) Oral daily  enoxaparin Injectable 40 milliGRAM(s) SubCutaneous daily  hydrALAZINE 75 milliGRAM(s) Oral three times a day  metoprolol tartrate 100 milliGRAM(s) Oral two times a day  pantoprazole    Tablet 40 milliGRAM(s) Oral before breakfast  piperacillin/tazobactam IVPB.. 3.375 Gram(s) IV Intermittent every 8 hours  simvastatin 20 milliGRAM(s) Oral at bedtime  traMADol 50 milliGRAM(s) Oral two times a day PRN                          10.6   14.01 )-----------( 513      ( 08 Jul 2020 08:08 )             32.8     07-08    137  |  99  |  8.0  ----------------------------<  108<H>  4.1   |  25.0  |  0.70    Ca    9.1      08 Jul 2020 08:08  Phos  3.7     07-08  Mg     2.0     07-08    TPro  6.5<L>  /  Alb  3.6  /  TBili  0.4  /  DBili  x   /  AST  14  /  ALT  7   /  AlkPhos  81  07-07      PHYSICAL EXAM:    Vital Signs Last 24 Hrs  T(C): 37.4 (08 Jul 2020 23:39), Max: 37.4 (08 Jul 2020 23:39)  T(F): 99.3 (08 Jul 2020 23:39), Max: 99.3 (08 Jul 2020 23:39)  HR: 71 (09 Jul 2020 04:54) (62 - 78)  BP: 147/68 (09 Jul 2020 04:54) (133/60 - 163/63)  BP(mean): --  RR: 18 (08 Jul 2020 23:39) (18 - 20)  SpO2: 95% (08 Jul 2020 23:39) (93% - 95%)  Daily     Daily     Appearance: Alert, responsive, in no acute distress.  Skin: no rash on visible skin. Skin is clean, dry and intact. No bleeding. No abrasions. No ulcerations. Left hip incision C/D/I. +ecchymosis present  Vascular: 2+ distal DP/PT/radial pulses. Cap refill < 2 sec. No signs of venous insuffiency or stasis. No extremity ulcerations. No cyanosis.  Musculoskeletal:       Back: No spinal deformities or step offs, mild mid thoracic/lumbar midline TTP.     Left Upper Extremity: Atraumatic with normal alignment NROM. No crepitus. No bony tenderness.      Right Upper Extremity: Atraumatic with normal alignment NROM. No crepitus. No bony tenderness.      Left Lower Extremity: Atraumatic with normal alignment NROM. No crepitus. No bony tenderness.      Right Lower Extremity: Atraumatic with normal alignment NROM. No crepitus. No bony tenderness.     Neurological: Sensation is grossly intact to light touch. No focal deficits or weaknesses found.  Pathologic reflexes: negative babinski, negative clonus    Motor exam: [  ]          [ ] Upper extremity                    Bi(c5)  WE(c6)  EE(c7)   FF(c8)                                                R         5/5        5/5        5/5       5/5                                               L          5/5        5/5        5/5       5/5         [ ] Lower extremeity                  HF(l2)     KE(l3)    TA(l4)   EHL(l5)  GS(s1)                                                 R        5/5        5/5        5/5       5/5         5/5                                               L         5/5        5/5       5/5       5/5          5/5       A/P:  Pt is a  75y Female with T11 compression fx, s/p left total hip arthroplasty DAA 6/29/20    PLAN:   * consider sedation for MRI T/L spine  * Pain Control  * PWB, continue posterior hip precautions  * TLSO at bedside  * cont. care per medical team Pt Name: CHARANJIT GREENWOOD    MRN: [id number]    Patient is a being followed for T11 compression fracture. Pt is admitted for bilateral pneumonia, s/p left total hip arthroplasty by Dr Cisneros 6/29/20. Pt states she has chronic back pain at baseline, not worsening. Pt has been progressing with physical therapy at home. Denies any back pain at this time. Pt informed that ortho spine reccomending MRI T/L spine due to compression fx findings. Pt does not think she will be able to tolerate MRI without sedation. Pt denies any motor sensory changes.     PAST MEDICAL & SURGICAL HISTORY:  PAST MEDICAL & SURGICAL HISTORY:  Carotid artery disease, unspecified laterality  Hyperlipidemia, unspecified hyperlipidemia type  Cerebrovascular accident (CVA), unspecified mechanism  Esophageal dysphagia  Hypertension  S/P tubal ligation  S/P cholecystectomy  S/P cataract surgery: both eyes  Carotid artery disease: s/p carotid bypass right side      Allergies: Biaxin (Other)  No Known Allergies      Medications: acetaminophen   Tablet .. 650 milliGRAM(s) Oral every 6 hours PRN  amLODIPine   Tablet 10 milliGRAM(s) Oral daily  aspirin  chewable 81 milliGRAM(s) Oral daily  enoxaparin Injectable 40 milliGRAM(s) SubCutaneous daily  hydrALAZINE 75 milliGRAM(s) Oral three times a day  metoprolol tartrate 100 milliGRAM(s) Oral two times a day  pantoprazole    Tablet 40 milliGRAM(s) Oral before breakfast  piperacillin/tazobactam IVPB.. 3.375 Gram(s) IV Intermittent every 8 hours  simvastatin 20 milliGRAM(s) Oral at bedtime  traMADol 50 milliGRAM(s) Oral two times a day PRN                          10.6   14.01 )-----------( 513      ( 08 Jul 2020 08:08 )             32.8     07-08    137  |  99  |  8.0  ----------------------------<  108<H>  4.1   |  25.0  |  0.70    Ca    9.1      08 Jul 2020 08:08  Phos  3.7     07-08  Mg     2.0     07-08    TPro  6.5<L>  /  Alb  3.6  /  TBili  0.4  /  DBili  x   /  AST  14  /  ALT  7   /  AlkPhos  81  07-07      PHYSICAL EXAM:    Vital Signs Last 24 Hrs  T(C): 37.4 (08 Jul 2020 23:39), Max: 37.4 (08 Jul 2020 23:39)  T(F): 99.3 (08 Jul 2020 23:39), Max: 99.3 (08 Jul 2020 23:39)  HR: 71 (09 Jul 2020 04:54) (62 - 78)  BP: 147/68 (09 Jul 2020 04:54) (133/60 - 163/63)  BP(mean): --  RR: 18 (08 Jul 2020 23:39) (18 - 20)  SpO2: 95% (08 Jul 2020 23:39) (93% - 95%)  Daily     Daily     Appearance: Alert, responsive, in no acute distress.  Skin: no rash on visible skin. Skin is clean, dry and intact. No bleeding. No abrasions. No ulcerations. Left hip incision C/D/I. +ecchymosis present  Vascular: 2+ distal DP/PT/radial pulses. Cap refill < 2 sec. No signs of venous insuffiency or stasis. No extremity ulcerations. No cyanosis.  Musculoskeletal:       Back: No spinal deformities or step offs, mild mid thoracic/lumbar midline TTP.     Left Upper Extremity: Atraumatic with normal alignment NROM. No crepitus. No bony tenderness.      Right Upper Extremity: Atraumatic with normal alignment NROM. No crepitus. No bony tenderness.      Left Lower Extremity: Atraumatic with normal alignment NROM. No crepitus. No bony tenderness.      Right Lower Extremity: Atraumatic with normal alignment NROM. No crepitus. No bony tenderness.     Neurological: Sensation is grossly intact to light touch. No focal deficits or weaknesses found.  Pathologic reflexes: negative babinski, negative clonus    Motor exam: [  ]          [ ] Upper extremity                    Bi(c5)  WE(c6)  EE(c7)   FF(c8)                                                R         5/5        5/5        5/5       5/5                                               L          5/5        5/5        5/5       5/5         [ ] Lower extremeity                  HF(l2)     KE(l3)    TA(l4)   EHL(l5)  GS(s1)                                                 R        5/5        5/5        5/5       5/5         5/5                                               L         5/5        5/5       5/5       5/5          5/5       A/P:  Pt is a  75y Female with T11 compression fx, s/p left total hip arthroplasty 6/29/20    PLAN:   * consider sedation for MRI T/L spine  * Pain Control  * PWB, continue posterior hip precautions  * TLSO at bedside  * cont. care per medical team

## 2020-07-09 NOTE — PHYSICAL THERAPY INITIAL EVALUATION ADULT - CRITERIA FOR SKILLED THERAPEUTIC INTERVENTIONS
rehab potential/impairments found/functional limitations in following categories/risk reduction/prevention/anticipated discharge recommendation/therapy frequency

## 2020-07-09 NOTE — OCCUPATIONAL THERAPY INITIAL EVALUATION ADULT - TRANSFER SAFETY CONCERNS NOTED: SIT/STAND, REHAB EVAL
inability to maintain weight-bearing restrictions w/o assist/decreased sequencing ability/decreased weight-shifting ability

## 2020-07-09 NOTE — PROGRESS NOTE ADULT - SUBJECTIVE AND OBJECTIVE BOX
CC: f/u aspiration pna, T11 compression fx  INTERVAL HPI/OVERNIGHT EVENTS: Pt seen and examined at bedside this AM by Hospitalist and PA. This AM had complained of B/L leg itching behind her knees and butt this AM. Pt states that she had similar symptoms after she received vancomycin in ER and later this morning with Zosyn. Has not used antibiotics in the past so she cannot know if she has any allergies to medications except Biaxin. Believes pruritis is from eczema, itching resolved after PO Benadryl. Pt states that "ENT said I had a problem with the sphincter" pointing to her throat and with possible esophageal dilation in the past but cannot recall name of her GI Dr. TLSO brace in place. No back pain or new numbness or tingling. Denies fever, chills, headaches, dizziness, chest pain/pressure, palpitations, shortness of breath, difficulty breathing, abdominal pain, n/v/d or any other complaints. No throat closure or rash.     REVIEW OF SYSTEMS:  CONSTITUTIONAL: No fever, weight loss, or fatigue  EYES: No eye pain, visual disturbances, or discharge  ENT:  No difficulty hearing, tinnitus, vertigo; No sinus or throat pain  NECK: No pain or stiffness  RESPIRATORY: No cough, wheezing, chills or hemoptysis; No shortness of breath  CARDIOVASCULAR: No chest pain, palpitations, dizziness, or leg swelling  GASTROINTESTINAL: No abdominal or epigastric pain. No nausea, vomiting or hematemesis; No diarrhea or constipation  GENITOURINARY: No dysuria, frequency, hematuria, or incontinence  NEUROLOGICAL: No headaches, memory loss, loss of strength, numbness, or tremors  SKIN: No itching, burning, rashes, or lesions   MUSCULOSKELETAL: see HPI    Allergies  No Known Allergies    Intolerances  Biaxin (Other)    HEALTH ISSUES - PROBLEM Dx:  Cerebrovascular accident (CVA), unspecified mechanism: Cerebrovascular accident (CVA), unspecified mechanism  Hypertension: Hypertension  Esophageal dysphagia: Esophageal dysphagia  Fracture of T11 vertebra: Fracture of T11 vertebra  Pneumonia: Pneumonia    PAST MEDICAL & SURGICAL HISTORY:  Carotid artery disease, unspecified laterality  Hyperlipidemia, unspecified hyperlipidemia type  Cerebrovascular accident (CVA), unspecified mechanism  Esophageal dysphagia  Hypertension  S/P tubal ligation  S/P cholecystectomy  S/P cataract surgery: both eyes  Carotid artery disease: s/p carotid bypass right side    VITAL SIGNS:  T(C): 36.8 (20 @ 08:41), Max: 37.4 (20 @ 23:39)  HR: 66 (20 @ 08:41) (62 - 78)  BP: 131/53 (20 @ 08:41) (107/61 - 163/63)  RR: 18 (20 @ 08:41) (18 - 20)  SpO2: 95% (20 @ 08:41) (93% - 95%)  Wt(kg): --Vital Signs Last 24 Hrs  T(C): 36.8 (2020 08:41), Max: 37.4 (2020 23:39)  T(F): 98.2 (2020 08:41), Max: 99.3 (2020 23:39)  HR: 66 (2020 08:41) (62 - 78)  BP: 131/53 (2020 08:41) (107/61 - 163/63)  BP(mean): --  RR: 18 (2020 08:41) (18 - 20)  SpO2: 95% (2020 08:41) (93% - 95%)    PHYSICAL EXAM:  GENERAL: Pt lying in bed comfortably in NAD  HEAD:  Atraumatic  EYES: EOMI, PERRL, conjunctiva and sclera clear  ENT: Moist mucous membranes  NECK: Supple, No JVD  CHEST/LUNG: Clear to auscultation bilaterally; No rales, rhonchi, wheezing, or rubs. Unlabored respirations  HEART: Regular rate and rhythm; No murmurs, rubs, or gallops  ABDOMEN: Bowel sounds present; Soft, Nontender, Nondistended. No guarding or rigidity   EXTREMITIES:  2+ Peripheral Pulses, brisk capillary refill. No clubbing, cyanosis, or edema  NERVOUS SYSTEM:  Alert & Oriented X3, speech clear, FROM x 4 extremities. No deficits   SKIN: Left hip incision c/d/i, old ecchymosis seen. No hematoma or swelling. No rashes or lesions. No erythema or petechia.     LABS:                     9.4    8.99  )-----------( 445      ( 2020 07:31 )             28.5     07    137  |  103  |  7.0<L>  ----------------------------<  105<H>  3.7   |  23.0  |  0.78    Ca    8.7      2020 07:31  Phos  3.7     07-08  Mg     2.0     07-09    TPro  6.5<L>  /  Alb  3.6  /  TBili  0.4  /  DBili  x   /  AST  14  /  ALT  7   /  AlkPhos  81  07-07      CAPILLARY BLOOD GLUCOSE          Urinalysis Basic - ( 2020 21:39 )    Color: Yellow / Appearance: Clear / S.010 / pH: x  Gluc: x / Ketone: Negative  / Bili: Negative / Urobili: Negative mg/dL   Blood: x / Protein: 15 mg/dL / Nitrite: Negative   Leuk Esterase: Negative / RBC: x / WBC x   Sq Epi: x / Non Sq Epi: x / Bacteria: x

## 2020-07-09 NOTE — OCCUPATIONAL THERAPY INITIAL EVALUATION ADULT - GENERAL OBSERVATIONS, REHAB EVAL
NAD, +IV (RN d/c at time of eval), +Telemetry, +, +Primafit, +TLSO (bedside; donned for sesssion), +breathing RA. Patient agreeable to OT evaluation

## 2020-07-09 NOTE — OCCUPATIONAL THERAPY INITIAL EVALUATION ADULT - RANGE OF MOTION EXAMINATION
Bilateral UE AROM all joints WFL Bilateral UE AROM all joints WFL; bilateral shoulder flexion assessed only to 90degrees

## 2020-07-09 NOTE — OCCUPATIONAL THERAPY INITIAL EVALUATION ADULT - MANUAL MUSCLE TESTING RESULTS, REHAB EVAL
Bilateral shoulders grossly assessed with AROM against gravity 3/5, bilateral elbows grossly assessed with AROM against gravity 3/5, bilateral gross grasp 4/5.

## 2020-07-09 NOTE — OCCUPATIONAL THERAPY INITIAL EVALUATION ADULT - ADDITIONAL COMMENTS
Patient was independent prior to hip replacement surgery, since surgery requires assist and devices for ADLs/functional transfers. Patient states has a tub with curtains and grab bars. Patient owns cane, RW and grab bars, commode, shower chair and hip kit. Patient is right handed and drives. Patient states  primarily responsible for IADLs (cleaning, food shopping and laundry, pt states would order out a lot instead of cook). Patient was independent prior to hip replacement surgery, since surgery requires assist and devices for ADLs/functional transfers. Patient states has a tub with curtains and grab bars. Patient owns cane, RW, grab bars, commode, shower chair and hip kit. Patient is right handed and drives. Patient states  primarily responsible for IADLs (cleaning, food shopping and laundry, pt states would order out a lot instead of cook).

## 2020-07-09 NOTE — PROGRESS NOTE ADULT - ASSESSMENT
Pt is a 74 yo F with Hx of Carotid Endarectomy complicated by CVA, Esophageal dysphagia, HTN, HLD, GERD presents with worsening shortness of breath found to have persistent PNA and lumbar fracture.    Pneumonia, concern for aspiration PNA  minimal opacity on chest CT        vanco and zosyn dc due to pruritis but no throat closure or rash seen  Covid swab negative x2  alarms reviewed, NSR on tele, d/c tele monitor  continue to monitor pulse ox  Due to allergies, may try Levaquin per MD and monitor closely for allergic reactions    Compression fracture of Thoracic vertebrae   no alarm signs  ortho recs appreciated, c/w TLSO brace  MRI ordered and Pt will need premedication due to anxiety   tylenol prn pain, will consider increase to gabapentin or meloxicam  PT recommends home with assist    Esophageal dysphagia  concern for aspiration causing PNA  swallow recs appreciated, soft diet  GI consulted   continue PPI    HTN  bp controlled  c/w hydralazine, amlodipine, metoprolol    Hx CVA  c/w statin, aspirin    DVT ppx: lovenox    Dispo: home with assist pending MRI Pt is a 76 yo F with Hx of Carotid Endarectomy complicated by CVA, Esophageal dysphagia, HTN, HLD, GERD presents with worsening shortness of breath found to have persistent PNA and lumbar fracture.    Pneumonia, concern for aspiration PNA  minimal right sided opacity on chest CT        vanco and zosyn dc due to pruritis but no throat closure or rash seen  Covid swab negative x2  alarms reviewed, NSR on tele, d/c tele monitor  continue to monitor pulse ox  Due to allergies, may try Levaquin per MD and monitor closely for allergic reactions    Compression fracture of Thoracic vertebrae   no alarm signs  ortho recs appreciated, c/w TLSO brace  MRI ordered and Pt will need premedication due to anxiety   tylenol prn pain, will consider increase to gabapentin or meloxicam  PT recommends home with assist    Esophageal dysphagia  concern for aspiration causing PNA  swallow recs appreciated, soft diet  GI consulted   continue PPI    Leukocytosis  improved, continue to trend  Previous ucx grew acinetobacter baumannii but with negative Urinalysis this admission  IV abx switched for allergies, ID consulted   f/u Bcx from 7/7    HTN  bp controlled  c/w hydralazine, amlodipine, metoprolol    Hx CVA  c/w statin, aspirin    DVT ppx: lovenox    Dispo: home with assist pending MRI Pt is a 76 yo F with Hx of Carotid Endarectomy complicated by CVA, Esophageal dysphagia, HTN, HLD, GERD presents with worsening shortness of breath found to have persistent PNA and lumbar fracture.    Pneumonia, concern for aspiration PNA  minimal right sided opacity on chest CT        vanco and zosyn dc due to pruritis but no throat closure or rash seen  Covid swab negative x2  alarms reviewed, NSR on tele, d/c tele monitor  continue to monitor pulse ox  Due to allergies, may try Levaquin per MD and monitor closely for allergic reactions    Compression fracture of Thoracic vertebrae   no alarm signs  ortho recs appreciated, c/w TLSO brace  MRI ordered and Pt will need premedication due to anxiety   tylenol prn pain, will consider increase to gabapentin or meloxicam  PT recommends home with assist    Esophageal dysphagia  concern for aspiration causing PNA  swallow recs appreciated, switched to mechanically soft diet  GI consulted   continue PPI    Leukocytosis  improved, continue to trend  Previous ucx grew acinetobacter baumannii but with negative Urinalysis this admission  IV abx switched for allergies, ID consulted   f/u Bcx from 7/7    HTN  bp controlled  c/w hydralazine, amlodipine, metoprolol    Hx CVA  c/w statin, aspirin    DVT ppx: lovenox    Dispo: home with assist pending MRI

## 2020-07-09 NOTE — OCCUPATIONAL THERAPY INITIAL EVALUATION ADULT - PHYSICAL ASSIST/NONPHYSICAL ASSIST: SIT/STAND, REHAB EVAL
Cues for sequencing of movement and for safety for proper hand placement prior to/during transfer/verbal cues

## 2020-07-09 NOTE — OCCUPATIONAL THERAPY INITIAL EVALUATION ADULT - LEVEL OF INDEPENDENCE: SIT/SUPINE, REHAB EVAL
Left patient sitting in bedside chair in care of PT Left patient sitting in bedside chair in care of PT Kristin

## 2020-07-09 NOTE — SWALLOW BEDSIDE ASSESSMENT ADULT - ASR SWALLOW ASPIRATION MONITOR
throat clearing/upper respiratory infection/gurgly voice/position upright (90Y)/cough/fever/pneumonia/change of breathing pattern/oral hygiene

## 2020-07-09 NOTE — OCCUPATIONAL THERAPY INITIAL EVALUATION ADULT - PHYSICAL ASSIST/NONPHYSICAL ASSIST:DRESS LOWER BODY, OT EVAL
Patient requires max A x1 to perform ADL secondary to Posterior THP; educated/instructed previously in LB dressing devices/hip kit; pt is min A with devices and increased time to perform ADL task./1 person assist/verbal cues no

## 2020-07-09 NOTE — SWALLOW BEDSIDE ASSESSMENT ADULT - SLP PERTINENT HISTORY OF CURRENT PROBLEM
Upon ?, pt reported prior MBS in ~ 2789-3395, which identified stasis with solids. Pt subsequently went to an ENT in which the "sphincter was stretched" & everything was fine. Pt with no follow-up since procedure. Pt endorses recent onset ~3-4 months ago of foods & saliva just getting "stuck" at bottom of throat area, with +"expectoration of white, foamy saliva at times, even when I am just in the middle of talking"

## 2020-07-09 NOTE — PHYSICAL THERAPY INITIAL EVALUATION ADULT - PASSIVE RANGE OF MOTION EXAMINATION, REHAB EVAL
left hip flex limited to 90 degrees due to posterior THR precautions, knee/ankle WFL/Right LE Passive ROM was WFL (within functional limits)

## 2020-07-09 NOTE — OCCUPATIONAL THERAPY INITIAL EVALUATION ADULT - SPECIAL TRAINING, OT EVAL
Patient educated in spinal precautions, posterior THP and PWB status (50%) of Left LE. Patient ambulated with RW and close supervision, +external cues short distances around bed area and to/from the bathroom. Cues/reminders to maintain PWB status of Left LE. Patient educated in energy conservation techniques including proper breathing and activity pacing. Patient demonstrated good safety awareness and obstacle negotiation.

## 2020-07-09 NOTE — SWALLOW BEDSIDE ASSESSMENT ADULT - ADDITIONAL RECOMMENDATIONS
If pt unable to tolerate soft solids, please modify to puree  Consider smaller, more frequent meals t/o day    Please re-consult PRN

## 2020-07-09 NOTE — PROGRESS NOTE ADULT - SUBJECTIVE AND OBJECTIVE BOX
Patient is currently PWB of the left lower extremity. Posterior hip precautions to remain in place at this time.

## 2020-07-09 NOTE — SWALLOW BEDSIDE ASSESSMENT ADULT - DIET PRIOR TO ADMI
As per pt: solid foods, with exception of "doughy breads": pt reportedly eats pretzels, peanuts, cake, & spare ribs

## 2020-07-09 NOTE — SWALLOW BEDSIDE ASSESSMENT ADULT - SWALLOW EVAL: DIAGNOSIS
Oral & pharyngeal stage of swallow clinically unremarkable for administered PO, with no overt s/s aspirtaion.

## 2020-07-09 NOTE — PHYSICAL THERAPY INITIAL EVALUATION ADULT - ADL SKILLS, REHAB EVAL
Patient was instructed to take the following medications on the day of surgery: Synthroid.            needs device and assist

## 2020-07-09 NOTE — CONSULT NOTE ADULT - SUBJECTIVE AND OBJECTIVE BOX
[facility]  INFECTIOUS DISEASES AND INTERNAL MEDICINE at [place]  =======================================================  Jaquan Kate MD  Diplomates American Board of Internal Medicine and Infectious Diseases  Tel: [phone]      Fax: [phone]  =======================================================      [id number]  CHARANJIT GREENWOOD is a 75y  Female     CC: Patient is a 75y old  Female who presents with a chief complaint of pneumonia (09 Jul 2020 14:23)    HPI:  75F hx Carotid Endarectomy complicated by CVA, Esophageal dysphagia, HTN, HLD, GERD presents with worsening shortness of breath. Patient had left hip replacment one week ago. She was discharged home and participating in physical therapy. This 4 days ago she started feeling unwell and having difficulty breathing. Due to esophageal dismotility always has secretions, but since surgery has had increasing secretions and coughing. She eats pureed diet.  She came to ED 3 days ago where had CTA which was negative for PE, but concerning for pneumonia. She was discharged on Cefdinir and doxycycline. She says she initially felt better, but then today again suddenly felt shortness of breath. This time accompanied by chest pressure with radiation to the back. She denies any history of heart disease, copd or asthma. Denies sick contacts, fever, chills, n/v/d.     In ED, pt afebrile, p- 96, bp- 154/71, RR- 17 and spo2 97 on RA. CT concerning for persistent pna. Pt received vancomycin and zosyn. Vancomycin infusion stopped due to itching. (07 Jul 2020 23:15)      PAST MEDICAL & SURGICAL HISTORY:  Carotid artery disease, unspecified laterality  Hyperlipidemia, unspecified hyperlipidemia type  Cerebrovascular accident (CVA), unspecified mechanism  Esophageal dysphagia  Hypertension  S/P tubal ligation  S/P cholecystectomy  S/P cataract surgery: both eyes  Carotid artery disease: s/p carotid bypass right side      Social Hx:    FAMILY HISTORY:  FH: heart disease (Grandparent)  FH: HTN (hypertension) (Grandparent)      Allergies    No Known Allergies    Intolerances    Biaxin (Other)      MEDICATIONS  (STANDING):  amLODIPine   Tablet 10 milliGRAM(s) Oral daily  aspirin  chewable 81 milliGRAM(s) Oral daily  cefepime   IVPB 2000 milliGRAM(s) IV Intermittent every 8 hours  enoxaparin Injectable 40 milliGRAM(s) SubCutaneous daily  hydrALAZINE 75 milliGRAM(s) Oral three times a day  lactobacillus acidophilus 1 Tablet(s) Oral daily  metoprolol tartrate 100 milliGRAM(s) Oral two times a day  pantoprazole    Tablet 40 milliGRAM(s) Oral before breakfast  simvastatin 20 milliGRAM(s) Oral at bedtime    MEDICATIONS  (PRN):  acetaminophen   Tablet .. 650 milliGRAM(s) Oral every 6 hours PRN Temp greater or equal to 38C (100.4F), Mild Pain (1 - 3), Moderate Pain (4 - 6)  diphenhydrAMINE   Injectable 25 milliGRAM(s) IV Push every 6 hours PRN Rash and/or Itching  traMADol 50 milliGRAM(s) Oral two times a day PRN Severe Pain (7 - 10)      ANTIMICROBIALS:  cefepime   IVPB 2000 every 8 hours      OTHER MEDS: MEDICATIONS  (STANDING):  acetaminophen   Tablet .. 650 every 6 hours PRN  amLODIPine   Tablet 10 daily  aspirin  chewable 81 daily  diphenhydrAMINE   Injectable 25 every 6 hours PRN  enoxaparin Injectable 40 daily  hydrALAZINE 75 three times a day  metoprolol tartrate 100 two times a day  pantoprazole    Tablet 40 before breakfast  simvastatin 20 at bedtime  traMADol 50 two times a day PRN             REVIEW OF SYSTEMS:  CONSTITUTIONAL:  No Fever or chills  HEENT:  No diplopia or blurred vision.  No earache, sore throat or runny nose.  CARDIOVASCULAR:  No pressure, squeezing, strangling, tightness, heaviness or aching about the chest, neck, axilla or epigastrium.  RESPIRATORY:  No cough, shortness of breath  GASTROINTESTINAL:  No nausea, vomiting or diarrhea.  GENITOURINARY:  No dysuria, frequency or urgency. No Blood in urine  MUSCULOSKELETAL:  no joint aches, no muscle pain  SKIN:  No change in skin, hair or nails.  NEUROLOGIC:  No Headaches, seizures or weakness.  PSYCHIATRIC:  No disorder of thought or mood.  ENDOCRINE:  No heat or cold intolerance  HEMATOLOGICAL:  No easy bruising or bleeding.           I&O's Detail    09 Jul 2020 07:01  -  09 Jul 2020 16:14  --------------------------------------------------------  IN:  Total IN: 0 mL    OUT:    Stool: 1 mL    Voided: 925 mL  Total OUT: 926 mL    Total NET: -926 mL            Physical Exam:  Vital Signs Last 24 Hrs  T(C): 36.8 (09 Jul 2020 08:41), Max: 37.4 (08 Jul 2020 23:39)  T(F): 98.2 (09 Jul 2020 08:41), Max: 99.3 (08 Jul 2020 23:39)  HR: 66 (09 Jul 2020 08:41) (62 - 78)  BP: 131/53 (09 Jul 2020 08:41) (107/61 - 163/63)  BP(mean): --  RR: 18 (09 Jul 2020 08:41) (18 - 18)  SpO2: 95% (09 Jul 2020 08:41) (93% - 95%)    GEN: NAD, pleasant  HEENT: normocephalic and atraumatic. EOMI. PERRL.  Anicteric  NECK: Supple.   LUNGS: Clear to auscultation.  HEART: Regular rate and rhythm without murmur.  ABDOMEN: Soft, nontender, and nondistended.  Positive bowel sounds.    : No CVA tenderness  EXTREMITIES: Without any edema.  MSK: No joint swelling  NEUROLOGIC: Cranial nerves II through XII are grossly intact. No Focal Deficits  PSYCHIATRIC: Appropriate affect .  SKIN: No Rash        Labs:      Radiology: [facility]  INFECTIOUS DISEASES AND INTERNAL MEDICINE at [place]  =======================================================  Jaquan Kate MD  Diplomates American Board of Internal Medicine and Infectious Diseases  Tel: [phone]      Fax: [phone]  =======================================================      N-[id number]  CHARANJIT GREENWOOD is a 75y  Female     CC: Patient is a 75y old  Female who presents with a chief complaint of pneumonia (09 Jul 2020 14:23)    HPI:  75F hx Carotid Endarectomy complicated by CVA, Esophageal dysphagia, HTN, HLD, GERD presents with worsening shortness of breath. Patient had left hip replacment one week ago. She was discharged home and participating in physical therapy. This 4 days ago she started feeling unwell and having difficulty breathing. Due to esophageal dismotility always has secretions, but since surgery has had increasing secretions and coughing. She eats pureed diet.  She came to ED 3 days ago where had CTA which was negative for PE, but concerning for pneumonia. She was discharged on Cefdinir and doxycycline. She says she initially felt better, but then today again suddenly felt shortness of breath. This time accompanied by chest pressure with radiation to the back. She denies any history of heart disease, copd or asthma. Denies sick contacts, fever, chills, n/v/d.     In ED, pt afebrile, p- 96, bp- 154/71, RR- 17 and spo2 97 on RA. CT concerning for persistent pna. Pt received vancomycin and zosyn. Vancomycin infusion stopped due to itching. (07 Jul 2020 23:15)      PAST MEDICAL & SURGICAL HISTORY:  Carotid artery disease, unspecified laterality  Hyperlipidemia, unspecified hyperlipidemia type  Cerebrovascular accident (CVA), unspecified mechanism  Esophageal dysphagia  Hypertension  S/P tubal ligation  S/P cholecystectomy  S/P cataract surgery: both eyes  Carotid artery disease: s/p carotid bypass right side      Social Hx: non smoker    FAMILY HISTORY:  FH: heart disease (Grandparent)  FH: HTN (hypertension) (Grandparent)      Allergies    No Known Allergies    Intolerances    Biaxin (Other)      MEDICATIONS  (STANDING):  amLODIPine   Tablet 10 milliGRAM(s) Oral daily  aspirin  chewable 81 milliGRAM(s) Oral daily  cefepime   IVPB 2000 milliGRAM(s) IV Intermittent every 8 hours  enoxaparin Injectable 40 milliGRAM(s) SubCutaneous daily  hydrALAZINE 75 milliGRAM(s) Oral three times a day  lactobacillus acidophilus 1 Tablet(s) Oral daily  metoprolol tartrate 100 milliGRAM(s) Oral two times a day  pantoprazole    Tablet 40 milliGRAM(s) Oral before breakfast  simvastatin 20 milliGRAM(s) Oral at bedtime    MEDICATIONS  (PRN):  acetaminophen   Tablet .. 650 milliGRAM(s) Oral every 6 hours PRN Temp greater or equal to 38C (100.4F), Mild Pain (1 - 3), Moderate Pain (4 - 6)  diphenhydrAMINE   Injectable 25 milliGRAM(s) IV Push every 6 hours PRN Rash and/or Itching  traMADol 50 milliGRAM(s) Oral two times a day PRN Severe Pain (7 - 10)      ANTIMICROBIALS:  cefepime   IVPB 2000 every 8 hours      OTHER MEDS: MEDICATIONS  (STANDING):  acetaminophen   Tablet .. 650 every 6 hours PRN  amLODIPine   Tablet 10 daily  aspirin  chewable 81 daily  diphenhydrAMINE   Injectable 25 every 6 hours PRN  enoxaparin Injectable 40 daily  hydrALAZINE 75 three times a day  metoprolol tartrate 100 two times a day  pantoprazole    Tablet 40 before breakfast  simvastatin 20 at bedtime  traMADol 50 two times a day PRN             REVIEW OF SYSTEMS:  CONSTITUTIONAL:  No Fever or chills  HEENT:  No diplopia or blurred vision.  No earache, sore throat or runny nose.  CARDIOVASCULAR:  No pressure, squeezing, strangling, tightness, heaviness or aching about the chest, neck, axilla or epigastrium.  RESPIRATORY:  No cough, shortness of breath  GASTROINTESTINAL:  No nausea, vomiting or diarrhea.  GENITOURINARY:  No dysuria, frequency or urgency. No Blood in urine  MUSCULOSKELETAL:  no joint aches, no muscle pain  SKIN:  No change in skin, hair or nails.  NEUROLOGIC:  No Headaches, seizures or weakness.  PSYCHIATRIC:  No disorder of thought or mood.  ENDOCRINE:  No heat or cold intolerance  HEMATOLOGICAL:  No easy bruising or bleeding.           I&O's Detail    09 Jul 2020 07:01  -  09 Jul 2020 16:14  --------------------------------------------------------  IN:  Total IN: 0 mL    OUT:    Stool: 1 mL    Voided: 925 mL  Total OUT: 926 mL    Total NET: -926 mL            Physical Exam:  Vital Signs Last 24 Hrs  T(C): 36.8 (09 Jul 2020 08:41), Max: 37.4 (08 Jul 2020 23:39)  T(F): 98.2 (09 Jul 2020 08:41), Max: 99.3 (08 Jul 2020 23:39)  HR: 66 (09 Jul 2020 08:41) (62 - 78)  BP: 131/53 (09 Jul 2020 08:41) (107/61 - 163/63)  BP(mean): --  RR: 18 (09 Jul 2020 08:41) (18 - 18)  SpO2: 95% (09 Jul 2020 08:41) (93% - 95%)    GEN: NAD, pleasant  HEENT: normocephalic and atraumatic. EOMI. PERRL.  Anicteric  NECK: Supple.   LUNGS: Clear to auscultation.  HEART: Regular rate and rhythm without murmur.  ABDOMEN: Soft, nontender, and nondistended.  Positive bowel sounds.    : No CVA tenderness  EXTREMITIES: Without any edema.  MSK: No joint swelling  NEUROLOGIC: Cranial nerves II through XII are grossly intact. No Focal Deficits  PSYCHIATRIC: Appropriate affect .  SKIN: No Rash        Labs:                        9.4    8.99  )-----------( 445      ( 09 Jul 2020 07:31 )             28.5                9.4    8.99  )-----------( 445      ( 09 Jul 2020 07:31 )             28.5       Radiology:

## 2020-07-09 NOTE — PROGRESS NOTE ADULT - ATTENDING COMMENTS
Pt is a 74 yo F with Hx of Carotid Endarectomy complicated by CVA, Esophageal dysphagia, HTN, HLD, GERD presents with worsening shortness of breath found to have persistent PNA and spinal fracture. Switched to levaquin given macrolide intolerance and possible penicillin-allergy. PT/OT following with TLSO brace. Pending MRI, will sedate with alprazolam prior to scan given pt's claustrophobia. GI consulted given history of dysphagia with previous dilation.

## 2020-07-09 NOTE — OCCUPATIONAL THERAPY INITIAL EVALUATION ADULT - PHYSICAL ASSIST/NONPHYSICAL ASSIST: TOILET, REHAB EVAL
1 person assist/Cues for sequencing of movement and for safety for proper hand placement prior to/during transfer. Reminders to maintain PWB status of Left LE secondary to pt with divided attention, difficulty with multitasking during ADL and transfer/verbal cues

## 2020-07-09 NOTE — OCCUPATIONAL THERAPY INITIAL EVALUATION ADULT - SENSORY TESTS
Patient with +capillary refill in Left toes. Patient +Left dorsal pedal pulse. Patient does not offer any complaints or changes in sensation.

## 2020-07-09 NOTE — SWALLOW BEDSIDE ASSESSMENT ADULT - SWALLOW EVAL: RECOMMENDED FEEDING/EATING TECHNIQUES
position upright (90 degrees)/crush medication (when feasible)/small sips/bites/allow for swallow between intakes/maintain upright posture during/after eating for 30 mins

## 2020-07-09 NOTE — CONSULT NOTE ADULT - SUBJECTIVE AND OBJECTIVE BOX
HISTORY OF PRESENT ILLNESS: This is a 75y old woman with a past medical history significant for Carotid Endarectomy complicated by CVA,( ) Esophageal dysphagia, HTN, HLD, GERD. Patient had left hip surgery on  and was discharged home to follow up with physical therapy. Patient verbalized that when  discharged from hospital she was wheezing, and 3 days later she started to fell sick, with worsen cough, "rattling" on her chest, and having difficulty breathing. Patient stated that after CVA in  she developed esophageal dysmotility and could not swallow pill and had to make changes on her diet to pureed diet.  Patient stated "I just can eat too much".Patient went to ENT for difficulty swallowing and was refer to  GI ( does not recall name) and end up in [facility] hospital having a procedure to stretch her upper sphincter done by ENT. Also due to esophageal dysmotility always has secretions, but since hip surgery has had increasing secretions and coughing.  She came to ED 5 days ago where had CTA which was negative for PE, but concerning for pneumonia. She was discharged on Cefdinir and doxycycline. She says she initially felt better, but then today again suddenly felt shortness of breath. This time accompanied by chest pressure with radiation to the back. Denies nausea, vomiting diarrhea, chest pain fever, sick contacts.   ROS: A 14-point review of systems was completed and was otherwise negative save what was reported in the HPI.     PAST MEDICAL/SURGICAL HISTORY:  Carotid artery disease, unspecified laterality  Hyperlipidemia, unspecified hyperlipidemia type  Cerebrovascular accident (CVA), unspecified mechanism  Esophageal dysphagia  Hypertension  S/P tubal ligation  S/P cholecystectomy  S/P cataract surgery: both eyes  Carotid artery disease: s/p carotid bypass right side    SOCIAL HISTORY:  - TOBACCO: Denies  - ALCOHOL: Denies  - ILLICIT DRUG USE: Denies    FAMILY HISTORY:  No known history of gastrointestinal or liver disease;  FH: heart disease (Grandparent)  FH: HTN (hypertension) (Grandparent)      HOME MEDICATIONS:  acetaminophen 325 mg oral tablet: 3 tab(s) orally every 8 hours (2020 00:44)  amLODIPine 10 mg oral tablet: 1 tab(s) orally once a day (2020 00:44)  aspirin 81 mg oral tablet, chewable: 1 tab(s) orally once a day (2020 00:44)  hydrALAZINE 50 mg oral tablet: 75 milligram(s) orally 3 times a day (2020 00:44)  metoprolol tartrate 100 mg oral tablet: 1 tab(s) orally 2 times a day (2020 00:44)  NexIUM 40 mg oral delayed release capsule: 1 cap(s) orally once a day (2020 00:44)  simvastatin 20 mg oral tablet: 1 tab(s) orally once a day (at bedtime) (2020 00:44)    INPATIENT MEDICATIONS:  MEDICATIONS  (STANDING):  amLODIPine   Tablet 10 milliGRAM(s) Oral daily  aspirin  chewable 81 milliGRAM(s) Oral daily  enoxaparin Injectable 40 milliGRAM(s) SubCutaneous daily  hydrALAZINE 75 milliGRAM(s) Oral three times a day  lactobacillus acidophilus 1 Tablet(s) Oral daily  levoFLOXacin IVPB      levoFLOXacin IVPB 750 milliGRAM(s) IV Intermittent once  metoprolol tartrate 100 milliGRAM(s) Oral two times a day  pantoprazole    Tablet 40 milliGRAM(s) Oral before breakfast  simvastatin 20 milliGRAM(s) Oral at bedtime    MEDICATIONS  (PRN):  acetaminophen   Tablet .. 650 milliGRAM(s) Oral every 6 hours PRN Temp greater or equal to 38C (100.4F), Mild Pain (1 - 3), Moderate Pain (4 - 6)  traMADol 50 milliGRAM(s) Oral two times a day PRN Severe Pain (7 - 10)    ALLERGIES:  No Known Allergies    T(C): 36.8 (20 @ 08:41), Max: 37.4 (20 @ 23:39)  HR: 66 (20 @ 08:41) (62 - 78)  BP: 131/53 (20 @ 08:41) (107/61 - 163/63)  RR: 18 (20 @ 08:41) (18 - 20)  SpO2: 95% (20 @ 08:41) (93% - 95%)    20 @ 07:01  -  20 @ 14:25  --------------------------------------------------------  IN: 0 mL / OUT: 926 mL / NET: -926 mL        PHYSICAL EXAM:  Constitutional: Well-developed, well-nourished, in no apparent distress  Eyes: Sclerae anicteric, conjunctivae normal  ENMT: Mucus membranes moist, no oropharyngeal thrush noted  Neck: No thyroid nodules appreciated, no significant cervical or supraclavicular lymphadenopathy  Respiratory: Breathing nonlabored; clear to auscultation  Cardiovascular: Regular rate and rhythm  Gastrointestinal: Soft, nontender, nondistended, normoactive bowel sounds; no hepatosplenomegaly appreciated; no rebound tenderness or involuntary guarding  Extremities: No clubbing, cyanosis or edema  Neurological: Alert and oriented to person, place and time; no asterixis  Skin: No jaundice  Musculoskeletal: No significant peripheral atrophy  Psychiatric: Affect and mood appropriate      LABS:             9.4    8.99  )-----------( 445      (  @ 07:31 )             28.5                10.6   14.01 )-----------( 513      (  @ 08:08 )             32.8                10.2   10.93 )-----------( 454      (  @ 18:39 )             31.1         07-    137  |  103  |  7.0<L>  ----------------------------<  105<H>  3.7   |  23.0  |  0.78    Ca    8.7      2020 07:31  Phos  3.7     07-08  Mg     2.0     -    TPro  6.5<L>  /  Alb  3.6  /  TBili  0.4  /  DBili  x   /  AST  14  /  ALT  7   /  AlkPhos  81  07-07    LIVER FUNCTIONS - ( 2020 18:39 )  Alb: 3.6 g/dL / Pro: 6.5 g/dL / ALK PHOS: 81 U/L / ALT: 7 U/L / AST: 14 U/L / GGT: x               Urinalysis Basic - ( 2020 21:39 )    Color: Yellow / Appearance: Clear / S.010 / pH: x  Gluc: x / Ketone: Negative  / Bili: Negative / Urobili: Negative mg/dL   Blood: x / Protein: 15 mg/dL / Nitrite: Negative   Leuk Esterase: Negative / RBC: x / WBC x   Sq Epi: x / Non Sq Epi: x / Bacteria: x

## 2020-07-09 NOTE — PHYSICAL THERAPY INITIAL EVALUATION ADULT - ADDITIONAL COMMENTS
pt states she lives with her  in a 1-story house with 3 steps to enter (+rail) and none inside. since left posterior THR 6/29/20, pt has been using a RW for ambulation. has shower chair and dressing equipment, as well. was getting home PT and  assisting as needed with ADLs.

## 2020-07-09 NOTE — OCCUPATIONAL THERAPY INITIAL EVALUATION ADULT - PLANNED THERAPY INTERVENTIONS, OT EVAL
balance training/ADL retraining/bed mobility training/neuromuscular re-education/strengthening/ROM/motor coordination training/transfer training

## 2020-07-09 NOTE — OCCUPATIONAL THERAPY INITIAL EVALUATION ADULT - PHYSICAL ASSIST/NONPHYSICAL ASSIST:TOILET, OT EVAL
verbal cues/Cues for sequencing of movement and for safety for proper hand placement prior to/during transfer. Reminders to maintain PWB status of Left LE secondary to pt with divided attention, difficulty with multitasking during ADL and transfer/1 person assist

## 2020-07-09 NOTE — CONSULT NOTE ADULT - ASSESSMENT
75F hx Carotid Endarectomy complicated by CVA, Esophageal dysphagia, HTN, HLD, GERD presents with worsening shortness of breath. Patient had left hip replacment one week ago. She was discharged home and participating in physical therapy. This 4 days ago she started feeling unwell and having difficulty breathing. Due to esophageal dismotility always has secretions, but since surgery has had increasing secretions and coughing. She eats pureed diet.  She came to ED 3 days ago where had CTA which was negative for PE, but concerning for pneumonia. She was discharged on Cefdinir and doxycycline. She says she initially felt better, but then today again suddenly felt shortness of breath. This time accompanied by chest pressure with radiation to the back. She denies any history of heart disease, copd or asthma. Denies sick contacts, fever, chills, n/v/d.     In ED, pt afebrile, p- 96, bp- 154/71, RR- 17 and spo2 97 on RA. CT concerning for persistent pna. Pt received vancomycin and zosyn. Vancomycin infusion stopped due to itching.     Dysphagia  Pneumonia concern for aspiration  Leukocytosis  Screening for COVID 19    - feeling better, cough improved  - Ct chest concerning for pneumonia  - BCX testing  - COVID PCr (-)  - UA (-), UCX Acinetobacter-likely contaminant  - Sputum CX if able  - esophagram pending  - initially on vanco/zosyn when she was noted yesterday to have a rash- unclear if she developed a rash to zosyn or vancomycin. She believes she has received augmentin before without issues but is not sure  - Dc levaquin. She has tolerated cephalosporins in the past-start cefepime 2 g IV q8h and observe for reaction  - Trend Fever  - Trend Leukocytosis      Will Follow

## 2020-07-09 NOTE — OCCUPATIONAL THERAPY INITIAL EVALUATION ADULT - LEVEL OF INDEPENDENCE, OT EVAL
moderate assist (50% patients effort)/to sponge bathe adhering to posterior THP and spinal precautions

## 2020-07-09 NOTE — OCCUPATIONAL THERAPY INITIAL EVALUATION ADULT - TRANSFER SAFETY CONCERNS NOTED: BED/CHAIR, REHAB EVAL
inability to maintain weight-bearing restrictions w/o assist/decreased step length/decreased weight-shifting ability

## 2020-07-09 NOTE — CONSULT NOTE ADULT - ATTENDING COMMENTS
I saw and evaluated this pt. with NP Tyler. She has had upper esophageal dysphagia since a CVA in 2013 and states that she had dilatation of her upper esophageal sphincter done by ENT in 2014. She was admitted with PN. She is on a Puree diet here after being evaluated by Speech Pathology. In light of this will proceed first with esophagram and an eventual EGD which can be done as an OPT if she is ready for discharge soon. She states that at home prior to this admission she was eating myers and other solid food w/o difficulty.

## 2020-07-10 ENCOUNTER — TRANSCRIPTION ENCOUNTER (OUTPATIENT)
Age: 75
End: 2020-07-10

## 2020-07-10 VITALS — TEMPERATURE: 98 F | DIASTOLIC BLOOD PRESSURE: 64 MMHG | SYSTOLIC BLOOD PRESSURE: 136 MMHG | HEART RATE: 71 BPM

## 2020-07-10 LAB
ANION GAP SERPL CALC-SCNC: 10 MMOL/L — SIGNIFICANT CHANGE UP (ref 5–17)
BUN SERPL-MCNC: 8 MG/DL — SIGNIFICANT CHANGE UP (ref 8–20)
CALCIUM SERPL-MCNC: 8.8 MG/DL — SIGNIFICANT CHANGE UP (ref 8.6–10.2)
CHLORIDE SERPL-SCNC: 104 MMOL/L — SIGNIFICANT CHANGE UP (ref 98–107)
CO2 SERPL-SCNC: 23 MMOL/L — SIGNIFICANT CHANGE UP (ref 22–29)
CREAT SERPL-MCNC: 0.77 MG/DL — SIGNIFICANT CHANGE UP (ref 0.5–1.3)
GLUCOSE SERPL-MCNC: 99 MG/DL — SIGNIFICANT CHANGE UP (ref 70–99)
HCT VFR BLD CALC: 28.9 % — LOW (ref 34.5–45)
HGB BLD-MCNC: 9.5 G/DL — LOW (ref 11.5–15.5)
MAGNESIUM SERPL-MCNC: 2.1 MG/DL — SIGNIFICANT CHANGE UP (ref 1.6–2.6)
MCHC RBC-ENTMCNC: 29.4 PG — SIGNIFICANT CHANGE UP (ref 27–34)
MCHC RBC-ENTMCNC: 32.9 GM/DL — SIGNIFICANT CHANGE UP (ref 32–36)
MCV RBC AUTO: 89.5 FL — SIGNIFICANT CHANGE UP (ref 80–100)
PLATELET # BLD AUTO: 439 K/UL — HIGH (ref 150–400)
POTASSIUM SERPL-MCNC: 4.1 MMOL/L — SIGNIFICANT CHANGE UP (ref 3.5–5.3)
POTASSIUM SERPL-SCNC: 4.1 MMOL/L — SIGNIFICANT CHANGE UP (ref 3.5–5.3)
RBC # BLD: 3.23 M/UL — LOW (ref 3.8–5.2)
RBC # FLD: 13.7 % — SIGNIFICANT CHANGE UP (ref 10.3–14.5)
SODIUM SERPL-SCNC: 137 MMOL/L — SIGNIFICANT CHANGE UP (ref 135–145)
WBC # BLD: 8.12 K/UL — SIGNIFICANT CHANGE UP (ref 3.8–10.5)
WBC # FLD AUTO: 8.12 K/UL — SIGNIFICANT CHANGE UP (ref 3.8–10.5)

## 2020-07-10 PROCEDURE — 84100 ASSAY OF PHOSPHORUS: CPT

## 2020-07-10 PROCEDURE — 93005 ELECTROCARDIOGRAM TRACING: CPT

## 2020-07-10 PROCEDURE — 85027 COMPLETE CBC AUTOMATED: CPT

## 2020-07-10 PROCEDURE — 71045 X-RAY EXAM CHEST 1 VIEW: CPT

## 2020-07-10 PROCEDURE — 97167 OT EVAL HIGH COMPLEX 60 MIN: CPT

## 2020-07-10 PROCEDURE — 83605 ASSAY OF LACTIC ACID: CPT

## 2020-07-10 PROCEDURE — 81001 URINALYSIS AUTO W/SCOPE: CPT

## 2020-07-10 PROCEDURE — 99285 EMERGENCY DEPT VISIT HI MDM: CPT | Mod: 25

## 2020-07-10 PROCEDURE — 92610 EVALUATE SWALLOWING FUNCTION: CPT

## 2020-07-10 PROCEDURE — 96374 THER/PROPH/DIAG INJ IV PUSH: CPT

## 2020-07-10 PROCEDURE — 97163 PT EVAL HIGH COMPLEX 45 MIN: CPT

## 2020-07-10 PROCEDURE — 83880 ASSAY OF NATRIURETIC PEPTIDE: CPT

## 2020-07-10 PROCEDURE — 97116 GAIT TRAINING THERAPY: CPT

## 2020-07-10 PROCEDURE — 86769 SARS-COV-2 COVID-19 ANTIBODY: CPT

## 2020-07-10 PROCEDURE — 80048 BASIC METABOLIC PNL TOTAL CA: CPT

## 2020-07-10 PROCEDURE — 99239 HOSP IP/OBS DSCHRG MGMT >30: CPT

## 2020-07-10 PROCEDURE — 83735 ASSAY OF MAGNESIUM: CPT

## 2020-07-10 PROCEDURE — 71250 CT THORAX DX C-: CPT

## 2020-07-10 PROCEDURE — 99232 SBSQ HOSP IP/OBS MODERATE 35: CPT

## 2020-07-10 PROCEDURE — 80053 COMPREHEN METABOLIC PANEL: CPT

## 2020-07-10 PROCEDURE — 74220 X-RAY XM ESOPHAGUS 1CNTRST: CPT | Mod: 26

## 2020-07-10 PROCEDURE — 74220 X-RAY XM ESOPHAGUS 1CNTRST: CPT

## 2020-07-10 PROCEDURE — U0003: CPT

## 2020-07-10 PROCEDURE — 87040 BLOOD CULTURE FOR BACTERIA: CPT

## 2020-07-10 PROCEDURE — 84484 ASSAY OF TROPONIN QUANT: CPT

## 2020-07-10 PROCEDURE — 36415 COLL VENOUS BLD VENIPUNCTURE: CPT

## 2020-07-10 PROCEDURE — 97530 THERAPEUTIC ACTIVITIES: CPT

## 2020-07-10 RX ORDER — METOPROLOL TARTRATE 50 MG
1 TABLET ORAL
Qty: 0 | Refills: 0 | DISCHARGE

## 2020-07-10 RX ORDER — HYDRALAZINE HCL 50 MG
75 TABLET ORAL
Qty: 0 | Refills: 0 | DISCHARGE

## 2020-07-10 RX ORDER — ASPIRIN/CALCIUM CARB/MAGNESIUM 324 MG
1 TABLET ORAL
Qty: 0 | Refills: 0 | DISCHARGE
Start: 2020-07-10

## 2020-07-10 RX ORDER — HYDROCORTISONE 1 %
1 OINTMENT (GRAM) TOPICAL
Refills: 0 | Status: DISCONTINUED | OUTPATIENT
Start: 2020-07-10 | End: 2020-07-10

## 2020-07-10 RX ORDER — METOPROLOL TARTRATE 50 MG
1 TABLET ORAL
Qty: 0 | Refills: 0 | DISCHARGE
Start: 2020-07-10

## 2020-07-10 RX ORDER — LACTOBACILLUS ACIDOPHILUS 100MM CELL
1 CAPSULE ORAL
Qty: 4 | Refills: 0
Start: 2020-07-10 | End: 2020-07-13

## 2020-07-10 RX ORDER — CIPROFLOXACIN LACTATE 400MG/40ML
1 VIAL (ML) INTRAVENOUS
Qty: 4 | Refills: 0
Start: 2020-07-10 | End: 2020-07-13

## 2020-07-10 RX ORDER — AMLODIPINE BESYLATE 2.5 MG/1
1 TABLET ORAL
Qty: 0 | Refills: 0 | DISCHARGE
Start: 2020-07-10

## 2020-07-10 RX ORDER — AMLODIPINE BESYLATE 2.5 MG/1
1 TABLET ORAL
Qty: 0 | Refills: 0 | DISCHARGE

## 2020-07-10 RX ORDER — ASPIRIN/CALCIUM CARB/MAGNESIUM 324 MG
1 TABLET ORAL
Qty: 0 | Refills: 0 | DISCHARGE

## 2020-07-10 RX ORDER — HYDRALAZINE HCL 50 MG
3 TABLET ORAL
Qty: 0 | Refills: 0 | DISCHARGE
Start: 2020-07-10

## 2020-07-10 RX ADMIN — Medication 100 MILLIGRAM(S): at 05:03

## 2020-07-10 RX ADMIN — Medication 100 MILLIGRAM(S): at 17:23

## 2020-07-10 RX ADMIN — PANTOPRAZOLE SODIUM 40 MILLIGRAM(S): 20 TABLET, DELAYED RELEASE ORAL at 05:03

## 2020-07-10 RX ADMIN — Medication 75 MILLIGRAM(S): at 12:58

## 2020-07-10 RX ADMIN — Medication 1 APPLICATION(S): at 17:24

## 2020-07-10 RX ADMIN — Medication 75 MILLIGRAM(S): at 05:03

## 2020-07-10 RX ADMIN — CEFEPIME 100 MILLIGRAM(S): 1 INJECTION, POWDER, FOR SOLUTION INTRAMUSCULAR; INTRAVENOUS at 12:58

## 2020-07-10 RX ADMIN — ENOXAPARIN SODIUM 40 MILLIGRAM(S): 100 INJECTION SUBCUTANEOUS at 12:59

## 2020-07-10 RX ADMIN — Medication 81 MILLIGRAM(S): at 12:58

## 2020-07-10 RX ADMIN — CEFEPIME 100 MILLIGRAM(S): 1 INJECTION, POWDER, FOR SOLUTION INTRAMUSCULAR; INTRAVENOUS at 05:04

## 2020-07-10 RX ADMIN — Medication 650 MILLIGRAM(S): at 00:03

## 2020-07-10 RX ADMIN — AMLODIPINE BESYLATE 10 MILLIGRAM(S): 2.5 TABLET ORAL at 05:03

## 2020-07-10 RX ADMIN — Medication 1 TABLET(S): at 12:59

## 2020-07-10 NOTE — DISCHARGE NOTE PROVIDER - CARE PROVIDERS DIRECT ADDRESSES
,[email or web address]

## 2020-07-10 NOTE — DISCHARGE NOTE PROVIDER - HOSPITAL COURSE
Pt is a 74 yo F with Hx of Carotid Endarectomy complicated by CVA, Esophageal dysphagia, HTN, HLD, GERD presents with worsening shortness of breath found to have persistent PNA and lumbar fracture. Pt. was treated for pneumonia, concern for aspiration PNA, minimal right sided opacity on chest CT, Covid swab negative x2.          vanco and zosyn dc due to pruritis but no throat closure or rash seen. Switched to levaquin given macrolide intolerance and possible penicillin-allergy.         PT/OT following with TLSO brace. Pending MRI, will sedate with alprazolam prior to scan given pt's claustrophobia. GI consulted given history of dysphagia with previous dilation.                 Compression fracture of Thoracic vertebrae     no alarm signs    ortho recs appreciated, c/w TLSO brace    MRI ordered and Pt will need premedication due to anxiety     tylenol prn pain, will consider increase to gabapentin or meloxicam    PT recommends home with assist        Esophageal dysphagia    concern for aspiration causing PNA    swallow recs appreciated, switched to mechanically soft diet    GI consulted     continue PPI        Leukocytosis    improved, continue to trend    Previous ucx grew acinetobacter baumannii but with negative Urinalysis this admission    IV abx switched for allergies, ID consulted     f/u Bcx from 7/7        HTN    bp controlled    c/w hydralazine, amlodipine, metoprolol        Hx CVA    c/w statin, aspirin Pt is a 76 yo F with Hx of Carotid Endarectomy complicated by CVA, Esophageal dysphagia, HTN, HLD, GERD presents with worsening shortness of breath found to have persistent PNA and lumbar fracture. Pt. was treated for pneumonia, concern for aspiration PNA, minimal right sided opacity on chest CT, Covid swab negative x2.          vanco and zosyn dc due to pruritis but no throat closure or rash seen. Switched to levaquin given macrolide intolerance and possible penicillin-allergy.     complete abx with levaquin 500 mg daily + flagyl 500 mg po q8h x 4 more days    Compression fracture of Thoracic vertebrae. Ortho consulted, Pain controlled, Pt refusing to undergo T/L MRI without sedation( - given pt's claustrophobia)    MRI not emergent at this time. PT/OT following with TLSO brace. PWB, continue posterior precautions. Pt. participated in PT.  PT recommends home with assist    GI consulted given history of dysphagia with previous dilation, concern for aspiration causing PNA. Continued on PPI. Speech therapist followed up swallow, pt. switched to mechanically soft diet. Esophagram completed/f/u results.    Leukocytosis improved. Previous ucx grew acinetobacter baumannii but with negative Urinalysis this admission. IV abx switched for allergies by ID    Blood cx negative 7/7/20. COVID PCr (-).     BP controlled with hydralazine, amlodipine, metoprolol. Pt. maintained on Statin and ASA with Hx CVA.    Pt. encouraged to F/U with Ortho and GI in 1 week. F/U with ID in 2 weeks        Vital Signs Last 24 Hrs    T(C): 36.7 (10 Jul 2020 15:23), Max: 36.8 (09 Jul 2020 20:12)    T(F): 98 (10 Jul 2020 15:23), Max: 98.2 (09 Jul 2020 20:12)    HR: 71 (10 Jul 2020 15:23) (66 - 78)    BP: 136/64 (10 Jul 2020 15:23) (118/65 - 156/68)    RR: 18 (10 Jul 2020 07:27) (18 - 18)    SpO2: 94% (10 Jul 2020 07:27) (94% - 96%)         ROS: Denies fever, chills, headaches, dizziness, chest pain/pressure, palpitations, shortness of breath, difficulty breathing, abdominal pain, n/v/d or any other complaints. No throat closure or rash. Pt. feels very well today. wants to go home.        PHYSICAL EXAM:    GENERAL: Pt sitting up in bed comfortably in NAD, pt adheres to PWB status    CHEST/LUNG: Clear to auscultation bilaterally; No rales, rhonchi, wheezing, or rubs. Unlabored respirations    HEART: Regular rate and rhythm; No murmurs, rubs, or gallops    ABDOMEN: Bowel sounds present; Soft, Nontender, Nondistended. No guarding or rigidity     EXTREMITIES:  2+ Peripheral Pulses, brisk capillary refill. No clubbing, cyanosis, or edema. TLSO brace on    NERVOUS SYSTEM:  Alert & Oriented X3, speech clear, FROM x 4 extremities. No deficits     SKIN: Left hip incision c/d/i, old ecchymosis seen. No hematoma or swelling. No rashes or lesions. No erythema or petechia.         < from: Xray Esophagram Single Contrast (07.10.20 @ 10:14) >    Esophageal dysmotility including tertiary contractions.    Narrowing in the region of the gastroesophageal junction with evaluation limited secondary to the examination being performed in the sitting position; either an endoscopy is recommended for further evaluation or a repeat study is recommended when the patient is able to perform the study on the fluoroscopy table        < from: US Duplex Venous Lower Ext Ltd, Left (07.05.20 @ 15:41) >    No sign of deep vein thrombosis in the common femoral vein, femoral vein or popliteal vein.        < from: CT Chest No Cont (07.07.20 @ 21:07) >    1. Small patchy airspace opacities in the right upper and right lower lobes and to a lesser extent in the right middle lobe and superior segment of the left lower lobe, not significantly changed, likely infectious in etiology.    2. New superior endplate compression fracture deformity of the T11 vertebral body.                                9.5      8.12  )-----------( 439      ( 10 Jul 2020 07:20 )               28.9         07-10        137  |  104  |  8.0    ----------------------------<  99    4.1   |  23.0  |  0.77        Ca    8.8      10 Jul 2020 07:20    Mg     2.1     07-10

## 2020-07-10 NOTE — DISCHARGE NOTE NURSING/CASE MANAGEMENT/SOCIAL WORK - PATIENT PORTAL LINK FT
You can access the FollowMyHealth Patient Portal offered by [facility] by registering at the following website: [email or web address]. By joining [employer]’s FollowMyHealth portal, you will also be able to view your health information using other applications (apps) compatible with our system.

## 2020-07-10 NOTE — PROGRESS NOTE ADULT - ASSESSMENT
75F hx Carotid Endarectomy complicated by CVA, Esophageal dysphagia, HTN, HLD, GERD presents with worsening shortness of breath. Patient had left hip replacment one week ago. She was discharged home and participating in physical therapy. This 4 days ago she started feeling unwell and having difficulty breathing. Due to esophageal dismotility always has secretions, but since surgery has had increasing secretions and coughing. She eats pureed diet.  She came to ED 3 days ago where had CTA which was negative for PE, but concerning for pneumonia. She was discharged on Cefdinir and doxycycline. She says she initially felt better, but then today again suddenly felt shortness of breath. This time accompanied by chest pressure with radiation to the back. She denies any history of heart disease, copd or asthma. Denies sick contacts, fever, chills, n/v/d.     In ED, pt afebrile, p- 96, bp- 154/71, RR- 17 and spo2 97 on RA. CT concerning for persistent pna. Pt received vancomycin and zosyn. Vancomycin infusion stopped due to itching.     Dysphagia  Pneumonia concern for aspiration  Leukocytosis  Screening for COVID 19    - feeling better, cough improved  - Ct chest concerning for pneumonia  - BCX testing  - COVID PCr (-)  - UA (-), UCX Acinetobacter-likely contaminant    - esophagram completed/f/u results  - initially on vanco/zosyn when she was noted to have a rash- unclear if she developed a rash to zosyn or vancomycin. She believes she has received augmentin before without issues but is not sure. ? rash from underlying eczema  - tolerated cefepime  - complete abx with levaquin 500 mg daily + flagyl 500 mg po q8h x 4 more days  - Id f/u 2 weeks  - aspiration precautions    d/w PA  Will Follow

## 2020-07-10 NOTE — DISCHARGE NOTE PROVIDER - PROVIDER TOKENS
PROVIDER:[TOKEN:[[id number]]],PROVIDER:[TOKEN:[[id number]]] PROVIDER:[TOKEN:[[id number]]],PROVIDER:[TOKEN:[[id number]],FOLLOWUP:[1 week]],PROVIDER:[TOKEN:[[id number]],FOLLOWUP:[2 weeks]],PROVIDER:[TOKEN:[[id number]],FOLLOWUP:[2 weeks]]

## 2020-07-10 NOTE — DISCHARGE NOTE NURSING/CASE MANAGEMENT/SOCIAL WORK - NSDCPEPTSTRK_GEN_ALL_CORE
Call 911 for stroke/Need for follow up after discharge/Risk factors for stroke/Stroke support groups for patients, families, and friends/Stroke education booklet/Stroke warning signs and symptoms/Signs and symptoms of stroke/Prescribed medications

## 2020-07-10 NOTE — PROGRESS NOTE ADULT - SUBJECTIVE AND OBJECTIVE BOX
INTERVAL HPI/OVERNIGHT EVENTS: Patient with GERD and dysphagia. Tolerated soft foods yesterday. Esophagram done this am.     ROS wnl     PAST MEDICAL & SURGICAL HISTORY:  Carotid artery disease, unspecified laterality  Hyperlipidemia, unspecified hyperlipidemia type  Cerebrovascular accident (CVA), unspecified mechanism  Esophageal dysphagia  Hypertension  S/P tubal ligation  S/P cholecystectomy  S/P cataract surgery: both eyes  Carotid artery disease: s/p carotid bypass right side      Home Medications:  acetaminophen 325 mg oral tablet: 3 tab(s) orally every 8 hours (:44)  amLODIPine 10 mg oral tablet: 1 tab(s) orally once a day (:44)  aspirin 81 mg oral tablet, chewable: 1 tab(s) orally once a day (:)  hydrALAZINE 50 mg oral tablet: 75 milligram(s) orally 3 times a day (:)  metoprolol tartrate 100 mg oral tablet: 1 tab(s) orally 2 times a day (:)  NexIUM 40 mg oral delayed release capsule: 1 cap(s) orally once a day (:)  simvastatin 20 mg oral tablet: 1 tab(s) orally once a day (at bedtime) (:)      MEDICATIONS  (STANDING):  amLODIPine   Tablet 10 milliGRAM(s) Oral daily  aspirin  chewable 81 milliGRAM(s) Oral daily  cefepime   IVPB 2000 milliGRAM(s) IV Intermittent every 8 hours  enoxaparin Injectable 40 milliGRAM(s) SubCutaneous daily  hydrALAZINE 75 milliGRAM(s) Oral three times a day  hydrocortisone 2.5% Lotion 1 Application(s) Topical two times a day  lactobacillus acidophilus 1 Tablet(s) Oral daily  metoprolol tartrate 100 milliGRAM(s) Oral two times a day  pantoprazole    Tablet 40 milliGRAM(s) Oral before breakfast  simvastatin 20 milliGRAM(s) Oral at bedtime    MEDICATIONS  (PRN):  acetaminophen   Tablet .. 650 milliGRAM(s) Oral every 6 hours PRN Temp greater or equal to 38C (100.4F), Mild Pain (1 - 3), Moderate Pain (4 - 6)  diphenhydrAMINE   Injectable 25 milliGRAM(s) IV Push every 6 hours PRN Rash and/or Itching  LORazepam   Injectable 2 milliGRAM(s) IV Push once PRN Anxiety  traMADol 50 milliGRAM(s) Oral two times a day PRN Severe Pain (7 - 10)      Allergies    No Known Allergies    Intolerances    Biaxin (Other)        PHYSICAL EXAM:   Vital Signs:  Vital Signs Last 24 Hrs  T(C): 36.7 (10 Jul 2020 07:27), Max: 36.8 (2020 20:12)  T(F): 98.1 (10 Jul 2020 07:27), Max: 98.2 (2020 20:12)  HR: 68 (10 Jul 2020 07:27) (66 - 78)  BP: 156/68 (10 Jul 2020 07:27) (118/65 - 156/68)  BP(mean): --  RR: 18 (10 Jul 2020 07:27) (18 - 18)  SpO2: 94% (10 Jul 2020 07:27) (94% - 96%)  Daily     Daily     GENERAL:  no distress  HEENT:  NC/AT,  anicteric  ABDOMEN:  Soft, non-tender, non-distended, normoactive bowel sounds  EXTREMITIES  no cyanosis      LABS:                        9.5    8.12  )-----------( 439      ( 10 Jul 2020 07:20 )             28.9       Hemoglobin: 9.5 g/dL (07-10-20 @ 07:20)  Hemoglobin: 9.4 g/dL (20 @ 07:31)  Hemoglobin: 10.6 g/dL (20 @ 08:08)  Hemoglobin: 10.2 g/dL (20 @ 18:39)      07-10    137  |  104  |  8.0  ----------------------------<  99  4.1   |  23.0  |  0.77    Ca    8.8      10 Jul 2020 07:20  Mg     2.1     10      Aspartate Aminotransferase (AST/SGOT): 14 U/L (20 @ 18:39)  Alkaline Phosphatase, Serum: 81 U/L (20 @ 18:39)  Alanine Aminotransferase (ALT/SGPT): 7 U/L (20 @ 18:39)      Urinalysis Basic - ( 2020 21:39 )    Color: Yellow / Appearance: Clear / S.010 / pH: x  Gluc: x / Ketone: Negative  / Bili: Negative / Urobili: Negative mg/dL   Blood: x / Protein: 15 mg/dL / Nitrite: Negative   Leuk Esterase: Negative / RBC: x / WBC x   Sq Epi: x / Non Sq Epi: x / Bacteria: x    RADIOLOGY & ADDITIONAL TESTS:  < from: CT Chest No Cont (20 @ 21:07) >  FINDINGS:    LUNGS AND AIRWAYS: Patent central airways. Small patchy airspace opacities in the right upper and right lower lobes and to a lesser extent in the right middle lobe and superior segment of the left lower lobe, not significantly changed, likely infectious in etiology. Biapical scarring.  PLEURA: No pleural effusion.  MEDIASTINUM AND ABDELRAHMAN: No lymphadenopathy.  VESSELS: Mild calcification of the coronary arteries and aortic valve annulus.  HEART: Heart size is normal. No pericardial effusion.  CHEST WALL AND LOWER NECK: Within normal limits.  VISUALIZED UPPER ABDOMEN: Cholecystectomy. Left renal cyst.  BONES: Compression fracture deformities of the T12, L1 and L2 vertebral bodies, not significantly changed. New superior endplate compression fracture deformity of the T11 vertebral body resulting in less than 50% loss of vertebral body height.    IMPRESSION:     1. Small patchy airspace opacities in the right upper and right lower lobes and to a lesser extent in the right middle lobe and superior segment of the left lower lobe, not significantly changed, likely infectious in etiology.  2. New superior endplate compression fracture deformity of the T11 vertebral body.    < end of copied text >

## 2020-07-10 NOTE — DISCHARGE NOTE PROVIDER - NSDCCPCAREPLAN_GEN_ALL_CORE_FT
PRINCIPAL DISCHARGE DIAGNOSIS  Diagnosis: Pneumonia  Assessment and Plan of Treatment: levaquin 500 mg daily + flagyl 500 mg po q8h x 4 more days. F/U with ID in 2 weeks      SECONDARY DISCHARGE DIAGNOSES  Diagnosis: Fracture of T11 vertebra  Assessment and Plan of Treatment: F/U with Ortho in 1 week. TLSO brace, PWB, with posterior precautions PRINCIPAL DISCHARGE DIAGNOSIS  Diagnosis: Pneumonia  Assessment and Plan of Treatment: levaquin 500 mg daily + flagyl 500 mg po q8h x 4 more days. F/U with ID in 2 weeks      SECONDARY DISCHARGE DIAGNOSES  Diagnosis: Fracture of T11 vertebra  Assessment and Plan of Treatment: F/U with Ortho in 1 week. TLSO brace, PWB, with posterior precautions    Diagnosis: Dysphagia, unspecified type  Assessment and Plan of Treatment: esophagogram shows dysmotility and narrowing of gastroesophageal junction, but results not fully conclusive given patient sitting during procedure. Continue soft diet and followup with GI clinic.

## 2020-07-10 NOTE — DISCHARGE NOTE PROVIDER - NSDCFUADDINST_GEN_ALL_CORE_FT
ORTHO: Patient is s/p total hip arthroplasty with Dr Cisneros from 6/29/20. Patient is to continue PWB status of her LLE, with posterior precautions for 6 weeks. Patient is to use TLSO brace when ambulating for support of compression fractures. Pt will maintain follow up appointment as scheduled with Dr Cisneros for total hip arthroplasty. Patient is to follow up in office with Dr Mraks as well for compression fractures.

## 2020-07-10 NOTE — PROGRESS NOTE ADULT - SUBJECTIVE AND OBJECTIVE BOX
Pt Name: CHARANJIT GREENWOOD    MRN: [id number]    Patient is being followed for T11 compression fracture. Pt is admitted for bilateral pneumonia, s/p left total hip arthroplasty DAA by Dr Cisneros 6/29/20. Pt refusing to undergo T/L MRI without sedation - MRI not emergent at this time. TLSO present at bedside. Pt participating in physical therapy and pain is well controlled. Back pain at baseline. Denies any motor sensory changes.     PAST MEDICAL & SURGICAL HISTORY:  PAST MEDICAL & SURGICAL HISTORY:  Carotid artery disease, unspecified laterality  Hyperlipidemia, unspecified hyperlipidemia type  Cerebrovascular accident (CVA), unspecified mechanism  Esophageal dysphagia  Hypertension  S/P tubal ligation  S/P cholecystectomy  S/P cataract surgery: both eyes  Carotid artery disease: s/p carotid bypass right side      Allergies: Biaxin (Other)  No Known Allergies      Medications: acetaminophen   Tablet .. 650 milliGRAM(s) Oral every 6 hours PRN  amLODIPine   Tablet 10 milliGRAM(s) Oral daily  aspirin  chewable 81 milliGRAM(s) Oral daily  cefepime   IVPB 2000 milliGRAM(s) IV Intermittent every 8 hours  diphenhydrAMINE   Injectable 25 milliGRAM(s) IV Push every 6 hours PRN  enoxaparin Injectable 40 milliGRAM(s) SubCutaneous daily  hydrALAZINE 75 milliGRAM(s) Oral three times a day  lactobacillus acidophilus 1 Tablet(s) Oral daily  LORazepam   Injectable 2 milliGRAM(s) IV Push once PRN  metoprolol tartrate 100 milliGRAM(s) Oral two times a day  pantoprazole    Tablet 40 milliGRAM(s) Oral before breakfast  simvastatin 20 milliGRAM(s) Oral at bedtime  traMADol 50 milliGRAM(s) Oral two times a day PRN                          9.5    8.12  )-----------( 439      ( 10 Jul 2020 07:20 )             28.9     07-10    137  |  104  |  8.0  ----------------------------<  99  4.1   |  23.0  |  0.77    Ca    8.8      10 Jul 2020 07:20  Mg     2.1     07-10        PHYSICAL EXAM:    Vital Signs Last 24 Hrs  T(C): 36.7 (10 Jul 2020 07:27), Max: 36.8 (09 Jul 2020 20:12)  T(F): 98.1 (10 Jul 2020 07:27), Max: 98.2 (09 Jul 2020 20:12)  HR: 68 (10 Jul 2020 07:27) (66 - 78)  BP: 156/68 (10 Jul 2020 07:27) (118/65 - 156/68)  BP(mean): --  RR: 18 (10 Jul 2020 07:27) (18 - 18)  SpO2: 94% (10 Jul 2020 07:27) (94% - 96%)  Daily     Daily     Appearance: Alert, responsive, in no acute distress.  Skin: no rash on visible skin. Skin is clean, dry and intact. No bleeding. No abrasions. No ulcerations. Left hip incision C/D/I. +ecchymosis present  Vascular: 2+ distal DP/PT/radial pulses. Cap refill < 2 sec. No signs of venous insuffiency or stasis. No extremity ulcerations. No cyanosis.  Musculoskeletal:       Back: No spinal deformities or step offs, mild mid thoracic/lumbar midline TTP.     Left Upper Extremity: Atraumatic with normal alignment NROM. No crepitus. No bony tenderness.      Right Upper Extremity: Atraumatic with normal alignment NROM. No crepitus. No bony tenderness.      Left Lower Extremity: Atraumatic with normal alignment NROM. No crepitus. No bony tenderness.      Right Lower Extremity: Atraumatic with normal alignment NROM. No crepitus. No bony tenderness.     Neurological: Sensation is grossly intact to light touch. No focal deficits or weaknesses found.  Pathologic reflexes: negative babinski, negative clonus    Motor exam: [  ]          [ ] Upper extremity                    Bi(c5)  WE(c6)  EE(c7)   FF(c8)                                                R         5/5        5/5        5/5       5/5                                               L          5/5        5/5        5/5       5/5         [ ] Lower extremeity                  HF(l2)     KE(l3)    TA(l4)   EHL(l5)  GS(s1)                                                 R        5/5        5/5        5/5       5/5         5/5                                               L         5/5        5/5       5/5       5/5          5/5     A/P:  Pt is a  75y Female with T11 compression fx, s/p total hip arthroplasty 6/29/20    PLAN:   * MRI not emergent at this time - pt can f/u outpatient with Dr. Marks  * Pain Control  * WBAT, continue anterior precautions  * TLSO at bedside  * cont. care per medical team  * ortho stable - will sign off Pt Name: CHARANJIT GREENWOOD    MRN: [id number]    Patient is being followed for T11 compression fracture. Pt is admitted for bilateral pneumonia, s/p left total hip arthroplasty DAA by Dr Cisneros 6/29/20. Pt refusing to undergo T/L MRI without sedation - MRI not emergent at this time. TLSO present at bedside. Pt participating in physical therapy and pain is well controlled. Back pain at baseline. Denies any motor sensory changes.     PAST MEDICAL & SURGICAL HISTORY:  PAST MEDICAL & SURGICAL HISTORY:  Carotid artery disease, unspecified laterality  Hyperlipidemia, unspecified hyperlipidemia type  Cerebrovascular accident (CVA), unspecified mechanism  Esophageal dysphagia  Hypertension  S/P tubal ligation  S/P cholecystectomy  S/P cataract surgery: both eyes  Carotid artery disease: s/p carotid bypass right side      Allergies: Biaxin (Other)  No Known Allergies      Medications: acetaminophen   Tablet .. 650 milliGRAM(s) Oral every 6 hours PRN  amLODIPine   Tablet 10 milliGRAM(s) Oral daily  aspirin  chewable 81 milliGRAM(s) Oral daily  cefepime   IVPB 2000 milliGRAM(s) IV Intermittent every 8 hours  diphenhydrAMINE   Injectable 25 milliGRAM(s) IV Push every 6 hours PRN  enoxaparin Injectable 40 milliGRAM(s) SubCutaneous daily  hydrALAZINE 75 milliGRAM(s) Oral three times a day  lactobacillus acidophilus 1 Tablet(s) Oral daily  LORazepam   Injectable 2 milliGRAM(s) IV Push once PRN  metoprolol tartrate 100 milliGRAM(s) Oral two times a day  pantoprazole    Tablet 40 milliGRAM(s) Oral before breakfast  simvastatin 20 milliGRAM(s) Oral at bedtime  traMADol 50 milliGRAM(s) Oral two times a day PRN                          9.5    8.12  )-----------( 439      ( 10 Jul 2020 07:20 )             28.9     07-10    137  |  104  |  8.0  ----------------------------<  99  4.1   |  23.0  |  0.77    Ca    8.8      10 Jul 2020 07:20  Mg     2.1     07-10        PHYSICAL EXAM:    Vital Signs Last 24 Hrs  T(C): 36.7 (10 Jul 2020 07:27), Max: 36.8 (09 Jul 2020 20:12)  T(F): 98.1 (10 Jul 2020 07:27), Max: 98.2 (09 Jul 2020 20:12)  HR: 68 (10 Jul 2020 07:27) (66 - 78)  BP: 156/68 (10 Jul 2020 07:27) (118/65 - 156/68)  BP(mean): --  RR: 18 (10 Jul 2020 07:27) (18 - 18)  SpO2: 94% (10 Jul 2020 07:27) (94% - 96%)  Daily     Daily     Appearance: Alert, responsive, in no acute distress.  Skin: no rash on visible skin. Skin is clean, dry and intact. No bleeding. No abrasions. No ulcerations. Left hip incision C/D/I. +ecchymosis present  Vascular: 2+ distal DP/PT/radial pulses. Cap refill < 2 sec. No signs of venous insuffiency or stasis. No extremity ulcerations. No cyanosis.  Musculoskeletal:       Back: No spinal deformities or step offs, mild mid thoracic/lumbar midline TTP.     Left Upper Extremity: Atraumatic with normal alignment NROM. No crepitus. No bony tenderness.      Right Upper Extremity: Atraumatic with normal alignment NROM. No crepitus. No bony tenderness.      Left Lower Extremity: Atraumatic with normal alignment NROM. No crepitus. No bony tenderness.      Right Lower Extremity: Atraumatic with normal alignment NROM. No crepitus. No bony tenderness.     Neurological: Sensation is grossly intact to light touch. No focal deficits or weaknesses found.  Pathologic reflexes: negative babinski, negative clonus    Motor exam: [  ]          [ ] Upper extremity                    Bi(c5)  WE(c6)  EE(c7)   FF(c8)                                                R         5/5        5/5        5/5       5/5                                               L          5/5        5/5        5/5       5/5         [ ] Lower extremeity                  HF(l2)     KE(l3)    TA(l4)   EHL(l5)  GS(s1)                                                 R        5/5        5/5        5/5       5/5         5/5                                               L         5/5        5/5       5/5       5/5          5/5     A/P:  Pt is a  75y Female with T11 compression fx, s/p total hip arthroplasty 6/29/20    PLAN:   * MRI not emergent at this time - pt can f/u outpatient with Dr. Marks  * Pain Control  * PWB, continue posterior precautions  * TLSO at bedside  * cont. care per medical team  * ortho stable - will sign off Pt Name: CHARANJIT GREENWOOD    MRN: [id number]    Patient is being followed for T11 compression fracture. Pt is admitted for bilateral pneumonia, s/p left total hip arthroplasty by Dr Cisneros 6/29/20. Pt refusing to undergo T/L MRI without sedation - MRI not emergent at this time. TLSO present at bedside. Pt participating in physical therapy and pain is well controlled. Back pain at baseline. Denies any motor sensory changes.     PAST MEDICAL & SURGICAL HISTORY:  PAST MEDICAL & SURGICAL HISTORY:  Carotid artery disease, unspecified laterality  Hyperlipidemia, unspecified hyperlipidemia type  Cerebrovascular accident (CVA), unspecified mechanism  Esophageal dysphagia  Hypertension  S/P tubal ligation  S/P cholecystectomy  S/P cataract surgery: both eyes  Carotid artery disease: s/p carotid bypass right side      Allergies: Biaxin (Other)  No Known Allergies      Medications: acetaminophen   Tablet .. 650 milliGRAM(s) Oral every 6 hours PRN  amLODIPine   Tablet 10 milliGRAM(s) Oral daily  aspirin  chewable 81 milliGRAM(s) Oral daily  cefepime   IVPB 2000 milliGRAM(s) IV Intermittent every 8 hours  diphenhydrAMINE   Injectable 25 milliGRAM(s) IV Push every 6 hours PRN  enoxaparin Injectable 40 milliGRAM(s) SubCutaneous daily  hydrALAZINE 75 milliGRAM(s) Oral three times a day  lactobacillus acidophilus 1 Tablet(s) Oral daily  LORazepam   Injectable 2 milliGRAM(s) IV Push once PRN  metoprolol tartrate 100 milliGRAM(s) Oral two times a day  pantoprazole    Tablet 40 milliGRAM(s) Oral before breakfast  simvastatin 20 milliGRAM(s) Oral at bedtime  traMADol 50 milliGRAM(s) Oral two times a day PRN                          9.5    8.12  )-----------( 439      ( 10 Jul 2020 07:20 )             28.9     07-10    137  |  104  |  8.0  ----------------------------<  99  4.1   |  23.0  |  0.77    Ca    8.8      10 Jul 2020 07:20  Mg     2.1     07-10        PHYSICAL EXAM:    Vital Signs Last 24 Hrs  T(C): 36.7 (10 Jul 2020 07:27), Max: 36.8 (09 Jul 2020 20:12)  T(F): 98.1 (10 Jul 2020 07:27), Max: 98.2 (09 Jul 2020 20:12)  HR: 68 (10 Jul 2020 07:27) (66 - 78)  BP: 156/68 (10 Jul 2020 07:27) (118/65 - 156/68)  BP(mean): --  RR: 18 (10 Jul 2020 07:27) (18 - 18)  SpO2: 94% (10 Jul 2020 07:27) (94% - 96%)  Daily     Daily     Appearance: Alert, responsive, in no acute distress.  Skin: no rash on visible skin. Skin is clean, dry and intact. No bleeding. No abrasions. No ulcerations. Left hip incision C/D/I. +ecchymosis present  Vascular: 2+ distal DP/PT/radial pulses. Cap refill < 2 sec. No signs of venous insuffiency or stasis. No extremity ulcerations. No cyanosis.  Musculoskeletal:       Back: No spinal deformities or step offs, mild mid thoracic/lumbar midline TTP.     Left Upper Extremity: Atraumatic with normal alignment NROM. No crepitus. No bony tenderness.      Right Upper Extremity: Atraumatic with normal alignment NROM. No crepitus. No bony tenderness.      Left Lower Extremity: Atraumatic with normal alignment NROM. No crepitus. No bony tenderness.      Right Lower Extremity: Atraumatic with normal alignment NROM. No crepitus. No bony tenderness.     Neurological: Sensation is grossly intact to light touch. No focal deficits or weaknesses found.  Pathologic reflexes: negative babinski, negative clonus    Motor exam: [  ]          [ ] Upper extremity                    Bi(c5)  WE(c6)  EE(c7)   FF(c8)                                                R         5/5        5/5        5/5       5/5                                               L          5/5        5/5        5/5       5/5         [ ] Lower extremeity                  HF(l2)     KE(l3)    TA(l4)   EHL(l5)  GS(s1)                                                 R        5/5        5/5        5/5       5/5         5/5                                               L         5/5        5/5       5/5       5/5          5/5     A/P:  Pt is a  75y Female with T11 compression fx, s/p total hip arthroplasty 6/29/20    PLAN:   * MRI not emergent at this time - pt can f/u outpatient with Dr. Marks  * Pain Control  * PWB, continue posterior precautions  * TLSO at bedside  * cont. care per medical team  * ortho stable - will sign off

## 2020-07-10 NOTE — PROGRESS NOTE ADULT - PROBLEM SELECTOR PLAN 1
f/u esophagram  diet as per swallow recommendations'
minimal opacity on chest ct, but worsening wbc and symptoms on abx  will treat with zosyn,   concern for possible aspiration pna as rhonchi heard on rll and history esphageal dismotility, will get swallow exam  pt had itching with vancomycin eventhough rate slowed, will hold off for now as unlikely mrsa given recent negative mrsa swab  also unlikely covid given recent negative swab, but repeat pending  continue to monitor pulse ox
Fit/dispensed TLSO.  All went without incident.  Demonstrated/discussed use.  Pt. to use as directed by Dr.  Please call [facility] with any questions.  [phone].

## 2020-07-10 NOTE — DISCHARGE NOTE PROVIDER - NSDCFUSCHEDAPPT_GEN_ALL_CORE_FT
CHARANJIT GREENWOOD ; 07/23/2020 ; [facility] [address] CHARANJIT [address] ; 08/13/2020 ; [facility] [address] CHARANJIT GREENWOOD ; 07/23/2020 ; [facility] [address] CHAARNJIT [address] ; 08/13/2020 ; [facility] [address]

## 2020-07-10 NOTE — DISCHARGE NOTE PROVIDER - NSDCMRMEDTOKEN_GEN_ALL_CORE_FT
acetaminophen 325 mg oral tablet: 3 tab(s) orally every 8 hours  amLODIPine 10 mg oral tablet: 1 tab(s) orally once a day  aspirin 81 mg oral tablet, chewable: 1 tab(s) orally once a day  Aspirin Enteric Coated 325 mg oral delayed release tablet: 1 tab(s) orally 2 times a day x 4 weeks, to begin after completion of Lovenox/ home dose Aspirin, may resume home dose after completion  cefdinir 300 mg oral capsule: 1 cap(s) orally 2 times a day MDD:600 mg  celecoxib 200 mg oral capsule: 1 cap(s) orally 2 times a day  doxycycline hyclate 100 mg oral capsule: 1 cap(s) orally 2 times a day MDD:200 mg  hydrALAZINE 50 mg oral tablet: 75 milligram(s) orally 3 times a day  Lovenox 40 mg/0.4 mL injectable solution: 40 milligram(s) subcutaneously once a day   metoprolol tartrate 100 mg oral tablet: 1 tab(s) orally 2 times a day  NexIUM 40 mg oral delayed release capsule: 1 cap(s) orally once a day  Senna S 50 mg-8.6 mg oral tablet: 2 tab(s) orally once a day (at bedtime)   simvastatin 20 mg oral tablet: 1 tab(s) orally once a day (at bedtime) acetaminophen 325 mg oral tablet: 3 tab(s) orally every 8 hours  amLODIPine 10 mg oral tablet: 1 tab(s) orally once a day  aspirin 81 mg oral tablet, chewable: 1 tab(s) orally once a day  celecoxib 200 mg oral capsule: 1 cap(s) orally 2 times a day  hydrALAZINE 25 mg oral tablet: 3 tab(s) orally 3 times a day  Lovenox 40 mg/0.4 mL injectable solution: 40 milligram(s) subcutaneously once a day   metoprolol tartrate 100 mg oral tablet: 1 tab(s) orally 2 times a day  NexIUM 40 mg oral delayed release capsule: 1 cap(s) orally once a day  Senna S 50 mg-8.6 mg oral tablet: 2 tab(s) orally once a day (at bedtime)   simvastatin 20 mg oral tablet: 1 tab(s) orally once a day (at bedtime) acetaminophen 325 mg oral tablet: 3 tab(s) orally every 8 hours  amLODIPine 10 mg oral tablet: 1 tab(s) orally once a day  aspirin 81 mg oral tablet, chewable: 1 tab(s) orally once a day  celecoxib 200 mg oral capsule: 1 cap(s) orally 2 times a day  flagyl 500m tab(s) orally every 8 hours x 4days  hydrALAZINE 25 mg oral tablet: 3 tab(s) orally 3 times a day  lactobacillus acidophilus oral capsule: 1 cap(s) orally once a day for 4 days  Levaquin 500 mg oral tablet: 1 tab(s) orally once a day x 4 days  Lovenox 40 mg/0.4 mL injectable solution: 40 milligram(s) subcutaneously once a day   metoprolol tartrate 100 mg oral tablet: 1 tab(s) orally 2 times a day  NexIUM 40 mg oral delayed release capsule: 1 cap(s) orally once a day  Senna S 50 mg-8.6 mg oral tablet: 2 tab(s) orally once a day (at bedtime)   simvastatin 20 mg oral tablet: 1 tab(s) orally once a day (at bedtime)

## 2020-07-10 NOTE — PROGRESS NOTE ADULT - SUBJECTIVE AND OBJECTIVE BOX
[facility]  INFECTIOUS DISEASES AND INTERNAL MEDICINE at [place]  =======================================================  Jaquan Kate MD  Diplomates American Board of Internal Medicine and Infectious Diseases  Tel: [phone]      Fax: [phone]  =======================================================    CHARANJIT GREENWOOD [id number]    Follow up: pneumonia, dysphagia  feels well  s/p esophagram    Allergies:  Biaxin (Other)  No Known Allergies      Medications:  acetaminophen   Tablet .. 650 milliGRAM(s) Oral every 6 hours PRN  amLODIPine   Tablet 10 milliGRAM(s) Oral daily  aspirin  chewable 81 milliGRAM(s) Oral daily  cefepime   IVPB 2000 milliGRAM(s) IV Intermittent every 8 hours  diphenhydrAMINE   Injectable 25 milliGRAM(s) IV Push every 6 hours PRN  enoxaparin Injectable 40 milliGRAM(s) SubCutaneous daily  hydrALAZINE 75 milliGRAM(s) Oral three times a day  hydrocortisone 2.5% Lotion 1 Application(s) Topical two times a day  lactobacillus acidophilus 1 Tablet(s) Oral daily  LORazepam   Injectable 2 milliGRAM(s) IV Push once PRN  metoprolol tartrate 100 milliGRAM(s) Oral two times a day  pantoprazole    Tablet 40 milliGRAM(s) Oral before breakfast  simvastatin 20 milliGRAM(s) Oral at bedtime  traMADol 50 milliGRAM(s) Oral two times a day PRN            REVIEW OF SYSTEMS:  CONSTITUTIONAL:  No Fever or chills  HEENT:   No diplopia or blurred vision.  No earache, sore throat or runny nose.  CARDIOVASCULAR:  No pressure, squeezing, strangling, tightness, heaviness or aching about the chest, neck, axilla or epigastrium.  RESPIRATORY:  No cough, shortness of breath  GASTROINTESTINAL:  No nausea, vomiting or diarrhea.  GENITOURINARY:  No dysuria, frequency or urgency. No Blood in urine  MUSCULOSKELETAL:  no joint aches, no muscle pain  SKIN:  No change in skin, hair or nails.  NEUROLOGIC:  No Headaches, seizures or weakness.  PSYCHIATRIC:  No disorder of thought or mood.  ENDOCRINE:  No heat or cold intolerance  HEMATOLOGICAL:  No easy bruising or bleeding.            Physical Exam:  ICU Vital Signs Last 24 Hrs  T(C): 36.7 (10 Jul 2020 07:27), Max: 36.8 (09 Jul 2020 20:12)  T(F): 98.1 (10 Jul 2020 07:27), Max: 98.2 (09 Jul 2020 20:12)  HR: 74 (10 Jul 2020 13:08) (66 - 78)  BP: 125/57 (10 Jul 2020 13:08) (118/65 - 156/68)  BP(mean): --  ABP: --  ABP(mean): --  RR: 18 (10 Jul 2020 07:27) (18 - 18)  SpO2: 94% (10 Jul 2020 07:27) (94% - 96%)      GEN: NAD, pleasant  HEENT: normocephalic and atraumatic. EOMI. PERRL.  Anicteric   NECK: Supple.   LUNGS: Clear to auscultation.  HEART: Regular rate and rhythm without murmur.  ABDOMEN: Soft, nontender, and nondistended.  Positive bowel sounds.    : No CVA tenderness  EXTREMITIES: Without any edema. left hip well healed scar  MSK: no joint swelling  NEUROLOGIC: Cranial nerves II through XII are grossly intact. No focal deficits  PSYCHIATRIC: Appropriate affect .  SKIN: No Rash      Labs:                          9.5    8.12  )-----------( 439      ( 10 Jul 2020 07:20 )             28.9   07-10    137  |  104  |  8.0  ----------------------------<  99  4.1   |  23.0  |  0.77    Ca    8.8      10 Jul 2020 07:20  Mg     2.1     07-10

## 2020-07-12 LAB
CULTURE RESULTS: SIGNIFICANT CHANGE UP
CULTURE RESULTS: SIGNIFICANT CHANGE UP
SPECIMEN SOURCE: SIGNIFICANT CHANGE UP
SPECIMEN SOURCE: SIGNIFICANT CHANGE UP

## 2020-07-14 RX ORDER — ASPIRIN/CALCIUM CARB/MAGNESIUM 324 MG
1 TABLET ORAL
Qty: 56 | Refills: 0
Start: 2020-07-14 | End: 2020-08-10

## 2020-07-23 ENCOUNTER — APPOINTMENT (OUTPATIENT)
Dept: ORTHOPEDIC SURGERY | Facility: CLINIC | Age: 75
End: 2020-07-23
Payer: MEDICARE

## 2020-07-23 VITALS
HEART RATE: 51 BPM | BODY MASS INDEX: 25.92 KG/M2 | SYSTOLIC BLOOD PRESSURE: 136 MMHG | DIASTOLIC BLOOD PRESSURE: 56 MMHG | HEIGHT: 69 IN | WEIGHT: 175 LBS

## 2020-07-23 PROCEDURE — 73502 X-RAY EXAM HIP UNI 2-3 VIEWS: CPT | Mod: LT

## 2020-07-23 PROCEDURE — 99024 POSTOP FOLLOW-UP VISIT: CPT

## 2020-07-23 NOTE — ADDENDUM
[FreeTextEntry1] : This note was authored by Federico Beach working as a medical scribe for Dr. Osmin Cisneros. The note was reviewed, edited, and revised by Dr. Osmin Cisneros whom is in agreement with the exam findings, imaging findings, and treatment plan. 07/23/2020.

## 2020-07-23 NOTE — HISTORY OF PRESENT ILLNESS
[Doing Well] : is doing well [Excellent Pain Control] : has excellent pain control [No Sign of Infection] : is showing no signs of infection [Erythema] : not erythematous [Discharge] : absent of discharge [de-identified] : S/P Conversion Left THR, posterior approach, DOS: 6/29/20. [Dehiscence] : not dehisced [de-identified] : Exam of the left hip shows a well healing incision. SLR with weakness, hip external rotation of 30 degrees, hip flexion of 90 degrees. 5/5 motor strength bilaterally distally. Sensation intact distally. LFCN intact.  [de-identified] : The patient is a 75 year old female 3 weeks s/p left posterior conversion THR. She is ambulating and transferring well with a walker and remains partial weightbearing. She reports continuing home therapy. She reports having a setback after being admitted to the hospital for pneumonia. For DVT prophylaxis she is on aspirin twice daily. She is on Celebrex for prevention of heterotopic ossification. Pain is controlled well with oxycodone and Tylenol. She denies systemic symptoms of fever or chills. She denies drainage from the incision site.  [de-identified] : Xray- AP pelvis and 2 views of the left hip shows a hip replacement in stable position, without sign of fracture or dislocation.  [de-identified] : The patient is doing very well 3 weeks after left posterior total hip replacement. The patient will be transitioned to outpatient physical therapy and remain partial weightbearing for 3 weeks. The patient will continue aspirin 325 mg twice per day for DVT prophylaxis for the next 3 weeks. Posterior hip precautions were reinforced. Overall the patient is very happy with their outcome so far. Followup in 3 weeks with repeat x-rays.\par

## 2020-08-04 ENCOUNTER — APPOINTMENT (OUTPATIENT)
Dept: INTERNAL MEDICINE | Facility: CLINIC | Age: 75
End: 2020-08-04

## 2020-08-11 ENCOUNTER — APPOINTMENT (OUTPATIENT)
Dept: ORTHOPEDIC SURGERY | Facility: CLINIC | Age: 75
End: 2020-08-11

## 2020-08-13 ENCOUNTER — APPOINTMENT (OUTPATIENT)
Dept: ORTHOPEDIC SURGERY | Facility: CLINIC | Age: 75
End: 2020-08-13
Payer: MEDICARE

## 2020-08-13 VITALS — HEIGHT: 69 IN | BODY MASS INDEX: 25.92 KG/M2 | WEIGHT: 175 LBS

## 2020-08-13 PROCEDURE — 73502 X-RAY EXAM HIP UNI 2-3 VIEWS: CPT | Mod: LT

## 2020-08-13 PROCEDURE — 99024 POSTOP FOLLOW-UP VISIT: CPT

## 2020-08-13 RX ORDER — SIMVASTATIN 20 MG/1
20 TABLET, FILM COATED ORAL
Qty: 90 | Refills: 0 | Status: ACTIVE | COMMUNITY
Start: 2020-08-10

## 2020-08-13 NOTE — ADDENDUM
[FreeTextEntry1] : This note was authored by Federico Beach working as a medical scribe for Dr. Osmin Cisneros. The note was reviewed, edited, and revised by Dr. Osmin Cisneros whom is in agreement with the exam findings, imaging findings, and treatment plan. 08/13/2020.

## 2020-08-13 NOTE — HISTORY OF PRESENT ILLNESS
[Doing Well] : is doing well [Excellent Pain Control] : has excellent pain control [No Sign of Infection] : is showing no signs of infection [de-identified] : S/P Conversion Left THR, posterior approach, DOS: 6/29/20.  [de-identified] : The patient is a 75 year old female 6-1/2 weeks s/p conversion left posterior THR. She is ambulating and transferring well with a walker and remains partial weightbearing. She reports attending outpatient physical therapy with good improvement of strength. She reports discontinuing aspirin for DVT prophylaxis at this time. Overall, she is very happy with the results of the surgery.  [de-identified] : Exam of the left hip shows a well healed incision.  Hip flexion of 90 degrees, hip external rotation of 40 degrees. 5/5 motor strength bilaterally distally. Sensation intact distally. LFCN intact.  [de-identified] : Xray- AP pelvis and 2 views of the left hip shows a hip replacement in stable position, without sign of fracture or dislocation.  [de-identified] : The patient is doing very well 6 weeks following conversion left posterior total hip replacement. Posterior hip precautions were reviewed and recommended to be followed for another 6 weeks. She was progressed to weightbearing as tolerated. The patient will continue outpatient physical therapy and transition to a cane with physical therapy. The patient may stop taking full strength aspirin at this point. Dental prophylaxis was reviewed. The patient is doing very well so far and overall very happy with their progress. Follow up in 6 weeks.\par \par

## 2020-09-07 ENCOUNTER — INPATIENT (INPATIENT)
Facility: HOSPITAL | Age: 75
LOS: 0 days | Discharge: HOME | DRG: 57 | End: 2020-09-08
Attending: HOSPITALIST | Admitting: HOSPITALIST
Payer: MEDICARE

## 2020-09-07 ENCOUNTER — TRANSCRIPTION ENCOUNTER (OUTPATIENT)
Age: 75
End: 2020-09-07

## 2020-09-07 VITALS
RESPIRATION RATE: 20 BRPM | OXYGEN SATURATION: 98 % | WEIGHT: 162.92 LBS | HEART RATE: 72 BPM | HEIGHT: 69 IN | SYSTOLIC BLOOD PRESSURE: 156 MMHG | DIASTOLIC BLOOD PRESSURE: 104 MMHG | TEMPERATURE: 98 F

## 2020-09-07 DIAGNOSIS — Z98.49 CATARACT EXTRACTION STATUS, UNSPECIFIED EYE: Chronic | ICD-10-CM

## 2020-09-07 DIAGNOSIS — Z90.49 ACQUIRED ABSENCE OF OTHER SPECIFIED PARTS OF DIGESTIVE TRACT: Chronic | ICD-10-CM

## 2020-09-07 DIAGNOSIS — R13.10 DYSPHAGIA, UNSPECIFIED: ICD-10-CM

## 2020-09-07 DIAGNOSIS — I77.9 DISORDER OF ARTERIES AND ARTERIOLES, UNSPECIFIED: Chronic | ICD-10-CM

## 2020-09-07 DIAGNOSIS — Z98.51 TUBAL LIGATION STATUS: Chronic | ICD-10-CM

## 2020-09-07 DIAGNOSIS — R09.89 OTHER SPECIFIED SYMPTOMS AND SIGNS INVOLVING THE CIRCULATORY AND RESPIRATORY SYSTEMS: ICD-10-CM

## 2020-09-07 LAB
ALBUMIN SERPL ELPH-MCNC: 4.7 G/DL — SIGNIFICANT CHANGE UP (ref 3.3–5.2)
ALP SERPL-CCNC: 94 U/L — SIGNIFICANT CHANGE UP (ref 40–120)
ALT FLD-CCNC: 12 U/L — SIGNIFICANT CHANGE UP
ANION GAP SERPL CALC-SCNC: 15 MMOL/L — SIGNIFICANT CHANGE UP (ref 5–17)
APTT BLD: 31 SEC — SIGNIFICANT CHANGE UP (ref 27.5–35.5)
AST SERPL-CCNC: 18 U/L — SIGNIFICANT CHANGE UP
BASOPHILS # BLD AUTO: 0.08 K/UL — SIGNIFICANT CHANGE UP (ref 0–0.2)
BASOPHILS NFR BLD AUTO: 1.1 % — SIGNIFICANT CHANGE UP (ref 0–2)
BILIRUB SERPL-MCNC: 0.3 MG/DL — LOW (ref 0.4–2)
BLD GP AB SCN SERPL QL: SIGNIFICANT CHANGE UP
BUN SERPL-MCNC: 11 MG/DL — SIGNIFICANT CHANGE UP (ref 8–20)
CALCIUM SERPL-MCNC: 10.1 MG/DL — SIGNIFICANT CHANGE UP (ref 8.6–10.2)
CHLORIDE SERPL-SCNC: 103 MMOL/L — SIGNIFICANT CHANGE UP (ref 98–107)
CO2 SERPL-SCNC: 21 MMOL/L — LOW (ref 22–29)
CREAT SERPL-MCNC: 0.94 MG/DL — SIGNIFICANT CHANGE UP (ref 0.5–1.3)
EOSINOPHIL # BLD AUTO: 0.22 K/UL — SIGNIFICANT CHANGE UP (ref 0–0.5)
EOSINOPHIL NFR BLD AUTO: 3 % — SIGNIFICANT CHANGE UP (ref 0–6)
GLUCOSE SERPL-MCNC: 100 MG/DL — HIGH (ref 70–99)
HCT VFR BLD CALC: 41.8 % — SIGNIFICANT CHANGE UP (ref 34.5–45)
HGB BLD-MCNC: 13.3 G/DL — SIGNIFICANT CHANGE UP (ref 11.5–15.5)
IMM GRANULOCYTES NFR BLD AUTO: 0.3 % — SIGNIFICANT CHANGE UP (ref 0–1.5)
INR BLD: 1.09 RATIO — SIGNIFICANT CHANGE UP (ref 0.88–1.16)
LYMPHOCYTES # BLD AUTO: 1.38 K/UL — SIGNIFICANT CHANGE UP (ref 1–3.3)
LYMPHOCYTES # BLD AUTO: 19.1 % — SIGNIFICANT CHANGE UP (ref 13–44)
MCHC RBC-ENTMCNC: 28.5 PG — SIGNIFICANT CHANGE UP (ref 27–34)
MCHC RBC-ENTMCNC: 31.8 GM/DL — LOW (ref 32–36)
MCV RBC AUTO: 89.5 FL — SIGNIFICANT CHANGE UP (ref 80–100)
MONOCYTES # BLD AUTO: 0.56 K/UL — SIGNIFICANT CHANGE UP (ref 0–0.9)
MONOCYTES NFR BLD AUTO: 7.8 % — SIGNIFICANT CHANGE UP (ref 2–14)
NEUTROPHILS # BLD AUTO: 4.96 K/UL — SIGNIFICANT CHANGE UP (ref 1.8–7.4)
NEUTROPHILS NFR BLD AUTO: 68.7 % — SIGNIFICANT CHANGE UP (ref 43–77)
PLATELET # BLD AUTO: 317 K/UL — SIGNIFICANT CHANGE UP (ref 150–400)
POTASSIUM SERPL-MCNC: 4 MMOL/L — SIGNIFICANT CHANGE UP (ref 3.5–5.3)
POTASSIUM SERPL-SCNC: 4 MMOL/L — SIGNIFICANT CHANGE UP (ref 3.5–5.3)
PROT SERPL-MCNC: 7.8 G/DL — SIGNIFICANT CHANGE UP (ref 6.6–8.7)
PROTHROM AB SERPL-ACNC: 12.6 SEC — SIGNIFICANT CHANGE UP (ref 10.6–13.6)
RBC # BLD: 4.67 M/UL — SIGNIFICANT CHANGE UP (ref 3.8–5.2)
RBC # FLD: 13.2 % — SIGNIFICANT CHANGE UP (ref 10.3–14.5)
SARS-COV-2 RNA SPEC QL NAA+PROBE: SIGNIFICANT CHANGE UP
SODIUM SERPL-SCNC: 139 MMOL/L — SIGNIFICANT CHANGE UP (ref 135–145)
WBC # BLD: 7.22 K/UL — SIGNIFICANT CHANGE UP (ref 3.8–10.5)
WBC # FLD AUTO: 7.22 K/UL — SIGNIFICANT CHANGE UP (ref 3.8–10.5)

## 2020-09-07 PROCEDURE — 99285 EMERGENCY DEPT VISIT HI MDM: CPT

## 2020-09-07 PROCEDURE — 71045 X-RAY EXAM CHEST 1 VIEW: CPT | Mod: 26

## 2020-09-07 PROCEDURE — 99233 SBSQ HOSP IP/OBS HIGH 50: CPT

## 2020-09-07 PROCEDURE — 93971 EXTREMITY STUDY: CPT | Mod: 26,LT

## 2020-09-07 PROCEDURE — 99223 1ST HOSP IP/OBS HIGH 75: CPT

## 2020-09-07 PROCEDURE — 93010 ELECTROCARDIOGRAM REPORT: CPT

## 2020-09-07 RX ORDER — SODIUM CHLORIDE 9 MG/ML
1000 INJECTION INTRAMUSCULAR; INTRAVENOUS; SUBCUTANEOUS ONCE
Refills: 0 | Status: COMPLETED | OUTPATIENT
Start: 2020-09-07 | End: 2020-09-07

## 2020-09-07 RX ORDER — HEPARIN SODIUM 5000 [USP'U]/ML
5000 INJECTION INTRAVENOUS; SUBCUTANEOUS EVERY 12 HOURS
Refills: 0 | Status: DISCONTINUED | OUTPATIENT
Start: 2020-09-07 | End: 2020-09-07

## 2020-09-07 RX ORDER — ESOMEPRAZOLE MAGNESIUM 40 MG/1
1 CAPSULE, DELAYED RELEASE ORAL
Qty: 0 | Refills: 0 | DISCHARGE

## 2020-09-07 RX ORDER — FAMOTIDINE 10 MG/ML
20 INJECTION INTRAVENOUS AT BEDTIME
Refills: 0 | Status: DISCONTINUED | OUTPATIENT
Start: 2020-09-07 | End: 2020-09-08

## 2020-09-07 RX ORDER — METOPROLOL TARTRATE 50 MG
5 TABLET ORAL EVERY 6 HOURS
Refills: 0 | Status: DISCONTINUED | OUTPATIENT
Start: 2020-09-07 | End: 2020-09-08

## 2020-09-07 RX ORDER — INFLUENZA VIRUS VACCINE 15; 15; 15; 15 UG/.5ML; UG/.5ML; UG/.5ML; UG/.5ML
0.5 SUSPENSION INTRAMUSCULAR ONCE
Refills: 0 | Status: COMPLETED | OUTPATIENT
Start: 2020-09-07 | End: 2020-09-07

## 2020-09-07 RX ORDER — SODIUM CHLORIDE 9 MG/ML
1000 INJECTION INTRAMUSCULAR; INTRAVENOUS; SUBCUTANEOUS
Refills: 0 | Status: DISCONTINUED | OUTPATIENT
Start: 2020-09-07 | End: 2020-09-08

## 2020-09-07 RX ORDER — ONDANSETRON 8 MG/1
4 TABLET, FILM COATED ORAL EVERY 6 HOURS
Refills: 0 | Status: DISCONTINUED | OUTPATIENT
Start: 2020-09-07 | End: 2020-09-08

## 2020-09-07 RX ORDER — HYDRALAZINE HCL 50 MG
10 TABLET ORAL EVERY 6 HOURS
Refills: 0 | Status: DISCONTINUED | OUTPATIENT
Start: 2020-09-07 | End: 2020-09-08

## 2020-09-07 RX ORDER — KETOROLAC TROMETHAMINE 30 MG/ML
15 SYRINGE (ML) INJECTION EVERY 6 HOURS
Refills: 0 | Status: DISCONTINUED | OUTPATIENT
Start: 2020-09-07 | End: 2020-09-08

## 2020-09-07 RX ADMIN — FAMOTIDINE 100 MILLIGRAM(S): 10 INJECTION INTRAVENOUS at 21:36

## 2020-09-07 RX ADMIN — Medication 10 MILLIGRAM(S): at 17:14

## 2020-09-07 RX ADMIN — SODIUM CHLORIDE 75 MILLILITER(S): 9 INJECTION INTRAMUSCULAR; INTRAVENOUS; SUBCUTANEOUS at 16:18

## 2020-09-07 RX ADMIN — SODIUM CHLORIDE 1000 MILLILITER(S): 9 INJECTION INTRAMUSCULAR; INTRAVENOUS; SUBCUTANEOUS at 12:31

## 2020-09-07 RX ADMIN — Medication 15 MILLIGRAM(S): at 17:11

## 2020-09-07 RX ADMIN — Medication 10 MILLIGRAM(S): at 23:51

## 2020-09-07 NOTE — ED ADULT NURSE NOTE - OBJECTIVE STATEMENT
Assumed care at 1156 pt in no apparent distress, denies any pain at the moment, pt has dysphasia caused from a CVA many years ago, as per pt recently she is not able ot keep anything down, every time that she takes her medications it comes back up. pt has not had anything to eat since Friday

## 2020-09-07 NOTE — ED ADULT TRIAGE NOTE - ARRIVAL FROM
Anesthesia Volume In Cc: 0.5
Post-Care Instructions: I reviewed with the patient in detail post-care instructions. Patient is to wear sunprotection, and avoid picking at any of the treated lesions. Pt may apply Vaseline to crusted or scabbing areas.
Detail Level: Detailed
Medical Necessity Clause: This procedure was medically necessary because the lesions that were treated were:
Bill Insurance (You Assume Risk Of Denial Or Audit By Selecting Yes): Yes
Medical Necessity Information: It is in your best interest to select a reason for this procedure from the list below. All of these items fulfill various CMS LCD requirements except the new and changing color options.
Add 52 Modifier (Optional): no
Consent: The patient's consent was obtained including but not limited to risks of crusting, scabbing, blistering, scarring, darker or lighter pigmentary change, recurrence, incomplete removal and infection.
Home

## 2020-09-07 NOTE — ED PROVIDER NOTE - NS_EDPROVIDERDISPOUSERTYPE_ED_A_ED
Scribe Attestation (For Scribes USE Only).../Medical/PA/NP Students Attestation (For Medical/PA/NP Student USE Only)... Attending Attestation (For Attendings USE Only).../Medical/PA/NP Students Attestation (For Medical/PA/NP Student USE Only)...

## 2020-09-07 NOTE — H&P ADULT - ATTENDING COMMENTS
VTE sq heparin q 12 hrs  med rec done w pt      Follow up  Doppler  GI consult Patient seen and examined at bedside. Agree with above H & P. Pt with dysphagia due to sphincter problems since CVA 6 year ago now with worsening dysphagia to solids & liquids despite sphincterotomy 3 years ago. GI consult called. NPO. IVF. Home meds changed to IV. EGD in am.      PHYSICAL EXAM-  GENERAL: NAD, well-groomed, well-developed  HEAD:  Atraumatic, Normocephalic  EYES: EOMI, PERRLA, conjunctiva and sclera clear  NECK: Supple, No JVD  NERVOUS SYSTEM:  Alert & Oriented X3, Motor Strength 5/5 B/L upper and lower extremities; DTRs 2+ intact and symmetric  CHEST/LUNG: Clear to auscultation bilaterally; No rales, rhonchi, wheezing, or rubs  HEART: Regular rate and rhythm; No murmurs, rubs, or gallops  ABDOMEN: Soft, Nontender, Nondistended; Bowel sounds present  EXTREMITIES:  2+ Peripheral Pulses, +edema LLL (recent L hip arthroplasty)  SKIN: No rashes or lesions

## 2020-09-07 NOTE — H&P ADULT - HISTORY OF PRESENT ILLNESS
75 year old female w chronic dysphagia secondary to CVA presetned to ED [facility] w progressive dysphagia    Chronic dysphagia x 6 yrs after CVA from CEA.  Had sphincterotomy 3 years ago  Over past 4 days has had increased regurgitation/vomiting during meals  + ingestion  of liquids causes coughing/vomiting  Has not tolerated any po x 2 days  Unable to take any meds x 2 days although did try Pepcid x 2 doses    In ED /104   HR 72   RR 20  T 98.3   98% sat RA  NS 1000 cc bolus; consulted GI who will do endoscopy tomorrow  Doppler to evaluate residual swelling L leg according to ED    PAST MEDICAL HX  Carotid artery disease, unspecified laterality    Cerebrovascular accident (CVA), unspecified mechanism    Esophageal dysphagia    HLD hyperlipidemia, unspecified hyperlipidemia type    HTN hypertension  Vertebral Fx T11       PAST SURGICAL HX  THR total hip replacement:  L 06-  Femoral neck fx L  w 3 screws 2017  Hernia   Esophageal dilatation 2017  CEA carotid endarterectomy    ?s/p carotid bypass right side  S/P cataract surgery  both eyes  Retinal surgery Left   Cholecystectomy    Tubal ligation    FAMILY HX  +HTN: Grandparent  + heart disease: Grandparent      SOCIAL HX  former smoker  walks w cane

## 2020-09-07 NOTE — H&P ADULT - NSHPPHYSICALEXAM_GEN_ALL_CORE
REASON FOR Tele-evaluation    SUBJECTIVE: (***)    HPI:          ROS:  (***)    T(C): 36.8 (09-07-20 @ 10:18), Max: 36.8 (09-07-20 @ 10:18)  HR: 72 (09-07-20 @ 10:18) (72 - 72)  BP: 156/104 (09-07-20 @ 10:18) (156/104 - 156/104)  RR: 20 (09-07-20 @ 10:18) (20 - 20)  SpO2: 98% (09-07-20 @ 10:18) (98% - 98%)    PHYSICAL EXAM: evaluation precludes physical exam. Pertinent physical exam findings as per video conference with teleNA:    Pleasant female talking easily wearing surgical facemask, in NAD

## 2020-09-07 NOTE — ED ADULT TRIAGE NOTE - CHIEF COMPLAINT QUOTE
Pt arrives to ED , breathing easy and unlabored , c/o difficulty swallowing since Thursday , unable to eat breakfast this morning " Dr. De La Cruz is my GI and I was recently in the hospital but because of my hip I could not finish  my swallowing test, it was incomplete " Hx od chronic dysaphia for 6 years

## 2020-09-07 NOTE — ED PROVIDER NOTE - OBJECTIVE STATEMENT
75yF with pmh dysphagia 2/2 CVA presenting with worsening dysphagia. Pt with chronic dysphagia for 6 years after CVA from carotid endarterectomy. Endorses increased secretions and increased difficulty with eating. Reports not being able to eat breakfast or take medication this morning. Has taken Pepcid the past few days w/o relief. Has h/o of aspiration PNA but today denies SOB, fevers, chills, and chest pain today. Denies headache, weakness, vision changes, or sensory changes. Last saw BREANNE De La Cruz while hospitalized for PNA in July but has not follow-up outpatient.

## 2020-09-07 NOTE — ED PROVIDER NOTE - PHYSICAL EXAMINATION
General: NAD, sitting comfortably in bed  HEENT: oropharynx clear with no swelling or erythema, uvula midline, EOMI, PERRLA  CV: normal S1/S2, no rubs, murmurs, gallops, 2+ radial pulses  Resp: CTABL; no wheezes rales, rhonchi  Abd: soft, NT, ND, no rebound tenderness or guarding  Skin: no rashes of lesions  Neuro: A&Ox3; CN II-XII intact; proximal muscle strength 5/5 b/l UEs and LEs; general sensation intact b/l; normal FTN; Heel to shin  Psych: affect appropriate to situation

## 2020-09-07 NOTE — ED PROVIDER NOTE - ATTENDING CONTRIBUTION TO CARE
Lito: I performed a face to face bedside interview with patient regarding history of present illness, review of symptoms and past medical history. I completed an independent physical exam.  I have discussed patient's plan of care with the medical student  I agree with note as stated above including HISTORY OF PRESENT ILLNESS, HIV, PAST MEDICAL/SURGICAL/FAMILY/SOCIAL HISTORY, ALLERGIES AND HOME MEDICATIONS, REVIEW OF SYSTEMS, PHYSICAL EXAM, MEDICAL DECISION MAKING and any PROGRESS NOTES during the time I functioned as the attending physician for this patient  unless otherwise noted. My assessment is as follows: unable to swallow over past few days, meds/food/water over past 24 hours not going down at all. with some left LE swelling and recent hip replacement, likely post surgical but needs eval for dvt. patent airway tolerating saliva, no resp complaints. Dr. Mullins from GI saw pt, to be admitted for endoscopy tmrw given unable to take meds/liquids.

## 2020-09-07 NOTE — H&P ADULT - ASSESSMENT
#Progressive dysphagia x 4 days  #Unable to tolerate any po x 2 days  1. admit to telemetry  2. s/p 1 L NS in ED  3. continue NS 75 cc/hr  4. zofran 4 mg prn  5. NPO  6. for EGD tomorrow  7. GI consulted    #CVA  1. hold ASA 81 mg po  2. hold simvastatin 20 mg po      #HTN  #unable to take meds x 2 days  1. hydralazine 75 mg po change to 10 mg IV q 6 hrs w parameters (dosed between 0.1 and 0.2 mg/kg/dose IV q 6 hrs)  2. metoprolol 100 mg BID change to lopressor 5 mg IV q 6 hrs w parameterrs  3. hold amlodipine      #s/p L THR  1. ambulate w cane  2, doppler ordered by ED    #HLD  1. hold statin    #MISC  1. hold claritin  2. pepcid 20 mg IV      IMPROVE VTE Individual Risk Assessment    RISK                                                                Points    [  ] Previous VTE                                                  3    [  ] Thrombophilia                                               2    [  ] Lower limb paralysis                                      2        (unable to hold up >15 seconds)      [  ] Current Cancer                                              2         (within 6 months)    [  ] Immobilization > 24 hrs                                1    [  ] ICU/CCU stay > 24 hours                              1    [ X ] Age > 60                                                      1    IMPROVE VTE Score ______1___    IMPROVE Score 0-1: Low Risk, No VTE prophylaxis required for most patients, encourage ambulation.   IMPROVE Score 2-3: At risk, pharmacologic VTE prophylaxis is indicated for most patients (in the absence of a contraindication)  IMPROVE Score > or = 4: High Risk, pharmacologic VTE prophylaxis is indicated for most patients (in the absence of a contraindication) #Progressive dysphagia x 4 days  #Unable to tolerate any po x 2 days  1. admit to telemetry  2. s/p 1 L NS in ED  3. continue NS 75 cc/hr  4. zofran 4 mg prn  5. NPO  6. for EGD tomorrow  7. GI consulted    #CVA  1. hold ASA 81 mg po  2. hold simvastatin 20 mg po      #HTN  #unable to take meds x 2 days  1. hydralazine 75 mg po change to 10 mg IV q 6 hrs w parameters (dosed between 0.1 and 0.2 mg/kg/dose IV q 6 hrs)  2. metoprolol 100 mg BID change to lopressor 5 mg IV q 6 hrs w parameterrs  3. hold amlodipine      #s/p L THR  1. ambulate w cane  2, doppler ordered by ED    #HLD  1. hold statin    #MISC  1. hold claritin  2. pepcid 20 mg IV  VTE sq heparin q 12 hrs  med rec done w pt            IMPROVE VTE Individual Risk Assessment    RISK                                                                Points    [  ] Previous VTE                                                  3    [  ] Thrombophilia                                               2    [  ] Lower limb paralysis                                      2        (unable to hold up >15 seconds)      [  ] Current Cancer                                              2         (within 6 months)    [  ] Immobilization > 24 hrs                                1    [  ] ICU/CCU stay > 24 hours                              1    [ X ] Age > 60                                                      1    IMPROVE VTE Score ______1___    IMPROVE Score 0-1: Low Risk, No VTE prophylaxis required for most patients, encourage ambulation.   IMPROVE Score 2-3: At risk, pharmacologic VTE prophylaxis is indicated for most patients (in the absence of a contraindication)  IMPROVE Score > or = 4: High Risk, pharmacologic VTE prophylaxis is indicated for most patients (in the absence of a contraindication)

## 2020-09-07 NOTE — CONSULT NOTE ADULT - SUBJECTIVE AND OBJECTIVE BOX
HPI:75yF with pmh dysphagia 2/2 CVA presenting with worsening dysphagia. Pt with chronic dysphagia for 6 years after CVA from carotid endarterectomy. Endorses increased secretions and increased difficulty with eating. Reports not being able to eat breakfast or take medication this morning. Has taken Pepcid the past few days w/o relief. Has h/o of aspiration PNA but today denies SOB, fevers, chills, and chest pain today. Denies headache, weakness, vision changes, or sensory changes.    She had esophagram performed. She had no previous colonoscopy.       PAST MEDICAL & SURGICAL HISTORY:  Carotid artery disease, unspecified laterality  Hyperlipidemia, unspecified hyperlipidemia type  Cerebrovascular accident (CVA), unspecified mechanism  Esophageal dysphagia  Hypertension  S/P tubal ligation  S/P cholecystectomy  S/P cataract surgery: both eyes  Carotid artery disease: s/p carotid bypass right side      ROS:  No Heartburn, regurgitation,   +dysphagia, No odynophagia.  No dyspepsia  No abdominal pain.    No Nausea, vomiting.  No Bleeding.  No hematemesis.   No diarrhea.    No hematochezia  No weight loss, anorexia.  No edema.      MEDICATIONS  (STANDING):  heparin   Injectable 5000 Unit(s) SubCutaneous every 12 hours  sodium chloride 0.9%. 1000 milliLiter(s) (75 mL/Hr) IV Continuous <Continuous>    MEDICATIONS  (PRN):  ondansetron Injectable 4 milliGRAM(s) IV Push every 6 hours PRN Nausea      Allergies    No Known Allergies    Intolerances    Biaxin (Other)      SOCIAL HISTORY: NC    ENDOSCOPIC/GI HISTORY: NC    FAMILY HISTORY:  FH: heart disease (Grandparent)  FH: HTN (hypertension) (Grandparent)      Vital Signs Last 24 Hrs  T(C): 36.8 (07 Sep 2020 10:18), Max: 36.8 (07 Sep 2020 10:18)  T(F): 98.3 (07 Sep 2020 10:18), Max: 98.3 (07 Sep 2020 10:18)  HR: 72 (07 Sep 2020 10:18) (72 - 72)  BP: 156/104 (07 Sep 2020 10:18) (156/104 - 156/104)  BP(mean): --  RR: 20 (07 Sep 2020 10:18) (20 - 20)  SpO2: 98% (07 Sep 2020 10:18) (98% - 98%)    PHYSICAL EXAM:    GENERAL: NAD, well-groomed, well-developed  HEAD:  Atraumatic, Normocephalic  EYES: EOMI, PERRLA, conjunctiva and sclera clear  ENMT: No tonsillar erythema, exudates, or enlargement; Moist mucous membranes, Good dentition, No lesions  NECK: Supple, No JVD, Normal thyroid  CHEST/LUNG: Clear to percussion bilaterally; No rales, rhonchi, wheezing, or rubs  HEART: Regular rate and rhythm; No murmurs, rubs, or gallops  ABDOMEN: Soft, Nontender, Nondistended; Bowel sounds present  EXTREMITIES:  2+ Peripheral Pulses, No clubbing, cyanosis, or edema  LYMPH: No lymphadenopathy noted  SKIN: No rashes or lesions      LABS:                        13.3   7.22  )-----------( 317      ( 07 Sep 2020 12:31 )             41.8     -    139  |  103  |  11.0  ----------------------------<  100<H>  4.0   |  21.0<L>  |  0.94    Ca    10.1      07 Sep 2020 12:31    TPro  7.8  /  Alb  4.7  /  TBili  0.3<L>  /  DBili  x   /  AST  18  /  ALT  12  /  AlkPhos  94  09-07    PT/INR - ( 07 Sep 2020 12:31 )   PT: 12.6 sec;   INR: 1.09 ratio         PTT - ( 07 Sep 2020 12:31 )  PTT:31.0 sec       LIVER FUNCTIONS - ( 07 Sep 2020 12:31 )  Alb: 4.7 g/dL / Pro: 7.8 g/dL / ALK PHOS: 94 U/L / ALT: 12 U/L / AST: 18 U/L / GGT: x               RADIOLOGY & ADDITIONAL STUDIES:< from: Xray Esophagram Single Contrast (07.10.20 @ 10:14) >     EXAM:  XR ESOPH SNGL CON STUDY                          PROCEDURE DATE:  07/10/2020          INTERPRETATION:  XR ESOPHAGRAM    HISTORY: Dysphagia. T11 fracture.    TECHNIQUE: Double contrast esophagram. Total fluoroscopic time of 3.1 minutes.    Comparison: CT chest 2020    FINDINGS:    The imaged lungs are clear on a  view. The heart is normal in size. No acute osseous abnormality.    The patient was unable to stand due to a recent compression fracture and the examination was performed with the patient sitting in a chair.    There is esophageal dysmotility including tertiary contractions. There is narrowing in the region of the gastroesophageal junction.    The patient states she has difficulty swallowing pills endocrine swallowing a 12 mm radiopaque barium pill.    IMPRESSION:      Esophageal dysmotility including tertiary contractions.    Narrowing in the region of the gastroesophageal junction with evaluation limited secondary to the examination being performed in the sitting position; either an endoscopy is recommended for further evaluation or a repeat study is recommended when the patient is able to perform the study on the fluoroscopy table.                SHAAN BRADFORD M.D., ATTENDING RADIOLOGIST  This document has been electronically signed. Jul 10 2020  3:34PM                  < end of copied text >    < from: 12 Lead ECG (20 @ 18:03) >    Ventricular Rate 83 BPM    Atrial Rate 83 BPM    P-R Interval 162 ms    QRS Duration 78 ms    Q-T Interval 358 ms    QTC Calculation(Bezet) 420 ms    P Axis -13 degrees    R Axis 12 degrees    T Axis 12 degrees    Diagnosis Line Sinus rhythm with Premature atrial complexes  Otherwise normal ECG    Confirmed by JENNIFER MEDINA ([id number]) on 2020 11:11:45 PM    < end of copied text >    < from: TTE Echo Complete w/Doppler (18 @ 08:39) >    EXAM:  ECHO TRANSTHORACIC COMP W DOPP      PROCEDURE DATE:  Mar 19 2018   .      INTERPRETATION:  REPORT:    TRANSTHORACIC ECHOCARDIOGRAM REPORT         Patient Name:   CHARANJIT GREENWOOD Patient Location: Inpatient  Medical Rec #:  [id number]   Accession #:     [id number]  Account #:                 Height:           68.9 in 175.0 cm  YOB: 1945  Weight:           178.6 lb 81.00 kg  Patient Age:    72 years   BSA:              1.97 m²  Patient Gender: F          BP:               150/59 mmHg       Date of Exam:        3/19/2018 8:39:30 AM  Sonographer:         Beatriz Hernandez  Referring Physician: Jeaneth Napoles MD    Procedure:     2D Echo/Doppler/Color Doppler Complete.  Indications:   Chronic ischemic heart disease, unspecified - I25.9  Diagnosis:     Nonrheumatic mitral (valve) insufficiency - I34.0  Study Details: Technically adequate study.         2D AND M-MODE MEASUREMENTS (normal ranges within parentheses):  Left                Normal    Aorta/Left           Normal  Ventricle:                    Atrium:  IVSd (2D):    0.97  (0.7-1.1) Aortic Root  2.90 cm (2.4-3.7)                cm              (2D):  LVPWd (2D):   1.03  (0.7-1.1) Left Atrium  3.71 cm (1.9-4.0)                cm              (2D):  LVIDd (2D):   3.84(3.4-5.7) LA Volume    22.4                cm              Index        ml/m²  LVIDs (2D):   2.52            Right Ventricle:                cm              TAPSE:           2.50 cm  LV FS (2D):   34.3  (>25%)                %  Relative Wall 0.54  (<0.42)  Thickness    LV SYSTOLIC FUNCTION BY 2D PLANIMETRY (MOD):  EF-Biplane: 69 %    LV DIASTOLIC FUNCTION:  MV Peak E: 0.88 m/s e', MV Della: 0.08 m/s  MV Peak A: 1.04 m/s E/e' Ratio: 10.74  E/A Ratio: 0.85    SPECTRAL DOPPLER ANALYSIS (where applicable):  Aortic Valve: AoV Max [place]:  AoV Peak PG:  AoV Mean P.0 mmHg    LVOT Vmax:  LVOT VTI: 0.225 m LVOT Diameter: 1.97 cm    AoV Area, Vmax:  AoV Area, VTI: 2.30 cm² AoV Area, Vmn: 2.40 cm²  Ao VTI: 0.298     PHYSICIAN INTERPRETATION:  Left Ventricle: The left ventricular internal cavity size is normal.  Global LV systolic function was normal. Left ventricular ejection   fraction, by visual estimation, is 65 to 70%. Spectral Doppler shows   impaired relaxation pattern of left ventricular myocardial filling (Grade   I diastolic dysfunction).  Right Ventricle: The right ventricular size is normal. RV systolic   function is normal.  Left Atrium: Normal left atrial size.  Right Atrium: Normal right atrial size.  Pericardium: There is no evidence of pericardial effusion.  Mitral Valve: Thickening of the anterior and posterior mitral valve   leaflets. Mild mitral valve regurgitation is seen.  Tricuspid Valve: Trivial tricuspid regurgitation is visualized. Adequate   TR velocity was not obtained to accurately assess RVSP.  Aortic Valve: No evidence of aortic valve regurgitation is seen.  Pulmonic Valve: Structurally normal pulmonic valve, with normal leaflet   excursion. Trace pulmonic valve regurgitation.  Aorta: The aortic root is normal in size and structure.  Pulmonary Artery: The main pulmonary artery is normal in size.  Venous: The inferior vena cava was normal sized, with respiratory size   variation greater than 50%.       Summary:   1. Left ventricular ejection fraction, by visual estimation, is 65 to   70%.   2. Normal global left ventricular systolic function.   3. Spectral Doppler shows impaired relaxation pattern of left   ventricular myocardial filling (Grade I diastolic dysfunction).   4. There is no evidence of pericardial effusion.   5. Mild mitral valve regurgitation.   6. Thickening of the anterior and posterior mitral valve leaflets.    [id number] Boone Ho MD, MD Electronically signed on 3/19/2018 at   12:01:39 PM              *** Final ***                  BOONE HO   This document has been electronically signed. Mar 19 2018  8:39AM            < end of copied text >

## 2020-09-07 NOTE — ED STATDOCS - PROGRESS NOTE DETAILS
76 y/o F pt with PMHX esophageal sphincter malfunction s/p CVA presents to ED c/o worsening difficulty swallowing x 5 days. Pt has chronic dysphagia x 6 years; follows with Dr. De La Cruz. Pt states over the past few days has not been able to swallow liquids and brings up phlegm. Was not able to take her meds for the past day. Denies cough. Will be transferred to [facility] for further eval. 74 y/o F pt with PMHX esophageal sphincter malfunction s/p CVA, Carotid Disease s/p CEA, HTN, HLD; now presents to ED c/o worsening difficulty swallowing x 5 days. Pt has chronic dysphagia x 6 years; follows with Dr. De La Cruz. Pt states over the past few days has not been able to swallow liquids or medications.  states everything comes up within a few mintues with phlegm. Was not able to take her meds for the past day. Denies cough.   Focused eval, protocol orders entered. Pt to be moved to main ED for complete evaluation by another provider.

## 2020-09-07 NOTE — CONSULT NOTE ADULT - ASSESSMENT
Patient with history of dysphagia, CVA in the past and now with worsening dysphagia. She had previous EGD performed in [place]. Doing ok. Keep NPO after midnight for possible EGD.   COVID 19 PCR  Chest X ray  EKG

## 2020-09-07 NOTE — ED PROVIDER NOTE - CLINICAL SUMMARY MEDICAL DECISION MAKING FREE TEXT BOX
75yF with chronic dysphagia from CVA presenting with difficulty swallowing. Pt without signs of infections. GI to see patient in the setting of not being able to tolerate PO meds, food, or secretions. Will admit for endoscopy. CBC, CMP, fluids, and CXR

## 2020-09-07 NOTE — ED STATDOCS - OBJECTIVE STATEMENT
76 y/o F pt with PMHX esophageal sphincter malfunction s/p CVA presents to ED c/o worsening difficulty swallowing x 5 days. Pt has chronic dysphagia x 6 years; follows with Dr. De La rCuz. Pt states over the past few days has not been able to swallow liquids and brings up phlegm. Was not able to take her meds for the past day. Denies cough. Will be transferred to [facility] for further eval.

## 2020-09-08 ENCOUNTER — TRANSCRIPTION ENCOUNTER (OUTPATIENT)
Age: 75
End: 2020-09-08

## 2020-09-08 ENCOUNTER — RESULT REVIEW (OUTPATIENT)
Age: 75
End: 2020-09-08

## 2020-09-08 VITALS
TEMPERATURE: 98 F | DIASTOLIC BLOOD PRESSURE: 79 MMHG | SYSTOLIC BLOOD PRESSURE: 155 MMHG | RESPIRATION RATE: 18 BRPM | OXYGEN SATURATION: 98 % | HEART RATE: 93 BPM

## 2020-09-08 LAB
ANION GAP SERPL CALC-SCNC: 11 MMOL/L — SIGNIFICANT CHANGE UP (ref 5–17)
BASOPHILS # BLD AUTO: 0.06 K/UL — SIGNIFICANT CHANGE UP (ref 0–0.2)
BASOPHILS NFR BLD AUTO: 1 % — SIGNIFICANT CHANGE UP (ref 0–2)
BUN SERPL-MCNC: 9 MG/DL — SIGNIFICANT CHANGE UP (ref 8–20)
CALCIUM SERPL-MCNC: 9 MG/DL — SIGNIFICANT CHANGE UP (ref 8.6–10.2)
CHLORIDE SERPL-SCNC: 110 MMOL/L — HIGH (ref 98–107)
CO2 SERPL-SCNC: 20 MMOL/L — LOW (ref 22–29)
CREAT SERPL-MCNC: 0.74 MG/DL — SIGNIFICANT CHANGE UP (ref 0.5–1.3)
EOSINOPHIL # BLD AUTO: 0.36 K/UL — SIGNIFICANT CHANGE UP (ref 0–0.5)
EOSINOPHIL NFR BLD AUTO: 5.9 % — SIGNIFICANT CHANGE UP (ref 0–6)
GLUCOSE BLDC GLUCOMTR-MCNC: 87 MG/DL — SIGNIFICANT CHANGE UP (ref 70–99)
GLUCOSE SERPL-MCNC: 89 MG/DL — SIGNIFICANT CHANGE UP (ref 70–99)
HCT VFR BLD CALC: 34.8 % — SIGNIFICANT CHANGE UP (ref 34.5–45)
HGB BLD-MCNC: 11 G/DL — LOW (ref 11.5–15.5)
IMM GRANULOCYTES NFR BLD AUTO: 0.5 % — SIGNIFICANT CHANGE UP (ref 0–1.5)
LYMPHOCYTES # BLD AUTO: 1.39 K/UL — SIGNIFICANT CHANGE UP (ref 1–3.3)
LYMPHOCYTES # BLD AUTO: 22.9 % — SIGNIFICANT CHANGE UP (ref 13–44)
MAGNESIUM SERPL-MCNC: 2.1 MG/DL — SIGNIFICANT CHANGE UP (ref 1.6–2.6)
MCHC RBC-ENTMCNC: 28.4 PG — SIGNIFICANT CHANGE UP (ref 27–34)
MCHC RBC-ENTMCNC: 31.6 GM/DL — LOW (ref 32–36)
MCV RBC AUTO: 89.7 FL — SIGNIFICANT CHANGE UP (ref 80–100)
MONOCYTES # BLD AUTO: 0.68 K/UL — SIGNIFICANT CHANGE UP (ref 0–0.9)
MONOCYTES NFR BLD AUTO: 11.2 % — SIGNIFICANT CHANGE UP (ref 2–14)
NEUTROPHILS # BLD AUTO: 3.55 K/UL — SIGNIFICANT CHANGE UP (ref 1.8–7.4)
NEUTROPHILS NFR BLD AUTO: 58.5 % — SIGNIFICANT CHANGE UP (ref 43–77)
PHOSPHATE SERPL-MCNC: 3.7 MG/DL — SIGNIFICANT CHANGE UP (ref 2.4–4.7)
PLATELET # BLD AUTO: 249 K/UL — SIGNIFICANT CHANGE UP (ref 150–400)
POTASSIUM SERPL-MCNC: 3.7 MMOL/L — SIGNIFICANT CHANGE UP (ref 3.5–5.3)
POTASSIUM SERPL-SCNC: 3.7 MMOL/L — SIGNIFICANT CHANGE UP (ref 3.5–5.3)
RBC # BLD: 3.88 M/UL — SIGNIFICANT CHANGE UP (ref 3.8–5.2)
RBC # FLD: 13.3 % — SIGNIFICANT CHANGE UP (ref 10.3–14.5)
SODIUM SERPL-SCNC: 141 MMOL/L — SIGNIFICANT CHANGE UP (ref 135–145)
WBC # BLD: 6.07 K/UL — SIGNIFICANT CHANGE UP (ref 3.8–10.5)
WBC # FLD AUTO: 6.07 K/UL — SIGNIFICANT CHANGE UP (ref 3.8–10.5)

## 2020-09-08 PROCEDURE — 88342 IMHCHEM/IMCYTCHM 1ST ANTB: CPT | Mod: 26

## 2020-09-08 PROCEDURE — 93971 EXTREMITY STUDY: CPT

## 2020-09-08 PROCEDURE — 85610 PROTHROMBIN TIME: CPT

## 2020-09-08 PROCEDURE — 80053 COMPREHEN METABOLIC PANEL: CPT

## 2020-09-08 PROCEDURE — 93005 ELECTROCARDIOGRAM TRACING: CPT

## 2020-09-08 PROCEDURE — 85027 COMPLETE CBC AUTOMATED: CPT

## 2020-09-08 PROCEDURE — 86769 SARS-COV-2 COVID-19 ANTIBODY: CPT

## 2020-09-08 PROCEDURE — 71045 X-RAY EXAM CHEST 1 VIEW: CPT

## 2020-09-08 PROCEDURE — 82962 GLUCOSE BLOOD TEST: CPT

## 2020-09-08 PROCEDURE — 83735 ASSAY OF MAGNESIUM: CPT

## 2020-09-08 PROCEDURE — 86900 BLOOD TYPING SEROLOGIC ABO: CPT

## 2020-09-08 PROCEDURE — 86850 RBC ANTIBODY SCREEN: CPT

## 2020-09-08 PROCEDURE — 88305 TISSUE EXAM BY PATHOLOGIST: CPT

## 2020-09-08 PROCEDURE — 84100 ASSAY OF PHOSPHORUS: CPT

## 2020-09-08 PROCEDURE — 36415 COLL VENOUS BLD VENIPUNCTURE: CPT

## 2020-09-08 PROCEDURE — 85730 THROMBOPLASTIN TIME PARTIAL: CPT

## 2020-09-08 PROCEDURE — 43239 EGD BIOPSY SINGLE/MULTIPLE: CPT

## 2020-09-08 PROCEDURE — 88305 TISSUE EXAM BY PATHOLOGIST: CPT | Mod: 26

## 2020-09-08 PROCEDURE — 80048 BASIC METABOLIC PNL TOTAL CA: CPT

## 2020-09-08 PROCEDURE — 99285 EMERGENCY DEPT VISIT HI MDM: CPT

## 2020-09-08 PROCEDURE — 86901 BLOOD TYPING SEROLOGIC RH(D): CPT

## 2020-09-08 PROCEDURE — 87635 SARS-COV-2 COVID-19 AMP PRB: CPT

## 2020-09-08 PROCEDURE — 88342 IMHCHEM/IMCYTCHM 1ST ANTB: CPT

## 2020-09-08 PROCEDURE — 99239 HOSP IP/OBS DSCHRG MGMT >30: CPT

## 2020-09-08 RX ORDER — PANTOPRAZOLE SODIUM 20 MG/1
40 TABLET, DELAYED RELEASE ORAL
Refills: 0 | Status: DISCONTINUED | OUTPATIENT
Start: 2020-09-08 | End: 2020-09-08

## 2020-09-08 RX ORDER — SUCRALFATE 1 G
1 TABLET ORAL
Refills: 0 | Status: DISCONTINUED | OUTPATIENT
Start: 2020-09-08 | End: 2020-09-08

## 2020-09-08 RX ORDER — SUCRALFATE 1 G
1 TABLET ORAL
Qty: 120 | Refills: 0
Start: 2020-09-08 | End: 2020-10-07

## 2020-09-08 RX ORDER — SUCRALFATE 1 G
10 TABLET ORAL
Qty: 1200 | Refills: 0
Start: 2020-09-08 | End: 2020-10-07

## 2020-09-08 RX ORDER — PANTOPRAZOLE SODIUM 20 MG/1
1 TABLET, DELAYED RELEASE ORAL
Qty: 60 | Refills: 0
Start: 2020-09-08 | End: 2020-10-07

## 2020-09-08 RX ADMIN — Medication 5 MILLIGRAM(S): at 05:08

## 2020-09-08 RX ADMIN — Medication 15 MILLIGRAM(S): at 06:30

## 2020-09-08 RX ADMIN — Medication 10 MILLIGRAM(S): at 05:07

## 2020-09-08 RX ADMIN — Medication 1 GRAM(S): at 14:25

## 2020-09-08 RX ADMIN — SODIUM CHLORIDE 75 MILLILITER(S): 9 INJECTION INTRAMUSCULAR; INTRAVENOUS; SUBCUTANEOUS at 05:10

## 2020-09-08 RX ADMIN — Medication 15 MILLIGRAM(S): at 05:10

## 2020-09-08 NOTE — DISCHARGE NOTE PROVIDER - NSDCCPCAREPLAN_GEN_ALL_CORE_FT
PRINCIPAL DISCHARGE DIAGNOSIS  Diagnosis: Esophageal dysphagia  Assessment and Plan of Treatment: Continue current medications as prescribed.  Follow up with primary doctor and GI.      SECONDARY DISCHARGE DIAGNOSES  Diagnosis: HLD (hyperlipidemia)  Assessment and Plan of Treatment: Continue current medications.    Diagnosis: HTN (hypertension)  Assessment and Plan of Treatment: Continue current medications as prescribed.

## 2020-09-08 NOTE — BRIEF OPERATIVE NOTE - NSICDXBRIEFPREOP_GEN_ALL_CORE_FT
PRE-OP DIAGNOSIS:  Abnormal esophagram 08-Sep-2020 08:57:07  Shakir Noel  Esophageal dysphagia 08-Sep-2020 08:56:59  Shakir Noel

## 2020-09-08 NOTE — BRIEF OPERATIVE NOTE - OPERATION/FINDINGS
Esophageal ring right above G-E jncn. in distal esophagus that was disrupted with the passage of the endoscope. @ 38 cm. from the incisors, The Q scope (8.8 mm. in diameter) was then easily passed into the stomach and first and second portions of the duodenum w/o difficulty. The remainder of the EGD was normal. Antral and body biopsies were taken to r/o H. Pylori infection.

## 2020-09-08 NOTE — DISCHARGE NOTE PROVIDER - CARE PROVIDER_API CALL
Roderick Amaya  [id number]  [address]  [place], [place] [id number]  Phone: [phone]  Fax: [phone]  Follow Up Time:     Skip Mullins  GASTROENTEROLOGY  [address] [address]  Phone: [phone]  Fax: [phone]  Follow Up Time:

## 2020-09-08 NOTE — DISCHARGE NOTE NURSING/CASE MANAGEMENT/SOCIAL WORK - PATIENT PORTAL LINK FT
You can access the FollowMyHealth Patient Portal offered by [facility] by registering at the following website: [email or web address]. By joining [employer]’s FollowMyHealth portal, you will also be able to view your health information using other applications (apps) compatible with our system.

## 2020-09-08 NOTE — DISCHARGE NOTE PROVIDER - HOSPITAL COURSE
75 year old female w chronic dysphagia secondary to CVA, s/p sphincterotomy presented to ED from Urgent Care with progressive dysphagia.  GI consulted.  Patient s/p EGD with esophageal ring right above G-E junction in distal esophagus that was disrupted with the passage of the endoscope, biopsies obtained.  Diet advanced and tolerating.  Patient stable for discharge to home with outpatient follow up with primary doctor and GI.        Vital Signs Last 24 Hrs    T(C): 36.7 (08 Sep 2020 10:05), Max: 37.1 (08 Sep 2020 04:35)    T(F): 98 (08 Sep 2020 10:05), Max: 98.8 (08 Sep 2020 04:35)    HR: 93 (08 Sep 2020 10:05) (71 - 93)    BP: 155/79 (08 Sep 2020 10:05) (115/84 - 188/71)    BP(mean): --    RR: 18 (08 Sep 2020 10:05) (18 - 20)    SpO2: 98% (08 Sep 2020 10:05) (94% - 98%)        PHYSICAL EXAM:    GENERAL: NAD, well-groomed, well-developed    HEAD:  Atraumatic, Normocephalic    NECK: Supple, No JVD, Normal thyroid    NERVOUS SYSTEM:  Alert & Oriented X3, Good concentration; Motor Strength 5/5 B/L upper and lower extremities    CHEST/LUNG: Clear to auscultation bilaterally; No rales, rhonchi, wheezing, or rubs    HEART: Regular rate and rhythm; No murmurs, rubs, or gallops    ABDOMEN: Soft, Nontender, Nondistended; Bowel sounds present    EXTREMITIES:  2+ Peripheral Pulses, No clubbing, cyanosis, or edema 75 year old female w chronic dysphagia secondary to CVA, s/p sphincterotomy presented to ED from Urgent Care with progressive dysphagia.  GI consulted.  Patient s/p EGD with esophageal ring right above G-E junction in distal esophagus that was disrupted with the passage of the endoscope, biopsies obtained  Diet advanced and tolerating.  Patient stable for discharge to home with outpatient follow up with primary doctor and GI.        Vital Signs Last 24 Hrs    T(C): 36.7 (08 Sep 2020 10:05), Max: 37.1 (08 Sep 2020 04:35)    T(F): 98 (08 Sep 2020 10:05), Max: 98.8 (08 Sep 2020 04:35)    HR: 93 (08 Sep 2020 10:05) (71 - 93)    BP: 155/79 (08 Sep 2020 10:05) (115/84 - 188/71)    BP(mean): --    RR: 18 (08 Sep 2020 10:05) (18 - 20)    SpO2: 98% (08 Sep 2020 10:05) (94% - 98%)        PHYSICAL EXAM:    GENERAL: NAD, well-groomed, well-developed    HEAD:  Atraumatic, Normocephalic    NECK: Supple, No JVD    NERVOUS SYSTEM:  Alert & Oriented X3, Good concentration; Motor Strength 5/5 B/L upper and lower extremities    CHEST/LUNG: Clear to auscultation bilaterally; No rales, rhonchi, wheezing, or rubs    HEART: Regular rate and rhythm; No murmurs, rubs, or gallops    ABDOMEN: Soft, Nontender, Nondistended; Bowel sounds present    EXTREMITIES:  2+ Peripheral Pulses, No clubbing, cyanosis, or edema

## 2020-09-08 NOTE — DISCHARGE NOTE PROVIDER - NSDCMRMEDTOKEN_GEN_ALL_CORE_FT
amLODIPine 10 mg oral tablet: 1 tab(s) orally once a day  aspirin 81 mg oral tablet, chewable: 1 tab(s) orally once a day  Claritin 10 mg oral tablet: 1 tab(s) orally once a day  hydrALAZINE 25 mg oral tablet: 3 tab(s) orally 3 times a day  pantoprazole 40 mg oral delayed release tablet: 1 tab(s) orally 2 times a day  simvastatin 20 mg oral tablet: 1 tab(s) orally once a day (at bedtime)  sucralfate 1 g/10 mL oral suspension: 10 milliliter(s) orally 4 times a day amLODIPine 10 mg oral tablet: 1 tab(s) orally once a day  aspirin 81 mg oral tablet, chewable: 1 tab(s) orally once a day  Carafate 1 g oral tablet: 1 tab(s) orally 4 times a day   Claritin 10 mg oral tablet: 1 tab(s) orally once a day  hydrALAZINE 25 mg oral tablet: 3 tab(s) orally 3 times a day  pantoprazole 40 mg oral delayed release tablet: 1 tab(s) orally 2 times a day  simvastatin 20 mg oral tablet: 1 tab(s) orally once a day (at bedtime)

## 2020-09-09 LAB
SARS-COV-2 IGG SERPL QL IA: NEGATIVE — SIGNIFICANT CHANGE UP
SARS-COV-2 IGM SERPL IA-ACNC: 0.09 INDEX — SIGNIFICANT CHANGE UP

## 2020-09-14 LAB — SURGICAL PATHOLOGY STUDY: SIGNIFICANT CHANGE UP

## 2020-09-24 ENCOUNTER — APPOINTMENT (OUTPATIENT)
Dept: ORTHOPEDIC SURGERY | Facility: CLINIC | Age: 75
End: 2020-09-24
Payer: MEDICARE

## 2020-09-24 VITALS
WEIGHT: 163 LBS | HEART RATE: 62 BPM | HEIGHT: 69 IN | DIASTOLIC BLOOD PRESSURE: 54 MMHG | SYSTOLIC BLOOD PRESSURE: 161 MMHG | BODY MASS INDEX: 24.14 KG/M2

## 2020-09-24 DIAGNOSIS — Z47.1 AFTERCARE FOLLOWING JOINT REPLACEMENT SURGERY: ICD-10-CM

## 2020-09-24 DIAGNOSIS — Z96.642 PRESENCE OF LEFT ARTIFICIAL HIP JOINT: ICD-10-CM

## 2020-09-24 DIAGNOSIS — Z96.649 PRESENCE OF UNSPECIFIED ARTIFICIAL HIP JOINT: ICD-10-CM

## 2020-09-24 PROCEDURE — 99024 POSTOP FOLLOW-UP VISIT: CPT

## 2020-09-24 PROCEDURE — 73502 X-RAY EXAM HIP UNI 2-3 VIEWS: CPT | Mod: LT

## 2020-09-24 NOTE — HISTORY OF PRESENT ILLNESS
[___ Months Post Op] : [unfilled] months post op [Xray (Date:___)] : [unfilled] Xray -  [Doing Well] : is doing well [Excellent Pain Control] : has excellent pain control [No Sign of Infection] : is showing no signs of infection [Erythema] : not erythematous [Discharge] : absent of discharge [Dehiscence] : not dehisced [de-identified] : S/P Conversion Left THR, posterior approach, DOS: 6/29/20. \par  [de-identified] : 3 months S/P conversion left THR, posterior approach.  Patient is doing very well and states she has no pain at this time.  She admits to using Tylenol intermittently as needed if she does have mild pain.  Patient is 1 physical therapy which is going well she feels she is getting stronger.  She is now back to her regular aspirin 81 daily.  She denies any fevers, chills or other constitutional symptoms.  She denies any incisional issues.  Patient is very happy overall. [de-identified] : Exam of the left hip shows a well healed incision. Excellent strength with SLR, hip flexion of 100 degrees, hip external rotation of 50 degrees, internal rotation of 20 degrees. 5/5 motor strength bilaterally distally. Sensation intact distally. LFCN intact.  [de-identified] : Xray- AP pelvis and 2 views of the left hip shows a hip replacement in stable position, without sign of fracture or dislocation.  [de-identified] : 3 months S/P conversion left THR, posterior approach. She will continue physical therapy and gradually transition to home exercises. Overall, she is very happy with the results of the surgery. Dental prophylaxis was advised. Follow up one year post op for radiographic surveillance.

## 2020-09-24 NOTE — ADDENDUM
[FreeTextEntry1] : This note was authored by Federico Beach working as a medical scribe for Dr. Osmin Cisneros. The note was reviewed, edited, and revised by Dr. Osmin Cisneros whom is in agreement with the exam findings, imaging findings, and treatment plan. 09/24/2020.

## 2020-11-10 ENCOUNTER — APPOINTMENT (OUTPATIENT)
Dept: GASTROENTEROLOGY | Facility: CLINIC | Age: 75
End: 2020-11-10
Payer: MEDICARE

## 2020-11-10 VITALS
WEIGHT: 165 LBS | HEIGHT: 69 IN | BODY MASS INDEX: 24.44 KG/M2 | OXYGEN SATURATION: 98 % | DIASTOLIC BLOOD PRESSURE: 60 MMHG | TEMPERATURE: 97.1 F | HEART RATE: 67 BPM | RESPIRATION RATE: 14 BRPM | SYSTOLIC BLOOD PRESSURE: 149 MMHG

## 2020-11-10 DIAGNOSIS — R13.10 DYSPHAGIA, UNSPECIFIED: ICD-10-CM

## 2020-11-10 PROCEDURE — 99072 ADDL SUPL MATRL&STAF TM PHE: CPT

## 2020-11-10 PROCEDURE — 99214 OFFICE O/P EST MOD 30 MIN: CPT

## 2020-11-10 NOTE — HISTORY OF PRESENT ILLNESS
[None] : had no significant interval events [Heartburn] : denies heartburn [Nausea] : denies nausea [Vomiting] : denies vomiting [Diarrhea] : denies diarrhea [Constipation] : denies constipation [Yellow Skin Or Eyes (Jaundice)] : denies jaundice [Abdominal Pain] : denies abdominal pain [Abdominal Swelling] : denies abdominal swelling [Rectal Pain] : denies rectal pain [Abdominal Surgery] : abdominal surgery [Wt Gain ___ Lbs] : no recent weight gain [Wt Loss ___ Lbs] : no recent weight loss [GERD] : no gastroesophageal reflux disease [Hiatus Hernia] : no hiatus hernia [Peptic Ulcer Disease] : no peptic ulcer disease [Cholelithiasis] : no cholelithiasis [Kidney Stone] : no kidney stone [Inflammatory Bowel Disease] : no inflammatory bowel disease [Irritable Bowel Syndrome] : no irritable bowel syndrome [Diverticulitis] : no diverticulitis [Alcohol Abuse] : no alcohol abuse [Malignancy] : no malignancy [Appendectomy] : no appendectomy [Cholecystectomy] : no cholecystectomy [de-identified] : September 8, 2020 [de-identified] : EGD with dilation [de-identified] : Patient presents for followup evaluation of esophageal dysphagia status post an upper endoscopy with  disruption of a distal reflux induced esophageal stricture September 8, 2020 during a hospitalization at [facility] for dysphagia. Since the above upper endoscopy she has been feeling wonderful and has no complaints of dysphagia. She was discharged on pantoprazole 40 mg daily which she is having trouble with because it is giving her increased abdominal pain and bloating and change in bowel habits and wishes to be put on a different acid suppression medication. She is tolerating a regular diet without issues of problems and was extremely grateful today for what was done in the hospital in September.

## 2020-11-10 NOTE — PHYSICAL EXAM
[General Appearance - Alert] : alert [General Appearance - In No Acute Distress] : in no acute distress [General Appearance - Well Developed] : well developed [General Appearance - Well Nourished] : well nourished [General Appearance - Well-Appearing] : healthy appearing [Sclera] : the sclera and conjunctiva were normal [PERRL With Normal Accommodation] : pupils were equal in size, round, and reactive to light [Extraocular Movements] : extraocular movements were intact [Outer Ear] : the ears and nose were normal in appearance [Oropharynx] : the oropharynx was normal [Neck Appearance] : the appearance of the neck was normal [Neck Cervical Mass (___cm)] : no neck mass was observed [Jugular Venous Distention Increased] : there was no jugular-venous distention [Thyroid Diffuse Enlargement] : the thyroid was not enlarged [Thyroid Nodule] : there were no palpable thyroid nodules [Auscultation Breath Sounds / Voice Sounds] : lungs were clear to auscultation bilaterally [Heart Rate And Rhythm] : heart rate was normal and rhythm regular [Heart Sounds] : normal S1 and S2 [Heart Sounds Gallop] : no gallops [Murmurs] : no murmurs [Heart Sounds Pericardial Friction Rub] : no pericardial rub [Bowel Sounds] : normal bowel sounds [Abdomen Soft] : soft [Abdomen Tenderness] : non-tender [] : no hepato-splenomegaly [Abdomen Mass (___ Cm)] : no abdominal mass palpated [No CVA Tenderness] : no ~M costovertebral angle tenderness [Abnormal Walk] : normal gait [Musculoskeletal - Swelling] : no joint swelling seen [Skin Color & Pigmentation] : normal skin color and pigmentation [Skin Turgor] : normal skin turgor [Oriented To Time, Place, And Person] : oriented to person, place, and time

## 2020-11-10 NOTE — ASSESSMENT
[FreeTextEntry1] : Impression: Resolved esophageal dysphagia secondary to a benign reflux-induced distal esophageal stricture that was successfully disrupted with upper endoscopy done in September of this year. Patient is presently having adverse side effects from pantoprazole.\par \par Recommendations: We'll change patient's acid suppression medication from  pantoprazole to famotidine 40 mg twice daily. She is to return here in 6 months for followup evaluation or sooner if necessary and appeared to understand all of the above instructions, information, and management plan.

## 2021-01-07 NOTE — PHYSICAL THERAPY INITIAL EVALUATION ADULT - REFERRING PHYSICIAN, REHAB EVAL
01/07/21                            Nichole Helton  [address]  [address]  [facility] [address]    To Whom It May Concern:    This is to certify Nichole Danie was evaluated with Marily Potts NP on 01/07/21 and can return to regular work on Monday January 11.     RESTRICTIONS: none    Comments: will need to be able to take time for physical therapy and follow up appointments        Marily Potts NP  [facility]  [address]  [facility] [address]  Phone: [phone]  Fax: [phone]     dr. redding

## 2021-01-12 NOTE — PRE-OP CHECKLIST - TEMPERATURE IN CELSIUS (DEGREES C)
Preceptor Attestation:    Patient seen and evaluated in person. I discussed the patient with the resident. I have verified the content of the note, which accurately reflects my assessment of the patient and the plan of care.   Supervising Physician:  Nickolas Mccray MD.                      37.3

## 2021-03-10 NOTE — BRIEF OPERATIVE NOTE - POST-OP DX
Closed fracture of neck of left femur, initial encounter  03/19/2018    Active  Joe Mayer [see HPI] : see HPI [Negative] : Heme/Lymph [Shortness Of Breath] : no shortness of breath [Dyspnea on exertion] : not dyspnea during exertion [Chest Pain] : no chest pain [Lower Ext Edema] : no extremity edema [Palpitations] : no palpitations [Dizziness] : dizziness

## 2021-06-07 ENCOUNTER — APPOINTMENT (OUTPATIENT)
Dept: GASTROENTEROLOGY | Facility: CLINIC | Age: 76
End: 2021-06-07
Payer: MEDICARE

## 2021-06-07 VITALS
HEART RATE: 72 BPM | RESPIRATION RATE: 14 BRPM | HEIGHT: 69 IN | DIASTOLIC BLOOD PRESSURE: 72 MMHG | BODY MASS INDEX: 25.03 KG/M2 | WEIGHT: 169 LBS | TEMPERATURE: 97.7 F | SYSTOLIC BLOOD PRESSURE: 160 MMHG | OXYGEN SATURATION: 98 %

## 2021-06-07 DIAGNOSIS — K21.9 GASTRO-ESOPHAGEAL REFLUX DISEASE W/OUT ESOPHAGITIS: ICD-10-CM

## 2021-06-07 DIAGNOSIS — K22.2 ESOPHAGEAL OBSTRUCTION: ICD-10-CM

## 2021-06-07 PROCEDURE — 99214 OFFICE O/P EST MOD 30 MIN: CPT

## 2021-06-07 PROCEDURE — 99072 ADDL SUPL MATRL&STAF TM PHE: CPT

## 2021-06-07 NOTE — PHYSICAL EXAM
[General Appearance - Alert] : alert [General Appearance - In No Acute Distress] : in no acute distress [General Appearance - Well Nourished] : well nourished [General Appearance - Well Developed] : well developed [General Appearance - Well-Appearing] : healthy appearing [Sclera] : the sclera and conjunctiva were normal [PERRL With Normal Accommodation] : pupils were equal in size, round, and reactive to light [Extraocular Movements] : extraocular movements were intact [Outer Ear] : the ears and nose were normal in appearance [Oropharynx] : the oropharynx was normal [Neck Appearance] : the appearance of the neck was normal [Neck Cervical Mass (___cm)] : no neck mass was observed [Jugular Venous Distention Increased] : there was no jugular-venous distention [Thyroid Diffuse Enlargement] : the thyroid was not enlarged [Thyroid Nodule] : there were no palpable thyroid nodules [Auscultation Breath Sounds / Voice Sounds] : lungs were clear to auscultation bilaterally [Heart Rate And Rhythm] : heart rate was normal and rhythm regular [Heart Sounds] : normal S1 and S2 [Heart Sounds Gallop] : no gallops [Murmurs] : no murmurs [Heart Sounds Pericardial Friction Rub] : no pericardial rub [Bowel Sounds] : normal bowel sounds [Abdomen Soft] : soft [Abdomen Tenderness] : non-tender [] : no hepato-splenomegaly [Abdomen Mass (___ Cm)] : no abdominal mass palpated [No CVA Tenderness] : no ~M costovertebral angle tenderness [Abnormal Walk] : normal gait [Musculoskeletal - Swelling] : no joint swelling seen [Skin Color & Pigmentation] : normal skin color and pigmentation [Skin Turgor] : normal skin turgor [Oriented To Time, Place, And Person] : oriented to person, place, and time

## 2021-06-07 NOTE — ASSESSMENT
[FreeTextEntry1] : Impression: Chronic GERD with GERD related stricture status post successful endoscopic dilatation with no recurrent dysphagia or complaints of reflux.\par \par Recommendations: Patient is to continue current acid suppression therapy with low-fat antireflux diet and return here in 6 months time for follow-up evaluation.  She appeared to understand all of the above instructions, information, and management plan.

## 2021-06-07 NOTE — HISTORY OF PRESENT ILLNESS
[None] : had no significant interval events [Heartburn] : stable heartburn [Nausea] : denies nausea [Vomiting] : denies vomiting [Diarrhea] : denies diarrhea [Constipation] : denies constipation [Yellow Skin Or Eyes (Jaundice)] : denies jaundice [Abdominal Pain] : denies abdominal pain [Abdominal Swelling] : denies abdominal swelling [Rectal Pain] : denies rectal pain [GERD] : gastroesophageal reflux disease [Abdominal Surgery] : abdominal surgery [Wt Gain ___ Lbs] : no recent weight gain [Wt Loss ___ Lbs] : no recent weight loss [Hiatus Hernia] : no hiatus hernia [Peptic Ulcer Disease] : no peptic ulcer disease [Pancreatitis] : no pancreatitis [Cholelithiasis] : no cholelithiasis [Kidney Stone] : no kidney stone [Inflammatory Bowel Disease] : no inflammatory bowel disease [Irritable Bowel Syndrome] : no irritable bowel syndrome [Diverticulitis] : no diverticulitis [Alcohol Abuse] : no alcohol abuse [Malignancy] : no malignancy [Appendectomy] : no appendectomy [Cholecystectomy] : no cholecystectomy [de-identified] : November 2020 [de-identified] : Changing medication [de-identified] : Patient presents today for follow-up visit for chronic GERD and previous GERD induced esophageal stricture status post EGD with dilation with no recurrent dysphagia presently on over-the-counter Pepcid 20 mg daily which is the only acid suppression medication that does not give her GI symptoms.  She is doing well with this therapy with no complaints of dysphagia or odontophagia or reflux.

## 2021-06-14 NOTE — DISCHARGE NOTE PROVIDER - NSDCQMSTAIRS_GEN_ALL_CORE
55yo male with PMH of DM who was initially admitted to [facility] ICU for hypoxic respiratory failure 2/2 COVID c/b PE with R heart strain, cardiogenic and septic shock, ischemic and hemorrhagic CVA and ESBL klebsiella ventilator associate PNA, transferred to [facility] for neurosurgery eval and further management. s/p PEG. Course c/b right empyema s/p VATs and obturator bleed (resolved). Now admitted for functional decline.     #ESBL Klebsiella PNA c/b empyema   - 5/12 underwent right VATS converted to open L thoracotomy, decortication, drainage of abscess and flex bronch.  - antibiotics completed. Afebrile. ID in. bactrim on hold per renal.   - CT Chest follow up 6/8 with improvement.  - Gait Instability, ADL impairments and Functional impairments:  Comprehensive Rehab Program of PT/OT/SLP. MBS today.     #Empyema   -OR Vats 5/12 with Dr. Albarado with all chest tubes removed by 5/18. Chest sites healing well.   -CT Chest 6/8 improved. no thoracic surgery intervention indicated per Dr. Albarado.    #Obturator Hematoma   - bleeding hematoma in right thigh on CT on 5/7, CT hip showing non active bleed/hematoma on right thigh on 5/8  - was on heparin drip prior  - cleared for DVT ppx heparin bid prior to rehab  -Evaluation by surgery at [facility]. No contraindication to AC as of 6/9 per Dr Cee. case discussed. Input appreciated.    #Pulmonary embolus  -Etiology of venous thromboembolism uncertain at this time; ?provoked acute covid illness   -s/p heparin drip held due to recurrent bleed in right obturator and right chest wall hematoma, now improved.   - 5000 heparin bid for dvt-cleared to continue.  - AC/Noac as per heme rec's 6/11. Pending Neurology clearance, CTH follow up completed 6/14 as requested.   -Asymptomatic, tolerating therapy. VSS.   -Surgery in, will follow recommendations.    #CVA  -Hx bilateral ischemic strokes, L Pica occlusion, hemorrhagic conversion, CTH follow up completed. Neurology consulted re/AC therapy timing. Case discussed w/Dr Anderson on 6/11, AC to begin per Neuro.   - TTE with bubble completed prior, no right-to-left shunt; no arrhythmia noted on tele prior. Cardiology consulted 6/11- holter/arrhythmia workup. RAJESH requested by Cardio, GI consulted for clearance as requested.   -CTA prior shows L Pica occlusion. Evaluated by Neurology prior to rehab-NOAC recommended when possible-however at the time of acute admission on hold for 'hx recurrent chest/thigh hematomas'.   -lipitor added per hospitalist plan.    #Altered mental status  - improved, A&Ox3, participates with care, no seizures overnight  - EEG neg seizure  - C/w Keppra 500 mg BID-> neuro f/u outpatient   - repeat CT head completed    #SUKI  -renal in, now off Bactrim, will follow rec's  -IVF completed.  -Nepro for hyperkalemia-improved    #Urinary Retention   - Mann removed 5/30  -voiding freely, low PVRs  - hold doxazosin for low BPs    #Diabetes mellitus  - Type II diabetes mellitus noted; a1c>10  - NPH to Lantus and Admelog scale.    -hospitalist following  - Follow up endo outpatient       #Dysphagia  - PEG tube placed on 4/30  -nausea/vomiting resolved, monitor loose stools while PO diet advancing-s/p MBS this am.   -XR abd follow up -neg acute findings      #Peripheral vascular disease  -Dry gangrene noted over distal toes on bilateral lower extremities  -ERASMO demonstrating right ERASMO: 1.09, left ERASMO: 1.24, right TBI: 0.78, and left TBI: 0.73.  -Podiatry recs appreciated, betadine to wound    #Pain Mgmt   - Tylenol PRN, Oxycodone PRN    #GI/Bowel Mgmt   - hold Senna, Miralax for loose stool overnight.    #/Bladder Mgmt   - Continent, PVR low    #Precautions / PROPHYLAXIS:   - Falls, Cardiac, Seizure   - ortho: Weight bearing status: WBAT   - Lungs: Aspiration, Incentive Spirometer   - Pressure injury/Skin: OOB to Chair, PT/OT    - DVT: HSQ    Yes

## 2021-09-13 NOTE — PROGRESS NOTE ADULT - PROVIDER SPECIALTY LIST ADULT
Gastroenterology
Hospitalist
Infectious Disease
Orthopedics
Hospitalist
alert and awake

## 2022-05-02 NOTE — H&P ADULT - PROBLEM SELECTOR PLAN 1
Detail Level: Generalized Instructions: This plan will send the code FBSE to the PM system.  DO NOT or CHANGE the price. Price (Do Not Change): 0.00 minimal opacity on chest ct, but worsening wbc and symptoms on abx  will treat with zosyn,   concern for possible aspiration pna as rhonchi heard on rll and history esphageal dismotility, will get swallow exam  pt had itching with vancomycin eventhough rate slowed, will hold off for now as unlikely mrsa given recent negative mrsa swab  also unlikely covid given recent negative swab, but repeat pending  continue to monitor pulse ox

## 2022-06-14 NOTE — PHYSICAL THERAPY INITIAL EVALUATION ADULT - ASR EQUIP NEEDS DISCH PT EVAL
raised toilet seat/shower chair/rolling walker (5 inch wheels) A-T Advancement Flap Text: The defect edges were debeveled with a #15 scalpel blade.  Given the location of the defect, shape of the defect and the proximity to free margins an A-T advancement flap was deemed most appropriate.  Using a sterile surgical marker, an appropriate advancement flap was drawn incorporating the defect and placing the expected incisions within the relaxed skin tension lines where possible.    The area thus outlined was incised deep to adipose tissue with a #15 scalpel blade.  The skin margins were undermined to an appropriate distance in all directions utilizing iris scissors.

## 2022-06-27 ENCOUNTER — APPOINTMENT (OUTPATIENT)
Dept: DERMATOLOGY | Facility: CLINIC | Age: 77
End: 2022-06-27

## 2022-06-27 ENCOUNTER — RESULT REVIEW (OUTPATIENT)
Age: 77
End: 2022-06-27

## 2022-06-27 PROCEDURE — 11105 PUNCH BX SKIN EA SEP/ADDL: CPT

## 2022-06-27 PROCEDURE — 99202 OFFICE O/P NEW SF 15 MIN: CPT | Mod: 25

## 2022-06-27 PROCEDURE — 11104 PUNCH BX SKIN SINGLE LESION: CPT

## 2022-07-20 ENCOUNTER — APPOINTMENT (OUTPATIENT)
Dept: DERMATOLOGY | Facility: CLINIC | Age: 77
End: 2022-07-20

## 2022-07-20 PROCEDURE — 99214 OFFICE O/P EST MOD 30 MIN: CPT

## 2022-08-03 ENCOUNTER — APPOINTMENT (OUTPATIENT)
Dept: DERMATOLOGY | Facility: CLINIC | Age: 77
End: 2022-08-03

## 2022-08-03 PROCEDURE — 99214 OFFICE O/P EST MOD 30 MIN: CPT

## 2022-08-11 NOTE — H&P ADULT - NEUROLOGICAL COMMENTS
redness    Red streaking: no    Progression:  Worsening  Pain details:     Quality:  Aching    Severity:  Moderate    Timing:  Constant    Progression:  Worsening  Context: not diabetes, not immunosuppression, not injected drug use, not insect bite/sting and not skin injury    Relieved by:  Nothing  Worsened by:  Nothing  Associated symptoms: no anorexia, no fatigue, no fever, no headaches, no nausea and no vomiting    Risk factors: prior abscess    Risk factors: no family hx of MRSA and no hx of MRSA        Review of Systems   Constitutional: Negative. Negative for fatigue and fever. HENT: Negative. Eyes: Negative. Respiratory: Negative. Cardiovascular: Negative. Gastrointestinal: Negative. Negative for anorexia, nausea and vomiting. Endocrine: Negative. Genitourinary: Negative. Musculoskeletal: Negative. Skin:  Positive for color change. Allergic/Immunologic: Negative. Neurological: Negative. Negative for headaches. Hematological: Negative. Psychiatric/Behavioral: Negative. All other systems reviewed and are negative. Past Medical History:   Diagnosis Date    Asthma         No past surgical history on file. No family history on file. Social History     Socioeconomic History    Marital status: Single   Tobacco Use    Smoking status: Every Day     Packs/day: 0.50     Types: Cigarettes    Smokeless tobacco: Never   Vaping Use    Vaping Use: Never used   Substance and Sexual Activity    Alcohol use: Yes    Drug use: Not Currently         Patient has no known allergies. There are no discharge medications for this patient. Vitals signs and nursing note reviewed. Patient Vitals for the past 4 hrs:   Temp Pulse Resp BP SpO2   08/11/22 1539 98.2 °F (36.8 °C) 65 16 133/72 99 %          Physical Exam  Vitals and nursing note reviewed. Constitutional:       Appearance: He is well-developed and normal weight. HENT:      Head: Normocephalic and atraumatic. Jaw: There is normal jaw occlusion. Comments: 2 x 2 centimeter fluctuant erythematous abscess to the left anterior tragus area. No anterior/posterior cervical lymphadenopathy. No difficulty swallowing. Left Ear: Tympanic membrane, ear canal and external ear normal.      Nose: Nose normal.      Mouth/Throat:      Mouth: Mucous membranes are moist.   Eyes:      Extraocular Movements: Extraocular movements intact. Conjunctiva/sclera: Conjunctivae normal.      Pupils: Pupils are equal, round, and reactive to light. Cardiovascular:      Rate and Rhythm: Normal rate. Pulses: Normal pulses. Pulmonary:      Effort: Pulmonary effort is normal.   Abdominal:      General: Abdomen is flat. Palpations: Abdomen is soft. Skin:     General: Skin is warm. Capillary Refill: Capillary refill takes less than 2 seconds. Neurological:      General: No focal deficit present. Mental Status: He is alert and oriented to person, place, and time. Psychiatric:         Mood and Affect: Mood normal.         Behavior: Behavior normal.         Thought Content:  Thought content normal.         Judgment: Judgment normal.        Incision/Drainage    Date/Time: 8/11/2022 6:51 PM  Performed by: MATTHEW Rose  Authorized by: Ashley Lyn MD     Consent:     Consent obtained:  Verbal    Consent given by:  Patient    Risks, benefits, and alternatives were discussed: yes      Risks discussed:  Bleeding, incomplete drainage, pain, damage to other organs and infection    Alternatives discussed:  No treatment, delayed treatment, alternative treatment, observation and referral  Universal protocol:     Procedure explained and questions answered to patient or proxy's satisfaction: yes      Relevant documents present and verified: yes      Site/side marked: yes      Immediately prior to procedure, a time out was called: yes      Patient identity confirmed:  Verbally with patient, arm band and hx CVA

## 2022-09-09 ENCOUNTER — APPOINTMENT (OUTPATIENT)
Dept: DERMATOLOGY | Facility: CLINIC | Age: 77
End: 2022-09-09

## 2022-09-09 PROCEDURE — 99214 OFFICE O/P EST MOD 30 MIN: CPT

## 2022-10-14 NOTE — DISCHARGE NOTE NURSING/CASE MANAGEMENT/SOCIAL WORK - NSDCPELOVENOXDIET_GEN_ALL_CORE
Eat healthy foods you enjoy. Enoxaparin/Lovenox DOES NOT have a special diet. Limit your alcohol intake.
impaired balance/decreased strength

## 2022-11-30 ENCOUNTER — APPOINTMENT (OUTPATIENT)
Dept: PULMONOLOGY | Facility: CLINIC | Age: 77
End: 2022-11-30

## 2022-11-30 VITALS
WEIGHT: 168 LBS | HEIGHT: 65.5 IN | BODY MASS INDEX: 27.65 KG/M2 | SYSTOLIC BLOOD PRESSURE: 124 MMHG | DIASTOLIC BLOOD PRESSURE: 78 MMHG | RESPIRATION RATE: 16 BRPM | HEART RATE: 77 BPM

## 2022-11-30 DIAGNOSIS — R21 RASH AND OTHER NONSPECIFIC SKIN ERUPTION: ICD-10-CM

## 2022-11-30 DIAGNOSIS — M79.89 OTHER SPECIFIED SOFT TISSUE DISORDERS: ICD-10-CM

## 2022-11-30 DIAGNOSIS — Z86.73 PERSONAL HISTORY OF TRANSIENT ISCHEMIC ATTACK (TIA), AND CEREBRAL INFARCTION W/OUT RESIDUAL DEFICITS: ICD-10-CM

## 2022-11-30 PROCEDURE — 99204 OFFICE O/P NEW MOD 45 MIN: CPT

## 2022-11-30 RX ORDER — METRONIDAZOLE 500 MG/1
500 TABLET ORAL
Qty: 30 | Refills: 0 | Status: DISCONTINUED | COMMUNITY
Start: 2022-10-27 | End: 2022-11-30

## 2022-11-30 RX ORDER — TRIAMCINOLONE ACETONIDE 1 MG/G
0.1 CREAM TOPICAL
Qty: 454 | Refills: 0 | Status: ACTIVE | COMMUNITY
Start: 2022-06-30

## 2022-11-30 RX ORDER — FAMOTIDINE 40 MG/1
40 TABLET, FILM COATED ORAL
Qty: 180 | Refills: 1 | Status: DISCONTINUED | COMMUNITY
Start: 2020-11-10 | End: 2022-11-30

## 2022-11-30 RX ORDER — HYDRALAZINE HYDROCHLORIDE 50 MG/1
TABLET ORAL
Refills: 0 | Status: DISCONTINUED | COMMUNITY
End: 2022-11-30

## 2022-11-30 RX ORDER — AMLODIPINE BESYLATE 10 MG/1
10 TABLET ORAL
Qty: 90 | Refills: 0 | Status: DISCONTINUED | COMMUNITY
Start: 2022-06-02 | End: 2022-11-30

## 2022-11-30 RX ORDER — DICLOXACILLIN SODIUM 500 MG/1
500 CAPSULE ORAL
Qty: 40 | Refills: 0 | Status: DISCONTINUED | COMMUNITY
Start: 2022-09-12 | End: 2022-11-30

## 2022-11-30 RX ORDER — AZELASTINE HYDROCHLORIDE 137 UG/1
137 SPRAY, METERED NASAL
Qty: 30 | Refills: 0 | Status: DISCONTINUED | COMMUNITY
Start: 2022-07-29 | End: 2022-11-30

## 2022-11-30 RX ORDER — HYDROCHLOROTHIAZIDE 25 MG/1
25 TABLET ORAL
Qty: 90 | Refills: 0 | Status: DISCONTINUED | COMMUNITY
Start: 2022-09-24 | End: 2022-11-30

## 2022-11-30 RX ORDER — ZOLPIDEM TARTRATE 10 MG/1
10 TABLET ORAL
Qty: 20 | Refills: 0 | Status: DISCONTINUED | COMMUNITY
Start: 2022-10-27 | End: 2022-11-30

## 2022-11-30 RX ORDER — FLUCONAZOLE 100 MG/1
100 TABLET ORAL
Qty: 30 | Refills: 0 | Status: DISCONTINUED | COMMUNITY
Start: 2022-09-29 | End: 2022-11-30

## 2022-11-30 RX ORDER — LOSARTAN POTASSIUM 50 MG/1
50 TABLET, FILM COATED ORAL
Qty: 90 | Refills: 0 | Status: DISCONTINUED | COMMUNITY
Start: 2022-07-15 | End: 2022-11-30

## 2022-11-30 RX ORDER — LOSARTAN POTASSIUM 100 MG/1
100 TABLET, FILM COATED ORAL
Qty: 90 | Refills: 0 | Status: ACTIVE | COMMUNITY
Start: 2022-10-26

## 2022-11-30 RX ORDER — IVERMECTIN 3 MG/1
3 TABLET ORAL
Qty: 12 | Refills: 0 | Status: DISCONTINUED | COMMUNITY
Start: 2022-08-03 | End: 2022-11-30

## 2022-11-30 RX ORDER — CEPHALEXIN 500 MG/1
500 CAPSULE ORAL
Qty: 21 | Refills: 0 | Status: DISCONTINUED | COMMUNITY
Start: 2022-09-09 | End: 2022-11-30

## 2022-11-30 RX ORDER — TRAMADOL HYDROCHLORIDE 50 MG/1
50 TABLET, COATED ORAL
Qty: 28 | Refills: 0 | Status: DISCONTINUED | COMMUNITY
Start: 2020-03-19 | End: 2022-11-30

## 2022-11-30 RX ORDER — DOXAZOSIN 4 MG/1
4 TABLET ORAL
Qty: 90 | Refills: 0 | Status: ACTIVE | COMMUNITY
Start: 2022-07-15

## 2022-11-30 NOTE — HISTORY OF PRESENT ILLNESS
[Former] : former [Never] : never [On ___] : performed on [unfilled] [Patient] : the patient [Indication ___] : for an indication of [unfilled] [Excessive Daytime Sleepiness] : excessive daytime sleepiness [Snoring] : snoring [Unrefreshing Sleep] : unrefreshing sleep [Sleepy When Sedentary] : sleepy when sedentary [Currently Experiencing] : The patient is currently experiencing symptoms. [None] : The patient is not currently being treated for this problem [TextBox_4] : Patient c/o SOBOE but is otherwise without associated respiratory complaints. \par Pt is a former smoker of 2 ppd x 30 years, quit 1995.\par  [TextBox_11] : 2 [TextBox_13] : 30 [YearQuit] : 1995 [FreeTextEntry9] : Chest CT [FreeTextEntry8] : Peripheral 2.4x2 cm LLL opacity with small subcm nodules [Witnessed Apnea During Sleep] : no witnessed apnea during sleep [Witnessed Gasping During Sleep] : no witnessed gasping during sleep

## 2022-11-30 NOTE — REASON FOR VISIT
[Initial] : an initial visit [Abnormal CXR/ Chest CT] : an abnormal CXR/ chest CT [Sleep Apnea] : sleep apnea [Emphysema] : emphysema [Shortness of Breath] : shortness of breath [Pulmonary Nodules] : pulmonary nodules

## 2022-11-30 NOTE — PHYSICAL EXAM
[No Acute Distress] : no acute distress [Low Lying Soft Palate] : low lying soft palate [Enlarged Base of the Tongue] : enlarged base of the tongue [II] : Mallampati Class: II [Normal Appearance] : normal appearance [Supple] : supple [Normal Rate/Rhythm] : normal rate/rhythm [Normal S1, S2] : normal s1, s2 [No Murmurs] : no murmurs [No Resp Distress] : no resp distress [No Acc Muscle Use] : no acc muscle use [Normal Rhythm and Effort] : normal rhythm and effort [Clear to Auscultation Bilaterally] : clear to auscultation bilaterally [No Abnormalities] : no abnormalities [Benign] : benign [Not Tender] : not tender [Soft] : soft [No Clubbing] : no clubbing [1+ Pitting] : 1+ pitting [No Focal Deficits] : no focal deficits [Oriented x3] : oriented x3 [TextBox_2] : Pt is overweight [TextBox_11] : Mild to moderate oropharyngeal crowding.

## 2022-11-30 NOTE — CONSULT LETTER
[Dear  ___] : Dear  [unfilled], [Consult Letter:] : I had the pleasure of evaluating your patient, [unfilled]. [Please see my note below.] : Please see my note below. [Consult Closing:] : Thank you very much for allowing me to participate in the care of this patient.  If you have any questions, please do not hesitate to contact me. [Sincerely,] : Sincerely, [FreeTextEntry3] : Tonio Fontana MD, FCCP, D. ABSM\par Pulmonary and Sleep Medicine\par [facility] at [place]

## 2022-11-30 NOTE — REVIEW OF SYSTEMS
[Fatigue] : fatigue [Nasal Congestion] : nasal congestion [Postnasal Drip] : postnasal drip [SOB on Exertion] : sob on exertion [Edema] : edema [Seasonal Allergies] : seasonal allergies [GERD] : gerd [Back Pain] : back pain [Negative] : Endocrine [TextBox_122] : H/o CVA

## 2022-12-09 ENCOUNTER — APPOINTMENT (OUTPATIENT)
Dept: NUCLEAR MEDICINE | Facility: CLINIC | Age: 77
End: 2022-12-09

## 2022-12-09 ENCOUNTER — OUTPATIENT (OUTPATIENT)
Dept: OUTPATIENT SERVICES | Facility: HOSPITAL | Age: 77
LOS: 1 days | End: 2022-12-09

## 2022-12-09 DIAGNOSIS — Z00.8 ENCOUNTER FOR OTHER GENERAL EXAMINATION: ICD-10-CM

## 2022-12-09 PROCEDURE — 78815 PET IMAGE W/CT SKULL-THIGH: CPT | Mod: 26,PI

## 2022-12-15 ENCOUNTER — APPOINTMENT (OUTPATIENT)
Dept: PULMONOLOGY | Facility: CLINIC | Age: 77
End: 2022-12-15

## 2022-12-19 ENCOUNTER — APPOINTMENT (OUTPATIENT)
Dept: THORACIC SURGERY | Facility: CLINIC | Age: 77
End: 2022-12-19

## 2022-12-19 VITALS
HEART RATE: 56 BPM | BODY MASS INDEX: 27.65 KG/M2 | WEIGHT: 168 LBS | RESPIRATION RATE: 18 BRPM | HEIGHT: 65.5 IN | DIASTOLIC BLOOD PRESSURE: 55 MMHG | SYSTOLIC BLOOD PRESSURE: 163 MMHG | OXYGEN SATURATION: 97 % | TEMPERATURE: 98 F

## 2022-12-19 PROCEDURE — 99204 OFFICE O/P NEW MOD 45 MIN: CPT

## 2022-12-19 RX ORDER — HYDROXYZINE HYDROCHLORIDE 10 MG/1
10 TABLET ORAL
Qty: 90 | Refills: 0 | Status: COMPLETED | COMMUNITY
Start: 2022-07-20 | End: 2022-12-19

## 2022-12-19 RX ORDER — PANTOPRAZOLE 40 MG/1
40 TABLET, DELAYED RELEASE ORAL TWICE DAILY
Qty: 180 | Refills: 1 | Status: COMPLETED | COMMUNITY
Start: 2020-09-30 | End: 2022-12-19

## 2022-12-19 RX ORDER — FLUOCINONIDE 0.05 MG/G
0.05 OINTMENT TOPICAL
Qty: 60 | Refills: 0 | Status: COMPLETED | COMMUNITY
Start: 2022-11-18 | End: 2022-12-19

## 2022-12-19 RX ORDER — CYPROHEPTADINE HYDROCHLORIDE 4 MG/1
4 TABLET ORAL
Qty: 30 | Refills: 0 | Status: COMPLETED | COMMUNITY
Start: 2022-04-26 | End: 2022-12-19

## 2022-12-19 RX ORDER — FAMOTIDINE 20 MG/1
20 TABLET, FILM COATED ORAL
Refills: 0 | Status: ACTIVE | COMMUNITY

## 2022-12-19 RX ORDER — PREDNISONE 5 MG/1
5 TABLET ORAL
Qty: 60 | Refills: 0 | Status: COMPLETED | COMMUNITY
Start: 2022-07-20 | End: 2022-12-19

## 2022-12-19 RX ORDER — PREDNISONE 10 MG/1
10 TABLET ORAL
Qty: 40 | Refills: 0 | Status: COMPLETED | COMMUNITY
Start: 2022-12-15

## 2022-12-19 RX ORDER — BETAMETHASONE DIPROPIONATE 0.5 MG/G
0.05 LOTION TOPICAL
Qty: 60 | Refills: 0 | Status: COMPLETED | COMMUNITY
Start: 2022-10-27 | End: 2022-12-19

## 2022-12-19 RX ORDER — CLOBETASOL PROPIONATE 0.05 G/100ML
0.05 SHAMPOO TOPICAL
Qty: 118 | Refills: 0 | Status: COMPLETED | COMMUNITY
Start: 2022-10-13 | End: 2022-12-19

## 2022-12-19 RX ORDER — LORATADINE 5 MG/5 ML
10 SOLUTION, ORAL ORAL
Refills: 0 | Status: ACTIVE | COMMUNITY

## 2022-12-19 RX ORDER — CLOBETASOL PROPIONATE 0.5 MG/G
0.05 CREAM TOPICAL
Qty: 60 | Refills: 0 | Status: COMPLETED | COMMUNITY
Start: 2022-04-14 | End: 2022-12-19

## 2022-12-19 RX ORDER — CLOTRIMAZOLE AND BETAMETHASONE DIPROPIONATE 10; .5 MG/G; MG/G
1-0.05 CREAM TOPICAL
Qty: 45 | Refills: 0 | Status: COMPLETED | COMMUNITY
Start: 2020-08-10 | End: 2022-12-19

## 2022-12-19 RX ORDER — SULFAMETHOXAZOLE AND TRIMETHOPRIM 800; 160 MG/1; MG/1
800-160 TABLET ORAL
Qty: 14 | Refills: 0 | Status: COMPLETED | COMMUNITY
Start: 2022-07-18 | End: 2022-12-19

## 2022-12-19 NOTE — REVIEW OF SYSTEMS
[SOB on Exertion] : shortness of breath during exertion [Easy Bleeding] : a tendency for easy bleeding [Easy Bruising] : a tendency for easy bruising [Negative] : Endocrine [Fever] : no fever [Chills] : no chills [Feeling Poorly] : not feeling poorly [Feeling Tired] : not feeling tired [Chest Pain] : no chest pain [Palpitations] : no palpitations [Wheezing] : no wheezing [Dizziness] : no dizziness [Fainting] : no fainting [Anxiety] : no anxiety [Depression] : no depression [FreeTextEntry9] : ambulate with cane [de-identified] : skin rash throughout the body- she reports currently being under the care of an immunologist.

## 2022-12-19 NOTE — PHYSICAL EXAM
[General Appearance - Alert] : alert [General Appearance - In No Acute Distress] : in no acute distress [] : no respiratory distress [Auscultation Breath Sounds / Voice Sounds] : lungs were clear to auscultation bilaterally [Heart Rate And Rhythm] : heart rate was normal and rhythm regular [Heart Sounds] : normal S1 and S2 [Heart Sounds Gallop] : no gallops [Murmurs] : no murmurs [Heart Sounds Pericardial Friction Rub] : no pericardial rub [No Focal Deficits] : no focal deficits [Oriented To Time, Place, And Person] : oriented to person, place, and time [Impaired Insight] : insight and judgment were intact [Affect] : the affect was normal

## 2022-12-19 NOTE — DATA REVIEWED
[FreeTextEntry1] : PETCT [facility] ONC FDG INIT  - ORDERED BY: ELENA GLOVER\par \par PROCEDURE DATE:  12/09/2022\par \par INTERPRETATION:  CLINICAL INFORMATION: 77-year-old with abnormal chest CT. Evaluate lung nodule and compare with previous films.\par \par TREATMENT STRATEGY EVALUATION: Initial\par FASTING BLOOD SUGAR: 86 mg/dL\par RADIOPHARMACEUTICAL:  12.4 mCi F-18, FDG, I.V.\par UPTAKE PERIOD: 55 minutes\par SCANNER: [employer] Discovery 710\par ORAL CONTRAST: Patient drank OMNIPAQUE contrast during the uptake period.\par PHARMACOLOGIC INTERVENTION: None.\par \par TECHNIQUE: Following intravenous injection of radiopharmaceutical and above uptake period, PET/CT was obtained from base of skull  to mid-thigh. CT protocol was optimized for PET attenuation correction and anatomic localization and was not designed to produce and cannot replace state-of-the-art diagnostic CT images with specific imaging protocols for different body parts and indications. Images were reconstructed and reviewed in axial, coronal and sagittal views and three-dimensional MIP.\par \par The standardized uptake values (SUV) are normalized to patient body weight and indicate the highest activity concentration (SUVmax) in a given site. All image numbers refer to axial image number.\par \par COMPARISON:  No prior FDG-PET/CT\par \par OTHER STUDIES USED FOR CORRELATION: CT 11/10/2022\par \par FINDINGS:\par \par HEAD/NECK: Physiologic FDG activity in visualized brain, head, and neck.\par \par THORAX:\par \par Bilateral FDG-avid axillary lymph nodes, with index node:\par -1.0 x 0.8 cm left axillary, SUV 2.5, image 82.\par -2.4 x 0.8 cm right axillary, SUV 2.8, image 75.\par \par No FDG-avid mediastinal or hilar lymph nodes.\par \par LUNGS:\par \par The 2.4 cm left lower lobe opacity evident on CT 11/10/2022 shows SUV 3.3, image 70. No FDG-avid lung nodules in the right middle lobe, with a non-FDG avid 0.9 cm anterior right middle lobe nodule (image 99).\par \par PLEURA/PERICARDIUM: No abnormal FDG activity. No effusion.\par \par HEPATOBILIARY/PANCREAS: Physiologic FDG activity.  Liver SUV mean is 2.7.\par \par SPLEEN: Physiologic FDG activity, SUV 2.7. Normal in size. Splenule.\par \par ADRENAL GLANDS: No abnormal FDG activity. 0.9 cm left adrenal nodule.\par \par KIDNEYS/URINARY BLADDER: Physiologic excreted FDG activity. Left renal cyst.\par \par REPRODUCTIVE ORGANS: No abnormal FDG activity.\par \par ABDOMINOPELVIC LYMPH NODES/RETROPERITONEUM:\par -No FDG avid lymph node adjacent to the aorta, as mentioned on previous CT.\par \par Bilateral FDG-avid inguinal nodes, with index node:\par -1.6 x 1.6 cm right inguinal, SUV 3.3, image 215.\par \par Mildly increased FDG uptake in the right inguinal region, image 187, likely postsurgical in patient with history of hernia repair.\par \par ESOPHAGUS/STOMACH/BOWEL/PERITONEUM/MESENTERY: No abnormal FDG activity.\par \par VESSELS: Carotid, coronary, and large vessel calcifications.\par \par BONES/SOFT TISSUES: Benign musculoskeletal uptake around both shoulders and hips. Left hip arthroplasty. Multilevel degenerative change in the spine. Compression fractures, approximately T7, T11 and L3.\par \par IMPRESSION:\par \par 1. The 2.4 cm left lower lobe lung nodule evident on prior CT shows SUV 3.3, concerning for malignancy. No FDG-avid hilar or mediastinal lymphadenopathy. No other FDG-avid lung nodules.\par \par 2. Nonspecific bilateral axillary and bilateral inguinal lymph nodes with low-level FDG avidity. Consider biopsy for further evaluation versus attention on follow-up.\par \par 3. Multiple compression fractures in the spine, as above.\par \par --- End of Report ---\par \par MAMTA ALVAERZ MD; Attending Radiologist\par \par \par \par \par \par \par \par \par \par \par \par CT CHEST at [facility] 11/10/22:\par IIMPRESSION:\par \par Left lower lobe irregular nodule. There is evidence of a irregular and somewhat\par spiculated 2.4 cm nodular focus posteriorly within the superior segment of the\par left lower lobe. Neoplasm including primary lung malignancy cannot be excluded.\par PET/CT examination is suggested for further evaluation.\par \par Right middle lobe nodularity. There is evidence of 2 subcentimeter nodular foci\par in the right middle lobe. Differential diagnosis includes true parenchymal\par nodules as well as nodular atelectatic changes. These findings would also be\par amenable to a follow-up PET/CT examination.\par \par Emphysema.\par \par Possible upper abdominal lymphadenopathy. There is evidence of a 2.0 soft tissue\par density in the upper abdomen adjacent to the right lateral aspect of the upper\par abdominal aorta. Lymphadenopathy cannot be excluded.\par \par Left adrenal nodule, suggestive of an adenoma.\par \par Left renal cyst\par \par Subcentimeter hypodensities within the right and left lobe of the thyroid gland,\par suggestive of cysts/adenomas.\par \par Signed by: Arnulfo Meyers MD\par Signed Date: 11/16/2022 3:18 PM ESTMPRESSION:

## 2022-12-19 NOTE — ASSESSMENT
[FreeTextEntry1] : Елена is a 77-year-old female with a left lower lobe nodule that is positive by PET scan and concerning for malignancy.  I will be arranging a transthoracic needle biopsy in the near future and look forward to seeing her after that is complete.\par \par Thank you for allowing me to participate in the care of your patient.\par \par 45 minutes was spent during this encounter.\par \par \par Raymundo Langston MD\par Department of Cardiovascular and Thoracic Surgery\par \par [employer]\[facility] [employer] [facility]\par

## 2022-12-19 NOTE — HISTORY OF PRESENT ILLNESS
[FreeTextEntry1] : Елена Grissom is a 77 year old female who presents for a consultation, referred by Dr. Fontana for evaluation of lung nodule, an incidental finding when patient was being worked up for hypercalcemia.\par \par Past medical history includes former smoker, sleep apnea, GERD induced esophageal stricture status post EGD with dilation with no recurrent dysphagia, stroke (2012), right carotid artery surgery at [facility] (poor history given).\par \par Today she reports intermittent shortness of breath on exertion. She does report a skin rash throughout her body, she reports currently being under the care of an immunologist. Patient denies chest pain, palpitations, cough, fevers, chills, fatigue, unintentional weight loss or gain, and night sweats.

## 2022-12-21 ENCOUNTER — NON-APPOINTMENT (OUTPATIENT)
Age: 77
End: 2022-12-21

## 2022-12-22 ENCOUNTER — NON-APPOINTMENT (OUTPATIENT)
Age: 77
End: 2022-12-22

## 2022-12-30 ENCOUNTER — APPOINTMENT (OUTPATIENT)
Dept: PULMONOLOGY | Facility: CLINIC | Age: 77
End: 2022-12-30
Payer: MEDICARE

## 2022-12-30 VITALS — BODY MASS INDEX: 27.16 KG/M2 | WEIGHT: 163 LBS | HEIGHT: 65 IN

## 2022-12-30 VITALS
RESPIRATION RATE: 16 BRPM | HEART RATE: 67 BPM | SYSTOLIC BLOOD PRESSURE: 138 MMHG | DIASTOLIC BLOOD PRESSURE: 54 MMHG | OXYGEN SATURATION: 61 %

## 2022-12-30 PROCEDURE — 94729 DIFFUSING CAPACITY: CPT

## 2022-12-30 PROCEDURE — 94010 BREATHING CAPACITY TEST: CPT

## 2022-12-30 PROCEDURE — 85018 HEMOGLOBIN: CPT | Mod: QW

## 2022-12-30 PROCEDURE — 99215 OFFICE O/P EST HI 40 MIN: CPT | Mod: 25

## 2022-12-30 PROCEDURE — 94727 GAS DIL/WSHOT DETER LNG VOL: CPT

## 2022-12-30 NOTE — REASON FOR VISIT
[Follow-Up] : a follow-up visit [Abnormal CXR/ Chest CT] : an abnormal CXR/ chest CT [Sleep Apnea] : sleep apnea [Emphysema] : emphysema [Shortness of Breath] : shortness of breath [Pulmonary Nodules] : pulmonary nodules [COPD] : COPD [Spouse] : spouse

## 2022-12-30 NOTE — PROCEDURE
[FreeTextEntry1] : PFTs 12/30/22 - Mildly reduced FVC and FEV1 with an obstructive pattern but with normal lung volumes and moderately reduced DLCO which almost normalized when corrected for alveolar volume.

## 2022-12-30 NOTE — HISTORY OF PRESENT ILLNESS
[On ___] : performed on [unfilled] [Patient] : the patient [Indication ___] : for an indication of [unfilled] [Follow-Up - Routine Clinic] : a routine clinic follow-up of [Excessive Daytime Sleepiness] : excessive daytime sleepiness [Snoring] : snoring [Unrefreshing Sleep] : unrefreshing sleep [Sleepy When Sedentary] : sleepy when sedentary [Currently Experiencing] : The patient is currently experiencing symptoms. [None] : The patient is not currently being treated for this problem [TextBox_4] : Patient c/o SOBOE but is otherwise without associated respiratory complaints. \par Pt is a former smoker of 2 ppd x 30 years, quit 1995.\par  [FreeTextEntry9] : Chest CT [FreeTextEntry8] : Peripheral 2.4x2 cm LLL opacity with small subcm nodules [Witnessed Apnea During Sleep] : no witnessed apnea during sleep [Witnessed Gasping During Sleep] : no witnessed gasping during sleep

## 2022-12-30 NOTE — CONSULT LETTER
[Dear  ___] : Dear  [unfilled], [Consult Letter:] : I had the pleasure of evaluating your patient, [unfilled]. [Please see my note below.] : Please see my note below. [Consult Closing:] : Thank you very much for allowing me to participate in the care of this patient.  If you have any questions, please do not hesitate to contact me. [Sincerely,] : Sincerely, [FreeTextEntry3] : Tonio Fontana MD, FCCP, D. ABSM\par Pulmonary and Sleep Medicine\par [facility] at [place]

## 2022-12-30 NOTE — DISCUSSION/SUMMARY
[FreeTextEntry1] : \par #1. PFTs performed today are c/w Gold Stage II COPD\par #2. Trial of Trelegy 100 and assess response; reviewed inhaler technique and stressed importance of rinsing mouth and throat after inhaler use \par #3. SOBOE is likely at least somewhat related to weight or deconditioning as well\par #4. Diet and exercise for weight loss \par #5. PET CT to evaluate LLL opacity with increased uptake an is being evaluated for needle bx per thoracic surgery\par #6. Thoracic surgery f/u after bx and perhaps oncology evaluation if malignant\par #7. Not vaccinated\par #8. F/u after bx or in 2 months with PFTs on Trelegy to assess response\par #9. Reviewed risks of exposure and symptoms of Covid-19 virus, including how the virus is spread and precautions to avoid melisa virus. \par \par The patient expressed understanding and agreement with the above recommendations/plan and accepts responsibility to be compliant with recommended testing, therapies, and f/u visits.\par All relevant questions and concerns were addressed. \par Discussed above with patient and  who was also present.

## 2023-01-31 ENCOUNTER — OUTPATIENT (OUTPATIENT)
Dept: OUTPATIENT SERVICES | Facility: HOSPITAL | Age: 78
LOS: 1 days | End: 2023-01-31
Payer: MEDICARE

## 2023-01-31 VITALS
WEIGHT: 165.13 LBS | SYSTOLIC BLOOD PRESSURE: 120 MMHG | HEART RATE: 60 BPM | TEMPERATURE: 97 F | OXYGEN SATURATION: 99 % | HEIGHT: 65 IN | RESPIRATION RATE: 18 BRPM | DIASTOLIC BLOOD PRESSURE: 70 MMHG

## 2023-01-31 DIAGNOSIS — I77.9 DISORDER OF ARTERIES AND ARTERIOLES, UNSPECIFIED: Chronic | ICD-10-CM

## 2023-01-31 DIAGNOSIS — Z96.642 PRESENCE OF LEFT ARTIFICIAL HIP JOINT: Chronic | ICD-10-CM

## 2023-01-31 DIAGNOSIS — Z29.9 ENCOUNTER FOR PROPHYLACTIC MEASURES, UNSPECIFIED: ICD-10-CM

## 2023-01-31 DIAGNOSIS — Z98.51 TUBAL LIGATION STATUS: Chronic | ICD-10-CM

## 2023-01-31 DIAGNOSIS — R91.8 OTHER NONSPECIFIC ABNORMAL FINDING OF LUNG FIELD: ICD-10-CM

## 2023-01-31 DIAGNOSIS — Z98.49 CATARACT EXTRACTION STATUS, UNSPECIFIED EYE: Chronic | ICD-10-CM

## 2023-01-31 DIAGNOSIS — Z01.818 ENCOUNTER FOR OTHER PREPROCEDURAL EXAMINATION: ICD-10-CM

## 2023-01-31 DIAGNOSIS — Z90.49 ACQUIRED ABSENCE OF OTHER SPECIFIED PARTS OF DIGESTIVE TRACT: Chronic | ICD-10-CM

## 2023-01-31 LAB
ALBUMIN SERPL ELPH-MCNC: 4.2 G/DL — SIGNIFICANT CHANGE UP (ref 3.3–5.2)
ALP SERPL-CCNC: 72 U/L — SIGNIFICANT CHANGE UP (ref 40–120)
ALT FLD-CCNC: 19 U/L — SIGNIFICANT CHANGE UP
ANION GAP SERPL CALC-SCNC: 10 MMOL/L — SIGNIFICANT CHANGE UP (ref 5–17)
APTT BLD: 30.5 SEC — SIGNIFICANT CHANGE UP (ref 27.5–35.5)
AST SERPL-CCNC: 26 U/L — SIGNIFICANT CHANGE UP
BASOPHILS # BLD AUTO: 0.03 K/UL — SIGNIFICANT CHANGE UP (ref 0–0.2)
BASOPHILS NFR BLD AUTO: 0.5 % — SIGNIFICANT CHANGE UP (ref 0–2)
BILIRUB SERPL-MCNC: <0.2 MG/DL — LOW (ref 0.4–2)
BUN SERPL-MCNC: 35.3 MG/DL — HIGH (ref 8–20)
CALCIUM SERPL-MCNC: 10.1 MG/DL — SIGNIFICANT CHANGE UP (ref 8.4–10.5)
CHLORIDE SERPL-SCNC: 104 MMOL/L — SIGNIFICANT CHANGE UP (ref 96–108)
CO2 SERPL-SCNC: 23 MMOL/L — SIGNIFICANT CHANGE UP (ref 22–29)
CREAT SERPL-MCNC: 1.76 MG/DL — HIGH (ref 0.5–1.3)
EGFR: 29 ML/MIN/1.73M2 — LOW
EOSINOPHIL # BLD AUTO: 0.32 K/UL — SIGNIFICANT CHANGE UP (ref 0–0.5)
EOSINOPHIL NFR BLD AUTO: 5 % — SIGNIFICANT CHANGE UP (ref 0–6)
GLUCOSE SERPL-MCNC: 96 MG/DL — SIGNIFICANT CHANGE UP (ref 70–99)
HCT VFR BLD CALC: 33.8 % — LOW (ref 34.5–45)
HGB BLD-MCNC: 10.7 G/DL — LOW (ref 11.5–15.5)
IMM GRANULOCYTES NFR BLD AUTO: 0.9 % — SIGNIFICANT CHANGE UP (ref 0–0.9)
INR BLD: 1.04 RATIO — SIGNIFICANT CHANGE UP (ref 0.88–1.16)
LYMPHOCYTES # BLD AUTO: 1.27 K/UL — SIGNIFICANT CHANGE UP (ref 1–3.3)
LYMPHOCYTES # BLD AUTO: 19.8 % — SIGNIFICANT CHANGE UP (ref 13–44)
MCHC RBC-ENTMCNC: 27.4 PG — SIGNIFICANT CHANGE UP (ref 27–34)
MCHC RBC-ENTMCNC: 31.7 GM/DL — LOW (ref 32–36)
MCV RBC AUTO: 86.4 FL — SIGNIFICANT CHANGE UP (ref 80–100)
MONOCYTES # BLD AUTO: 0.8 K/UL — SIGNIFICANT CHANGE UP (ref 0–0.9)
MONOCYTES NFR BLD AUTO: 12.5 % — SIGNIFICANT CHANGE UP (ref 2–14)
NEUTROPHILS # BLD AUTO: 3.94 K/UL — SIGNIFICANT CHANGE UP (ref 1.8–7.4)
NEUTROPHILS NFR BLD AUTO: 61.3 % — SIGNIFICANT CHANGE UP (ref 43–77)
PLATELET # BLD AUTO: 299 K/UL — SIGNIFICANT CHANGE UP (ref 150–400)
POTASSIUM SERPL-MCNC: 5.6 MMOL/L — HIGH (ref 3.5–5.3)
POTASSIUM SERPL-SCNC: 5.6 MMOL/L — HIGH (ref 3.5–5.3)
PROT SERPL-MCNC: 6.7 G/DL — SIGNIFICANT CHANGE UP (ref 6.6–8.7)
PROTHROM AB SERPL-ACNC: 12.1 SEC — SIGNIFICANT CHANGE UP (ref 10.5–13.4)
RBC # BLD: 3.91 M/UL — SIGNIFICANT CHANGE UP (ref 3.8–5.2)
RBC # FLD: 15.3 % — HIGH (ref 10.3–14.5)
SODIUM SERPL-SCNC: 136 MMOL/L — SIGNIFICANT CHANGE UP (ref 135–145)
WBC # BLD: 6.42 K/UL — SIGNIFICANT CHANGE UP (ref 3.8–10.5)
WBC # FLD AUTO: 6.42 K/UL — SIGNIFICANT CHANGE UP (ref 3.8–10.5)

## 2023-01-31 PROCEDURE — 80053 COMPREHEN METABOLIC PANEL: CPT

## 2023-01-31 PROCEDURE — 85730 THROMBOPLASTIN TIME PARTIAL: CPT

## 2023-01-31 PROCEDURE — 36415 COLL VENOUS BLD VENIPUNCTURE: CPT

## 2023-01-31 PROCEDURE — 85610 PROTHROMBIN TIME: CPT

## 2023-01-31 PROCEDURE — 93010 ELECTROCARDIOGRAM REPORT: CPT

## 2023-01-31 PROCEDURE — 93005 ELECTROCARDIOGRAM TRACING: CPT

## 2023-01-31 PROCEDURE — G0463: CPT

## 2023-01-31 PROCEDURE — 85025 COMPLETE CBC W/AUTO DIFF WBC: CPT

## 2023-01-31 RX ORDER — SODIUM CHLORIDE 9 MG/ML
3 INJECTION INTRAMUSCULAR; INTRAVENOUS; SUBCUTANEOUS ONCE
Refills: 0 | Status: DISCONTINUED | OUTPATIENT
Start: 2023-02-15 | End: 2023-03-01

## 2023-01-31 NOTE — H&P PST ADULT - NSICDXFAMILYHX_GEN_ALL_CORE_FT
FAMILY HISTORY:  Father  Still living? Unknown  FH: heart attack, Age at diagnosis: Age Unknown    Grandparent  Still living? Unknown  FH: heart disease, Age at diagnosis: Age Unknown  FH: HTN (hypertension), Age at diagnosis: Age Unknown

## 2023-01-31 NOTE — H&P PST ADULT - PROBLEM SELECTOR PLAN 1
preop assessment, medical clearance pending, CT guided left lung biopsy with anesthesia w/Dr Rendon (Dr Langston ordering) scheduled for 2/7/2023

## 2023-01-31 NOTE — H&P PST ADULT - PROBLEM SELECTOR PLAN 2
caprini score risk for dvt, SCD ordered, surgical team to assess for dvt prophylaxis caprini score 7, high risk for dvt, SCD ordered, surgical team to assess for dvt prophylaxis

## 2023-01-31 NOTE — H&P PST ADULT - NEUROLOGICAL
cranial nerves II-XII intact/sensation intact/responds to verbal commands/no spontaneous movement negative

## 2023-01-31 NOTE — H&P PST ADULT - HISTORY OF PRESENT ILLNESS
76 yo F PMH of HTN, HLD, PATRICE, GERD induced esophageal stricture status post EGD with dilation with no recurrent dysphagia, stroke in 2012, right carotid artery surgery at [facility], presents with c/o lung nodule, an incidental finding when patient was being worked up for hypercalcemia. Today she reports intermittent shortness of breath on exertion. She has a skin rash throughout her body, she reports currently being under the care of an immunologist. Patient denies chest pain, palpitations, cough, fevers, chills, fatigue, unintentional weight loss or gain, and night sweats. PET/CT 12/9/2022 showed 2.4 cm left lower lobe lung nodule evident on prior CT concerning for malignancy; no FDG-avid hilar or mediastinal lymphadenopathy; no other FDG-avid lung nodules. Preop assessment prior to CT guided left lung biopsy with anesthesia w/Dr Rendon (Dr Langston ordering) scheduled for 2/7/2023      Imaging:  PET/CT [facility] 12/9/2022 FINDINGS:  HEAD/NECK: Physiologic FDG activity in visualized brain, head, and neck.  THORAX:  Bilateral FDG-avid axillary lymph nodes, with index node:  -1.0 x 0.8 cm left axillary, SUV 2.5  -2.4 x 0.8 cm right axillary, SUV 2.8  No FDG-avid mediastinal or hilar lymph nodes.    LUNGS:  The 2.4 cm left lower lobe opacity evident on CT 11/10/2022 shows SUV 3.3. No FDG-avid lung nodules in the right middle lobe, with a non-FDG avid 0.9 cm anterior right middle lobe nodule  PLEURA/PERICARDIUM: No abnormal FDG activity. No effusion.  HEPATOBILIARY/PANCREAS: Physiologic FDG activity. Liver SUV mean is 2.7.  SPLEEN: Physiologic FDG activity, SUV 2.7. Normal in size. Splenule.  ADRENAL GLANDS: No abnormal FDG activity. 0.9 cm left adrenal nodule.  KIDNEYS/URINARY BLADDER: Physiologic excreted FDG activity. Left renal cyst.  REPRODUCTIVE ORGANS: No abnormal FDG activity.  ABDOMINOPELVIC LYMPH NODES/RETROPERITONEUM:  -No FDG avid lymph node adjacent to the aorta, as mentioned on previous CT.  Bilateral FDG-avid inguinal nodes, with index node:  -1.6 x 1.6 cm right inguinal, SUV 3.3  Mildly increased FDG uptake in the right inguinal region, image 187, likely postsurgical in patient with history of hernia repair.  ESOPHAGUS/STOMACH/BOWEL/PERITONEUM/MESENTERY: No abnormal FDG activity.  VESSELS: Carotid, coronary, and large vessel calcifications.  BONES/SOFT TISSUES: Benign musculoskeletal uptake around both shoulders and hips. Left hip arthroplasty. Multilevel degenerative change in the spine. Compression fractures, approximately T7, T11 and L3.    IMPRESSION:  1. The 2.4 cm left lower lobe lung nodule evident on prior CT shows SUV 3.3, concerning for malignancy. No FDG-avid hilar or mediastinal lymphadenopathy. No other FDG-avid lung nodules.  2. Nonspecific bilateral axillary and bilateral inguinal lymph nodes with low-level FDG avidity. Consider biopsy for further evaluation versus attention on follow-up.  3. Multiple compression fractures in the spine, as above.   76 yo F PMH of HTN, HLD, PATRICE, GERD induced esophageal stricture status post EGD with dilation with no recurrent dysphagia, stroke in 2013 s/p right carotid artery surgery at [facility], presents with c/o lung nodule, an incidental finding when patient was being worked up for hypercalcemia. Today she reports intermittent shortness of breath on exertion. She has a skin rash throughout her body, she reports currently being under the care of an immunologist. Patient denies chest pain, palpitations, cough, fevers, chills, fatigue, unintentional weight loss or gain, or night sweats. PET/CT 12/9/2022 showed 2.4 cm left lower lobe lung nodule evident on prior CT, SUV 3.3, concerning for malignancy; no FDG-avid hilar or mediastinal lymphadenopathy; no other FDG-avid lung nodules. Preop assessment prior to CT guided left lung biopsy with anesthesia w/Dr Rendon (Dr Langston ordering) scheduled for 2/7/2023      Imaging:  PET/CT [facility] 12/9/2022 FINDINGS:  HEAD/NECK: Physiologic FDG activity in visualized brain, head, and neck.  THORAX:  Bilateral FDG-avid axillary lymph nodes, with index node:  -1.0 x 0.8 cm left axillary, SUV 2.5  -2.4 x 0.8 cm right axillary, SUV 2.8  No FDG-avid mediastinal or hilar lymph nodes.    LUNGS:  The 2.4 cm left lower lobe opacity evident on CT 11/10/2022 shows SUV 3.3. No FDG-avid lung nodules in the right middle lobe, with a non-FDG avid 0.9 cm anterior right middle lobe nodule  PLEURA/PERICARDIUM: No abnormal FDG activity. No effusion.  HEPATOBILIARY/PANCREAS: Physiologic FDG activity. Liver SUV mean is 2.7.  SPLEEN: Physiologic FDG activity, SUV 2.7. Normal in size. Splenule.  ADRENAL GLANDS: No abnormal FDG activity. 0.9 cm left adrenal nodule.  KIDNEYS/URINARY BLADDER: Physiologic excreted FDG activity. Left renal cyst.  REPRODUCTIVE ORGANS: No abnormal FDG activity.  ABDOMINOPELVIC LYMPH NODES/RETROPERITONEUM:  -No FDG avid lymph node adjacent to the aorta, as mentioned on previous CT.  Bilateral FDG-avid inguinal nodes, with index node:  -1.6 x 1.6 cm right inguinal, SUV 3.3  Mildly increased FDG uptake in the right inguinal region, image 187, likely postsurgical in patient with history of hernia repair.  ESOPHAGUS/STOMACH/BOWEL/PERITONEUM/MESENTERY: No abnormal FDG activity.  VESSELS: Carotid, coronary, and large vessel calcifications.  BONES/SOFT TISSUES: Benign musculoskeletal uptake around both shoulders and hips. Left hip arthroplasty. Multilevel degenerative change in the spine. Compression fractures, approximately T7, T11 and L3.    IMPRESSION:  1. The 2.4 cm left lower lobe lung nodule evident on prior CT shows SUV 3.3, concerning for malignancy. No FDG-avid hilar or mediastinal lymphadenopathy. No other FDG-avid lung nodules.  2. Nonspecific bilateral axillary and bilateral inguinal lymph nodes with low-level FDG avidity. Consider biopsy for further evaluation versus attention on follow-up.  3. Multiple compression fractures in the spine, as above.

## 2023-01-31 NOTE — H&P PST ADULT - ASSESSMENT
76 yo F PMH of HTN, HLD, PATRICE, GERD induced esophageal stricture status post EGD with dilation with no recurrent dysphagia, stroke in 2012, right carotid artery surgery at [facility], presents with c/o lung nodule, an incidental finding when patient was being worked up for hypercalcemia. Today she reports intermittent shortness of breath on exertion. She has a skin rash throughout her body, she reports currently being under the care of an immunologist. Patient denies chest pain, palpitations, cough, fevers, chills, fatigue, unintentional weight loss or gain, and night sweats. PET/CT 12/9/2022 showed 2.4 cm left lower lobe lung nodule evident on prior CT concerning for malignancy; no FDG-avid hilar or mediastinal lymphadenopathy; no other FDG-avid lung nodules. Preop assessment prior to CT guided left lung biopsy with anesthesia w/Dr Rendon (Dr Langston ordering) scheduled for 2/7/2023    Pt was educated on preop preparation with written and verbal instructions. Pt was informed to obtain clearances >3 days before surgery. Pt will review medications with PCP. Pt was educated on NSAIDs, multivitamins and herbals that increase the risk of bleeding and need to be stopped 7 days before procedure. Pt was educated on covid testing and covid prevention, i.e. social distancing, handwashing, mask wearing. Pt verbalized understanding of the above.  76 yo F PMH of HTN, HLD, PATRICE, GERD induced esophageal stricture status post EGD with dilation with no recurrent dysphagia, stroke in 2013 s/p right carotid artery surgery at [facility], presents with c/o lung nodule, an incidental finding when patient was being worked up for hypercalcemia. Today she reports intermittent shortness of breath on exertion. She has a skin rash throughout her body, she reports currently being under the care of an immunologist. Patient denies chest pain, palpitations, cough, fevers, chills, fatigue, unintentional weight loss or gain, or night sweats. PET/CT 2022 showed 2.4 cm left lower lobe lung nodule evident on prior CT, SUV 3.3, concerning for malignancy; no FDG-avid hilar or mediastinal lymphadenopathy; no other FDG-avid lung nodules. Preop assessment prior to CT guided left lung biopsy with anesthesia w/Dr Rendon (Dr Langston ordering) scheduled for 2023    Pt was educated on preop preparation with written and verbal instructions. Pt was informed to obtain clearance >3 days before surgery. Pt was educated on NSAIDs, multivitamins and herbals that increase the risk of bleeding and need to be stopped 7 days before procedure. Pt was educated on covid testing and covid prevention, i.e. social distancing, handwashing, mask wearing. Pt verbalized understanding of the above.     OPIOID RISK TOOL    JANE EACH BOX THAT APPLIES AND ADD TOTALS AT THE END    FAMILY HISTORY OF SUBSTANCE ABUSE                 FEMALE         MALE                                                Alcohol                             [  ]1 pt          [  ]3pts                                               Illegal Durgs                     [  ]2 pts        [  ]3pts                                               Rx Drugs                           [  ]4 pts        [  ]4 pts    PERSONAL HISTORY OF SUBSTANCE ABUSE                                                                                          Alcohol                             [  ]3 pts       [  ]3 pts                                               Illegal Drugs                     [  ]4 pts        [  ]4 pts                                               Rx Drugs                           [  ]5 pts        [  ]5 pts    AGE BETWEEN 16-45 YEARS                                      [  ]1 pt         [  ]1 pt    HISTORY OF PREADOLESCENT   SEXUAL ABUSE                                                             [  ]3 pts        [  ]0pts    PSYCHOLOGICAL DISEASE                     ADD, OCD, Bipolar, Schizophrenia        [  ]2 pts         [  ]2 pts                      Depression                                               [  ]1 pt           [  ]1 pt           SCORING TOTAL   (add numbers and type here)              ( 0 )                                     A score of 3 or lower indicated LOW risk for future opioid abuse  A score of 4 to 7 indicated moderate risk for future opioid abuse  A score of 8 or higher indicates a high risk for opioid abuse    CAPRINI VTE 2.0 SCORE [CLOT updated 2019]    AGE RELATED RISK FACTORS                                                       MOBILITY RELATED FACTORS  [ ] Age 41-60 years                                            (1 Point)                    [ ] Bed rest                                                        (1 Point)  [ ] Age: 61-74 years                                           (2 Points)                  [ ] Plaster cast                                                   (2 Points)  [ x] Age= 75 years                                              (3 Points)                    [ ] Bed bound for more than 72 hours                 (2 Points)    DISEASE RELATED RISK FACTORS                                               GENDER SPECIFIC FACTORS  [ ] Edema in the lower extremities                       (1 Point)              [ ] Pregnancy                                                     (1 Point)  [ ] Varicose veins                                               (1 Point)                     [ ] Post-partum < 6 weeks                                   (1 Point)             [ ] BMI > 25 Kg/m2                                            (1 Point)                     [ ] Hormonal therapy  or oral contraception          (1 Point)                 [ ] Sepsis (in the previous month)                        (1 Point)               [ ] History of pregnancy complications                 (1 point)  [ ] Pneumonia or serious lung disease                                               [ ] Unexplained or recurrent                     (1 Point)           (in the previous month)                               (1 Point)  [ ] Abnormal pulmonary function test                     (1 Point)                 SURGERY RELATED RISK FACTORS  [ ] Acute myocardial infarction                              (1 Point)               [ ]  Section                                             (1 Point)  [ ] Congestive heart failure (in the previous month)  (1 Point)      [ ] Minor surgery                                                  (1 Point)   [ ] Inflammatory bowel disease                             (1 Point)               [ ] Arthroscopic surgery                                        (2 Points)  [ ] Central venous access                                      (2 Points)                [x ] General surgery lasting more than 45 minutes (2 points)  [x ] Malignancy- Present or previous                   (2 Points)                [ ] Elective arthroplasty                                         (5 points)    [ ] Stroke (in the previous month)                          (5 Points)                                                                                                                                                           HEMATOLOGY RELATED FACTORS                                                 TRAUMA RELATED RISK FACTORS  [ ] Prior episodes of VTE                                     (3 Points)                [ ] Fracture of the hip, pelvis, or leg                       (5 Points)  [ ] Positive family history for VTE                         (3 Points)             [ ] Acute spinal cord injury (in the previous month)  (5 Points)  [ ] Prothrombin 70361 A                                     (3 Points)               [ ] Paralysis  (less than 1 month)                             (5 Points)  [ ] Factor V Leiden                                             (3 Points)                  [ ] Multiple Trauma within 1 month                        (5 Points)  [ ] Lupus anticoagulants                                     (3 Points)                                                           [ ] Anticardiolipin antibodies                               (3 Points)                                                       [ ] High homocysteine in the blood                      (3 Points)                                             [ ] Other congenital or acquired thrombophilia      (3 Points)                                                [ ] Heparin induced thrombocytopenia                  (3 Points)                                     Total Score [     7     ]

## 2023-01-31 NOTE — H&P PST ADULT - RESPIRATORY
clear to auscultation bilaterally/no wheezes/no rales/no rhonchi/breath sounds equal/respirations non-labored no wheezes/no rales/no rhonchi/respirations non-labored/diminished breath sounds, L

## 2023-01-31 NOTE — H&P PST ADULT - NSICDXPASTMEDICALHX_GEN_ALL_CORE_FT
PAST MEDICAL HISTORY:  Carotid artery disease, unspecified laterality     Cerebrovascular accident (CVA), unspecified mechanism     Esophageal dysphagia     Hyperlipidemia, unspecified hyperlipidemia type     Hypertension     Other nonspecific abnormal finding of lung field

## 2023-01-31 NOTE — H&P PST ADULT - NSHP PST DIAGOTHER LIST_GEN_A_CORE
1/31/2023: Dr Rendon and Dr Langston were notified of abnormal lab results; all PST labs were faxed to PCP Dr Amaya. Christianne Abraham NP

## 2023-01-31 NOTE — H&P PST ADULT - NEGATIVE GENERAL SYMPTOMS
no fever/no chills/no sweating/no weight loss/no weight gain/no malaise no fever/no chills/no sweating/no weight loss/no weight gain/no malaise/no fatigue

## 2023-01-31 NOTE — H&P PST ADULT - NEGATIVE ENMT SYMPTOMS
no hearing difficulty/no vertigo/no sinus symptoms no hearing difficulty/no vertigo/no sinus symptoms/no post-nasal discharge/no nose bleeds/no gum bleeding

## 2023-01-31 NOTE — H&P PST ADULT - NSICDXPASTSURGICALHX_GEN_ALL_CORE_FT
PAST SURGICAL HISTORY:  Carotid artery disease s/p carotid bypass right side    History of left hip replacement     S/P cataract surgery both eyes    S/P cholecystectomy     S/P tubal ligation

## 2023-02-08 ENCOUNTER — APPOINTMENT (OUTPATIENT)
Dept: PULMONOLOGY | Facility: CLINIC | Age: 78
End: 2023-02-08

## 2023-02-15 ENCOUNTER — OUTPATIENT (OUTPATIENT)
Dept: OUTPATIENT SERVICES | Facility: HOSPITAL | Age: 78
LOS: 1 days | End: 2023-02-15
Payer: MEDICARE

## 2023-02-15 ENCOUNTER — RESULT REVIEW (OUTPATIENT)
Age: 78
End: 2023-02-15

## 2023-02-15 VITALS
SYSTOLIC BLOOD PRESSURE: 184 MMHG | HEIGHT: 66 IN | DIASTOLIC BLOOD PRESSURE: 72 MMHG | OXYGEN SATURATION: 97 % | HEART RATE: 67 BPM | WEIGHT: 167.99 LBS | RESPIRATION RATE: 15 BRPM | TEMPERATURE: 99 F

## 2023-02-15 DIAGNOSIS — Z90.49 ACQUIRED ABSENCE OF OTHER SPECIFIED PARTS OF DIGESTIVE TRACT: Chronic | ICD-10-CM

## 2023-02-15 DIAGNOSIS — Z98.49 CATARACT EXTRACTION STATUS, UNSPECIFIED EYE: Chronic | ICD-10-CM

## 2023-02-15 DIAGNOSIS — I77.9 DISORDER OF ARTERIES AND ARTERIOLES, UNSPECIFIED: Chronic | ICD-10-CM

## 2023-02-15 DIAGNOSIS — R91.8 OTHER NONSPECIFIC ABNORMAL FINDING OF LUNG FIELD: ICD-10-CM

## 2023-02-15 DIAGNOSIS — Z96.642 PRESENCE OF LEFT ARTIFICIAL HIP JOINT: Chronic | ICD-10-CM

## 2023-02-15 DIAGNOSIS — Z98.51 TUBAL LIGATION STATUS: Chronic | ICD-10-CM

## 2023-02-15 PROCEDURE — 77012 CT SCAN FOR NEEDLE BIOPSY: CPT

## 2023-02-15 PROCEDURE — 71045 X-RAY EXAM CHEST 1 VIEW: CPT | Mod: 26

## 2023-02-15 PROCEDURE — 32408 CORE NDL BX LNG/MED PERQ: CPT

## 2023-02-15 PROCEDURE — 88305 TISSUE EXAM BY PATHOLOGIST: CPT | Mod: 26

## 2023-02-15 PROCEDURE — 71045 X-RAY EXAM CHEST 1 VIEW: CPT

## 2023-02-15 PROCEDURE — 32408 CORE NDL BX LNG/MED PERQ: CPT | Mod: LT

## 2023-02-15 PROCEDURE — 88305 TISSUE EXAM BY PATHOLOGIST: CPT

## 2023-02-15 RX ORDER — METOPROLOL TARTRATE 50 MG
1 TABLET ORAL
Qty: 0 | Refills: 0 | DISCHARGE

## 2023-02-15 RX ORDER — LORATADINE 10 MG/1
1 TABLET ORAL
Qty: 0 | Refills: 0 | DISCHARGE

## 2023-02-15 RX ORDER — DOXAZOSIN MESYLATE 4 MG
1 TABLET ORAL
Qty: 0 | Refills: 0 | DISCHARGE

## 2023-02-15 RX ORDER — HYDROCHLOROTHIAZIDE 25 MG
1 TABLET ORAL
Qty: 0 | Refills: 0 | DISCHARGE

## 2023-02-15 RX ORDER — FAMOTIDINE 10 MG/ML
0 INJECTION INTRAVENOUS
Qty: 0 | Refills: 0 | DISCHARGE

## 2023-02-15 RX ORDER — SIMVASTATIN 20 MG/1
1 TABLET, FILM COATED ORAL
Qty: 0 | Refills: 0 | DISCHARGE

## 2023-02-15 RX ORDER — LOSARTAN POTASSIUM 100 MG/1
1 TABLET, FILM COATED ORAL
Qty: 0 | Refills: 0 | DISCHARGE

## 2023-02-15 NOTE — ASU DISCHARGE PLAN (ADULT/PEDIATRIC) - NS MD DC FALL RISK RISK
For information on Fall & Injury Prevention, visit: [email or web address] OR  [email or web address] OR  https://www.cdc.gov/steadi/patient.html

## 2023-02-15 NOTE — ASU DISCHARGE PLAN (ADULT/PEDIATRIC) - ASU DC SPECIAL INSTRUCTIONSFT
Biopsy Discharge    Discharge Instructions  - You have had a biopsy of your lung.   - You may shower in 24 hours. No soaking or swimming until the site is completely healed.  - Keep the area covered and dry for the next 24 hours.  - Do not perform any heavy lifting for the next few days or until the site is healed.  - You may resume your normal diet.  - You may resume your normal medications however you should wait 48 hours before restarting aspirin, plavix, or blood thinners.  - It is normal to experience some pain over the site for the next few days. You may take apply ice to the area (20 minutes on, 20 minutes off) and take Tylenol for that pain. Do not take more frequently than every 6 hours and do not exceed more than 3000mg of Tylenol in a 24 hour period.    - You were given conscious sedation which may make you drowsy, therefore you need someone to stay with you until the morning following the procedure.  - Do not drive, engage in heavy lifting or strenuous activity, or drink any alcoholic beverages for the next 24 hours.   - You may resume normal activity in 24 hours.    Notify your primary physician and/or Interventional Radiology IMMEDIATELY if you experience any of the following       - Fever of 101F or 38C       - Chills or Rigors/ Shakes       - Swelling and/or Redness in the area around the biopsy site       - Worsening Pain       - Blood soaked bandages or worsening bleeding       - Lightheadedness and/or dizziness upon standing       - Chest Pain/ Tightness       - Shortness of Breath       - Difficulty walking    If you have a problem that you believe requires IMMEDIATE attention, please go to your NEAREST Emergency Room. If you believe your problem can safely wait until you speak to a physician, please call Interventional Radiology for any concerns.    During Normal Weekday Business Hours- You can contact the Interventional Radiology department during normal business hours via telephone.  During Evenings and Weekends- If you need to contact Interventional Radiology during off hours, do so by calling the hospital and requesting to be connected to the Interventional Radiologist on call.
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
## 2023-02-15 NOTE — PROGRESS NOTE ADULT - SUBJECTIVE AND OBJECTIVE BOX
IR Post Procedure Note    Diagnosis: leftLung Nodule    Procedure: left Lung  Nodule Biopsy    : Aman Rendon MD    Contrast: None    Anesthesia: 1% Lidocaine Subcutaneous, Sedation administered by Anesthesiology  Estimated Blood Loss: Less than 10cc    Specimens: Identified, Labeled, Confirmed and Sent to Lab.     Complications: No Immediate Complications    Anticoagulation: Resume in 24 Hours    Findings & Plan: multiple core bx of left lung nodule obtained w 20g biopsy needle through 19g introducer needle under CT guidance.    Please call Interventional Radiology with any questions, concerns, or issues.  IR Post Procedure Note    Diagnosis: Left Lung Nodule    Procedure: Left Lung  Nodule Biopsy    : Aman Rendon MD    Contrast: None    Anesthesia: 1% Lidocaine Subcutaneous, Sedation administered by Anesthesiology    Estimated Blood Loss: Less than 10cc    Specimens: Identified, Labeled, Confirmed and Sent to Lab.     Complications: No Immediate Complications    Anticoagulation: Resume in 24 Hours    Findings & Plan: multiple core bx of left lung nodule obtained w 20g biopsy needle through 19g introducer needle under CT guidance.    Please call Interventional Radiology with any questions, concerns, or issues.

## 2023-02-16 PROBLEM — R91.8 OTHER NONSPECIFIC ABNORMAL FINDING OF LUNG FIELD: Chronic | Status: ACTIVE | Noted: 2023-01-31

## 2023-02-16 PROBLEM — I10 ESSENTIAL (PRIMARY) HYPERTENSION: Chronic | Status: ACTIVE | Noted: 2023-01-31

## 2023-02-17 LAB — SURGICAL PATHOLOGY STUDY: SIGNIFICANT CHANGE UP

## 2023-02-27 ENCOUNTER — APPOINTMENT (OUTPATIENT)
Dept: THORACIC SURGERY | Facility: CLINIC | Age: 78
End: 2023-02-27
Payer: MEDICARE

## 2023-02-27 VITALS
BODY MASS INDEX: 26.03 KG/M2 | HEIGHT: 66 IN | RESPIRATION RATE: 16 BRPM | HEART RATE: 52 BPM | SYSTOLIC BLOOD PRESSURE: 138 MMHG | TEMPERATURE: 97.5 F | OXYGEN SATURATION: 97 % | WEIGHT: 162 LBS | DIASTOLIC BLOOD PRESSURE: 54 MMHG

## 2023-02-27 PROCEDURE — 99214 OFFICE O/P EST MOD 30 MIN: CPT

## 2023-02-27 RX ORDER — NYSTATIN 100000 [USP'U]/G
100000 POWDER TOPICAL
Qty: 30 | Refills: 0 | Status: COMPLETED | COMMUNITY
Start: 2020-08-10 | End: 2023-02-27

## 2023-02-27 RX ORDER — FUROSEMIDE 40 MG/1
40 TABLET ORAL
Qty: 90 | Refills: 0 | Status: COMPLETED | COMMUNITY
Start: 2022-11-25 | End: 2023-02-27

## 2023-02-27 RX ORDER — NABUMETONE 500 MG/1
500 TABLET, FILM COATED ORAL
Qty: 30 | Refills: 0 | Status: COMPLETED | COMMUNITY
Start: 2022-06-09 | End: 2023-02-27

## 2023-02-27 RX ORDER — HYDROXYZINE HYDROCHLORIDE 25 MG/1
25 TABLET ORAL
Qty: 30 | Refills: 0 | Status: COMPLETED | COMMUNITY
Start: 2022-08-23 | End: 2023-02-27

## 2023-02-27 NOTE — DATA REVIEWED
[FreeTextEntry1] : Misty Accession Number : [id number]\par Patient:   CHARANJIT GREENWOOD\par \par Accession:                             [id number]\par \par Collected Date/Time:                   2/15/2023 09:00 EST\par Received Date/Time:                    2/15/2023 09:52 EST\par \par Surgical Pathology Report - Auth (Verified)\par \par Specimen(s) Submitted\par 1  Left lower lung nodule\par \par Final Diagnosis\par Lung, left lower lobe, core biopsy:\par -   Mucinous adenocarcinoma\par \par Verified by: Joe Saucedo MD\par (Electronic Signature)\par \par \par \par \par \par \par \par \par \par \par \par PETCT SKUL-THI ONC FDG INIT  - ORDERED BY: ELENA GLOVER\par \par PROCEDURE DATE:  12/09/2022\par \par INTERPRETATION:  CLINICAL INFORMATION: 77-year-old with abnormal chest CT. Evaluate lung nodule and compare with previous films.\par \par TREATMENT STRATEGY EVALUATION: Initial\par FASTING BLOOD SUGAR: 86 mg/dL\par RADIOPHARMACEUTICAL:  12.4 mCi F-18, FDG, I.V.\par UPTAKE PERIOD: 55 minutes\par SCANNER: [employer] 710\par ORAL CONTRAST: Patient drank OMNIPAQUE contrast during the uptake period.\par PHARMACOLOGIC INTERVENTION: None.\par \par TECHNIQUE: Following intravenous injection of radiopharmaceutical and above uptake period, PET/CT was obtained from base of skull  to mid-thigh. CT protocol was optimized for PET attenuation correction and anatomic localization and was not designed to produce and cannot replace state-of-the-art diagnostic CT images with specific imaging protocols for different body parts and indications. Images were reconstructed and reviewed in axial, coronal and sagittal views and three-dimensional MIP.\par \par The standardized uptake values (SUV) are normalized to patient body weight and indicate the highest activity concentration (SUVmax) in a given site. All image numbers refer to axial image number.\par \par COMPARISON:  No prior FDG-PET/CT\par \par OTHER STUDIES USED FOR CORRELATION: CT 11/10/2022\par \par FINDINGS:\par \par HEAD/NECK: Physiologic FDG activity in visualized brain, head, and neck.\par \par THORAX:\par \par Bilateral FDG-avid axillary lymph nodes, with index node:\par -1.0 x 0.8 cm left axillary, SUV 2.5, image 82.\par -2.4 x 0.8 cm right axillary, SUV 2.8, image 75.\par \par No FDG-avid mediastinal or hilar lymph nodes.\par \par LUNGS:\par \par The 2.4 cm left lower lobe opacity evident on CT 11/10/2022 shows SUV 3.3, image 70. No FDG-avid lung nodules in the right middle lobe, with a non-FDG avid 0.9 cm anterior right middle lobe nodule (image 99).\par \par PLEURA/PERICARDIUM: No abnormal FDG activity. No effusion.\par \par HEPATOBILIARY/PANCREAS: Physiologic FDG activity.  Liver SUV mean is 2.7.\par \par SPLEEN: Physiologic FDG activity, SUV 2.7. Normal in size. Splenule.\par \par ADRENAL GLANDS: No abnormal FDG activity. 0.9 cm left adrenal nodule.\par \par KIDNEYS/URINARY BLADDER: Physiologic excreted FDG activity. Left renal cyst.\par \par REPRODUCTIVE ORGANS: No abnormal FDG activity.\par \par ABDOMINOPELVIC LYMPH NODES/RETROPERITONEUM:\par -No FDG avid lymph node adjacent to the aorta, as mentioned on previous CT.\par \par Bilateral FDG-avid inguinal nodes, with index node:\par -1.6 x 1.6 cm right inguinal, SUV 3.3, image 215.\par \par Mildly increased FDG uptake in the right inguinal region, image 187, likely postsurgical in patient with history of hernia repair.\par \par ESOPHAGUS/STOMACH/BOWEL/PERITONEUM/MESENTERY: No abnormal FDG activity.\par \par VESSELS: Carotid, coronary, and large vessel calcifications.\par \par BONES/SOFT TISSUES: Benign musculoskeletal uptake around both shoulders and hips. Left hip arthroplasty. Multilevel degenerative change in the spine. Compression fractures, approximately T7, T11 and L3.\par \par IMPRESSION:\par \par 1. The 2.4 cm left lower lobe lung nodule evident on prior CT shows SUV 3.3, concerning for malignancy. No FDG-avid hilar or mediastinal lymphadenopathy. No other FDG-avid lung nodules.\par \par 2. Nonspecific bilateral axillary and bilateral inguinal lymph nodes with low-level FDG avidity. Consider biopsy for further evaluation versus attention on follow-up.\par \par 3. Multiple compression fractures in the spine, as above.\par \par --- End of Report ---\par \par MAMTA ALVAREZ MD; Attending Radiologist

## 2023-02-27 NOTE — REVIEW OF SYSTEMS
[Sleep Disturbances] : sleep disturbances [Easy Bleeding] : a tendency for easy bleeding [Easy Bruising] : a tendency for easy bruising [Negative] : Endocrine [Fever] : no fever [Chills] : no chills [Feeling Poorly] : not feeling poorly [Feeling Tired] : not feeling tired [Chest Pain] : no chest pain [Palpitations] : no palpitations [Shortness Of Breath] : no shortness of breath [Wheezing] : no wheezing [Cough] : no cough [SOB on Exertion] : no shortness of breath during exertion [Anxiety] : no anxiety [Depression] : no depression [de-identified] : skin rash throughout the body- she reports currently being under the care of a dermatologist.

## 2023-02-27 NOTE — ASSESSMENT
[FreeTextEntry1] : Елена is a 77-year-old female with a left lower lobe patchy opacity who recently underwent transthoracic needle biopsy.  This demonstrated a mucinous adenocarcinoma.  She does have borderline pulmonary function and I have asked her to obtain a radiation oncology consultation for discussion of options that are nonsurgical as well.  I look forward to seeing her after that is complete.\par \par Thank you for allowing me to participate in the care of your patient.\par \par 30 minutes was spent during this encounter.\par \par Raymundo Langston MD\par Department of Cardiovascular and Thoracic Surgery\par \par [employer]\par [facility] [employer] at [facility]\par

## 2023-02-27 NOTE — HISTORY OF PRESENT ILLNESS
[FreeTextEntry1] : Елена Grissom is a 77 year old female, referred by Dr. Fontana, who presents for a follow up appointment regarding an incidental finding of a lung nodule during a work up for hypercalcemia. She is status post biopsy on 2/15 and presents today to review results.\par \par Past medical history includes former smoker, sleep apnea, GERD induced esophageal stricture status post EGD with dilation with no recurrent dysphagia, stroke (2012), right carotid artery surgery at [facility] (poor history given).\par \par Patient reports that she is feeling well today. She reports that she is under the care of a dermatologist for a skin rash. She denies chest pain, palpitations, shortness of breath, cough, fevers, chills, fatigue, unintentional weight loss or gain, and night sweats.

## 2023-02-28 ENCOUNTER — OFFICE (OUTPATIENT)
Dept: URBAN - METROPOLITAN AREA CLINIC 115 | Facility: CLINIC | Age: 78
Setting detail: OPHTHALMOLOGY
End: 2023-02-28
Payer: COMMERCIAL

## 2023-02-28 DIAGNOSIS — H35.033: ICD-10-CM

## 2023-02-28 DIAGNOSIS — H33.8: ICD-10-CM

## 2023-02-28 DIAGNOSIS — H01.004: ICD-10-CM

## 2023-02-28 DIAGNOSIS — H43.391: ICD-10-CM

## 2023-02-28 DIAGNOSIS — H02.831: ICD-10-CM

## 2023-02-28 DIAGNOSIS — H01.001: ICD-10-CM

## 2023-02-28 DIAGNOSIS — H02.834: ICD-10-CM

## 2023-02-28 PROCEDURE — 92250 FUNDUS PHOTOGRAPHY W/I&R: CPT | Performed by: OPHTHALMOLOGY

## 2023-02-28 PROCEDURE — 92014 COMPRE OPH EXAM EST PT 1/>: CPT | Performed by: OPHTHALMOLOGY

## 2023-02-28 ASSESSMENT — REFRACTION_MANIFEST
OS_VA1: 20/25
OS_CYLINDER: -2.25
OD_CYLINDER: -1.50
OD_ADD: +2.75
OS_AXIS: 120
OD_ADD: +2.75
OD_VA1: 20/20
OS_VA1: 20/30
OS_AXIS: 115
OS_ADD: +2.75
OS_CYLINDER: -2.00
OD_VA1: 20/25
OS_ADD: +2.75
OS_SPHERE: -1.25
OD_AXIS: 090
OD_SPHERE: +0.75
OD_AXIS: 090
OD_SPHERE: +0.50
OU_VA: 20/25
OS_SPHERE: -1.25
OD_CYLINDER: -2.00

## 2023-02-28 ASSESSMENT — VISUAL ACUITY
OS_BCVA: 20/25
OD_BCVA: 20/30

## 2023-02-28 ASSESSMENT — SPHEQUIV_DERIVED
OS_SPHEQUIV: -2.25
OS_SPHEQUIV: -1.875
OD_SPHEQUIV: -0.25
OD_SPHEQUIV: -0.25
OS_SPHEQUIV: -2.375
OD_SPHEQUIV: -0.25

## 2023-02-28 ASSESSMENT — REFRACTION_CURRENTRX
OS_AXIS: 106
OD_VPRISM_DIRECTION: SV
OS_VPRISM_DIRECTION: SV
OD_AXIS: 097
OD_CYLINDER: -1.25
OD_OVR_VA: 20/
OD_VPRISM_DIRECTION: SV
OD_OVR_VA: 20/
OS_AXIS: 115
OS_VPRISM_DIRECTION: SV
OS_SPHERE: -1.75
OS_SPHERE: -1.25
OS_CYLINDER: -2.25
OD_AXIS: 087
OD_SPHERE: +0.50
OD_CYLINDER: -1.75
OS_OVR_VA: 20/
OS_CYLINDER: -1.75
OD_SPHERE: +0.50
OS_OVR_VA: 20/

## 2023-02-28 ASSESSMENT — LID EXAM ASSESSMENTS
OS_BLEPHARITIS: LUL T
OD_BLEPHARITIS: RUL T

## 2023-02-28 ASSESSMENT — CONFRONTATIONAL VISUAL FIELD TEST (CVF)
OD_FINDINGS: FULL
OS_FINDINGS: FULL

## 2023-02-28 ASSESSMENT — REFRACTION_AUTOREFRACTION
OD_AXIS: 089
OS_SPHERE: -0.75
OD_SPHERE: +0.75
OS_AXIS: 121
OD_CYLINDER: -2.00
OS_CYLINDER: -2.25

## 2023-02-28 ASSESSMENT — LID POSITION - DERMATOCHALASIS
OS_DERMATOCHALASIS: LUL 1+ 2+
OD_DERMATOCHALASIS: RUL 1+ 2+

## 2023-03-02 ENCOUNTER — NON-APPOINTMENT (OUTPATIENT)
Age: 78
End: 2023-03-02

## 2023-03-08 ENCOUNTER — OUTPATIENT (OUTPATIENT)
Dept: OUTPATIENT SERVICES | Facility: HOSPITAL | Age: 78
LOS: 1 days | Discharge: HOME | End: 2023-03-08
Payer: MEDICARE

## 2023-03-08 DIAGNOSIS — Z98.49 CATARACT EXTRACTION STATUS, UNSPECIFIED EYE: Chronic | ICD-10-CM

## 2023-03-08 DIAGNOSIS — Z90.49 ACQUIRED ABSENCE OF OTHER SPECIFIED PARTS OF DIGESTIVE TRACT: Chronic | ICD-10-CM

## 2023-03-08 DIAGNOSIS — I77.9 DISORDER OF ARTERIES AND ARTERIOLES, UNSPECIFIED: Chronic | ICD-10-CM

## 2023-03-08 DIAGNOSIS — Z98.51 TUBAL LIGATION STATUS: Chronic | ICD-10-CM

## 2023-03-08 DIAGNOSIS — Z96.642 PRESENCE OF LEFT ARTIFICIAL HIP JOINT: Chronic | ICD-10-CM

## 2023-03-09 ENCOUNTER — NON-APPOINTMENT (OUTPATIENT)
Age: 78
End: 2023-03-09

## 2023-03-09 ENCOUNTER — APPOINTMENT (OUTPATIENT)
Dept: RADIATION ONCOLOGY | Facility: CLINIC | Age: 78
End: 2023-03-09
Payer: MEDICARE

## 2023-03-09 VITALS
HEIGHT: 66 IN | SYSTOLIC BLOOD PRESSURE: 127 MMHG | DIASTOLIC BLOOD PRESSURE: 57 MMHG | TEMPERATURE: 97 F | BODY MASS INDEX: 26.2 KG/M2 | HEART RATE: 70 BPM | WEIGHT: 163 LBS | OXYGEN SATURATION: 96 % | RESPIRATION RATE: 16 BRPM

## 2023-03-09 PROCEDURE — 99205 OFFICE O/P NEW HI 60 MIN: CPT | Mod: 25

## 2023-03-09 NOTE — PHYSICAL EXAM
[Sclera] : the sclera and conjunctiva were normal [Extraocular Movements] : extraocular movements were intact [Outer Ear] : the ears and nose were normal in appearance [Heart Rate And Rhythm] : heart rate and rhythm were normal [Heart Sounds] : normal S1 and S2 [Bowel Sounds] : normal bowel sounds [Abdomen Soft] : soft [Nondistended] : nondistended [Abdomen Tenderness] : non-tender [Motor Tone] : muscle strength and tone were normal [No Spine Tenderness] : no tenderness to palpation of the vertebral spine [Normal] : no focal deficits

## 2023-03-10 NOTE — OB/GYN HISTORY
[History of Birth Control Pills] : Patient has a history of taking birth control pills [Currently In Menopause] : currently in menopause [Menopause Age: ____] : patient was [unfilled] years old at menopause [___] : Living: [unfilled] [History of Hormone Replacement Therapy] : no history of hormone replacement therapy

## 2023-03-10 NOTE — HISTORY OF PRESENT ILLNESS
[FreeTextEntry1] : \par 77 year old woman presents today for consultation regarding radiation therapy for lung carcinoma.\par \par She was referred by Dr. Roderikc Amaya for chest imaging given her smoking history and hypercalcemia.\par \par 11/10/22 CT chest noted a 2.4 cm irregular nodule in the left lower lobe of lung.  There were also 2 subcentimeter nodular foci in the right middle lobe.\par \par She met with Dr. Tonio Fontana on 11/30/22.\par \par 12/9/22 PET/CT noted 2.4 cm LLL opacity with SUV 3.3.  No FDG-avid lung nodules in the RML, with a non-FDG aid 0.9 cm anterior RML nodule.  No FDG-avid hilar or mediastinal lymphadenopathy.  Also noted were nonspecific bilateral axillary and bilateral inguinal lymph nodes measuring up to 2.4 cm, SUV 2.5 – 3.3.\par \par She met with Dr. Raymundo Langston on 12/19/22.\par \par 2/15/23 left lower lobe core biopsy showed mucinous adenocarcinoma.\par \par She reports intermittent exertional dyspnea.  No cough, orthopnea, odynophagia, or chest pain.  No unintentional weight loss.\par \par She also notes a diffuse rash since 7/2021, for which she has seen dermatology (Yaw Feliciano) and immunology (doesn’t recall name, @ [facility]).  She was diagnosed with bullous pemphigoid, and is on doxycycline + nicotinamide.  She has also previously been on courses of prednisone.  She described the rash affecting bilateral arms, back, and bilateral legs.

## 2023-03-10 NOTE — LETTER CLOSING
[Consult Closing:] : Thank you for allowing me to participate in the care of this patient.  If you have any questions, please do not hesitate to contact me. [Sincerely yours,] : Sincerely yours, [FreeTextEntry3] : Reyes Kirk MD\par  of Radiation Medicine, Quality and Safety\par  of Radiation Medicine\par [facility] at [facility]\par [facility]\par [facility]\par

## 2023-03-10 NOTE — REVIEW OF SYSTEMS
[Dysphagia] : dysphagia [SOB on Exertion] : shortness of breath during exertion [Negative] : Allergic/Immunologic [Dysphagia: Grade 1 - Symptomatic, able to eat regular diet] : Dysphagia: Grade 1 - Symptomatic, able to eat regular diet [Edema Limbs: Grade 0] : Edema Limbs: Grade 0  [Fatigue: Grade 0] : Fatigue: Grade 0 [Localized Edema: Grade 0] : Localized Edema: Grade 0  [Neck Edema: Grade 0] : Neck Edema: Grade 0 [Cough: Grade 0] : Cough: Grade 0 [Dyspnea: Grade 1 - Shortness of breath with moderate exertion] : Dyspnea: Grade 1 - Shortness of breath with moderate exertion [FreeTextEntry9] : walks with a cane

## 2023-03-16 ENCOUNTER — APPOINTMENT (OUTPATIENT)
Dept: PULMONOLOGY | Facility: CLINIC | Age: 78
End: 2023-03-16

## 2023-03-17 ENCOUNTER — OUTPATIENT (OUTPATIENT)
Dept: OUTPATIENT SERVICES | Facility: HOSPITAL | Age: 78
LOS: 1 days | End: 2023-03-17

## 2023-03-17 ENCOUNTER — APPOINTMENT (OUTPATIENT)
Dept: CT IMAGING | Facility: CLINIC | Age: 78
End: 2023-03-17
Payer: MEDICARE

## 2023-03-17 DIAGNOSIS — Z96.642 PRESENCE OF LEFT ARTIFICIAL HIP JOINT: Chronic | ICD-10-CM

## 2023-03-17 DIAGNOSIS — Z90.49 ACQUIRED ABSENCE OF OTHER SPECIFIED PARTS OF DIGESTIVE TRACT: Chronic | ICD-10-CM

## 2023-03-17 DIAGNOSIS — I77.9 DISORDER OF ARTERIES AND ARTERIOLES, UNSPECIFIED: Chronic | ICD-10-CM

## 2023-03-17 DIAGNOSIS — Z98.51 TUBAL LIGATION STATUS: Chronic | ICD-10-CM

## 2023-03-17 DIAGNOSIS — Z00.8 ENCOUNTER FOR OTHER GENERAL EXAMINATION: ICD-10-CM

## 2023-03-17 DIAGNOSIS — Z98.49 CATARACT EXTRACTION STATUS, UNSPECIFIED EYE: Chronic | ICD-10-CM

## 2023-03-17 PROCEDURE — 71260 CT THORAX DX C+: CPT | Mod: 26

## 2023-03-19 PROCEDURE — 77263 THER RADIOLOGY TX PLNG CPLX: CPT

## 2023-03-21 PROCEDURE — 77290 THER RAD SIMULAJ FIELD CPLX: CPT | Mod: 26

## 2023-03-21 PROCEDURE — 77334 RADIATION TREATMENT AID(S): CPT | Mod: 26

## 2023-03-22 DIAGNOSIS — C34.32 MALIGNANT NEOPLASM OF LOWER LOBE, LEFT BRONCHUS OR LUNG: ICD-10-CM

## 2023-03-27 ENCOUNTER — NON-APPOINTMENT (OUTPATIENT)
Age: 78
End: 2023-03-27

## 2023-03-30 PROCEDURE — 77435 SBRT MANAGEMENT: CPT

## 2023-03-30 PROCEDURE — 77295 3-D RADIOTHERAPY PLAN: CPT | Mod: 26

## 2023-03-30 PROCEDURE — 77300 RADIATION THERAPY DOSE PLAN: CPT | Mod: 26

## 2023-03-30 PROCEDURE — 77334 RADIATION TREATMENT AID(S): CPT | Mod: 26

## 2023-04-14 ENCOUNTER — NON-APPOINTMENT (OUTPATIENT)
Age: 78
End: 2023-04-14

## 2023-04-14 VITALS
RESPIRATION RATE: 16 BRPM | WEIGHT: 164 LBS | OXYGEN SATURATION: 95 % | HEIGHT: 66 IN | SYSTOLIC BLOOD PRESSURE: 130 MMHG | DIASTOLIC BLOOD PRESSURE: 73 MMHG | BODY MASS INDEX: 26.36 KG/M2 | TEMPERATURE: 97 F | HEART RATE: 77 BPM

## 2023-04-14 NOTE — REVIEW OF SYSTEMS
[Dysphagia: Grade 1 - Symptomatic, able to eat regular diet] : Dysphagia: Grade 1 - Symptomatic, able to eat regular diet [Edema Limbs: Grade 0] : Edema Limbs: Grade 0  [Fatigue: Grade 0] : Fatigue: Grade 0 [Localized Edema: Grade 0] : Localized Edema: Grade 0  [Neck Edema: Grade 0] : Neck Edema: Grade 0 [Cough: Grade 1 - Mild symptoms; nonprescription intervention indicated] : Cough: Grade 1 - Mild symptoms; nonprescription intervention indicated [Dyspnea: Grade 1 - Shortness of breath with moderate exertion] : Dyspnea: Grade 1 - Shortness of breath with moderate exertion [Skin Hyperpigmentation: Grade 0] : Skin Hyperpigmentation: Grade 0

## 2023-04-14 NOTE — REASON FOR VISIT
[Routine On-Treatment] : a routine on-treatment visit for [Post-Treatment Evaluation] : post-treatment evaluation for [Lung Cancer] : lung cancer [Spouse] : spouse

## 2023-04-14 NOTE — DISEASE MANAGEMENT
[Clinical] : TNM Stage: c [IA] : IA [FreeTextEntry4] : NSCLC (LLL, mucinous adenocarcinoma) [TTNM] : 1c [NTNM] : 0 [MTNM] : 0 [de-identified] : 3373 [de-identified] : 8401 [de-identified] : LLL lung SBRT

## 2023-04-14 NOTE — PHYSICAL EXAM
[Normal] : oriented to person, place and time, the affect was normal, the mood was normal and not anxious [Extraocular Movements] : extraocular movements were intact [Outer Ear] : the ears and nose were normal in appearance [] : no respiratory distress [Respiration, Rhythm And Depth] : normal respiratory rhythm and effort [Exaggerated Use Of Accessory Muscles For Inspiration] : no accessory muscle use [de-identified] : rhonchi all lung fields

## 2023-04-14 NOTE — HISTORY OF PRESENT ILLNESS
[Follow up with Radiation MD in _____] : Follow up with Radiation MD in [unfilled] [Follow up with Oncologist in _____] : Follow up with Oncologist in [unfilled] [Follow up with Surgeon in _____] : Follow up with Surgeon in [unfilled] [Primary care physician] : primary care physician [Fatigue] : fatigue [[facility]] : [facility] [FreeTextEntry8] : Afsaneh Finnegan

## 2023-05-05 ENCOUNTER — APPOINTMENT (OUTPATIENT)
Dept: RADIATION ONCOLOGY | Facility: CLINIC | Age: 78
End: 2023-05-05
Payer: MEDICARE

## 2023-05-05 VITALS
WEIGHT: 164 LBS | RESPIRATION RATE: 16 BRPM | BODY MASS INDEX: 26.47 KG/M2 | HEART RATE: 71 BPM | OXYGEN SATURATION: 97 % | DIASTOLIC BLOOD PRESSURE: 64 MMHG | SYSTOLIC BLOOD PRESSURE: 137 MMHG

## 2023-05-05 PROCEDURE — 99214 OFFICE O/P EST MOD 30 MIN: CPT

## 2023-05-05 NOTE — HISTORY OF PRESENT ILLNESS
[FreeTextEntry1] : 77 year old woman returns today s/p 5,000cGY SBRT to the LLL lung for a [id number] lA adenocarcinoma completed 4/14/23\par Reports currently undergoing treatment for Bullous pemphigoid with Prednisone 10mg and  doxycycline 100mg 2 times per day\par with Dr Feliciano (derm)\par \par Denies dyspnea, cough orthopnea, odynophagia, dysphagia, pain,fatigue & weight loss\par \par Dr Fontana\par Dr Amaya

## 2023-05-15 ENCOUNTER — NON-APPOINTMENT (OUTPATIENT)
Age: 78
End: 2023-05-15

## 2023-07-18 ENCOUNTER — OFFICE (OUTPATIENT)
Dept: URBAN - METROPOLITAN AREA CLINIC 115 | Facility: CLINIC | Age: 78
Setting detail: OPHTHALMOLOGY
End: 2023-07-18
Payer: COMMERCIAL

## 2023-07-18 DIAGNOSIS — H26.493: ICD-10-CM

## 2023-07-18 DIAGNOSIS — H02.834: ICD-10-CM

## 2023-07-18 DIAGNOSIS — H43.391: ICD-10-CM

## 2023-07-18 DIAGNOSIS — H01.001: ICD-10-CM

## 2023-07-18 DIAGNOSIS — H01.004: ICD-10-CM

## 2023-07-18 DIAGNOSIS — H35.033: ICD-10-CM

## 2023-07-18 DIAGNOSIS — H02.831: ICD-10-CM

## 2023-07-18 DIAGNOSIS — H33.8: ICD-10-CM

## 2023-07-18 PROCEDURE — 92012 INTRM OPH EXAM EST PATIENT: CPT | Performed by: OPHTHALMOLOGY

## 2023-07-18 ASSESSMENT — AXIALLENGTH_DERIVED
OS_AL: 24.9595
OS_AL: 24.797
OD_AL: 23.9737
OD_AL: 23.9235

## 2023-07-18 ASSESSMENT — REFRACTION_CURRENTRX
OD_CYLINDER: -1.25
OS_OVR_VA: 20/
OD_OVR_VA: 20/
OS_AXIS: 106
OS_SPHERE: -1.75
OD_SPHERE: +0.50
OS_VPRISM_DIRECTION: SV
OS_OVR_VA: 20/
OD_OVR_VA: 20/
OD_AXIS: 097
OD_AXIS: 087
OD_VPRISM_DIRECTION: SV
OD_SPHERE: +0.50
OD_VPRISM_DIRECTION: SV
OD_CYLINDER: -1.75
OS_VPRISM_DIRECTION: SV
OS_AXIS: 115
OS_CYLINDER: -1.75
OS_CYLINDER: -2.25
OS_SPHERE: -1.25

## 2023-07-18 ASSESSMENT — LID POSITION - DERMATOCHALASIS
OS_DERMATOCHALASIS: LUL 1+ 2+
OD_DERMATOCHALASIS: RUL 1+ 2+

## 2023-07-18 ASSESSMENT — REFRACTION_MANIFEST
OS_SPHERE: -1.25
OD_SPHERE: +0.75
OS_CYLINDER: -2.25
OD_ADD: +3.00
OD_AXIS: 090
OS_VA1: 20/30
OS_ADD: +3.00
OD_CYLINDER: -2.00
OD_VA1: 20/20
OS_AXIS: 120

## 2023-07-18 ASSESSMENT — KERATOMETRY
OS_AXISANGLE_DEGREES: 036
OD_AXISANGLE_DEGREES: 173
OD_K2POWER_DIOPTERS: 43.50
OD_K1POWER_DIOPTERS: 42.25
OS_K1POWER_DIOPTERS: 41.50
OS_K2POWER_DIOPTERS: 43.50

## 2023-07-18 ASSESSMENT — LID EXAM ASSESSMENTS
OS_BLEPHARITIS: LUL T
OD_BLEPHARITIS: RUL T

## 2023-07-18 ASSESSMENT — CONFRONTATIONAL VISUAL FIELD TEST (CVF)
OD_FINDINGS: FULL
OS_FINDINGS: FULL

## 2023-07-18 ASSESSMENT — REFRACTION_AUTOREFRACTION
OS_SPHERE: -1.00
OS_CYLINDER: -2.00
OD_CYLINDER: -1.75
OD_AXIS: 089
OS_AXIS: 122
OD_SPHERE: +0.50

## 2023-07-18 ASSESSMENT — SPHEQUIV_DERIVED
OD_SPHEQUIV: -0.25
OD_SPHEQUIV: -0.375
OS_SPHEQUIV: -2.375
OS_SPHEQUIV: -2

## 2023-07-18 ASSESSMENT — VISUAL ACUITY
OS_BCVA: 20/20-1
OD_BCVA: 20/30-1

## 2023-07-18 ASSESSMENT — TONOMETRY
OD_IOP_MMHG: 12
OS_IOP_MMHG: 13

## 2023-08-01 ENCOUNTER — APPOINTMENT (OUTPATIENT)
Dept: CT IMAGING | Facility: CLINIC | Age: 78
End: 2023-08-01
Payer: MEDICARE

## 2023-08-01 ENCOUNTER — OUTPATIENT (OUTPATIENT)
Dept: OUTPATIENT SERVICES | Facility: HOSPITAL | Age: 78
LOS: 1 days | End: 2023-08-01
Payer: MEDICARE

## 2023-08-01 DIAGNOSIS — I77.9 DISORDER OF ARTERIES AND ARTERIOLES, UNSPECIFIED: Chronic | ICD-10-CM

## 2023-08-01 DIAGNOSIS — Z98.49 CATARACT EXTRACTION STATUS, UNSPECIFIED EYE: Chronic | ICD-10-CM

## 2023-08-01 DIAGNOSIS — Z90.49 ACQUIRED ABSENCE OF OTHER SPECIFIED PARTS OF DIGESTIVE TRACT: Chronic | ICD-10-CM

## 2023-08-01 DIAGNOSIS — Z96.642 PRESENCE OF LEFT ARTIFICIAL HIP JOINT: Chronic | ICD-10-CM

## 2023-08-01 DIAGNOSIS — C34.92 MALIGNANT NEOPLASM OF UNSPECIFIED PART OF LEFT BRONCHUS OR LUNG: ICD-10-CM

## 2023-08-01 DIAGNOSIS — Z98.51 TUBAL LIGATION STATUS: Chronic | ICD-10-CM

## 2023-08-01 PROCEDURE — 71260 CT THORAX DX C+: CPT

## 2023-08-01 PROCEDURE — 71260 CT THORAX DX C+: CPT | Mod: 26

## 2023-08-08 ENCOUNTER — APPOINTMENT (OUTPATIENT)
Dept: RADIATION ONCOLOGY | Facility: CLINIC | Age: 78
End: 2023-08-08
Payer: MEDICARE

## 2023-08-08 ENCOUNTER — NON-APPOINTMENT (OUTPATIENT)
Age: 78
End: 2023-08-08

## 2023-08-08 VITALS
OXYGEN SATURATION: 95 % | BODY MASS INDEX: 27.92 KG/M2 | WEIGHT: 173 LBS | DIASTOLIC BLOOD PRESSURE: 52 MMHG | SYSTOLIC BLOOD PRESSURE: 161 MMHG | RESPIRATION RATE: 16 BRPM | HEART RATE: 58 BPM

## 2023-08-08 PROCEDURE — 99214 OFFICE O/P EST MOD 30 MIN: CPT

## 2023-08-08 NOTE — PHYSICAL EXAM
[Sclera] : the sclera and conjunctiva were normal [Extraocular Movements] : extraocular movements were intact [Outer Ear] : the ears and nose were normal in appearance [Heart Rate And Rhythm] : heart rate and rhythm were normal [Heart Sounds] : normal S1 and S2 [Bowel Sounds] : normal bowel sounds [Abdomen Soft] : soft [Nondistended] : nondistended [No Spine Tenderness] : no tenderness to palpation of the vertebral spine [] : no rash [Normal] : no focal deficits

## 2023-08-08 NOTE — REVIEW OF SYSTEMS
[Negative] : Allergic/Immunologic [Dysphagia: Grade 1 - Symptomatic, able to eat regular diet] : Dysphagia: Grade 1 - Symptomatic, able to eat regular diet [Edema Limbs: Grade 0] : Edema Limbs: Grade 0  [Fatigue: Grade 0] : Fatigue: Grade 0 [Localized Edema: Grade 0] : Localized Edema: Grade 0  [Neck Edema: Grade 0] : Neck Edema: Grade 0 [Cough: Grade 0] : Cough: Grade 0 [Dyspnea: Grade 0] : Dyspnea: Grade 0 [Hoarseness: Grade 0] : Hoarseness: Grade 0

## 2023-08-08 NOTE — HISTORY OF PRESENT ILLNESS
[FreeTextEntry1] : 78-year-old woman with non-small cell lung cancer (mucinous adenocarcinoma, left lower lobe), status post SBRT completed 4/14/2023.    Interval history: Reports that she has been much managing bullous pemphigoid with prednisone 10mg and doxycycline 100mg 2 times per day.  Has not noted any increased dermatitis in area of radiation treatment.  Specifically, has not noted dermatitis of the back, which is where her rash usually begins.  Reports that her respiratory symptoms have been relatively stable during the summer heat.  Remains in air conditioning when possible.  She is reluctant to start additional steroid-based inhaler medications.  She notes a stable cough, with no increased sputum production.  8/1/2023 CT chest showed slightly decreased size of spiculated nodule within the superior segment of the left lower lobe, now measuring 2 x 1.4 cm.    Derm: Yaw Feliciano

## 2023-08-23 ENCOUNTER — APPOINTMENT (OUTPATIENT)
Dept: PULMONOLOGY | Facility: CLINIC | Age: 78
End: 2023-08-23
Payer: MEDICARE

## 2023-08-23 VITALS
SYSTOLIC BLOOD PRESSURE: 160 MMHG | BODY MASS INDEX: 27.8 KG/M2 | HEIGHT: 66 IN | RESPIRATION RATE: 16 BRPM | DIASTOLIC BLOOD PRESSURE: 62 MMHG | WEIGHT: 173 LBS | HEART RATE: 57 BPM | OXYGEN SATURATION: 93 %

## 2023-08-23 PROCEDURE — 99214 OFFICE O/P EST MOD 30 MIN: CPT

## 2023-08-23 RX ORDER — FLUTICASONE FUROATE, UMECLIDINIUM BROMIDE AND VILANTEROL TRIFENATATE 100; 62.5; 25 UG/1; UG/1; UG/1
100-62.5-25 POWDER RESPIRATORY (INHALATION)
Qty: 3 | Refills: 3 | Status: DISCONTINUED | COMMUNITY
Start: 2022-12-30 | End: 2023-08-23

## 2023-08-23 NOTE — HISTORY OF PRESENT ILLNESS
[Excessive Daytime Sleepiness] : excessive daytime sleepiness [Snoring] : snoring [Unrefreshing Sleep] : unrefreshing sleep [Sleepy When Sedentary] : sleepy when sedentary [Currently Experiencing] : The patient is currently experiencing symptoms. [On ___] : performed on [unfilled] [Patient] : the patient [Indication ___] : for an indication of [unfilled] [Follow-Up - Routine Clinic] : a routine clinic follow-up of [Dyspnea on Exertion] : dyspnea on exertion [None] : No associated symptoms are reported [Short-Acting Beta Agonists] : short-acting beta agonists [Good Compliance] : good compliance with treatment [Good Tolerance] : good tolerance of treatment [Good Symptom Control] : good symptom control [TextBox_4] : Patient c/o SOBOE but is otherwise without associated respiratory complaints.  Pt is a former smoker of 2 ppd x 30 years, quit 1995. S/p Covid infection 12/20/22 but was asymptomatic. Pt s/p needle bx for lung lesion and was c/w adenocarcinoma of the lung and is s/p XRT. Pt on prednisone for bullous pemphigoid. [Witnessed Apnea During Sleep] : no witnessed apnea during sleep [Witnessed Gasping During Sleep] : no witnessed gasping during sleep [FreeTextEntry8] : Peripheral 2.4x2 cm LLL opacity with small subcm nodules [FreeTextEntry9] : Chest CT [TextEntry] : PET CT from 12/9/22 revealed uptake with an SUV of 3.3 regarding LLL opacity with mild b/l axillary and inguinal NIKUNJ with low level uptake and other nodules without uptake.  Chest CT from 3/17/23 revealed a 2.4x1.8 cm LLL nodule stable from prior PET CT with underlying emphysema and atelectasis. Chest CT from 8/1/23 revealed a decrease in the previously seen LLL nodule from 2.4x1.8 down to 2x1.4 cm in size.

## 2023-08-23 NOTE — CONSULT LETTER
[Dear  ___] : Dear  [unfilled], [Consult Letter:] : I had the pleasure of evaluating your patient, [unfilled]. [Please see my note below.] : Please see my note below. [Consult Closing:] : Thank you very much for allowing me to participate in the care of this patient.  If you have any questions, please do not hesitate to contact me. [Sincerely,] : Sincerely, [FreeTextEntry3] : Tonio Fontana MD, FCCP, D. ABSM\par  Pulmonary and Sleep Medicine\par  [facility] at [place]

## 2023-08-23 NOTE — DISCUSSION/SUMMARY
[FreeTextEntry1] : #1. PFTs performed previously were c/w Gold Stage II COPD; repeat with next visit to assess need for BD therapy. #2. Pt never started trial of Trelegy 100 and denies significant respiratory complaints so will continue to observe off therapy for now. #3. SOBOE is likely at least somewhat related to weight or deconditioning as well. #4. Diet and exercise for weight loss  #5. PET CT to evaluate LLL opacity with increased uptake so had bx which revealed adenoCa and is now following with Rad-onc and is s/p XRT with decrease in the size of the lesion. Further imaging studies per rad-onc. #6. Continue Albuterol as needed for now. #7. Not vaccinated; s/p Covid infection. #8. F/u in 4 months with PFTs to assess BD requirements.  The patient expressed understanding and agreement with the above recommendations/plan and accepts responsibility to be compliant with recommended testing, therapies, and f/u visits. All relevant questions and concerns were addressed.  Discussed above with patient and  who was also present.

## 2023-08-23 NOTE — END OF VISIT
[Time Spent: ___ minutes] : I have spent [unfilled] minutes of time on the encounter. [TextEntry] : Discussed with pt at length regarding smoking hx, emphysema on CT, nodules/mass, possible malignancy, soboe; reviewed w/u with pt as above.

## 2023-08-23 NOTE — REASON FOR VISIT
[Follow-Up] : a follow-up visit [Abnormal CXR/ Chest CT] : an abnormal CXR/ chest CT [Sleep Apnea] : sleep apnea [COPD] : COPD [Emphysema] : emphysema [Shortness of Breath] : shortness of breath [Pulmonary Nodules] : pulmonary nodules [Spouse] : spouse [Lung Cancer] : lung cancer

## 2023-11-30 ENCOUNTER — APPOINTMENT (OUTPATIENT)
Dept: CT IMAGING | Facility: CLINIC | Age: 78
End: 2023-11-30

## 2023-12-08 ENCOUNTER — NON-APPOINTMENT (OUTPATIENT)
Age: 78
End: 2023-12-08

## 2023-12-08 ENCOUNTER — APPOINTMENT (OUTPATIENT)
Dept: RADIATION ONCOLOGY | Facility: CLINIC | Age: 78
End: 2023-12-08
Payer: MEDICARE

## 2023-12-08 VITALS
WEIGHT: 183 LBS | HEIGHT: 66 IN | OXYGEN SATURATION: 93 % | BODY MASS INDEX: 29.41 KG/M2 | TEMPERATURE: 97 F | SYSTOLIC BLOOD PRESSURE: 159 MMHG | RESPIRATION RATE: 16 BRPM | DIASTOLIC BLOOD PRESSURE: 63 MMHG | HEART RATE: 58 BPM

## 2023-12-08 DIAGNOSIS — K76.9 LIVER DISEASE, UNSPECIFIED: ICD-10-CM

## 2023-12-08 PROCEDURE — 99214 OFFICE O/P EST MOD 30 MIN: CPT

## 2023-12-13 ENCOUNTER — APPOINTMENT (OUTPATIENT)
Dept: PULMONOLOGY | Facility: CLINIC | Age: 78
End: 2023-12-13
Payer: MEDICARE

## 2023-12-13 VITALS
HEART RATE: 56 BPM | RESPIRATION RATE: 16 BRPM | SYSTOLIC BLOOD PRESSURE: 158 MMHG | OXYGEN SATURATION: 96 % | DIASTOLIC BLOOD PRESSURE: 58 MMHG

## 2023-12-13 VITALS — HEIGHT: 64.3 IN | WEIGHT: 177 LBS | BODY MASS INDEX: 30.22 KG/M2

## 2023-12-13 DIAGNOSIS — Z87.891 PERSONAL HISTORY OF NICOTINE DEPENDENCE: ICD-10-CM

## 2023-12-13 DIAGNOSIS — E66.3 OVERWEIGHT: ICD-10-CM

## 2023-12-13 DIAGNOSIS — R06.02 SHORTNESS OF BREATH: ICD-10-CM

## 2023-12-13 DIAGNOSIS — R93.89 ABNORMAL FINDINGS ON DIAGNOSTIC IMAGING OF OTHER SPECIFIED BODY STRUCTURES: ICD-10-CM

## 2023-12-13 DIAGNOSIS — R91.8 OTHER NONSPECIFIC ABNORMAL FINDING OF LUNG FIELD: ICD-10-CM

## 2023-12-13 DIAGNOSIS — J44.9 CHRONIC OBSTRUCTIVE PULMONARY DISEASE, UNSPECIFIED: ICD-10-CM

## 2023-12-13 PROCEDURE — 85018 HEMOGLOBIN: CPT | Mod: QW

## 2023-12-13 PROCEDURE — 99214 OFFICE O/P EST MOD 30 MIN: CPT | Mod: 25

## 2023-12-13 PROCEDURE — 94727 GAS DIL/WSHOT DETER LNG VOL: CPT

## 2023-12-13 PROCEDURE — 94010 BREATHING CAPACITY TEST: CPT

## 2023-12-13 PROCEDURE — 94729 DIFFUSING CAPACITY: CPT

## 2023-12-13 NOTE — REASON FOR VISIT
[Follow-Up] : a follow-up visit [Abnormal CXR/ Chest CT] : an abnormal CXR/ chest CT [Lung Cancer] : lung cancer [Sleep Apnea] : sleep apnea [COPD] : COPD [Emphysema] : emphysema [Shortness of Breath] : shortness of breath [Pulmonary Nodules] : pulmonary nodules [Spouse] : spouse

## 2023-12-13 NOTE — PROCEDURE
[FreeTextEntry1] : PFTs 12/30/22 - Mildly reduced FVC and FEV1 with an obstructive pattern but with normal lung volumes and moderately reduced DLCO which almost normalized when corrected for alveolar volume. PFTs 12/13/23 - Mildly reduced FVC and FEV1 with obstruction with reduced lung volumes and severely reduced DLCO which improved somewhat when corrected for alveolar volume though pt had difficulty with lung volumes and with anemia (Hgb of 10.8).

## 2023-12-13 NOTE — DISCUSSION/SUMMARY
[FreeTextEntry1] : #1. PFTs performed previously were c/w Gold Stage II COPD; repeat with next visit to assess need for BD therapy. #2. Pt never started trial of Trelegy 100 and denies significant respiratory complaints so will continue to observe off therapy for now as she feels she is breathing well. #3. SOBOE is likely at least somewhat related to weight or deconditioning as well. #4. Diet and exercise for weight loss  #5. PET CT to evaluate LLL opacity with increased uptake so had bx which revealed adenoCa and is now following with Rad-onc and is s/p XRT with decrease in the size of the lesion. Further imaging studies per rad-onc. #6. Continue Albuterol as needed for now. #7. Not vaccinated; s/p Covid infection. #8. F/u in 4 months with lucinda to assess BD requirements.  The patient expressed understanding and agreement with the above recommendations/plan and accepts responsibility to be compliant with recommended testing, therapies, and f/u visits. All relevant questions and concerns were addressed.  Discussed above with patient and  who was also present.

## 2023-12-13 NOTE — CONSULT LETTER
[Dear  ___] : Dear  [unfilled], [Consult Letter:] : I had the pleasure of evaluating your patient, [unfilled]. [Please see my note below.] : Please see my note below. [Consult Closing:] : Thank you very much for allowing me to participate in the care of this patient.  If you have any questions, please do not hesitate to contact me. [Sincerely,] : Sincerely, [FreeTextEntry3] : Tonio Fontana MD, FCCP, D. ABSM\par  Pulmonary and Sleep Medicine\par  [facility] at [place]

## 2023-12-13 NOTE — HISTORY OF PRESENT ILLNESS
[Dyspnea on Exertion] : dyspnea on exertion [Short-Acting Beta Agonists] : short-acting beta agonists [Good Compliance] : good compliance with treatment [Good Tolerance] : good tolerance of treatment [Good Symptom Control] : good symptom control [Follow-Up - Routine Clinic] : a routine clinic follow-up of [Excessive Daytime Sleepiness] : excessive daytime sleepiness [Snoring] : snoring [Unrefreshing Sleep] : unrefreshing sleep [Sleepy When Sedentary] : sleepy when sedentary [Currently Experiencing] : The patient is currently experiencing symptoms. [None] : The patient is not currently being treated for this problem [On ___] : performed on [unfilled] [Patient] : the patient [Indication ___] : for an indication of [unfilled] [TextBox_4] : Patient c/o SOBOE but is otherwise without associated respiratory complaints. She does not want to start inhaler therapy despite obstruction noted on lung function testing. Pt is a former smoker of 2 ppd x 30 years, quit 1995. S/p Covid infection 12/20/22 but was asymptomatic. Pt s/p needle bx for lung lesion and was c/w adenocarcinoma of the lung and is s/p XRT. Pt on prednisone for bullous pemphigoid. [Witnessed Apnea During Sleep] : no witnessed apnea during sleep [Witnessed Gasping During Sleep] : no witnessed gasping during sleep [FreeTextEntry9] : Chest CT [FreeTextEntry8] : Peripheral 2.4x2 cm LLL opacity with small subcm nodules [TextEntry] : PET CT from 12/9/22 revealed uptake with an SUV of 3.3 regarding LLL opacity with mild b/l axillary and inguinal NIKUNJ with low level uptake and other nodules without uptake.  Chest CT from 3/17/23 revealed a 2.4x1.8 cm LLL nodule stable from prior PET CT with underlying emphysema and atelectasis. Chest CT from 8/1/23 revealed a decrease in the previously seen LLL nodule from 2.4x1.8 down to 2x1.4 cm in size.

## 2024-01-04 NOTE — PATIENT PROFILE ADULT - NSASFALLNEEDSASSIST_GEN_A_NUR
Hospital Medicine Daily Progress Note    Date of Service  1/2/2023    Chief Complaint  Bishop Bucio is a 52 y.o. male admitted 12/29/2023 with intentional OD    Hospital Course  53yo Hx of depression and previous suicide attempts who presented after an ingestion of approximately 1g of benadryl and possibly trazodone, seroquel and risperidone    Interval Problem Update  I reviewed the chart along with vitals, labs, imaging, test (both pending and resulted) and recommendations from specialists and interdisciplinary team.  53yo M with prior attempts of suicide by overdose. He presented  12/29/2023 with suicidal overdose  with Benadryl (estimated more than 1 g of Benadryl swallowed), as well as possible overdose with trazodone, Seroquel and risperidone unknown amount. At ED, afebrile, tachycardic, slightly hypertensive. Anticoholinergic toxidrome on evaluation. EKG QTc prolonged. Hypokalemic and hypomagnesemic on labs. Admitted to IMCU for close monitoring. Psychiatry consulted, legal hold extended. Now stable off telemetry.     Sitter at bedside. Has a mild dry  cough. Ordered CXR came back clear, procalcitonin came back 0.10. CoVID/flu/RSV pending. Inpatient Psychiatry pending    01/03/24  Patient is on room air.  Continues to have suicidal ideation.  No hallucinations.  Sitter at bedside  Restarting trazodone, olanzapine, and Zoloft.  Discussed with CHASIDY Chaves of psychiatry.      I have discussed this patient's plan of care and discharge plan at IDT rounds today with Case Management, Nursing, Nursing leadership, and other members of the IDT team.    Consultants/Specialty  psychiatry    Code Status  Prior    Disposition  The patient is medically cleared for discharge to home or a post-acute facility.  Anticipate discharge to: an inpatient rehabilitation hospital    I have placed the appropriate orders for post-discharge needs.    Review of Systems  Review of Systems   Constitutional:  Negative for  chills and fever.   Respiratory:  Negative for cough and shortness of breath.    Cardiovascular:  Negative for chest pain and palpitations.   Gastrointestinal:  Negative for abdominal pain, nausea and vomiting.   Musculoskeletal:  Negative for joint pain and myalgias.   Neurological:  Negative for dizziness and headaches.   Psychiatric/Behavioral:  Positive for depression and suicidal ideas. Negative for hallucinations. The patient is nervous/anxious.         Physical Exam  Temp:  [36.8 °C (98.2 °F)-37.3 °C (99.1 °F)] 36.9 °C (98.4 °F)  Pulse:  [84-97] 85  Resp:  [17-18] 17  BP: (113-152)/() 113/75  SpO2:  [90 %-93 %] 93 %    Physical Exam  Vitals and nursing note reviewed. Exam conducted with a chaperone present.   Constitutional:       General: He is sleeping. He is not in acute distress.     Appearance: Normal appearance. He is not ill-appearing.   HENT:      Head: Normocephalic and atraumatic.      Mouth/Throat:      Mouth: Mucous membranes are moist.      Pharynx: Oropharynx is clear. No oropharyngeal exudate.   Eyes:      General: No scleral icterus.        Right eye: No discharge.         Left eye: No discharge.      Conjunctiva/sclera: Conjunctivae normal.   Cardiovascular:      Rate and Rhythm: Normal rate and regular rhythm.      Pulses: Normal pulses.      Heart sounds: Normal heart sounds. No murmur heard.  Pulmonary:      Effort: Pulmonary effort is normal. No respiratory distress.      Breath sounds: Normal breath sounds.   Abdominal:      General: Abdomen is flat. Bowel sounds are normal. There is no distension.      Palpations: Abdomen is soft.   Musculoskeletal:         General: No swelling.      Cervical back: Neck supple. No tenderness.      Right lower leg: No edema.      Left lower leg: No edema.   Skin:     General: Skin is warm and dry.      Coloration: Skin is not pale.   Neurological:      Mental Status: He is oriented to person, place, and time and easily aroused. Mental status is at  baseline.      Motor: No weakness.   Psychiatric:         Attention and Perception: Attention normal.         Mood and Affect: Mood is anxious and depressed. Affect is blunt and flat.         Speech: Speech is delayed.         Behavior: Behavior is cooperative.         Thought Content: Thought content normal.         Judgment: Judgment normal.         Fluids    Intake/Output Summary (Last 24 hours) at 1/3/2024 2125  Last data filed at 1/3/2024 0800  Gross per 24 hour   Intake 270 ml   Output no documentation   Net 270 ml         Laboratory  Recent Labs     01/01/24  0048 01/02/24  0249   WBC 6.2 6.4   RBC 4.93 5.17   HEMOGLOBIN 14.6 15.3   HEMATOCRIT 42.7 44.5   MCV 86.6 86.1   MCH 29.6 29.6   MCHC 34.2 34.4   RDW 41.1 40.5   PLATELETCT 203 212   MPV 9.5 9.5       Recent Labs     01/01/24 0048 01/02/24 0249   SODIUM 136 136   POTASSIUM 3.8 4.0   CHLORIDE 103 104   CO2 20 20   GLUCOSE 112* 109*   BUN 14 16   CREATININE 1.10 1.28   CALCIUM 8.7 8.6                     Imaging  DX-CHEST-PORTABLE (1 VIEW)   Final Result         1. No acute cardiopulmonary abnormalities are identified.           Assessment/Plan  * Drug overdose, intentional self-harm, initial encounter (HCC)- (present on admission)  Assessment & Plan  52-year-old male with prior history of Benadryl overdose and depression, presented with Benadryl (>1 gram), as well as possible Seroquel trazodone and risperidone overdose  With anticholinergic toxidrome, risk for arrhythmia due to QTc prolongation  Neuro exam neg for nystagmus, increased tone/clonus/rigidity  Psych following. Legal hold extended  Inpatient Psych planned.    1/2/2024  Medically cleared.  Continued legal hold.  Awaiting inpatient psych facility acceptance.    Insomnia due to mental disorder- (present on admission)  Assessment & Plan  Restart trazodone    Anxiety- (present on admission)  Assessment & Plan  Holding hydroxyzine    Toxic encephalopathy  Assessment & Plan  Secondary to drug  overdose  Supportive care  Fall, aspiration, seizure precaution  Monitor respiratory function with pulse oximetry  Exam improved today with improved orientation though still sedated to some degree.  Given anticholinergic OD will need prolonged observation due to risk of delayed abssorption/bezor  Mentation close to baseline      Hypertension  Assessment & Plan  Vitals:    01/03/24 0731   BP: 113/75   Pulse: 85   Resp: 17   Temp: 36.9 °C (98.4 °F)   SpO2: 93%         Stable.    Hyperglycemia  Assessment & Plan  Fasting  this am  No Hx of DM  Insulin sliding scale ordered    Depression- (present on admission)  Assessment & Plan  Continues to have suicidal ideation.  No hallucinations.  Sitter at bedside  Restarting trazodone, olanzapine, and Zoloft.  Discussed with Compa Deal APRN of psychiatry.    Hypokalemia- (present on admission)  Assessment & Plan  Potassium 3.1  Follow and replace Mg         VTE prophylaxis: Lovenox SQ    I have performed a physical exam and reviewed and updated ROS and Plan today (1/2/2024). In review of yesterday's note (1/1/2024), there are no changes except as documented above.         yes

## 2024-01-17 NOTE — CONSULT NOTE ADULT - PROBLEM/RECOMMENDATION-4
Thank you for choosing [facility]. It was a pleasure to see you for your office visit today.     If you have any questions or scheduling needs during regular office hours, please call: [phone]  If urgent concerns arise after hours, you can call [phone] and ask to speak to the pediatric specialist on call.   If you need to schedule Imaging/Radiology tests, please call: [phone]  [employer] messages are for routine communication and questions and are usually answered within 48-72 hours. If you have an urgent concern or require sooner response, please call us.  Outside lab and imaging results should be faxed to [phone].  If you go to a lab outside of [facility] we will not automatically get those results. You will need to ask to have them faxed.   You may receive a survey regarding your experience with the clinic today. We would appreciate your feedback.   We encourage to you make your follow-up today to ensure a timely appointment. If you are unable to do so please reach out to [phone] as soon as possible.       If you had any blood work, imaging or other tests completed today:  Normal test results will be mailed to your home address in a letter.  Abnormal results will be communicated to you via phone call/letter.  Please allow up to 1-2 weeks for processing and interpretation of most lab work.   Plan:  Consider trial of cyproheptadine - let us know if you would like to trial this.  Meet with dietician to determine calorie goals and feeding plan  Typical recommendation is 6 months of not using tube with adequate growth prior to removal.  Follow-up in 3 months, would recommend weight check in one month.   DISPLAY PLAN FREE TEXT

## 2024-04-10 NOTE — BRIEF OPERATIVE NOTE - OPERATION/FINDINGS
This is a 47-year-old woman with a history of type 1 diabetes mellitus diagnosed in 1990.  I previously cared for her at [facility] until I left in 2015.  Since that time she has been cared for by endocrinology at [facility] and then more recently by primary care at [place].  She now returns for ongoing care..  She presented in March with an A1c of 9.4%.     Past Medical History  She has a history of coronary disease status post stents and coronary bypass surgery in 2019  She has a history of proliferative diabetic retinopathy status post bilateral laser treatments and vitrectomy OS  She has a history of diabetic peripheral neuropathy  Renal function stable with GFR > 60     Current diabetes medication  Lantus 26 units at bedtime  Humalog 1:10 with meals plus a correction scale of 1:50     She works at a [employer] from 7 AM to 3 PM.  She eats her first meal about 9:00 in the morning.  That usually a pancake and sausage.  Lunch is a salad with apple juice.  Dinner can be pizza or meat and a starch.  She drinks juice.  Because of residual chest pain, she is  minimally physically active.  She monitors blood sugar several times daily.  The blood sugars she had generally range between 150 and 250 which is consistent with her A1c.     This patient is seen every 3-4 months, monitors blood sugar 4-6 times daily and injects insulin 4-5 times daily, Insulin dosing is based on glucose readings       When I saw her for the first time a month ago, I was able to get her a Dexcom and she is now using that to monitor her blood sugars.  She is actually improving in the short time that she has been using it.  There is clear that the basal of 26 units is still a bit much.  She is bolusing and correcting appropriately.  Her diet is unchanged.    Since I saw her last, she was hospitalized at [facility] with a suicide attempt with an overdose of sleeping pills.  She tells me today that she is better.  As far as I can tell she does not have counselors in  valgus impacted femoral neck fracture

## 2024-04-30 ENCOUNTER — NON-APPOINTMENT (OUTPATIENT)
Age: 79
End: 2024-04-30

## 2024-05-01 ENCOUNTER — NON-APPOINTMENT (OUTPATIENT)
Age: 79
End: 2024-05-01

## 2024-05-28 ENCOUNTER — APPOINTMENT (OUTPATIENT)
Dept: RADIATION ONCOLOGY | Facility: CLINIC | Age: 79
End: 2024-05-28
Payer: MEDICARE

## 2024-05-28 VITALS
SYSTOLIC BLOOD PRESSURE: 123 MMHG | BODY MASS INDEX: 28 KG/M2 | DIASTOLIC BLOOD PRESSURE: 60 MMHG | HEIGHT: 64 IN | OXYGEN SATURATION: 92 % | RESPIRATION RATE: 16 BRPM | WEIGHT: 164 LBS | HEART RATE: 91 BPM

## 2024-05-28 PROCEDURE — 99214 OFFICE O/P EST MOD 30 MIN: CPT

## 2024-05-28 PROCEDURE — G2211 COMPLEX E/M VISIT ADD ON: CPT

## 2024-05-28 NOTE — HISTORY OF PRESENT ILLNESS
[FreeTextEntry1] : 79-year-old woman with non-small cell lung cancer (mucinous adenocarcinoma, left lower lobe), status post SBRT completed 4/14/2023.    8/1/2023 CT chest showed slightly decreased size of spiculated nodule within the superior segment of the left lower lobe, now measuring 2 x 1.4 cm.    Recall she reported mid to lower back pain since October.  10/24/23 XR L/S spine/sacrum/coccyx showed normal sacrum and coccyx radiographs, with partial wedge collapse of the L4 vertebral body.  11/28/23 XR T spine showed Moderate C6, mild T9 and mild T10 compression deformities.  11/28/23 Ct chest showed further decrease in the 1.4 x 1.2cm nodule superior segment left lower lobe.  There were multiple early enhancing peripheral hepatic lesions, not characterized on this exam, and new moderate compression deformity of T5 and T8.  Met with chiropractor (Shiv Santoro) : Celebrex and Tylenol  Interval history: 4/15/24 CT chest: large adjacent nodules in the superior segment of the left lower lobe suspicious for recurrent tumor   Patient notes she has been managing bullous pemphigoid with prednisone 10mg and doxycycline 100mg 2 times per day.   Reports exertional dyspnea, and ongoing dysphagia due to stroke.   Denies cough, orthopnea, fatigue, weight loss.   Reports she has been taking pre-gabalin prescribed by her PCP.  She notes relief from back pain since starting the medication.  Care team Pulm Tonio Fontana PCP Roderick Amaya Derm: Yaw Feliciano

## 2024-05-28 NOTE — REVIEW OF SYSTEMS
[Cough: Grade 0] : Cough: Grade 0 [Dyspnea: Grade 0] : Dyspnea: Grade 0 [Hoarseness: Grade 0] : Hoarseness: Grade 0

## 2024-06-11 ENCOUNTER — APPOINTMENT (OUTPATIENT)
Dept: RADIATION ONCOLOGY | Facility: CLINIC | Age: 79
End: 2024-06-11
Payer: MEDICARE

## 2024-06-11 VITALS
OXYGEN SATURATION: 92 % | HEIGHT: 64 IN | RESPIRATION RATE: 16 BRPM | BODY MASS INDEX: 27.83 KG/M2 | WEIGHT: 163 LBS | DIASTOLIC BLOOD PRESSURE: 58 MMHG | HEART RATE: 94 BPM | SYSTOLIC BLOOD PRESSURE: 107 MMHG

## 2024-06-11 PROCEDURE — 99214 OFFICE O/P EST MOD 30 MIN: CPT

## 2024-06-11 PROCEDURE — G2211 COMPLEX E/M VISIT ADD ON: CPT

## 2024-06-11 NOTE — DISEASE MANAGEMENT
[Clinical] : TNM Stage: c [IA] : IA [FreeTextEntry4] : NSCLC (LLL, mucinous adenocarcinoma) [TTNM] : 1c [NTNM] : 0 [MTNM] : 0

## 2024-06-11 NOTE — HISTORY OF PRESENT ILLNESS
[FreeTextEntry1] : 79-year-old woman with non-small cell lung cancer (mucinous adenocarcinoma, left lower lobe), status post SBRT completed 4/14/2023.    11/28/23 Ct chest showed further decrease in the 1.4 x 1.2 cm nodule superior segment left lower lobe.  There were multiple early enhancing peripheral hepatic lesions, not characterized on this exam, and new moderate compression deformity of T5 and T8.  However, 4/15/24 CT chest showed large adjacent nodules in the superior segment of the left lower lobe suspicious for recurrent tumor.  5/06/24 PET/CT showed intense FDG activity corresponding with a superior segment left lower lobe mass (SUV 10.1, 3.6 x 3.4 cm) prior (3.0 x 2.8 cm).   Patient notes she has been managing bullous pemphigoid with prednisone 10mg and doxycycline 100mg 2 times per day.  Currently notes exertional dyspnea, and ongoing dysphagia due to stroke. Denies cough, orthopnea, fatigue, weight loss. Reports she has been taking pre-gabalin prescribed by her PCP.  She notes relief from back pain since starting the medication.  Care team Pulm Tonio Fontana PCP Roderick Amaya Derm: Yaw Feliciano

## 2024-06-11 NOTE — PHYSICAL EXAM
[Sclera] : the sclera and conjunctiva were normal [Extraocular Movements] : extraocular movements were intact [] : no respiratory distress [Respiration, Rhythm And Depth] : normal respiratory rhythm and effort [Exaggerated Use Of Accessory Muscles For Inspiration] : no accessory muscle use [Heart Rate And Rhythm] : heart rate and rhythm were normal [Nondistended] : nondistended [Normal] : oriented to person, place and time, the affect was normal, the mood was normal and not anxious

## 2024-06-11 NOTE — REVIEW OF SYSTEMS
[SOB on Exertion] : shortness of breath during exertion [Constipation: Grade 0] : Constipation: Grade 0 [Diarrhea: Grade 0] : Diarrhea: Grade 0 [Nausea: Grade 0] : Nausea: Grade 0 [Vomiting: Grade 0] : Vomiting: Grade 0 [Urinary Incontinence: Grade 0] : Urinary Incontinence: Grade 0  [Urinary Tract Pain: Grade 0] : Urinary Tract Pain: Grade 0 [Urinary Urgency: Grade 0] : Urinary Urgency: Grade 0 [Urinary Frequency: Grade 0] : Urinary Frequency: Grade 0 [Genital Edema: Grade 0] : Genital Edema: Grade 0 [Dizziness: Grade 0] : Dizziness: Grade 0  [Headache: Grade 0] : Headache: Grade 0 [Lethargy: Grade 0] : Lethargy: Grade 0 [Cough: Grade 1 - Mild symptoms; nonprescription intervention indicated] : Cough: Grade 1 - Mild symptoms; nonprescription intervention indicated [Dyspnea: Grade 0] : Dyspnea: Grade 0 [Shortness Of Breath] : no shortness of breath [Wheezing] : no wheezing [Cough] : no cough [FreeTextEntry2] : SOB on exertion,

## 2024-06-12 ENCOUNTER — OUTPATIENT (OUTPATIENT)
Dept: OUTPATIENT SERVICES | Facility: HOSPITAL | Age: 79
LOS: 1 days | Discharge: HOME | End: 2024-06-12

## 2024-06-12 DIAGNOSIS — Z96.642 PRESENCE OF LEFT ARTIFICIAL HIP JOINT: Chronic | ICD-10-CM

## 2024-06-12 DIAGNOSIS — Z90.49 ACQUIRED ABSENCE OF OTHER SPECIFIED PARTS OF DIGESTIVE TRACT: Chronic | ICD-10-CM

## 2024-06-12 DIAGNOSIS — Z98.49 CATARACT EXTRACTION STATUS, UNSPECIFIED EYE: Chronic | ICD-10-CM

## 2024-06-12 DIAGNOSIS — D64.9 ANEMIA, UNSPECIFIED: ICD-10-CM

## 2024-06-12 DIAGNOSIS — Z98.51 TUBAL LIGATION STATUS: Chronic | ICD-10-CM

## 2024-06-12 DIAGNOSIS — I77.9 DISORDER OF ARTERIES AND ARTERIOLES, UNSPECIFIED: Chronic | ICD-10-CM

## 2024-06-12 PROBLEM — R91.8 LUNG MASS: Status: ACTIVE | Noted: 2024-06-12

## 2024-06-14 ENCOUNTER — RESULT REVIEW (OUTPATIENT)
Age: 79
End: 2024-06-14

## 2024-06-14 ENCOUNTER — APPOINTMENT (OUTPATIENT)
Dept: HEMATOLOGY ONCOLOGY | Facility: CLINIC | Age: 79
End: 2024-06-14
Payer: MEDICARE

## 2024-06-14 VITALS
SYSTOLIC BLOOD PRESSURE: 131 MMHG | BODY MASS INDEX: 27.87 KG/M2 | HEART RATE: 72 BPM | OXYGEN SATURATION: 93 % | HEIGHT: 64 IN | WEIGHT: 163.25 LBS | DIASTOLIC BLOOD PRESSURE: 56 MMHG

## 2024-06-14 DIAGNOSIS — L12.0 BULLOUS PEMPHIGOID: ICD-10-CM

## 2024-06-14 DIAGNOSIS — C34.92 MALIGNANT NEOPLASM OF UNSPECIFIED PART OF LEFT BRONCHUS OR LUNG: ICD-10-CM

## 2024-06-14 LAB
BASOPHILS # BLD AUTO: 0.1 K/UL — SIGNIFICANT CHANGE UP (ref 0–0.2)
BASOPHILS NFR BLD AUTO: 0.5 % — SIGNIFICANT CHANGE UP (ref 0–2)
EOSINOPHIL # BLD AUTO: 0 K/UL — SIGNIFICANT CHANGE UP (ref 0–0.5)
EOSINOPHIL NFR BLD AUTO: 0.3 % — SIGNIFICANT CHANGE UP (ref 0–6)
HCT VFR BLD CALC: 40.2 % — SIGNIFICANT CHANGE UP (ref 34.5–45)
HGB BLD-MCNC: 13.1 G/DL — SIGNIFICANT CHANGE UP (ref 11.5–15.5)
LYMPHOCYTES # BLD AUTO: 0.6 K/UL — LOW (ref 1–3.3)
LYMPHOCYTES # BLD AUTO: 4 % — LOW (ref 13–44)
MCHC RBC-ENTMCNC: 28.7 PG — SIGNIFICANT CHANGE UP (ref 27–34)
MCHC RBC-ENTMCNC: 32.7 G/DL — SIGNIFICANT CHANGE UP (ref 32–36)
MCV RBC AUTO: 87.8 FL — SIGNIFICANT CHANGE UP (ref 80–100)
MONOCYTES # BLD AUTO: 0.2 K/UL — SIGNIFICANT CHANGE UP (ref 0–0.9)
MONOCYTES NFR BLD AUTO: 1 % — LOW (ref 2–14)
NEUTROPHILS # BLD AUTO: 10.2 K/UL — HIGH (ref 1.8–7.4)
NEUTROPHILS NFR BLD AUTO: 95 % — HIGH (ref 43–77)
PLAT MORPH BLD: NORMAL — SIGNIFICANT CHANGE UP
PLATELET # BLD AUTO: 298 K/UL — SIGNIFICANT CHANGE UP (ref 150–400)
RBC # BLD: 4.58 M/UL — SIGNIFICANT CHANGE UP (ref 3.8–5.2)
RBC # FLD: 14.2 % — SIGNIFICANT CHANGE UP (ref 10.3–14.5)
RBC BLD AUTO: NORMAL — SIGNIFICANT CHANGE UP
WBC # BLD: 11.2 K/UL — HIGH (ref 3.8–10.5)
WBC # FLD AUTO: 11.2 K/UL — HIGH (ref 3.8–10.5)

## 2024-06-14 PROCEDURE — G2211 COMPLEX E/M VISIT ADD ON: CPT

## 2024-06-14 PROCEDURE — 99205 OFFICE O/P NEW HI 60 MIN: CPT

## 2024-06-14 PROCEDURE — G2212 PROLONG OUTPT/OFFICE VIS: CPT

## 2024-06-14 NOTE — HISTORY OF PRESENT ILLNESS
[de-identified] : CHARANJIT GREENWOOD is a 79 year F with PMHX of GERD, esophageal stricture, CVA,  former smoker (quit in 1995 70 PPY) presents for initial consultation for Adenocarcinoma of the lung   Patient previously treated for left lower lobe Mucinous adenocarcinoma stage IA with SBRT ( completed 4/14/2023). Subsequent CT Chest on 11/28/23 demonstrated response to treatment with decrease in the 1.4 x 1.2 cm nodule superior segment left lower lobe (previously 2.0 x 1.4 cm)  CT chest on 4/5/24 showed large adjacent nodules in the superior segment of the left lower lobe (measuring 3 cm and 2.2 cm) suspicious for recurrent tumor. Subsequent PET/CT on 6/5/24 reported  intense FDG activity corresponding with a superior segment left lower lobe mass (SUV 10.1, 3.6 x 3.4 cm) prior (3.0 x 2.8 cm) Patient evaluated by Dr. Kirk on 6/11/24 discussed possible salvage RT. Referred to medical oncology for possible systemic treatment  Patient has apt on 6/17 with Dr. Langston to discuss surgical options   Past medical history includes former smoker, sleep apnea, GERD induced esophageal stricture status post EGD with dilation with no recurrent dysphagia, stroke (2012), right carotid artery surgery at [facility] (poor history given).  [de-identified] : Patient presents for initial visit with her   Pt denies having COPD She had throat sphincter correction. During surgery had a stroke in 2012 reports no residual weakness Quit smoking in 1995. Started at 13 years old until 47 yo 2PPD.  Has an active rash. She was referred to a blood specialist at [facility] which dx her with bullous pemphigoid She is on 15mg Prednisone every other day started a few years ago.  When it gets worse the rash causes her joint pain/ muscle pain

## 2024-06-14 NOTE — ASSESSMENT
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 Subsequent CT Chest on 11/28/23 demonstrated response to treatment with decrease in the 1.4 x 1.2 cm nodule superior segment left lower lobe (previously 2.0 x 1.4 cm) CT chest on 4/5/24 showed large adjacent nodules in the superior segment of the left lower lobe (measuring 3 cm and 2.2 cm) suspicious for recurrent tumor. 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 Kirk pt not amenable to reradiation given close proximity/recurrence in the previously radiated field. Discussed with pt other options surgery vs concurrent chemo RT ---> Durvalumab vs ablation  -Will send [facility] on prior tumor from 2023  -Pt seeing Dr. Langston next week 6/17/24 -Will discuss with Dr. Elise the potential of microwave ablation  - Multi D tumor board discussion  - Has h/o bullous pemphigoid with an active rash.  She is on 15mg Prednisone every other day started a few years ago. Will reach put to Dr. Rose for her records Saw an MD in [facility] [place] discussed caution of Io with patient -Labs today F/u with MD in 3-4 weeks  [AdvancecareDate] : 6/14/24

## 2024-06-14 NOTE — ADDENDUM
[FreeTextEntry1] :  Documented by Mindi Driver acting as scribe for Dr. Smith on  06/14/2024.    All Medical record entries made by the Scribe were at my, Dr. Smith's, direction and personally dictated by me on  06/14/2024. I have reviewed the chart and agree that the record accurately reflects my personal performance of the history, physical exam, assessment and plan. I have also personally directed, reviewed, and agreed with the discharge instructions.

## 2024-06-14 NOTE — RESULTS/DATA
[FreeTextEntry1] : 5/06/24 PET/CT showed intense FDG activity corresponding with a superior segment left lower lobe mass (SUV 10.1, 3.6 x 3.4 cm) prior (3.0 x 2.8 cm)  11/28/23 Ct chest  - further decrease in the 1.4 x 1.2 cm nodule superior segment left lower lobe. There were multiple early enhancing peripheral hepatic lesions, not characterized on this exam, and new moderate compression deformity of T5 and T8.  2/15/2023 Pathology   Final Diagnosis Lung, left lower lobe, core biopsy: -   Mucinous adenocarcinoma  12/9/22 PET/CT  - 2.4 cm LLL opacity with SUV 3.3. No FDG-avid lung nodules in the RML, with a non-FDG aid 0.9 cm anterior RML nodule. No FDG-avid hilar or mediastinal lymphadenopathy. Also noted were nonspecific bilateral axillary and bilateral inguinal lymph nodes measuring up to 2.4 cm, SUV 2.5 - 3.3.   11/10/22 CT chest  -a 2.4 cm irregular nodule in the left lower lobe of lung. There were also 2 subcentimeter nodular foci in the right middle lobe.

## 2024-06-17 ENCOUNTER — APPOINTMENT (OUTPATIENT)
Dept: THORACIC SURGERY | Facility: CLINIC | Age: 79
End: 2024-06-17
Payer: MEDICARE

## 2024-06-17 VITALS
HEIGHT: 64 IN | OXYGEN SATURATION: 93 % | SYSTOLIC BLOOD PRESSURE: 138 MMHG | TEMPERATURE: 97.9 F | RESPIRATION RATE: 16 BRPM | DIASTOLIC BLOOD PRESSURE: 69 MMHG | HEART RATE: 76 BPM

## 2024-06-17 DIAGNOSIS — R91.8 OTHER NONSPECIFIC ABNORMAL FINDING OF LUNG FIELD: ICD-10-CM

## 2024-06-17 LAB
ALBUMIN SERPL ELPH-MCNC: 4.9 G/DL
ALP BLD-CCNC: 91 U/L
ALT SERPL-CCNC: 13 U/L
ANION GAP SERPL CALC-SCNC: 13 MMOL/L
AST SERPL-CCNC: 15 U/L
BILIRUB SERPL-MCNC: 0.3 MG/DL
BUN SERPL-MCNC: 22 MG/DL
CALCIUM SERPL-MCNC: 10.1 MG/DL
CEA SERPL-MCNC: 23.3 NG/ML
CHLORIDE SERPL-SCNC: 105 MMOL/L
CO2 SERPL-SCNC: 24 MMOL/L
CREAT SERPL-MCNC: 1.47 MG/DL
EGFR: 36 ML/MIN/1.73M2
GLUCOSE SERPL-MCNC: 118 MG/DL
POTASSIUM SERPL-SCNC: 5.1 MMOL/L
PROT SERPL-MCNC: 7.4 G/DL
SODIUM SERPL-SCNC: 142 MMOL/L

## 2024-06-17 PROCEDURE — 99214 OFFICE O/P EST MOD 30 MIN: CPT

## 2024-06-17 RX ORDER — TACROLIMUS 0.3 MG/G
0.03 OINTMENT TOPICAL
Qty: 30 | Refills: 0 | Status: COMPLETED | COMMUNITY
Start: 2022-05-17 | End: 2024-06-17

## 2024-06-17 RX ORDER — PIMECROLIMUS 10 MG/G
1 CREAM TOPICAL
Qty: 60 | Refills: 0 | Status: COMPLETED | COMMUNITY
Start: 2022-06-13 | End: 2024-06-17

## 2024-06-17 RX ORDER — PREDNISONE 50 MG/1
TABLET ORAL
Refills: 0 | Status: ACTIVE | COMMUNITY

## 2024-06-17 RX ORDER — DOXYCYCLINE 100 MG/1
100 TABLET, FILM COATED ORAL
Qty: 60 | Refills: 0 | Status: COMPLETED | COMMUNITY
Start: 2023-02-21 | End: 2024-06-17

## 2024-06-17 NOTE — DATA REVIEWED
[FreeTextEntry1] : NM PET/CT SKULL BASE TO MID THIGH performed at [facility] on 6/5/24: HISTORY: Evaluate pulmonary nodules INDICATION: The patient presents for initial treatment strategy. TECHNIQUE: 63 minutes following the following intravenous administration of 12.7 mCi of F-18 labeled Fluorodeoxyglucose (FDG) in a Right antecubital vein, whole body scan was performed with acquisition from the base of skull to mid femurs. Prior to intravenous FDG administration, fingerstick glucose level was measured, with a value of 98 mg/dl. The SUV is normalized to body weight and is reported as SUV maximum.  COMPARISON: The prior CT chest of 4/5/2024 is available for comparison  SCINTIGRAPHIC FINDINGS: HEAD AND NECK: Normal FDG uptake in the visualized portions of brain as well as the soft tissues of the head and neck. CHEST: There is intense FDG activity corresponding with a superior segment left lower lobe mass (SUV 10.1, 3.6 x 3.4 cm, series 3 image 86), prior (3.0 x 2.8 cm). Malignancy is suspected. No other focal abnormal FDG activity is identified within the chest. ABDOMEN AND PELVIS: Normal FDG uptake in the liver, spleen, renal cortices, renal collecting systems, ureters and urinary bladder. MUSCULOSKELETAL: Normal distribution of FDG uptake throughout the bone marrow. There are multiple thoracic and lumbar compression fractures without significant FDG activity likely chronic in nature.  CT FINDINGS: SOFT TISSUES NECK: There are no enlarged lymph nodes. The thyroid gland is unremarkable. AXILLAE AND CHEST WALL: There is no adenopathy or chest wall mass. MEDIASTINUM AND ABDELRAHMAN: There is no mass or adenopathy. HEART AND GREAT VESSELS: The cardiac size is normal. There is atherosclerotic calcification of nonaneurysmal thoracic aorta. LUNGS AND AIRWAYS: There are emphysematous changes of the lungs. There is an intensely FDG avid left lower lobe pulmonary mass as described above. An additional mass identified posterior inferiorly on the prior study is currently inseparable from the mass. The major central bronchi are patent. PLEURA: There is no pleural effusion. LIVER: Normal size. No focal lesions are identified by CT. GALLBLADDER/BILIARY TREE: The gallbladder is not well delineated. There is no biliary ductal dilatation. SPLEEN: Normal. PANCREAS: Unremarkable unenhanced pancreas by CT. ADRENAL GLANDS: Normal. KIDNEYS/URETERS/BLADDER: There are bilateral renal cysts. There is no hydronephrosis or hydroureter. The bladder is not fully characterized due to streak artifact from a left hip prosthesis. BOWEL/MESENTERY/PERITONEUM: There are colonic diverticula without evidence of diverticulitis. ABDOMINAL WALL: There is no hernia or mass. RETROPERITONEUM: There is no retroperitoneal or pelvic lymphadenopathy. VESSELS: There is atherosclerotic calcification of the nonaneurysmal abdominal aorta. PELVIC VISCERA: No masses are present. OSSEOUS STRUCTURES: There are no lytic or blastic lesions. There are multiple thoracic and lumbar compression fractures.  IMPRESSION: There is an intensely FDG avid left lower lobe mass which appears malignant in nature.  Signed by: Suzette Puri M.D. Signed Date: 6/6/2024 6:20 AM EDT SIGNED BY: Suzette Puri M.D., Ext. [id number] 06/06/2024 06:20 AM       CT-CHEST POST IV CONTRAST performed at [facility] on 4/5/24: History: Multiple lung nodules. Adenocarcinoma left lung, post RT. R91.8, C34.92 Technique: CT scan of the chest was performed during contrast administration of 70 cc Omnipaque 350. Coronal and sagittal reconstructions were available for review. This study was performed using automatic exposure control and an iterative reconstruction technique (radiation dose reduction software) to obtain a diagnostic image quality scan with patient dose as low as reasonably achievable. The mA and kV were adjusted according to patient size. The administered radiation dose was 4.536 mSv.  Per ACR guidelines and [facility] policy, a creatinine level test was performed prior to this exam. Results were as follows: Creatinine 1.6.  Comparison: CT 11/28/2023.  THYROID: The visualized thyroid gland is unremarkable.  MEDIASTINUM / ABDELRAHMAN/ LYMPH NODES: No suspicious mass or lymphadenopathy.  HEART / VASCULAR STRUCTURES: Normal heart size. No pericardial effusion. Normal caliber of the aorta and pulmonary artery.  LUNGS/PLEURA: There are 2 adjacent large nodules in the superior segment of the left lower lobe measuring 3 cm and 2.2 cm (series 7, images 101 and 111), suspicious for recurrent tumor. Also noted are emphysema bronchial thickening, and a small left pleural effusion with adjacent compressive atelectasis.  UPPER ABDOMEN: Left renal cyst.  BONES: Osteoporosis, multiple compression fractures  IMPRESSION: Large adjacent nodules in the superior segment of the left lower lobe, suspicious for recurrent tumor.  Signed by: Robin Colindres MD Signed Date: 4/12/2024 9:59 AM EDT SIGNED BY: Robin Colindres M.D., Ext. [id number] 04/12/2024 09:59 AM      Pulmonary Function Testing performed at [facility] [place] on 12/13/23: FEV1 62 DLCO37.5       Accession:                             [id number]  Collected Date/Time:                   2/15/2023 09:00 EST Received Date/Time:                    2/15/2023 09:52 EST  Surgical Pathology Report - Auth (Verified)  Specimen(s) Submitted 1  Left lower lung nodule  Final Diagnosis Lung, left lower lobe, core biopsy: -   Mucinous adenocarcinoma  Verified by: Joe Saucedo MD (Electronic Signature) Reported on: 02/17/23 12:55 EST

## 2024-06-17 NOTE — ASSESSMENT
[FreeTextEntry1] : Елена is an 79-year-old female with a history of a left lower lobe nodule that was treated with SBRT.  She now has a new growth in the region suggestive of recurrence.  She does have significant COPD and would likely be high risk from a pulmonary standpoint for any surgical procedure.  She may be a candidate for cryoablation or RFA and I will be discussing her care with oncology and radiation oncology.  Thank you for allowing me to participate in the care of your patient.  30 minutes was spent during this encounter.  Raymundo Langston MD Department of Cardiovascular and Thoracic Surgery  [employer] at [facility]

## 2024-06-17 NOTE — HISTORY OF PRESENT ILLNESS
[FreeTextEntry1] : Елена Grissom is an 79-year-old female, referred by Dr. Fontana, who presents for a follow up appointment. Previously, she was followed for a left lower lobe patchy opacity and underwent transthoracic needle biopsy in February 2023 that demonstrated a mucinous adenocarcinoma. Given her borderline pulmonary function, she consulted with radiation oncology and is now status post SBRT completed 4/14/23.  However, April 2024 CT chest noting large adjacent nodules in the superior segment of the left lower lobe, suspicious for recurrent tumor. She subsequently obtained PET imaging that noted an intensely FDG avid left lower lobe mass which appears malignant in nature. She presents today to discuss potential surgical options.   Past medical history includes former smoker, sleep apnea, GERD induced esophageal stricture status post EGD with dilation with no recurrent dysphagia, stroke (2012), right carotid artery surgery at [facility] (poor history given).  Today she reports feeling well. She does have a little shortness of breath with exertion.   The patient is accompanied by her , Robin, who assists with history taking.   CARE TEAM: Pulmonology: Tonio Fontana PCP: Roderick Amaya Dermatology: Yaw Feliciano

## 2024-06-26 ENCOUNTER — NON-APPOINTMENT (OUTPATIENT)
Age: 79
End: 2024-06-26

## 2024-06-26 ENCOUNTER — OUTPATIENT (OUTPATIENT)
Dept: OUTPATIENT SERVICES | Facility: HOSPITAL | Age: 79
LOS: 1 days | End: 2024-06-26
Payer: MEDICARE

## 2024-06-26 ENCOUNTER — RESULT REVIEW (OUTPATIENT)
Age: 79
End: 2024-06-26

## 2024-06-26 DIAGNOSIS — Z98.49 CATARACT EXTRACTION STATUS, UNSPECIFIED EYE: Chronic | ICD-10-CM

## 2024-06-26 DIAGNOSIS — R91.8 OTHER NONSPECIFIC ABNORMAL FINDING OF LUNG FIELD: ICD-10-CM

## 2024-06-26 DIAGNOSIS — C34.92 MALIGNANT NEOPLASM OF UNSPECIFIED PART OF LEFT BRONCHUS OR LUNG: ICD-10-CM

## 2024-06-26 DIAGNOSIS — Z96.642 PRESENCE OF LEFT ARTIFICIAL HIP JOINT: Chronic | ICD-10-CM

## 2024-06-26 DIAGNOSIS — Z98.51 TUBAL LIGATION STATUS: Chronic | ICD-10-CM

## 2024-06-26 DIAGNOSIS — I77.9 DISORDER OF ARTERIES AND ARTERIOLES, UNSPECIFIED: Chronic | ICD-10-CM

## 2024-06-26 DIAGNOSIS — Z90.49 ACQUIRED ABSENCE OF OTHER SPECIFIED PARTS OF DIGESTIVE TRACT: Chronic | ICD-10-CM

## 2024-06-26 PROCEDURE — 99215 OFFICE O/P EST HI 40 MIN: CPT

## 2024-07-08 ENCOUNTER — NON-APPOINTMENT (OUTPATIENT)
Age: 79
End: 2024-07-08

## 2024-07-09 ENCOUNTER — APPOINTMENT (OUTPATIENT)
Dept: HEMATOLOGY ONCOLOGY | Facility: CLINIC | Age: 79
End: 2024-07-09
Payer: MEDICARE

## 2024-07-09 VITALS
WEIGHT: 161.01 LBS | DIASTOLIC BLOOD PRESSURE: 72 MMHG | HEART RATE: 74 BPM | OXYGEN SATURATION: 95 % | BODY MASS INDEX: 27.49 KG/M2 | HEIGHT: 64 IN | SYSTOLIC BLOOD PRESSURE: 129 MMHG

## 2024-07-09 PROCEDURE — G2211 COMPLEX E/M VISIT ADD ON: CPT

## 2024-07-09 PROCEDURE — 99215 OFFICE O/P EST HI 40 MIN: CPT

## 2024-07-10 ENCOUNTER — OUTPATIENT (OUTPATIENT)
Dept: OUTPATIENT SERVICES | Facility: HOSPITAL | Age: 79
LOS: 1 days | Discharge: HOME | End: 2024-07-10
Payer: MEDICARE

## 2024-07-10 ENCOUNTER — APPOINTMENT (OUTPATIENT)
Dept: RADIATION ONCOLOGY | Facility: CLINIC | Age: 79
End: 2024-07-10
Payer: MEDICARE

## 2024-07-10 VITALS
SYSTOLIC BLOOD PRESSURE: 122 MMHG | RESPIRATION RATE: 16 BRPM | BODY MASS INDEX: 27.64 KG/M2 | HEART RATE: 72 BPM | OXYGEN SATURATION: 96 % | DIASTOLIC BLOOD PRESSURE: 68 MMHG | WEIGHT: 161 LBS

## 2024-07-10 DIAGNOSIS — I77.9 DISORDER OF ARTERIES AND ARTERIOLES, UNSPECIFIED: Chronic | ICD-10-CM

## 2024-07-10 DIAGNOSIS — Z98.51 TUBAL LIGATION STATUS: Chronic | ICD-10-CM

## 2024-07-10 DIAGNOSIS — C34.92 MALIGNANT NEOPLASM OF UNSPECIFIED PART OF LEFT BRONCHUS OR LUNG: ICD-10-CM

## 2024-07-10 DIAGNOSIS — Z90.49 ACQUIRED ABSENCE OF OTHER SPECIFIED PARTS OF DIGESTIVE TRACT: Chronic | ICD-10-CM

## 2024-07-10 DIAGNOSIS — Z96.642 PRESENCE OF LEFT ARTIFICIAL HIP JOINT: Chronic | ICD-10-CM

## 2024-07-10 DIAGNOSIS — Z98.49 CATARACT EXTRACTION STATUS, UNSPECIFIED EYE: Chronic | ICD-10-CM

## 2024-07-10 PROCEDURE — G2211 COMPLEX E/M VISIT ADD ON: CPT

## 2024-07-10 PROCEDURE — 99214 OFFICE O/P EST MOD 30 MIN: CPT

## 2024-07-15 PROCEDURE — 77333 RADIATION TREATMENT AID(S): CPT | Mod: 26

## 2024-07-15 PROCEDURE — 77470 SPECIAL RADIATION TREATMENT: CPT | Mod: 26,1L

## 2024-07-15 PROCEDURE — 77263 THER RADIOLOGY TX PLNG CPLX: CPT

## 2024-07-23 ENCOUNTER — NON-APPOINTMENT (OUTPATIENT)
Age: 79
End: 2024-07-23

## 2024-07-23 ENCOUNTER — OFFICE (OUTPATIENT)
Dept: URBAN - METROPOLITAN AREA CLINIC 115 | Facility: CLINIC | Age: 79
Setting detail: OPHTHALMOLOGY
End: 2024-07-23
Payer: COMMERCIAL

## 2024-07-23 DIAGNOSIS — H02.831: ICD-10-CM

## 2024-07-23 DIAGNOSIS — H33.8: ICD-10-CM

## 2024-07-23 DIAGNOSIS — H01.004: ICD-10-CM

## 2024-07-23 DIAGNOSIS — H34.812: ICD-10-CM

## 2024-07-23 DIAGNOSIS — H02.834: ICD-10-CM

## 2024-07-23 DIAGNOSIS — H43.391: ICD-10-CM

## 2024-07-23 DIAGNOSIS — H35.033: ICD-10-CM

## 2024-07-23 DIAGNOSIS — Z96.1: ICD-10-CM

## 2024-07-23 DIAGNOSIS — H00.14: ICD-10-CM

## 2024-07-23 DIAGNOSIS — H01.001: ICD-10-CM

## 2024-07-23 PROCEDURE — 99214 OFFICE O/P EST MOD 30 MIN: CPT | Performed by: OPHTHALMOLOGY

## 2024-07-23 PROCEDURE — 92202 OPSCPY EXTND ON/MAC DRAW: CPT | Performed by: OPHTHALMOLOGY

## 2024-07-23 PROCEDURE — 92250 FUNDUS PHOTOGRAPHY W/I&R: CPT | Performed by: OPHTHALMOLOGY

## 2024-07-23 ASSESSMENT — CONFRONTATIONAL VISUAL FIELD TEST (CVF)
OS_FINDINGS: FULL
OD_FINDINGS: FULL

## 2024-07-23 ASSESSMENT — LID EXAM ASSESSMENTS
OS_BLEPHARITIS: LUL T
OD_BLEPHARITIS: RUL T

## 2024-07-23 ASSESSMENT — LID POSITION - DERMATOCHALASIS
OD_DERMATOCHALASIS: RUL 1+ 2+
OS_DERMATOCHALASIS: LUL 1+ 2+

## 2024-08-02 ENCOUNTER — APPOINTMENT (OUTPATIENT)
Dept: PULMONOLOGY | Facility: CLINIC | Age: 79
End: 2024-08-02
Payer: MEDICARE

## 2024-08-02 ENCOUNTER — APPOINTMENT (OUTPATIENT)
Dept: PULMONOLOGY | Facility: CLINIC | Age: 79
End: 2024-08-02

## 2024-08-02 VITALS — HEIGHT: 62.3 IN | BODY MASS INDEX: 28.34 KG/M2 | WEIGHT: 156 LBS

## 2024-08-02 VITALS
DIASTOLIC BLOOD PRESSURE: 72 MMHG | OXYGEN SATURATION: 93 % | SYSTOLIC BLOOD PRESSURE: 124 MMHG | HEART RATE: 66 BPM | RESPIRATION RATE: 16 BRPM

## 2024-08-02 DIAGNOSIS — R91.8 OTHER NONSPECIFIC ABNORMAL FINDING OF LUNG FIELD: ICD-10-CM

## 2024-08-02 DIAGNOSIS — C34.92 MALIGNANT NEOPLASM OF UNSPECIFIED PART OF LEFT BRONCHUS OR LUNG: ICD-10-CM

## 2024-08-02 DIAGNOSIS — E66.3 OVERWEIGHT: ICD-10-CM

## 2024-08-02 DIAGNOSIS — J44.9 CHRONIC OBSTRUCTIVE PULMONARY DISEASE, UNSPECIFIED: ICD-10-CM

## 2024-08-02 DIAGNOSIS — Z87.891 PERSONAL HISTORY OF NICOTINE DEPENDENCE: ICD-10-CM

## 2024-08-02 DIAGNOSIS — R93.89 ABNORMAL FINDINGS ON DIAGNOSTIC IMAGING OF OTHER SPECIFIED BODY STRUCTURES: ICD-10-CM

## 2024-08-02 DIAGNOSIS — R06.02 SHORTNESS OF BREATH: ICD-10-CM

## 2024-08-02 PROCEDURE — 99215 OFFICE O/P EST HI 40 MIN: CPT | Mod: 25

## 2024-08-02 PROCEDURE — 94010 BREATHING CAPACITY TEST: CPT

## 2024-08-02 RX ORDER — FLUTICASONE FUROATE, UMECLIDINIUM BROMIDE AND VILANTEROL TRIFENATATE 100; 62.5; 25 UG/1; UG/1; UG/1
100-62.5-25 POWDER RESPIRATORY (INHALATION) DAILY
Qty: 1 | Refills: 5 | Status: ACTIVE | COMMUNITY
Start: 2024-08-02 | End: 1900-01-01

## 2024-08-02 NOTE — HISTORY OF PRESENT ILLNESS
[Dyspnea on Exertion] : dyspnea on exertion [Short-Acting Beta Agonists] : short-acting beta agonists [Good Compliance] : good compliance with treatment [Good Tolerance] : good tolerance of treatment [Good Symptom Control] : good symptom control [Follow-Up - Routine Clinic] : a routine clinic follow-up of [Excessive Daytime Sleepiness] : excessive daytime sleepiness [Snoring] : snoring [Unrefreshing Sleep] : unrefreshing sleep [Sleepy When Sedentary] : sleepy when sedentary [Currently Experiencing] : The patient is currently experiencing symptoms. [None] : The patient is not currently being treated for this problem [On ___] : performed on [unfilled] [Patient] : the patient [Indication ___] : for an indication of [unfilled] [TextBox_4] : Patient c/o SOBOE but is otherwise without associated respiratory complaints. She does not want to start inhaler therapy despite obstruction noted on lung function testing. Pt is a former smoker of 2 ppd x 30 years, quit 1995. S/p Covid infection 12/20/22 but was asymptomatic. Pt s/p needle bx for lung lesion and was c/w adenocarcinoma of the lung and is s/p XRT. Pt on prednisone for bullous pemphigoid. [Witnessed Apnea During Sleep] : no witnessed apnea during sleep [Witnessed Gasping During Sleep] : no witnessed gasping during sleep [FreeTextEntry9] : Chest CT [FreeTextEntry8] : Peripheral 2.4x2 cm LLL opacity with small subcm nodules [TextEntry] : PET CT from 12/9/22 revealed uptake with an SUV of 3.3 regarding LLL opacity with mild b/l axillary and inguinal NIKUNJ with low level uptake and other nodules without uptake.  Chest CT from 3/17/23 revealed a 2.4x1.8 cm LLL nodule stable from prior PET CT with underlying emphysema and atelectasis. Chest CT from 8/1/23 revealed a decrease in the previously seen LLL nodule from 2.4x1.8 down to 2x1.4 cm in size. Chest CT from 11/28/23 revealed decrease in a 1.4x1.2 cm but increase in LLL nodularity though no significant NIKUNJ. Chest CT from 4/5/24 revealed large 2 and 3 cm LLL nodules with small effusion and compressive atelectasis. PET CT from 6/5/24 - Increased uptake in the 2 and 3 cm LLL nodules.

## 2024-08-02 NOTE — CONSULT LETTER
[Dear  ___] : Dear  [unfilled], [Consult Letter:] : I had the pleasure of evaluating your patient, [unfilled]. [Please see my note below.] : Please see my note below. [Consult Closing:] : Thank you very much for allowing me to participate in the care of this patient.  If you have any questions, please do not hesitate to contact me. [Sincerely,] : Sincerely, [FreeTextEntry3] : Tonio Fontana MD, FCCP, D. ABSM\par  Pulmonary and Sleep Medicine\par  [facility]monary [facility] Sleep [facility] at [place]

## 2024-08-02 NOTE — PROCEDURE
[FreeTextEntry1] : PFTs 12/30/22 - Mildly reduced FVC and FEV1 with an obstructive pattern but with normal lung volumes and moderately reduced DLCO which almost normalized when corrected for alveolar volume. PFTs 12/13/23 - Mildly reduced FVC and FEV1 with obstruction with reduced lung volumes and severely reduced DLCO which improved somewhat when corrected for alveolar volume though pt had difficulty with lung volumes and with anemia (Hgb of 10.8). [place] 8/2/24 - Mildly reduced FVC and FEV1 with obstruction; worse than prior. Pt never started Trelegy so will order again.

## 2024-08-02 NOTE — DISCUSSION/SUMMARY
[FreeTextEntry1] : #1. PFTs performed previously were c/w Gold Stage II COPD; repeat again with Stage II COPD but worse than prior. #2. Pt never started trial of Trelegy 100 and denies significant respiratory complaints but ordered again now given reduction in lucinda c/w priors. #3. SOBOE is likely at least somewhat related to weight or deconditioning as well. #4. Diet and exercise for weight loss  #5. PET CT to evaluate LLL opacity with increased uptake so had bx which revealed adenoCa and is now following with Rad-onc and is s/p XRT with decrease in the size of the lesion. Further imaging studies per rad-onc given new lesions. Therapy per onc. #6. Continue Albuterol as needed for now as well. #7. Not vaccinated; s/p Covid infection. #8. F/u in 2 months with lucinda to assess response to Trelegy.  The patient expressed understanding and agreement with the above recommendations/plan and accepts responsibility to be compliant with recommended testing, therapies, and f/u visits. All relevant questions and concerns were addressed.  Discussed above with patient and  who was also present.

## 2024-08-15 ENCOUNTER — OFFICE (OUTPATIENT)
Dept: URBAN - METROPOLITAN AREA CLINIC 115 | Facility: CLINIC | Age: 79
Setting detail: OPHTHALMOLOGY
End: 2024-08-15
Payer: MEDICARE

## 2024-08-15 DIAGNOSIS — H35.033: ICD-10-CM

## 2024-08-15 DIAGNOSIS — H34.8121: ICD-10-CM

## 2024-08-15 DIAGNOSIS — H43.391: ICD-10-CM

## 2024-08-15 DIAGNOSIS — H33.8: ICD-10-CM

## 2024-08-15 PROCEDURE — 92134 CPTRZ OPH DX IMG PST SGM RTA: CPT | Performed by: OPHTHALMOLOGY

## 2024-08-15 PROCEDURE — 92014 COMPRE OPH EXAM EST PT 1/>: CPT | Performed by: OPHTHALMOLOGY

## 2024-08-15 ASSESSMENT — LID EXAM ASSESSMENTS
OS_BLEPHARITIS: LUL T
OD_BLEPHARITIS: RUL T

## 2024-08-15 ASSESSMENT — CONFRONTATIONAL VISUAL FIELD TEST (CVF)
OD_FINDINGS: FULL
OS_FINDINGS: FULL

## 2024-08-15 ASSESSMENT — LID POSITION - DERMATOCHALASIS
OS_DERMATOCHALASIS: LUL 1+ 2+
OD_DERMATOCHALASIS: RUL 1+ 2+

## 2024-08-15 NOTE — OCCUPATIONAL THERAPY INITIAL EVALUATION ADULT - WEIGHT-BEARING RESTRICTIONS: SIT/STAND, REHAB EVAL
Treatment Goal Explanation (Does Not Render In The Note): Stable for the purposes of categorizing medical decision making is defined by the specific treatment goals for an individual patient. A patient that is not at their treatment goal is not stable, even if the condition has not changed and there is no short- term threat to life or function. Treatment Goal Met?: no 50% Left LE and TLSO brace/partial weight-bearing

## 2024-09-28 NOTE — DISCHARGE NOTE PROVIDER - NS AS DC PROVIDER CONTACT Y/N MULTI
Patient had a fall from wheelchair with minor back strain.  Caregivers are instructed to give her current medications as prescribed.  They are instructed to have the patient follow-up with the facility physician and call for an appointment within the next 1 to 2 days.  They are instructed return this patient to the emergency room immediately for any new or worsening complaints.   Yes

## 2024-10-10 ENCOUNTER — OUTPATIENT (OUTPATIENT)
Dept: OUTPATIENT SERVICES | Facility: HOSPITAL | Age: 79
LOS: 1 days | Discharge: HOME | End: 2024-10-10

## 2024-10-10 DIAGNOSIS — Z98.49 CATARACT EXTRACTION STATUS, UNSPECIFIED EYE: Chronic | ICD-10-CM

## 2024-10-10 DIAGNOSIS — D64.9 ANEMIA, UNSPECIFIED: ICD-10-CM

## 2024-10-10 DIAGNOSIS — I77.9 DISORDER OF ARTERIES AND ARTERIOLES, UNSPECIFIED: Chronic | ICD-10-CM

## 2024-10-10 DIAGNOSIS — Z96.642 PRESENCE OF LEFT ARTIFICIAL HIP JOINT: Chronic | ICD-10-CM

## 2024-10-10 DIAGNOSIS — Z98.51 TUBAL LIGATION STATUS: Chronic | ICD-10-CM

## 2024-10-10 DIAGNOSIS — Z90.49 ACQUIRED ABSENCE OF OTHER SPECIFIED PARTS OF DIGESTIVE TRACT: Chronic | ICD-10-CM

## 2024-10-11 ENCOUNTER — APPOINTMENT (OUTPATIENT)
Dept: HEMATOLOGY ONCOLOGY | Facility: CLINIC | Age: 79
End: 2024-10-11

## 2024-10-17 ENCOUNTER — OFFICE (OUTPATIENT)
Dept: URBAN - METROPOLITAN AREA CLINIC 115 | Facility: CLINIC | Age: 79
Setting detail: OPHTHALMOLOGY
End: 2024-10-17
Payer: MEDICARE

## 2024-10-17 DIAGNOSIS — H33.8: ICD-10-CM

## 2024-10-17 DIAGNOSIS — H43.391: ICD-10-CM

## 2024-10-17 DIAGNOSIS — H34.8121: ICD-10-CM

## 2024-10-17 DIAGNOSIS — H35.033: ICD-10-CM

## 2024-10-17 PROCEDURE — 92134 CPTRZ OPH DX IMG PST SGM RTA: CPT | Performed by: OPHTHALMOLOGY

## 2024-10-17 PROCEDURE — 92012 INTRM OPH EXAM EST PATIENT: CPT | Performed by: OPHTHALMOLOGY

## 2024-10-17 PROCEDURE — 92235 FLUORESCEIN ANGRPH MLTIFRAME: CPT | Performed by: OPHTHALMOLOGY

## 2024-10-17 ASSESSMENT — KERATOMETRY
OD_AXISANGLE_DEGREES: 173
OS_K1POWER_DIOPTERS: 41.50
OD_K1POWER_DIOPTERS: 42.25
OS_AXISANGLE_DEGREES: 036
OS_K2POWER_DIOPTERS: 43.50
OD_K2POWER_DIOPTERS: 43.50

## 2024-10-17 ASSESSMENT — REFRACTION_CURRENTRX
OD_AXIS: 086
OD_CYLINDER: -2.00
OD_OVR_VA: 20/
OD_AXIS: 087
OS_CYLINDER: -1.75
OD_VPRISM_DIRECTION: SV
OD_AXIS: 097
OD_SPHERE: +0.50
OD_SPHERE: +0.50
OS_CYLINDER: -2.00
OD_CYLINDER: -1.25
OS_AXIS: 115
OD_CYLINDER: -1.75
OS_VPRISM_DIRECTION: SV
OD_AXIS: 085
OD_CYLINDER: -1.50
OS_SPHERE: -1.75
OS_VPRISM_DIRECTION: SV
OD_VPRISM_DIRECTION: SV
OS_AXIS: 117
OS_AXIS: 112
OS_OVR_VA: 20/
OS_SPHERE: -1.25
OD_VPRISM_DIRECTION: SV
OD_OVR_VA: 20/
OS_CYLINDER: -2.00
OS_OVR_VA: 20/
OS_CYLINDER: -2.25
OD_SPHERE: +0.50
OD_OVR_VA: 20/
OS_SPHERE: -1.25
OS_OVR_VA: 20/
OS_AXIS: 106
OD_SPHERE: +3.50
OS_SPHERE: +1.50

## 2024-10-17 ASSESSMENT — CONFRONTATIONAL VISUAL FIELD TEST (CVF)
OS_FINDINGS: FULL
OD_FINDINGS: FULL

## 2024-10-17 ASSESSMENT — REFRACTION_AUTOREFRACTION
OD_AXIS: 104
OS_AXIS: 120
OS_CYLINDER: -2.00
OD_CYLINDER: -2.22
OD_SPHERE: 0.00
OS_SPHERE: -1.50

## 2024-10-17 ASSESSMENT — LID EXAM ASSESSMENTS
OS_BLEPHARITIS: LUL T
OD_BLEPHARITIS: RUL T

## 2024-10-17 ASSESSMENT — TONOMETRY
OD_IOP_MMHG: 14
OS_IOP_MMHG: 17

## 2024-10-17 ASSESSMENT — LID POSITION - DERMATOCHALASIS
OS_DERMATOCHALASIS: LUL 1+ 2+
OD_DERMATOCHALASIS: RUL 1+ 2+

## 2024-10-17 ASSESSMENT — VISUAL ACUITY
OS_BCVA: 20/25
OD_BCVA: 20/CF @ 1FT

## 2024-10-27 ENCOUNTER — INPATIENT (INPATIENT)
Facility: HOSPITAL | Age: 79
LOS: 3 days | Discharge: HOME | DRG: 189 | End: 2024-10-31
Attending: HOSPITALIST | Admitting: INTERNAL MEDICINE
Payer: MEDICARE

## 2024-10-27 VITALS
RESPIRATION RATE: 26 BRPM | HEART RATE: 141 BPM | DIASTOLIC BLOOD PRESSURE: 97 MMHG | WEIGHT: 169.98 LBS | SYSTOLIC BLOOD PRESSURE: 146 MMHG | TEMPERATURE: 99 F | OXYGEN SATURATION: 81 % | HEIGHT: 63 IN

## 2024-10-27 DIAGNOSIS — J96.01 ACUTE RESPIRATORY FAILURE WITH HYPOXIA: ICD-10-CM

## 2024-10-27 DIAGNOSIS — I77.9 DISORDER OF ARTERIES AND ARTERIOLES, UNSPECIFIED: Chronic | ICD-10-CM

## 2024-10-27 DIAGNOSIS — Z90.49 ACQUIRED ABSENCE OF OTHER SPECIFIED PARTS OF DIGESTIVE TRACT: Chronic | ICD-10-CM

## 2024-10-27 DIAGNOSIS — Z98.51 TUBAL LIGATION STATUS: Chronic | ICD-10-CM

## 2024-10-27 DIAGNOSIS — Z98.49 CATARACT EXTRACTION STATUS, UNSPECIFIED EYE: Chronic | ICD-10-CM

## 2024-10-27 DIAGNOSIS — Z96.642 PRESENCE OF LEFT ARTIFICIAL HIP JOINT: Chronic | ICD-10-CM

## 2024-10-27 LAB
ALBUMIN SERPL ELPH-MCNC: 4 G/DL — SIGNIFICANT CHANGE UP (ref 3.3–5.2)
ALBUMIN SERPL ELPH-MCNC: 4.2 G/DL — SIGNIFICANT CHANGE UP (ref 3.3–5.2)
ALP SERPL-CCNC: 82 U/L — SIGNIFICANT CHANGE UP (ref 40–120)
ALP SERPL-CCNC: 93 U/L — SIGNIFICANT CHANGE UP (ref 40–120)
ALT FLD-CCNC: 33 U/L — HIGH
ALT FLD-CCNC: 34 U/L — HIGH
ANION GAP SERPL CALC-SCNC: 14 MMOL/L — SIGNIFICANT CHANGE UP (ref 5–17)
ANION GAP SERPL CALC-SCNC: 14 MMOL/L — SIGNIFICANT CHANGE UP (ref 5–17)
APTT BLD: 27 SEC — SIGNIFICANT CHANGE UP (ref 24.5–35.6)
APTT BLD: 86.3 SEC — HIGH (ref 24.5–35.6)
AST SERPL-CCNC: 31 U/L — SIGNIFICANT CHANGE UP
AST SERPL-CCNC: 46 U/L — HIGH
BASE EXCESS BLDA CALC-SCNC: -0.9 MMOL/L — SIGNIFICANT CHANGE UP (ref -2–3)
BASOPHILS # BLD AUTO: 0.06 K/UL — SIGNIFICANT CHANGE UP (ref 0–0.2)
BASOPHILS NFR BLD AUTO: 0.5 % — SIGNIFICANT CHANGE UP (ref 0–2)
BILIRUB SERPL-MCNC: 0.3 MG/DL — LOW (ref 0.4–2)
BILIRUB SERPL-MCNC: 0.5 MG/DL — SIGNIFICANT CHANGE UP (ref 0.4–2)
BLOOD GAS COMMENTS ARTERIAL: SIGNIFICANT CHANGE UP
BUN SERPL-MCNC: 19.3 MG/DL — SIGNIFICANT CHANGE UP (ref 8–20)
BUN SERPL-MCNC: 22.5 MG/DL — HIGH (ref 8–20)
CALCIUM SERPL-MCNC: 8.9 MG/DL — SIGNIFICANT CHANGE UP (ref 8.4–10.5)
CALCIUM SERPL-MCNC: 9.4 MG/DL — SIGNIFICANT CHANGE UP (ref 8.4–10.5)
CHLORIDE SERPL-SCNC: 102 MMOL/L — SIGNIFICANT CHANGE UP (ref 96–108)
CHLORIDE SERPL-SCNC: 99 MMOL/L — SIGNIFICANT CHANGE UP (ref 96–108)
CO2 SERPL-SCNC: 24 MMOL/L — SIGNIFICANT CHANGE UP (ref 22–29)
CO2 SERPL-SCNC: 26 MMOL/L — SIGNIFICANT CHANGE UP (ref 22–29)
CREAT SERPL-MCNC: 1.18 MG/DL — SIGNIFICANT CHANGE UP (ref 0.5–1.3)
CREAT SERPL-MCNC: 1.39 MG/DL — HIGH (ref 0.5–1.3)
EGFR: 38 ML/MIN/1.73M2 — LOW
EGFR: 46 ML/MIN/1.73M2 — LOW
EOSINOPHIL # BLD AUTO: 0.23 K/UL — SIGNIFICANT CHANGE UP (ref 0–0.5)
EOSINOPHIL NFR BLD AUTO: 1.8 % — SIGNIFICANT CHANGE UP (ref 0–6)
FLUAV AG NPH QL: SIGNIFICANT CHANGE UP
FLUBV AG NPH QL: SIGNIFICANT CHANGE UP
GAS PNL BLDA: SIGNIFICANT CHANGE UP
GLUCOSE SERPL-MCNC: 175 MG/DL — HIGH (ref 70–99)
GLUCOSE SERPL-MCNC: 87 MG/DL — SIGNIFICANT CHANGE UP (ref 70–99)
HCO3 BLDA-SCNC: 25 MMOL/L — SIGNIFICANT CHANGE UP (ref 21–28)
HCT VFR BLD CALC: 36.2 % — SIGNIFICANT CHANGE UP (ref 34.5–45)
HCT VFR BLD CALC: 39.2 % — SIGNIFICANT CHANGE UP (ref 34.5–45)
HGB BLD-MCNC: 11.8 G/DL — SIGNIFICANT CHANGE UP (ref 11.5–15.5)
HGB BLD-MCNC: 12.2 G/DL — SIGNIFICANT CHANGE UP (ref 11.5–15.5)
HOROWITZ INDEX BLDA+IHG-RTO: 0.5 — SIGNIFICANT CHANGE UP
IMM GRANULOCYTES NFR BLD AUTO: 0.9 % — SIGNIFICANT CHANGE UP (ref 0–0.9)
INR BLD: 1.09 RATIO — SIGNIFICANT CHANGE UP (ref 0.85–1.16)
LACTATE SERPL-SCNC: 1.2 MMOL/L — SIGNIFICANT CHANGE UP (ref 0.5–2)
LYMPHOCYTES # BLD AUTO: 1.58 K/UL — SIGNIFICANT CHANGE UP (ref 1–3.3)
LYMPHOCYTES # BLD AUTO: 12.5 % — LOW (ref 13–44)
MAGNESIUM SERPL-MCNC: 1.9 MG/DL — SIGNIFICANT CHANGE UP (ref 1.6–2.6)
MCHC RBC-ENTMCNC: 27.5 PG — SIGNIFICANT CHANGE UP (ref 27–34)
MCHC RBC-ENTMCNC: 27.6 PG — SIGNIFICANT CHANGE UP (ref 27–34)
MCHC RBC-ENTMCNC: 31.1 GM/DL — LOW (ref 32–36)
MCHC RBC-ENTMCNC: 32.6 GM/DL — SIGNIFICANT CHANGE UP (ref 32–36)
MCV RBC AUTO: 84.4 FL — SIGNIFICANT CHANGE UP (ref 80–100)
MCV RBC AUTO: 88.7 FL — SIGNIFICANT CHANGE UP (ref 80–100)
MONOCYTES # BLD AUTO: 1 K/UL — HIGH (ref 0–0.9)
MONOCYTES NFR BLD AUTO: 7.9 % — SIGNIFICANT CHANGE UP (ref 2–14)
MRSA PCR RESULT.: SIGNIFICANT CHANGE UP
NEUTROPHILS # BLD AUTO: 9.67 K/UL — HIGH (ref 1.8–7.4)
NEUTROPHILS NFR BLD AUTO: 76.4 % — SIGNIFICANT CHANGE UP (ref 43–77)
PCO2 BLDA: 49 MMHG — HIGH (ref 32–45)
PH BLDA: 7.32 — LOW (ref 7.35–7.45)
PHOSPHATE SERPL-MCNC: 3.4 MG/DL — SIGNIFICANT CHANGE UP (ref 2.4–4.7)
PLATELET # BLD AUTO: 347 K/UL — SIGNIFICANT CHANGE UP (ref 150–400)
PLATELET # BLD AUTO: 398 K/UL — SIGNIFICANT CHANGE UP (ref 150–400)
PO2 BLDA: 78 MMHG — LOW (ref 83–108)
POTASSIUM SERPL-MCNC: 3.5 MMOL/L — SIGNIFICANT CHANGE UP (ref 3.5–5.3)
POTASSIUM SERPL-MCNC: 4.4 MMOL/L — SIGNIFICANT CHANGE UP (ref 3.5–5.3)
POTASSIUM SERPL-SCNC: 3.5 MMOL/L — SIGNIFICANT CHANGE UP (ref 3.5–5.3)
POTASSIUM SERPL-SCNC: 4.4 MMOL/L — SIGNIFICANT CHANGE UP (ref 3.5–5.3)
PROCALCITONIN SERPL-MCNC: 0.33 NG/ML — HIGH (ref 0.02–0.1)
PROT SERPL-MCNC: 6.6 G/DL — SIGNIFICANT CHANGE UP (ref 6.6–8.7)
PROT SERPL-MCNC: 7.1 G/DL — SIGNIFICANT CHANGE UP (ref 6.6–8.7)
PROTHROM AB SERPL-ACNC: 12.6 SEC — SIGNIFICANT CHANGE UP (ref 9.9–13.4)
RBC # BLD: 4.29 M/UL — SIGNIFICANT CHANGE UP (ref 3.8–5.2)
RBC # BLD: 4.42 M/UL — SIGNIFICANT CHANGE UP (ref 3.8–5.2)
RBC # FLD: 14.4 % — SIGNIFICANT CHANGE UP (ref 10.3–14.5)
RBC # FLD: 14.6 % — HIGH (ref 10.3–14.5)
RSV RNA NPH QL NAA+NON-PROBE: SIGNIFICANT CHANGE UP
S AUREUS DNA NOSE QL NAA+PROBE: SIGNIFICANT CHANGE UP
SAO2 % BLDA: 97.1 % — SIGNIFICANT CHANGE UP (ref 94–98)
SARS-COV-2 RNA SPEC QL NAA+PROBE: SIGNIFICANT CHANGE UP
SODIUM SERPL-SCNC: 139 MMOL/L — SIGNIFICANT CHANGE UP (ref 135–145)
SODIUM SERPL-SCNC: 140 MMOL/L — SIGNIFICANT CHANGE UP (ref 135–145)
TROPONIN T, HIGH SENSITIVITY RESULT: 120 NG/L — HIGH (ref 0–51)
TROPONIN T, HIGH SENSITIVITY RESULT: 19 NG/L — SIGNIFICANT CHANGE UP (ref 0–51)
TROPONIN T, HIGH SENSITIVITY RESULT: 76 NG/L — HIGH (ref 0–51)
TROPONIN T, HIGH SENSITIVITY RESULT: 98 NG/L — HIGH (ref 0–51)
TSH SERPL-MCNC: 2.66 UIU/ML — SIGNIFICANT CHANGE UP (ref 0.27–4.2)
WBC # BLD: 12.66 K/UL — HIGH (ref 3.8–10.5)
WBC # BLD: 9.17 K/UL — SIGNIFICANT CHANGE UP (ref 3.8–10.5)
WBC # FLD AUTO: 12.66 K/UL — HIGH (ref 3.8–10.5)
WBC # FLD AUTO: 9.17 K/UL — SIGNIFICANT CHANGE UP (ref 3.8–10.5)

## 2024-10-27 PROCEDURE — 93010 ELECTROCARDIOGRAM REPORT: CPT | Mod: 76

## 2024-10-27 PROCEDURE — 99291 CRITICAL CARE FIRST HOUR: CPT

## 2024-10-27 PROCEDURE — 71275 CT ANGIOGRAPHY CHEST: CPT | Mod: 26,MC

## 2024-10-27 PROCEDURE — 93306 TTE W/DOPPLER COMPLETE: CPT | Mod: 26

## 2024-10-27 PROCEDURE — 71045 X-RAY EXAM CHEST 1 VIEW: CPT | Mod: 26

## 2024-10-27 PROCEDURE — 99223 1ST HOSP IP/OBS HIGH 75: CPT

## 2024-10-27 RX ORDER — DILTIAZEM HCL 5 MG/ML
30 VIAL (ML) INTRAVENOUS EVERY 12 HOURS
Refills: 0 | Status: DISCONTINUED | OUTPATIENT
Start: 2024-10-27 | End: 2024-10-27

## 2024-10-27 RX ORDER — PIPERACILLIN AND TAZOBACTAM .5; 4 G/20ML; G/20ML
3.38 INJECTION, POWDER, LYOPHILIZED, FOR SOLUTION INTRAVENOUS ONCE
Refills: 0 | Status: COMPLETED | OUTPATIENT
Start: 2024-10-27 | End: 2024-10-27

## 2024-10-27 RX ORDER — SODIUM CHLORIDE 9 MG/ML
1000 INJECTION, SOLUTION INTRAMUSCULAR; INTRAVENOUS; SUBCUTANEOUS ONCE
Refills: 0 | Status: COMPLETED | OUTPATIENT
Start: 2024-10-27 | End: 2024-10-27

## 2024-10-27 RX ORDER — DOXAZOSIN MESYLATE 8 MG
2 TABLET ORAL AT BEDTIME
Refills: 0 | Status: DISCONTINUED | OUTPATIENT
Start: 2024-10-27 | End: 2024-10-31

## 2024-10-27 RX ORDER — FUROSEMIDE 40 MG
40 TABLET ORAL ONCE
Refills: 0 | Status: COMPLETED | OUTPATIENT
Start: 2024-10-27 | End: 2024-10-27

## 2024-10-27 RX ORDER — LACTOBACILLUS ACIDOPHILUS 25MM CELL
1 CAPSULE ORAL
Refills: 0 | DISCHARGE

## 2024-10-27 RX ORDER — METOPROLOL TARTRATE 50 MG
5 TABLET ORAL ONCE
Refills: 0 | Status: DISCONTINUED | OUTPATIENT
Start: 2024-10-27 | End: 2024-10-31

## 2024-10-27 RX ORDER — FAMOTIDINE 10 MG/ML
10 INJECTION INTRAVENOUS DAILY
Refills: 0 | Status: DISCONTINUED | OUTPATIENT
Start: 2024-10-27 | End: 2024-10-31

## 2024-10-27 RX ORDER — FUROSEMIDE 40 MG
40 TABLET ORAL
Refills: 0 | Status: DISCONTINUED | OUTPATIENT
Start: 2024-10-27 | End: 2024-10-28

## 2024-10-27 RX ORDER — HEPARIN SODIUM 10000 [USP'U]/ML
3000 INJECTION INTRAVENOUS; SUBCUTANEOUS EVERY 6 HOURS
Refills: 0 | Status: DISCONTINUED | OUTPATIENT
Start: 2024-10-27 | End: 2024-10-30

## 2024-10-27 RX ORDER — DILTIAZEM HCL 5 MG/ML
10 VIAL (ML) INTRAVENOUS ONCE
Refills: 0 | Status: COMPLETED | OUTPATIENT
Start: 2024-10-27 | End: 2024-10-27

## 2024-10-27 RX ORDER — CHLORHEXIDINE GLUCONATE 40 MG/ML
1 SOLUTION TOPICAL
Refills: 0 | Status: DISCONTINUED | OUTPATIENT
Start: 2024-10-27 | End: 2024-10-31

## 2024-10-27 RX ORDER — HEPARIN SODIUM 10000 [USP'U]/ML
INJECTION INTRAVENOUS; SUBCUTANEOUS
Qty: 25000 | Refills: 0 | Status: DISCONTINUED | OUTPATIENT
Start: 2024-10-27 | End: 2024-10-30

## 2024-10-27 RX ORDER — ASPIRIN/MAG CARB/ALUMINUM AMIN 325 MG
81 TABLET ORAL DAILY
Refills: 0 | Status: DISCONTINUED | OUTPATIENT
Start: 2024-10-27 | End: 2024-10-31

## 2024-10-27 RX ORDER — FUROSEMIDE 40 MG
40 TABLET ORAL EVERY 12 HOURS
Refills: 0 | Status: DISCONTINUED | OUTPATIENT
Start: 2024-10-27 | End: 2024-10-27

## 2024-10-27 RX ORDER — LEVALBUTEROL HYDROCHLORIDE 0.63 MG/3ML
0.63 SOLUTION RESPIRATORY (INHALATION) EVERY 8 HOURS
Refills: 0 | Status: DISCONTINUED | OUTPATIENT
Start: 2024-10-27 | End: 2024-10-28

## 2024-10-27 RX ORDER — METOPROLOL TARTRATE 50 MG
5 TABLET ORAL ONCE
Refills: 0 | Status: DISCONTINUED | OUTPATIENT
Start: 2024-10-27 | End: 2024-10-27

## 2024-10-27 RX ORDER — AMLODIPINE BESYLATE 10 MG
1 TABLET ORAL
Refills: 0 | DISCHARGE

## 2024-10-27 RX ORDER — METOPROLOL TARTRATE 50 MG
12.5 TABLET ORAL ONCE
Refills: 0 | Status: COMPLETED | OUTPATIENT
Start: 2024-10-27 | End: 2024-10-27

## 2024-10-27 RX ORDER — LACTOBACILLUS ACIDOPHILUS 25MM CELL
1 CAPSULE ORAL
Refills: 0 | Status: DISCONTINUED | OUTPATIENT
Start: 2024-10-27 | End: 2024-10-31

## 2024-10-27 RX ORDER — INFLUENZ VIR VAC TV P-SURF2003 15MCG/.5ML
0.5 SYRINGE (ML) INTRAMUSCULAR ONCE
Refills: 0 | Status: DISCONTINUED | OUTPATIENT
Start: 2024-10-27 | End: 2024-10-31

## 2024-10-27 RX ORDER — METOPROLOL TARTRATE 50 MG
1 TABLET ORAL
Refills: 0 | DISCHARGE

## 2024-10-27 RX ORDER — CETIRIZINE HCL 10 MG/1
10 TABLET ORAL DAILY
Refills: 0 | Status: DISCONTINUED | OUTPATIENT
Start: 2024-10-27 | End: 2024-10-31

## 2024-10-27 RX ORDER — HEPARIN SODIUM 10000 [USP'U]/ML
6500 INJECTION INTRAVENOUS; SUBCUTANEOUS EVERY 6 HOURS
Refills: 0 | Status: DISCONTINUED | OUTPATIENT
Start: 2024-10-27 | End: 2024-10-30

## 2024-10-27 RX ORDER — METOPROLOL TARTRATE 50 MG
25 TABLET ORAL
Refills: 0 | Status: DISCONTINUED | OUTPATIENT
Start: 2024-10-27 | End: 2024-10-31

## 2024-10-27 RX ORDER — METHYLPREDNISOLONE ACETATE 80 MG/ML
40 INJECTION, SUSPENSION INTRALESIONAL; INTRAMUSCULAR; INTRASYNOVIAL; SOFT TISSUE EVERY 8 HOURS
Refills: 0 | Status: DISCONTINUED | OUTPATIENT
Start: 2024-10-27 | End: 2024-10-28

## 2024-10-27 RX ORDER — PIPERACILLIN AND TAZOBACTAM .5; 4 G/20ML; G/20ML
3.38 INJECTION, POWDER, LYOPHILIZED, FOR SOLUTION INTRAVENOUS EVERY 8 HOURS
Refills: 0 | Status: DISCONTINUED | OUTPATIENT
Start: 2024-10-27 | End: 2024-10-27

## 2024-10-27 RX ADMIN — Medication 2 MILLIGRAM(S): at 22:08

## 2024-10-27 RX ADMIN — Medication 40 MILLIGRAM(S): at 07:05

## 2024-10-27 RX ADMIN — PIPERACILLIN AND TAZOBACTAM 200 GRAM(S): .5; 4 INJECTION, POWDER, LYOPHILIZED, FOR SOLUTION INTRAVENOUS at 04:18

## 2024-10-27 RX ADMIN — Medication 25 MILLIGRAM(S): at 18:59

## 2024-10-27 RX ADMIN — Medication 81 MILLIGRAM(S): at 11:08

## 2024-10-27 RX ADMIN — SODIUM CHLORIDE 1000 MILLILITER(S): 9 INJECTION, SOLUTION INTRAMUSCULAR; INTRAVENOUS; SUBCUTANEOUS at 04:18

## 2024-10-27 RX ADMIN — Medication 1 TABLET(S): at 17:27

## 2024-10-27 RX ADMIN — Medication 10 MILLIGRAM(S): at 22:09

## 2024-10-27 RX ADMIN — METHYLPREDNISOLONE ACETATE 40 MILLIGRAM(S): 80 INJECTION, SUSPENSION INTRALESIONAL; INTRAMUSCULAR; INTRASYNOVIAL; SOFT TISSUE at 14:07

## 2024-10-27 RX ADMIN — CHLORHEXIDINE GLUCONATE 1 APPLICATION(S): 40 SOLUTION TOPICAL at 10:18

## 2024-10-27 RX ADMIN — Medication 30 MILLIGRAM(S): at 17:28

## 2024-10-27 RX ADMIN — METHYLPREDNISOLONE ACETATE 40 MILLIGRAM(S): 80 INJECTION, SUSPENSION INTRALESIONAL; INTRAMUSCULAR; INTRASYNOVIAL; SOFT TISSUE at 22:08

## 2024-10-27 RX ADMIN — HEPARIN SODIUM 1400 UNIT(S)/HR: 10000 INJECTION INTRAVENOUS; SUBCUTANEOUS at 19:39

## 2024-10-27 RX ADMIN — HEPARIN SODIUM 1400 UNIT(S)/HR: 10000 INJECTION INTRAVENOUS; SUBCUTANEOUS at 21:21

## 2024-10-27 RX ADMIN — Medication 40 MILLIGRAM(S): at 17:26

## 2024-10-27 RX ADMIN — HEPARIN SODIUM 1400 UNIT(S)/HR: 10000 INJECTION INTRAVENOUS; SUBCUTANEOUS at 13:46

## 2024-10-27 RX ADMIN — LEVALBUTEROL HYDROCHLORIDE 0.63 MILLIGRAM(S): 0.63 SOLUTION RESPIRATORY (INHALATION) at 16:21

## 2024-10-27 RX ADMIN — PIPERACILLIN AND TAZOBACTAM 25 GRAM(S): .5; 4 INJECTION, POWDER, LYOPHILIZED, FOR SOLUTION INTRAVENOUS at 07:14

## 2024-10-27 RX ADMIN — FAMOTIDINE 10 MILLIGRAM(S): 10 INJECTION INTRAVENOUS at 11:28

## 2024-10-27 RX ADMIN — Medication 10 MILLIGRAM(S): at 11:28

## 2024-10-27 RX ADMIN — PIPERACILLIN AND TAZOBACTAM 25 GRAM(S): .5; 4 INJECTION, POWDER, LYOPHILIZED, FOR SOLUTION INTRAVENOUS at 14:07

## 2024-10-27 RX ADMIN — CETIRIZINE HCL 10 MILLIGRAM(S): 10 TABLET ORAL at 13:51

## 2024-10-27 NOTE — ED PROVIDER NOTE - CLINICAL SUMMARY MEDICAL DECISION MAKING FREE TEXT BOX
Beth Htuchison MD, Attending  80 yo F hx of "lung nodules" treated with chemo and radiation, CVA, former smoker, presents with SOB. Pt reports she woke up go to the bathroom and noticed her difficulty breathing. Per EMS, pt Spo2 was low 80's at arrival. In the ED, pt has increased WOB, tachypenic, hypoxic and placed on bipap. Pt also found to be in rapid Afib. On chart review, pt does not have a known history of Afib. Patient corroborates that this is a new finding.     Beth Hutchison MD Attending  GEN: Patient awake alert in resp distress  HEENT: normocephalic, atraumatic, EOMI, no scleral icterus  CARDIAC: rapid Afib 120's  PULM: + b/l ronchi/rales, no wheeze. + increased wob with accessory muscle use + hypoxic, tachypneic.   ABD: soft NT, ND, no rebound no guarding.   MSK: Moving all extremities, no edema. 5/5 strength and full ROM in all extremities.     NEURO: A&Ox3, no focal neurological deficits, CN 2-12 grossly intact  SKIN: warm, dry, no rash.    ddx includes, but is not limited to the following: PE, pleural effusion, flash pulm edema, CHF exacerbation, pneumothorax, hemothroax.   plan: priority CT scan to ro PE, BIpap, Anticipate admission.   Update: pt is new bipap, will consult MICU. See progress note.

## 2024-10-27 NOTE — PATIENT PROFILE ADULT - FALL HARM RISK - HARM RISK INTERVENTIONS

## 2024-10-27 NOTE — H&P ADULT - NS PANP COMMENT GEN_ALL_CORE FT
78 y/o F with hx of lung cancer mucinous adeno of LLL s/p radiation, now with possible recurrence (not biopsy proven but PET avid, and patient refused further radiation), mild-mod COPD, former smoker (quitted 20 yrs ago), bullous pemphigoid on daily prednisone 5 mg, s/p R CEA c/b CVA, dysphagia, developed acute shortness of breath at home, admitted to MICU for acute hypoxic respiratory failure needing bipap.    CTA chest no PE, however has associate infiltrate at the LLL and b/l pleural effusion, also possible pulm edema.   BNP elevated, wbc 12  By the time patient arrived to MICU, patient is comfortable on 3L nasal canula .  rest of vital sign stable.     #Overall concern for AHRF 2/2 to pulmonary edema vs. copd exacerbation, cannot rule out post obstructive pneumonia given LLL mass     will continue steroid with taper, xopenex neb  contine antibiotics  continue lasix  obtain echo   start diltiazem 30 mg PO q12h,   start heparin drip for AC  EP consult for new onset afib   bipap stand by for now  if remains stable, likely downgrade to medicine

## 2024-10-27 NOTE — CHART NOTE - NSCHARTNOTEFT_GEN_A_CORE
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 Pt states episode came on suddenly when she got up to use the restroom this evening. Her shortness of breath did not resolve, which prompted her to visit the ED.  Patient denies associated symptoms of sweats, fever, chest pain. In the ED patient was found to be hypoxic, tachypneic, and increased WOB. She was placed on bipap (12/6/50%) and given Lasix. Labs signifigant for mild transaminitis, leukocytosis and elevated BNP. Found to be in new onset afib rvr. ICU consulted for poor respiratory status.         PAST MEDICAL & SURGICAL HISTORY:  Esophageal dysphagia  Cerebrovascular accident (CVA), unspecified mechanism  Hyperlipidemia, unspecified hyperlipidemia type  Carotid artery disease, unspecified laterality  Other nonspecific abnormal finding of lung field  Hypertension  Carotid artery disease s/p carotid bypass right side  S/P cataract surgery both eyes  S/P cholecystectomy  S/P tubal ligation  History of left hip replacement        Allergies - No Known Allergies    Intolerances    Biaxin (Other)  doxycycline (Diarrhea)    FAMILY HISTORY:  FH: HTN (hypertension) (Grandparent)  FH: heart disease (Grandparent)  FH: heart attack (Father)        Review of Systems:  CONSTITUTIONAL: No fever, chills, or fatigue  EYES: No eye pain, visual disturbances, or discharge  ENMT:  No difficulty hearing, tinnitus, vertigo; No sinus or throat pain  NECK: No pain or stiffness  RESPIRATORY: No cough, wheezing, chills or hemoptysis; No shortness of breath  CARDIOVASCULAR: No chest pain, palpitations, dizziness, or leg swelling  GASTROINTESTINAL: No abdominal or epigastric pain. No nausea, vomiting, or hematemesis; No diarrhea or constipation. No melena or hematochezia.  GENITOURINARY: No dysuria, frequency, hematuria, or incontinence  NEUROLOGICAL: No headaches, memory loss, loss of strength, numbness, or tremors  SKIN: No itching, burning, rashes, or lesions   MUSCULOSKELETAL: No joint pain or swelling; No muscle, back, or extremity pain  PSYCHIATRIC: No depression, anxiety, mood swings, or difficulty sleeping      Medications:  piperacillin/tazobactam IVPB.. 3.375 Gram(s) IV Intermittent every 8 hours  doxazosin 2 milliGRAM(s) Oral at bedtime  furosemide   Injectable 40 milliGRAM(s) IV Push every 12 hours  cetirizine 10 milliGRAM(s) Oral daily  aspirin  chewable 81 milliGRAM(s) Oral daily  famotidine    Tablet 10 milliGRAM(s) Oral daily  atorvastatin 10 milliGRAM(s) Oral at bedtime  methylPREDNISolone sodium succinate Injectable 40 milliGRAM(s) IV Push every 8 hours  influenza  Vaccine (HIGH DOSE) 0.5 milliLiter(s) IntraMuscular once  chlorhexidine 2% Cloths 1 Application(s) Topical <User Schedule>  lactobacillus acidophilus 1 Tablet(s) Oral two times a day with meals          ICU Vital Signs Last 24 Hrs  T(C): 37 (27 Oct 2024 11:00), Max: 37 (27 Oct 2024 03:24)  T(F): 98.6 (27 Oct 2024 11:00), Max: 98.6 (27 Oct 2024 03:24)  HR: 118 (27 Oct 2024 11:30) (99 - 141)  BP: 136/66 (27 Oct 2024 11:30) (103/65 - 150/91)  BP(mean): 84 (27 Oct 2024 11:30) (78 - 108)  ABP: --  ABP(mean): --  RR: 25 (27 Oct 2024 11:30) (22 - 28)  SpO2: 95% (27 Oct 2024 11:30) (81% - 100%)    O2 Parameters below as of 27 Oct 2024 09:00  Patient On (Oxygen Delivery Method): nasal cannula  O2 Flow (L/min): 3        Vital Signs Last 24 Hrs  T(C): 37 (27 Oct 2024 11:00), Max: 37 (27 Oct 2024 03:24)  T(F): 98.6 (27 Oct 2024 11:00), Max: 98.6 (27 Oct 2024 03:24)  HR: 118 (27 Oct 2024 11:30) (99 - 141)  BP: 136/66 (27 Oct 2024 11:30) (103/65 - 150/91)  BP(mean): 84 (27 Oct 2024 11:30) (78 - 108)  RR: 25 (27 Oct 2024 11:30) (22 - 28)  SpO2: 95% (27 Oct 2024 11:30) (81% - 100%)    Parameters below as of 27 Oct 2024 09:00  Patient On (Oxygen Delivery Method): nasal cannula  O2 Flow (L/min): 3      Physical Examination:    General: No acute distress.  Alert, oriented, interactive, nonfocal    HEENT: Pupils equal, reactive to light.  Symmetric.    PULM: Clear to auscultation bilaterally, no significant sputum production    CVS: Regular rate and rhythm, no murmurs, rubs, or gallops    ABD: Soft, nondistended, nontender, normoactive bowel sounds, no masses    EXT: No edema, nontender    SKIN: Warm and well perfused. Rash noted in left thigh.    ABG - ( 27 Oct 2024 04:43 )  pH, Arterial: 7.320 pH, Blood: x     /  pCO2: 49    /  pO2: 78    / HCO3: 25    / Base Excess: -0.9  /  SaO2: 97.1                I&O's Detail    27 Oct 2024 07:01  -  27 Oct 2024 12:10  --------------------------------------------------------  IN:  Total IN: 0 mL    OUT:    Voided (mL): 425 mL  Total OUT: 425 mL    Total NET: -425 mL            LABS:                        12.2   12.66 )-----------( 398      ( 27 Oct 2024 03:46 )             39.2     10-27    140  |  102  |  22.5[H]  ----------------------------<  175[H]  3.5   |  24.0  |  1.39[H]    Ca    9.4      27 Oct 2024 03:46  Mg     2.2     10-27    TPro  7.1  /  Alb  4.2  /  TBili  0.3[L]  /  DBili  x   /  AST  46[H]  /  ALT  34[H]  /  AlkPhos  93  10-27          CAPILLARY BLOOD GLUCOSE        PT/INR - ( 27 Oct 2024 03:46 )   PT: 12.6 sec;   INR: 1.09 ratio         PTT - ( 27 Oct 2024 03:46 )  PTT:27.0 sec  Urinalysis Basic - ( 27 Oct 2024 03:46 )    Color: x / Appearance: x / SG: x / pH: x  Gluc: 175 mg/dL / Ketone: x  / Bili: x / Urobili: x   Blood: x / Protein: x / Nitrite: x   Leuk Esterase: x / RBC: x / WBC x   Sq Epi: x / Non Sq Epi: x / Bacteria: x      CULTURES:        RADIOLOGY: ***    CRITICAL CARE TIME SPENT: *** HPI:   Pt is a 80 yo F w/ PMH of PMH of HTN, HLD, PATRICE, GERD induced esophageal stricture status post EGD with dilation with no recurrent dysphagia, stroke in 2013 s/p right carotid artery surgery, and  "lung nodules" (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 Pt states episode came on suddenly when she got up to use the restroom this evening. Her shortness of breath did not resolve, which prompted her to visit the ED.  Patient denies associated symptoms of sweats, fever, chest pain. In the ED patient was found to be hypoxic, tachypneic, and increased WOB. She was placed on bipap (12/6/50%) and given Lasix. Labs significant for mild transaminitis, leukocytosis and elevated BNP. Found to be in new onset afib rvr. ICU consulted for poor respiratory status.         PAST MEDICAL & SURGICAL HISTORY:  Esophageal dysphagia  Cerebrovascular accident (CVA), unspecified mechanism  Hyperlipidemia, unspecified hyperlipidemia type  Carotid artery disease, unspecified laterality  Other nonspecific abnormal finding of lung field  Hypertension  Carotid artery disease s/p carotid bypass right side  S/P cataract surgery both eyes  S/P cholecystectomy  S/P tubal ligation  History of left hip replacement        Allergies - No Known Allergies    Intolerances    Biaxin (Other)  doxycycline (Diarrhea)    FAMILY HISTORY:  FH: HTN (hypertension) (Grandparent)  FH: heart disease (Grandparent)  FH: heart attack (Father)        Review of Systems:  CONSTITUTIONAL: No fever, chills, or fatigue  EYES: No eye pain, visual disturbances, or discharge  ENMT:  No difficulty hearing, tinnitus, vertigo; No sinus or throat pain  NECK: No pain or stiffness  RESPIRATORY: No cough, wheezing, chills or hemoptysis; No shortness of breath  CARDIOVASCULAR: No chest pain, palpitations, dizziness, or leg swelling  GASTROINTESTINAL: No abdominal or epigastric pain. No nausea, vomiting, or hematemesis; No diarrhea or constipation. No melena or hematochezia.  GENITOURINARY: No dysuria, frequency, hematuria, or incontinence  NEUROLOGICAL: No headaches, memory loss, loss of strength, numbness, or tremors  SKIN: No itching, burning, rashes, or lesions   MUSCULOSKELETAL: No joint pain or swelling; No muscle, back, or extremity pain  PSYCHIATRIC: No depression, anxiety, mood swings, or difficulty sleeping      Medications:  piperacillin/tazobactam IVPB.. 3.375 Gram(s) IV Intermittent every 8 hours  doxazosin 2 milliGRAM(s) Oral at bedtime  furosemide   Injectable 40 milliGRAM(s) IV Push every 12 hours  cetirizine 10 milliGRAM(s) Oral daily  aspirin  chewable 81 milliGRAM(s) Oral daily  famotidine    Tablet 10 milliGRAM(s) Oral daily  atorvastatin 10 milliGRAM(s) Oral at bedtime  methylPREDNISolone sodium succinate Injectable 40 milliGRAM(s) IV Push every 8 hours  influenza  Vaccine (HIGH DOSE) 0.5 milliLiter(s) IntraMuscular once  chlorhexidine 2% Cloths 1 Application(s) Topical <User Schedule>  lactobacillus acidophilus 1 Tablet(s) Oral two times a day with meals          ICU Vital Signs Last 24 Hrs  T(C): 37 (27 Oct 2024 11:00), Max: 37 (27 Oct 2024 03:24)  T(F): 98.6 (27 Oct 2024 11:00), Max: 98.6 (27 Oct 2024 03:24)  HR: 118 (27 Oct 2024 11:30) (99 - 141)  BP: 136/66 (27 Oct 2024 11:30) (103/65 - 150/91)  BP(mean): 84 (27 Oct 2024 11:30) (78 - 108)  ABP: --  ABP(mean): --  RR: 25 (27 Oct 2024 11:30) (22 - 28)  SpO2: 95% (27 Oct 2024 11:30) (81% - 100%)    O2 Parameters below as of 27 Oct 2024 09:00  Patient On (Oxygen Delivery Method): nasal cannula  O2 Flow (L/min): 3        Vital Signs Last 24 Hrs  T(C): 37 (27 Oct 2024 11:00), Max: 37 (27 Oct 2024 03:24)  T(F): 98.6 (27 Oct 2024 11:00), Max: 98.6 (27 Oct 2024 03:24)  HR: 118 (27 Oct 2024 11:30) (99 - 141)  BP: 136/66 (27 Oct 2024 11:30) (103/65 - 150/91)  BP(mean): 84 (27 Oct 2024 11:30) (78 - 108)  RR: 25 (27 Oct 2024 11:30) (22 - 28)  SpO2: 95% (27 Oct 2024 11:30) (81% - 100%)    Parameters below as of 27 Oct 2024 09:00  Patient On (Oxygen Delivery Method): nasal cannula  O2 Flow (L/min): 3      Physical Examination:    General: No acute distress.  Alert, oriented, interactive, nonfocal    HEENT: Pupils equal, reactive to light.  Symmetric.    PULM: Clear to auscultation bilaterally, no significant sputum production    CVS: Regular rate and rhythm, no murmurs, rubs, or gallops    ABD: Soft, nondistended, nontender, normoactive bowel sounds, no masses    EXT: No edema, nontender    SKIN: Warm and well perfused. Rash noted in left thigh.    ABG - ( 27 Oct 2024 04:43 )  pH, Arterial: 7.320 pH, Blood: x     /  pCO2: 49    /  pO2: 78    / HCO3: 25    / Base Excess: -0.9  /  SaO2: 97.1                I&O's Detail    27 Oct 2024 07:01  -  27 Oct 2024 12:10  --------------------------------------------------------  IN:  Total IN: 0 mL    OUT:    Voided (mL): 425 mL  Total OUT: 425 mL    Total NET: -425 mL            LABS:                        12.2   12.66 )-----------( 398      ( 27 Oct 2024 03:46 )             39.2     10-27    140  |  102  |  22.5[H]  ----------------------------<  175[H]  3.5   |  24.0  |  1.39[H]    Ca    9.4      27 Oct 2024 03:46  Mg     2.2     10-27    TPro  7.1  /  Alb  4.2  /  TBili  0.3[L]  /  DBili  x   /  AST  46[H]  /  ALT  34[H]  /  AlkPhos  93  10-27          CAPILLARY BLOOD GLUCOSE        PT/INR - ( 27 Oct 2024 03:46 )   PT: 12.6 sec;   INR: 1.09 ratio         PTT - ( 27 Oct 2024 03:46 )  PTT:27.0 sec  Urinalysis Basic - ( 27 Oct 2024 03:46 )    Color: x / Appearance: x / SG: x / pH: x  Gluc: 175 mg/dL / Ketone: x  / Bili: x / Urobili: x   Blood: x / Protein: x / Nitrite: x   Leuk Esterase: x / RBC: x / WBC x   Sq Epi: x / Non Sq Epi: x / Bacteria: x      CULTURES:    RADIOLOGY: ***    ASSESSMENT  Pt is a 80 yo F w/ PMH of PMH of HTN, HLD, PATRICE, GERD induced esophageal stricture s/p EGD w/ dilation, stroke in 2013 s/p right carotid artery surgery, and  "lung nodules" (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 Labs significant for mild transaminitis, leukocytosis and elevated BNP. ECG showed to be in new onset afib rvr. Pt placed on bipap (12/6/50%) and given Lasix. 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        PLAN:  #ProblemList    NEURO:    CV:  # PMH of HTN, HLD  # elevated BNP  #new onset afib with RVR  - Cardiac monitor  - consulted cards/EP  - f/u echo results  - trend troponin  - c/w lasix 40mg q12  - Maintain MAP>65  - c/w home diltiazem 30mg BID  - c/w home lipitor 10mg at bedtime  - c/w home aspirin 81 mg QD  - replete Mg, Phos, K as needed      RESP:  #mucinous adenocarcinoma s/p chemo/radaiton  #pneumonia  - weaned from BiPAP --> 6L NC  - continue to wean oxygen as tolerated  - c/w solumedrol 40 mg q8h  - xopenex 0.63 mg q8h prn  - c/w empiric antibiotics  - Aspiration precautions  - trend ABG    GI:  - Protonix for GI ppx    RENAL:  - c/w home doxazosin 2mg at bedtime for urinary retention          DVT: heparin infusion  Diet:  DASH  Code Status: Full Code  Dispo:    Case discussed with MICU Attending:  HPI:   Pt is a 78 yo F w/ PMH of PMH of HTN, HLD, PATRICE, GERD induced esophageal stricture status post EGD with dilation with no recurrent dysphagia, stroke in 2013 s/p right carotid artery surgery, and  "lung nodules" (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 Pt states episode came on suddenly when she got up to use the restroom this evening. Her shortness of breath did not resolve, which prompted her to visit the ED.  Patient denies associated symptoms of sweats, fever, chest pain. In the ED patient was found to be hypoxic, tachypneic, and increased WOB. She was placed on bipap (12/6/50%) and given Lasix. Labs significant for mild transaminitis, leukocytosis and elevated BNP. Found to be in new onset afib rvr. ICU consulted for poor respiratory status.         PAST MEDICAL & SURGICAL HISTORY:  Esophageal dysphagia  Cerebrovascular accident (CVA), unspecified mechanism  Hyperlipidemia, unspecified hyperlipidemia type  Carotid artery disease, unspecified laterality  Other nonspecific abnormal finding of lung field  Hypertension  Carotid artery disease s/p carotid bypass right side  S/P cataract surgery both eyes  S/P cholecystectomy  S/P tubal ligation  History of left hip replacement        Allergies - No Known Allergies    Intolerances    Biaxin (Other)  doxycycline (Diarrhea)    FAMILY HISTORY:  FH: HTN (hypertension) (Grandparent)  FH: heart disease (Grandparent)  FH: heart attack (Father)        Review of Systems:  CONSTITUTIONAL: No fever, chills, or fatigue  EYES: No eye pain, visual disturbances, or discharge  ENMT:  No difficulty hearing, tinnitus, vertigo; No sinus or throat pain  NECK: No pain or stiffness  RESPIRATORY: No cough, wheezing, chills or hemoptysis; No shortness of breath  CARDIOVASCULAR: No chest pain, palpitations, dizziness, or leg swelling  GASTROINTESTINAL: No abdominal or epigastric pain. No nausea, vomiting, or hematemesis; No diarrhea or constipation. No melena or hematochezia.  GENITOURINARY: No dysuria, frequency, hematuria, or incontinence  NEUROLOGICAL: No headaches, memory loss, loss of strength, numbness, or tremors  SKIN: No itching, burning, rashes, or lesions   MUSCULOSKELETAL: No joint pain or swelling; No muscle, back, or extremity pain  PSYCHIATRIC: No depression, anxiety, mood swings, or difficulty sleeping      Medications:  piperacillin/tazobactam IVPB.. 3.375 Gram(s) IV Intermittent every 8 hours  doxazosin 2 milliGRAM(s) Oral at bedtime  furosemide   Injectable 40 milliGRAM(s) IV Push every 12 hours  cetirizine 10 milliGRAM(s) Oral daily  aspirin  chewable 81 milliGRAM(s) Oral daily  famotidine    Tablet 10 milliGRAM(s) Oral daily  atorvastatin 10 milliGRAM(s) Oral at bedtime  methylPREDNISolone sodium succinate Injectable 40 milliGRAM(s) IV Push every 8 hours  influenza  Vaccine (HIGH DOSE) 0.5 milliLiter(s) IntraMuscular once  chlorhexidine 2% Cloths 1 Application(s) Topical <User Schedule>  lactobacillus acidophilus 1 Tablet(s) Oral two times a day with meals          ICU Vital Signs Last 24 Hrs  T(C): 37 (27 Oct 2024 11:00), Max: 37 (27 Oct 2024 03:24)  T(F): 98.6 (27 Oct 2024 11:00), Max: 98.6 (27 Oct 2024 03:24)  HR: 118 (27 Oct 2024 11:30) (99 - 141)  BP: 136/66 (27 Oct 2024 11:30) (103/65 - 150/91)  BP(mean): 84 (27 Oct 2024 11:30) (78 - 108)  ABP: --  ABP(mean): --  RR: 25 (27 Oct 2024 11:30) (22 - 28)  SpO2: 95% (27 Oct 2024 11:30) (81% - 100%)    O2 Parameters below as of 27 Oct 2024 09:00  Patient On (Oxygen Delivery Method): nasal cannula  O2 Flow (L/min): 3        Vital Signs Last 24 Hrs  T(C): 37 (27 Oct 2024 11:00), Max: 37 (27 Oct 2024 03:24)  T(F): 98.6 (27 Oct 2024 11:00), Max: 98.6 (27 Oct 2024 03:24)  HR: 118 (27 Oct 2024 11:30) (99 - 141)  BP: 136/66 (27 Oct 2024 11:30) (103/65 - 150/91)  BP(mean): 84 (27 Oct 2024 11:30) (78 - 108)  RR: 25 (27 Oct 2024 11:30) (22 - 28)  SpO2: 95% (27 Oct 2024 11:30) (81% - 100%)    Parameters below as of 27 Oct 2024 09:00  Patient On (Oxygen Delivery Method): nasal cannula  O2 Flow (L/min): 3      Physical Examination:    General: No acute distress.  Alert, oriented, interactive, nonfocal    HEENT: Pupils equal, reactive to light.  Symmetric.    PULM: Clear to auscultation bilaterally, no significant sputum production    CVS: Regular rate and rhythm, no murmurs, rubs, or gallops    ABD: Soft, nondistended, nontender, normoactive bowel sounds, no masses    EXT: No edema, nontender    SKIN: Warm and well perfused. Rash noted in left thigh.    ABG - ( 27 Oct 2024 04:43 )  pH, Arterial: 7.320 pH, Blood: x     /  pCO2: 49    /  pO2: 78    / HCO3: 25    / Base Excess: -0.9  /  SaO2: 97.1                I&O's Detail    27 Oct 2024 07:01  -  27 Oct 2024 12:10  --------------------------------------------------------  IN:  Total IN: 0 mL    OUT:    Voided (mL): 425 mL  Total OUT: 425 mL    Total NET: -425 mL            LABS:                        12.2   12.66 )-----------( 398      ( 27 Oct 2024 03:46 )             39.2     10-27    140  |  102  |  22.5[H]  ----------------------------<  175[H]  3.5   |  24.0  |  1.39[H]    Ca    9.4      27 Oct 2024 03:46  Mg     2.2     10-27    TPro  7.1  /  Alb  4.2  /  TBili  0.3[L]  /  DBili  x   /  AST  46[H]  /  ALT  34[H]  /  AlkPhos  93  10-27          CAPILLARY BLOOD GLUCOSE        PT/INR - ( 27 Oct 2024 03:46 )   PT: 12.6 sec;   INR: 1.09 ratio         PTT - ( 27 Oct 2024 03:46 )  PTT:27.0 sec  Urinalysis Basic - ( 27 Oct 2024 03:46 )    Color: x / Appearance: x / SG: x / pH: x  Gluc: 175 mg/dL / Ketone: x  / Bili: x / Urobili: x   Blood: x / Protein: x / Nitrite: x   Leuk Esterase: x / RBC: x / WBC x   Sq Epi: x / Non Sq Epi: x / Bacteria: x      CULTURES:    RADIOLOGY: ***  Xray Chest 1 View- PORTABLE-Urgent (10.27.24 @ 03:54)   IMPRESSION: 5 cm left hilar pulmonary mass. Mild CHF. Small bilateral   pleural effusions.    CT Angio Chest PE Protocol w/ IV Cont (10.27.24 @ 04:08)   IMPRESSION:  No pulmonary embolism. New 5 cm left lower lobe lung mass with new subcarinal lymphadenopathy   suspicious for progression of tumor. Pulmonary edema and small pleural effusions.          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 Labs significant for mild transaminitis, leukocytosis and elevated BNP. ECG showed to be in new onset afib rvr. Pt placed on bipap (12/6/50%) and given Lasix. 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        PLAN:  #ProblemList    NEURO:    CV:  # PMH of HTN, HLD  # elevated BNP  #new onset afib with RVR  - Cardiac monitor  - f/u echo results  - trend troponin  - c/w lasix 40mg q12  - Maintain MAP>65  - c/w home lipitor 10mg at bedtime  - c/w home aspirin 81 mg QD  - replete Mg, Phos, K as needed  - consulted cards/EP  - started on diltiazem 30mg BID  - started on heparin drip      RESP:  #mucinous adenocarcinoma s/p chemo/radaiton  #pneumonia  - weaned from BiPAP --> 6L NC  - continue to wean oxygen as tolerated  - c/w solumedrol 40 mg q8h  - xopenex 0.63 mg q8h prn  - c/w empiric antibiotics  - Aspiration precautions  - trend ABG    GI:  - Protonix for GI ppx    RENAL:  - c/w home doxazosin 2mg at bedtime for urinary retention          DVT: heparin infusion  Diet:  DASH  Code Status: Full Code  Dispo:    Case discussed with MICU Attending:  HPI:   Pt is a 78 yo F w/ PMH of PMH of HTN, HLD, PATRICE, GERD induced esophageal stricture status post EGD with dilation with no recurrent dysphagia, stroke in 2013 s/p right carotid artery surgery, and  "lung nodules" (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 Pt states episode came on suddenly when she got up to use the restroom this evening. Her shortness of breath did not resolve, which prompted her to visit the ED.  Patient denies associated symptoms of sweats, fever, chest pain. In the ED patient was found to be hypoxic, tachypneic, and increased WOB. She was placed on bipap (12/6/50%) and given Lasix. Labs significant for mild transaminitis, leukocytosis and elevated BNP. Found to be in new onset afib rvr. ICU consulted for poor respiratory status.     Pt being downgraded to telemetry . Accepting physician to Dr. Pink    PAST MEDICAL & SURGICAL HISTORY:  Esophageal dysphagia  Cerebrovascular accident (CVA), unspecified mechanism  Hyperlipidemia, unspecified hyperlipidemia type  Carotid artery disease, unspecified laterality  Other nonspecific abnormal finding of lung field  Hypertension  Carotid artery disease s/p carotid bypass right side  S/P cataract surgery both eyes  S/P cholecystectomy  S/P tubal ligation  History of left hip replacement        Allergies - No Known Allergies    Intolerances    Biaxin (Other)  doxycycline (Diarrhea)    FAMILY HISTORY:  FH: HTN (hypertension) (Grandparent)  FH: heart disease (Grandparent)  FH: heart attack (Father)        Review of Systems:  CONSTITUTIONAL: No fever, chills, or fatigue  EYES: No eye pain, visual disturbances, or discharge  ENMT:  No difficulty hearing, tinnitus, vertigo; No sinus or throat pain  NECK: No pain or stiffness  RESPIRATORY: No cough, wheezing, chills or hemoptysis; No shortness of breath  CARDIOVASCULAR: No chest pain, palpitations, dizziness, or leg swelling  GASTROINTESTINAL: No abdominal or epigastric pain. No nausea, vomiting, or hematemesis; No diarrhea or constipation. No melena or hematochezia.  GENITOURINARY: No dysuria, frequency, hematuria, or incontinence  NEUROLOGICAL: No headaches, memory loss, loss of strength, numbness, or tremors  SKIN: No itching, burning, rashes, or lesions   MUSCULOSKELETAL: No joint pain or swelling; No muscle, back, or extremity pain  PSYCHIATRIC: No depression, anxiety, mood swings, or difficulty sleeping      Medications:  piperacillin/tazobactam IVPB.. 3.375 Gram(s) IV Intermittent every 8 hours  doxazosin 2 milliGRAM(s) Oral at bedtime  furosemide   Injectable 40 milliGRAM(s) IV Push every 12 hours  cetirizine 10 milliGRAM(s) Oral daily  aspirin  chewable 81 milliGRAM(s) Oral daily  famotidine    Tablet 10 milliGRAM(s) Oral daily  atorvastatin 10 milliGRAM(s) Oral at bedtime  methylPREDNISolone sodium succinate Injectable 40 milliGRAM(s) IV Push every 8 hours  influenza  Vaccine (HIGH DOSE) 0.5 milliLiter(s) IntraMuscular once  chlorhexidine 2% Cloths 1 Application(s) Topical <User Schedule>  lactobacillus acidophilus 1 Tablet(s) Oral two times a day with meals          ICU Vital Signs Last 24 Hrs  T(C): 37 (27 Oct 2024 11:00), Max: 37 (27 Oct 2024 03:24)  T(F): 98.6 (27 Oct 2024 11:00), Max: 98.6 (27 Oct 2024 03:24)  HR: 118 (27 Oct 2024 11:30) (99 - 141)  BP: 136/66 (27 Oct 2024 11:30) (103/65 - 150/91)  BP(mean): 84 (27 Oct 2024 11:30) (78 - 108)  ABP: --  ABP(mean): --  RR: 25 (27 Oct 2024 11:30) (22 - 28)  SpO2: 95% (27 Oct 2024 11:30) (81% - 100%)    O2 Parameters below as of 27 Oct 2024 09:00  Patient On (Oxygen Delivery Method): nasal cannula  O2 Flow (L/min): 3        Vital Signs Last 24 Hrs  T(C): 37 (27 Oct 2024 11:00), Max: 37 (27 Oct 2024 03:24)  T(F): 98.6 (27 Oct 2024 11:00), Max: 98.6 (27 Oct 2024 03:24)  HR: 118 (27 Oct 2024 11:30) (99 - 141)  BP: 136/66 (27 Oct 2024 11:30) (103/65 - 150/91)  BP(mean): 84 (27 Oct 2024 11:30) (78 - 108)  RR: 25 (27 Oct 2024 11:30) (22 - 28)  SpO2: 95% (27 Oct 2024 11:30) (81% - 100%)    Parameters below as of 27 Oct 2024 09:00  Patient On (Oxygen Delivery Method): nasal cannula  O2 Flow (L/min): 3      Physical Examination:    General: No acute distress.  Alert, oriented, interactive, nonfocal    HEENT: Pupils equal, reactive to light.  Symmetric.    PULM: Clear to auscultation bilaterally, no significant sputum production    CVS: Regular rate and rhythm, no murmurs, rubs, or gallops    ABD: Soft, nondistended, nontender, normoactive bowel sounds, no masses    EXT: No edema, nontender    SKIN: Warm and well perfused. Rash noted in left thigh.    ABG - ( 27 Oct 2024 04:43 )  pH, Arterial: 7.320 pH, Blood: x     /  pCO2: 49    /  pO2: 78    / HCO3: 25    / Base Excess: -0.9  /  SaO2: 97.1                I&O's Detail    27 Oct 2024 07:01  -  27 Oct 2024 12:10  --------------------------------------------------------  IN:  Total IN: 0 mL    OUT:    Voided (mL): 425 mL  Total OUT: 425 mL    Total NET: -425 mL            LABS:                        12.2   12.66 )-----------( 398      ( 27 Oct 2024 03:46 )             39.2     10-27    140  |  102  |  22.5[H]  ----------------------------<  175[H]  3.5   |  24.0  |  1.39[H]    Ca    9.4      27 Oct 2024 03:46  Mg     2.2     10-27    TPro  7.1  /  Alb  4.2  /  TBili  0.3[L]  /  DBili  x   /  AST  46[H]  /  ALT  34[H]  /  AlkPhos  93  10-27          CAPILLARY BLOOD GLUCOSE        PT/INR - ( 27 Oct 2024 03:46 )   PT: 12.6 sec;   INR: 1.09 ratio         PTT - ( 27 Oct 2024 03:46 )  PTT:27.0 sec  Urinalysis Basic - ( 27 Oct 2024 03:46 )    Color: x / Appearance: x / SG: x / pH: x  Gluc: 175 mg/dL / Ketone: x  / Bili: x / Urobili: x   Blood: x / Protein: x / Nitrite: x   Leuk Esterase: x / RBC: x / WBC x   Sq Epi: x / Non Sq Epi: x / Bacteria: x      CULTURES:    RADIOLOGY: ***  Xray Chest 1 View- PORTABLE-Urgent (10.27.24 @ 03:54)   IMPRESSION: 5 cm left hilar pulmonary mass. Mild CHF. Small bilateral   pleural effusions.    CT Angio Chest PE Protocol w/ IV Cont (10.27.24 @ 04:08)   IMPRESSION:  No pulmonary embolism. New 5 cm left lower lobe lung mass with new subcarinal lymphadenopathy   suspicious for progression of tumor. Pulmonary edema and small pleural effusions.          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 Labs significant for mild transaminitis, leukocytosis and elevated BNP. ECG showed to be in new onset afib rvr. Pt placed on bipap (12/6/50%) and given Lasix. 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        PLAN:  #ProblemList    NEURO:    CV:  # PMH of HTN, HLD  # elevated BNP  #new onset afib with RVR  - Cardiac monitor  - f/u echo results  - trend troponin  - c/w lasix 40mg q12  - Maintain MAP>65  - c/w home lipitor 10mg at bedtime  - c/w home aspirin 81 mg QD  - replete Mg, Phos, K as needed  - consulted cards/EP  - started on diltiazem 30mg BID  - started on heparin drip      RESP:  #mucinous adenocarcinoma s/p chemo/radaiton  #pneumonia  - weaned from BiPAP --> 6L NC  - continue to wean oxygen as tolerated  - c/w solumedrol 40 mg q8h  - xopenex 0.63 mg q8h prn  - c/w empiric antibiotics  - Aspiration precautions  - trend ABG    GI:  - Protonix for GI ppx    RENAL:  - c/w home doxazosin 2mg at bedtime for urinary retention          DVT: heparin infusion  Diet:  DASH  Code Status: Full Code  Dispo:    Case discussed with MICU Attending:

## 2024-10-27 NOTE — ED PROVIDER NOTE - CRITICAL CARE ATTENDING CONTRIBUTION TO CARE
Patient was critically ill with a high probability of imminent or life threatening deterioration.  I have performed direct patient care (not related to procedure), additional history taking, interpretation of diagnostic studies, documentation, consultation with other physicians, telephone consultation with the patient's family.   I have personally and independently provided the amount of critical care time documented below excluding time spent on separate procedures.  critical care time 60 minutes

## 2024-10-27 NOTE — H&P ADULT - CONVERSATION DETAILS
Confirms  at bedside as surrogate   Currently bipap dependent, explained that if breathing were to get worse she may require invasive mechanical ventilation   She has not considered this possibility before and wishes to speak with family further - I encouraged her to do so   Full code

## 2024-10-27 NOTE — ED ADULT NURSE NOTE - NSFALLRISKINTERV_ED_ALL_ED

## 2024-10-27 NOTE — PROGRESS NOTE ADULT - ASSESSMENT
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 She was hypoxic, tachypneic and placed on bipap for increased WOB. Labs signifigant for mild transaminitis, leukocytosis and elevated BNP. Found to be in afib rvr. ICU consulted given poor respiratory status.     ICU course:  Pt admitted to ICU for closer monitoring. Initial labs noted to have elevated wbc of 12.6, BUN/Cr 22.5/1.39, and mildly elevated LFTs. Pro BNP 4536  and troponin was 19. Initial blood gas with pH 7.3 and CO2 49. CTA chest obtained which was negative for PE. Showed small bilateral effusions and known pulmonary nodule. She was continued on bipap and started on empiric IV abx, solumedrol and lasix for multifactorial respiratory failure. Repeat labs relatively stable except for up trending troponin up to 120 at time of downgrade. ICU team started pt on aspirin and heparin gtt and consulted cardiology. Pt deemed to no longer need ICU level of care thus downgraded to medicine for further management.     Acute hypoxic respiratory failure  - downgrade to tele with   - etiology multifactorial per ICU, appears most likely component to be CHF  - c/w IV lasix for now  - f/u formal TTE  - c/w solumedrol and xopenex  - pulmonology consult for assistance in management  - although procal 0.33 no obvious PNA seen on CT however due to acuity of the situation will c/w empiric abx for total 5 days   - f/u blood clx    Suspected NSTEMI, type 1 vs type 2  - pt currently without chest pain  - c/w asa and heparin gtt  - ICU consulted cardiology, f/u recs  - continue to trend troponin to trends down  - f/u TTE read    Afib with RVR  - started on diltiazem 30mg q12hr  - still in RVR  - change to lopressor given concern for nstemi  - titrate accordingly  - PRN IV lopressor    Urinary retention  - RN reports good urine output on lasix  - pt denies incomplete voiding sensation   - c/w doxazosin qhs    ZA  - resolved on repeat labs    Lung cancer  - pt to resume follow up with outpatient provider on d/c    DVT ppx: heparin gtt   79F with pmhx "lung nodules" (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 She was hypoxic, tachypneic and placed on bipap for increased WOB. Labs signifigant for mild transaminitis, leukocytosis and elevated BNP. Found to be in afib rvr. ICU consulted given poor respiratory status.     ICU course:  Pt admitted to ICU for closer monitoring. Initial labs noted to have elevated wbc of 12.6, BUN/Cr 22.5/1.39, and mildly elevated LFTs. Pro BNP 4536  and troponin was 19. Initial blood gas with pH 7.3 and CO2 49. CTA chest obtained which was negative for PE. Showed small bilateral effusions and known pulmonary nodule. She was continued on bipap and started on empiric IV abx, solumedrol and lasix for multifactorial respiratory failure. Repeat labs relatively stable except for up trending troponin up to 120 at time of downgrade. ICU team started pt on aspirin and heparin gtt and consulted cardiology. Pt deemed to no longer need ICU level of care thus downgraded to medicine for further management.     Acute hypoxic respiratory failure  - downgrade to tele with   - etiology multifactorial per ICU, appears most likely contributing component to be CHF  - c/w IV lasix for now  - f/u formal TTE  - c/w solumedrol q8h today, taper to q12hr tomorrow  - xopenex round the clock today, if remains stable overnight consider changing to PRN  - although procal 0.33 no obvious PNA seen on CT will hold off on further abx at this time and monitor   - f/u blood clx    Suspected NSTEMI, type 1 vs type 2  - pt currently without chest pain  - c/w asa and heparin gtt  - ICU consulted cardiology, f/u recs  - continue to trend troponin to trends down  - f/u TTE read    Afib with RVR  - started on diltiazem 30mg q12hr  - still in RVR  - change to lopressor given concern for nstemi  - titrate accordingly  - PRN IV lopressor    Urinary retention  - RN reports good urine output on lasix  - pt denies incomplete voiding sensation   - c/w doxazosin qhs    ZA  - resolved on repeat labs    Lung cancer  - pt to resume follow up with outpatient provider on d/c    DVT ppx: heparin gtt   79F with pmhx "lung nodules" (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 She was hypoxic, tachypneic and placed on bipap for increased WOB. Labs signifigant for mild transaminitis, leukocytosis and elevated BNP. Found to be in afib rvr. ICU consulted given poor respiratory status.     ICU course:  Pt admitted to ICU for closer monitoring. Initial labs noted to have elevated wbc of 12.6, BUN/Cr 22.5/1.39, and mildly elevated LFTs. Pro BNP 4536  and troponin was 19. Initial blood gas with pH 7.3 and CO2 49. CTA chest obtained which was negative for PE. Showed small bilateral effusions and known pulmonary nodule. She was continued on bipap and started on empiric IV abx, solumedrol and lasix for multifactorial respiratory failure. Repeat labs relatively stable except for up trending troponin up to 120 at time of downgrade. ICU team started pt on aspirin and heparin gtt and consulted cardiology. Pt deemed to no longer need ICU level of care thus downgraded to medicine for further management.     Acute hypoxic respiratory failure  - downgrade to tele with   - etiology multifactorial per ICU, appears most likely contributing component to be CHF  - c/w IV lasix for now  - f/u formal TTE  - c/w solumedrol q8h today, taper to q12hr tomorrow  - xopenex round the clock today, if remains stable overnight consider changing to PRN  - although procal 0.33 no obvious PNA seen on CT, will hold off on further abx at this time and monitor   - f/u blood clx    Suspected NSTEMI, type 1 vs type 2  - pt currently without chest pain  - c/w asa and heparin gtt  - ICU consulted cardiology, f/u recs  - continue to trend troponin to trends down  - f/u TTE read    Afib with RVR  - started on diltiazem 30mg q12hr  - still in RVR  - change to lopressor given concern for nstemi  - titrate accordingly  - PRN IV lopressor    Urinary retention  - RN reports good urine output on lasix  - pt denies incomplete voiding sensation   - c/w doxazosin qhs    ZA  - resolved on repeat labs    Lung cancer  - pt to resume follow up with outpatient provider on d/c    DVT ppx: heparin gtt   79F with pmhx "lung nodules" (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 She was hypoxic, tachypneic and placed on bipap for increased WOB. Labs signifigant for mild transaminitis, leukocytosis and elevated BNP. Found to be in afib rvr. ICU consulted given poor respiratory status.     ICU course:  Pt admitted to ICU for closer monitoring. Initial labs noted to have elevated wbc of 12.6, BUN/Cr 22.5/1.39, and mildly elevated LFTs. Pro BNP 4536  and troponin was 19. Initial blood gas with pH 7.3 and CO2 49. CTA chest obtained which was negative for PE. Showed small bilateral effusions and known pulmonary nodule. She was continued on bipap and started on empiric IV abx, solumedrol and lasix for multifactorial respiratory failure. Repeat labs relatively stable except for up trending troponin up to 120 at time of downgrade. ICU team started pt on aspirin and heparin gtt and consulted cardiology. Pt deemed to no longer need ICU level of care thus downgraded to medicine for further management.     Acute hypoxic respiratory failure  - downgrade to tele with   - etiology multifactorial per ICU, appears most likely contributing component to be CHF  - c/w IV lasix for now  - f/u formal TTE  - c/w solumedrol q8h  - xopenex q6hr  - although procal 0.33 no obvious PNA seen on CT, will hold off on further abx at this time and monitor   - f/u blood clx    Suspected NSTEMI, type 1 vs type 2  - pt currently without chest pain  - c/w asa and heparin gtt  - ICU consulted cardiology, f/u recs  - continue to trend troponin to trends down  - f/u TTE read    Afib with RVR  - started on diltiazem 30mg q12hr  - still in RVR  - change to lopressor given concern for nstemi  - titrate accordingly  - PRN IV lopressor    Urinary retention  - RN reports good urine output on lasix  - pt denies incomplete voiding sensation   - c/w doxazosin qhs    ZA  - resolved on repeat labs    Lung cancer  - pt to resume follow up with outpatient provider on d/c    DVT ppx: heparin gtt

## 2024-10-27 NOTE — ED PROVIDER NOTE - CARE PLAN
1 Principal Discharge DX:	Acute hypoxic respiratory failure  Secondary Diagnosis:	Pleural effusion  Secondary Diagnosis:	Lung mass

## 2024-10-27 NOTE — H&P ADULT - ASSESSMENT
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 s/p PO metoprolol x1 and lasix in the ED. check tte, cardio consult placed   Pulm: trialed off bipap briefly, did not tolerate. CTA check neg for PE but with concern for metastatic spread of lung ca however lungs somewhat out of proportion to her dyspnea. will trial off bipap with HFNC with adjunct anxiolytics as respiratory status allows. titrate fio2 as tolerated for goal spo2 >92%  GI: NPO while on bipap   Renal: will eval UOP in response to diuresis   ID: on empiric zosyn can continue for now however low suspicion for infectious etiology f/u cx   Endo: goal bg 110-180   Heme: hep sq dvt ppx may need to start AC     Dispo: admit to ICU  d.w ED    case d/w ICU attending, Dr. Reddy
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
## 2024-10-27 NOTE — PROGRESS NOTE ADULT - SUBJECTIVE AND OBJECTIVE BOX
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 She was hypoxic, tachypneic and placed on bipap for increased WOB. Labs signifigant for mild transaminitis, leukocytosis and elevated BNP. Found to be in afib rvr. ICU consulted given poor respiratory status.     Patient seen and examined at bedside. On bipap, 12/6/50%, tachypneic with RR 30 however able to speak in full sentences. Reports improvement in response to bipap and diuresis so far. History as above. Otherwise no acute complaints.     ICU course:  Pt admitted to ICU for closer monitoring. Initial labs noted to have elevated wbc of 12.6, BUN/Cr 22.5/1.39, and mildly elevated LFTs. Pro BNP 4536  and troponin was 19. Initial blood gas with pH 7.3 and CO2 49. CTA chest obtained which was negative for PE. Showed small bilateral effusions and known pulmonary nodule.She was continued on bipap and started on empiric IV abx, solumedrol and lasix for multifactorial respiratory failure. Repeat labs relatively stable except for up trending troponin up to 120 at time of downgrade. ICU team started pt on aspirin and heparin gtt and consulted cardiology. Pt deemed to no longer need ICU level of care thus downgraded to medicine for further management.     SUBJECTIVE / OVERNIGHT EVENTS: Pt seen at the bedside. Endorses feeling better but still with SOB. Denies any new complaints. All other systems reviewed and are negative.    MEDICATIONS  (STANDING):  aspirin  chewable 81 milliGRAM(s) Oral daily  atorvastatin 10 milliGRAM(s) Oral at bedtime  cetirizine 10 milliGRAM(s) Oral daily  chlorhexidine 2% Cloths 1 Application(s) Topical <User Schedule>  diltiazem    Tablet 30 milliGRAM(s) Oral every 12 hours  doxazosin 2 milliGRAM(s) Oral at bedtime  famotidine    Tablet 10 milliGRAM(s) Oral daily  furosemide   Injectable 40 milliGRAM(s) IV Push two times a day  heparin  Infusion.  Unit(s)/Hr (14 mL/Hr) IV Continuous <Continuous>  influenza  Vaccine (HIGH DOSE) 0.5 milliLiter(s) IntraMuscular once  lactobacillus acidophilus 1 Tablet(s) Oral two times a day with meals  levalbuterol Inhalation 0.63 milliGRAM(s) Inhalation every 8 hours  methylPREDNISolone sodium succinate Injectable 40 milliGRAM(s) IV Push every 8 hours  piperacillin/tazobactam IVPB.. 3.375 Gram(s) IV Intermittent every 8 hours    MEDICATIONS  (PRN):  heparin   Injectable 3000 Unit(s) IV Push every 6 hours PRN For aPTT between 40 - 57  heparin   Injectable 6500 Unit(s) IV Push every 6 hours PRN For aPTT less than 40      I&O's Summary    27 Oct 2024 07:01  -  27 Oct 2024 18:39  --------------------------------------------------------  IN: 610 mL / OUT: 2325 mL / NET: -1715 mL        aspirin  chewable 81 milliGRAM(s) Oral daily  atorvastatin 10 milliGRAM(s) Oral at bedtime  cetirizine 10 milliGRAM(s) Oral daily  chlorhexidine 2% Cloths 1 Application(s) Topical <User Schedule>  diltiazem    Tablet 30 milliGRAM(s) Oral every 12 hours  doxazosin 2 milliGRAM(s) Oral at bedtime  famotidine    Tablet 10 milliGRAM(s) Oral daily  furosemide   Injectable 40 milliGRAM(s) IV Push two times a day  heparin   Injectable 3000 Unit(s) IV Push every 6 hours PRN  heparin   Injectable 6500 Unit(s) IV Push every 6 hours PRN  heparin  Infusion.  Unit(s)/Hr IV Continuous <Continuous>  influenza  Vaccine (HIGH DOSE) 0.5 milliLiter(s) IntraMuscular once  lactobacillus acidophilus 1 Tablet(s) Oral two times a day with meals  levalbuterol Inhalation 0.63 milliGRAM(s) Inhalation every 8 hours  methylPREDNISolone sodium succinate Injectable 40 milliGRAM(s) IV Push every 8 hours  piperacillin/tazobactam IVPB.. 3.375 Gram(s) IV Intermittent every 8 hours      PHYSICAL EXAM:  Vital Signs Last 24 Hrs  T(C): 36.6 (27 Oct 2024 15:51), Max: 37 (27 Oct 2024 03:24)  T(F): 97.9 (27 Oct 2024 15:51), Max: 98.6 (27 Oct 2024 03:24)  HR: 123 (27 Oct 2024 17:00) (98 - 141)  BP: 141/85 (27 Oct 2024 17:00) (103/65 - 150/91)  BP(mean): 101 (27 Oct 2024 17:00) (78 - 108)  RR: 19 (27 Oct 2024 17:00) (19 - 28)  SpO2: 94% (27 Oct 2024 17:00) (81% - 100%)    Parameters below as of 27 Oct 2024 17:00  Patient On (Oxygen Delivery Method): nasal cannula  O2 Flow (L/min): 3        CONSTITUTIONAL: no apparent distress  EYES: PERRLA  ENMT: Oral mucosa with moist membranes  RESP: No respiratory distress, bibasilar crackles appreciated with rhonchi in mid left lung field  CV: RRR, +S1S2, no peripheral edema  GI: Soft, NT, ND, no rebound, no guarding  MSK: Normal ROM without pain, normal muscle strength/tone  SKIN: No rashes or ulcers noted  PSYCH: A+O x 3, mood and affect appropriate  NEURO: Cooperative, upper and lower motor function grossly intact bilaterally, sensation grossly intact throughout      LABS:                        12.2   12.66 )-----------( 398      ( 27 Oct 2024 03:46 )             39.2     10-27    139  |  99  |  19.3  ----------------------------<  87  4.4   |  26.0  |  1.18    Ca    8.9      27 Oct 2024 13:15  Phos  3.4     10-27  Mg     1.9     10-27    TPro  6.6  /  Alb  4.0  /  TBili  0.5  /  DBili  x   /  AST  31  /  ALT  33[H]  /  AlkPhos  82  10-27    PT/INR - ( 27 Oct 2024 03:46 )   PT: 12.6 sec;   INR: 1.09 ratio         PTT - ( 27 Oct 2024 03:46 )  PTT:27.0 sec      Urinalysis Basic - ( 27 Oct 2024 13:15 )    Color: x / Appearance: x / SG: x / pH: x  Gluc: 87 mg/dL / Ketone: x  / Bili: x / Urobili: x   Blood: x / Protein: x / Nitrite: x   Leuk Esterase: x / RBC: x / WBC x   Sq Epi: x / Non Sq Epi: x / Bacteria: x        CAPILLARY BLOOD GLUCOSE          IMAGING:

## 2024-10-27 NOTE — ED ADULT NURSE REASSESSMENT NOTE - NS ED NURSE REASSESS COMMENT FT1
Took over care of patient from Cal SWAN. Pt is on BiPAP at this time and has been admitted to MICU, pending bed. Remains tachycardic to the low 110s, but reports symptomatic improvement. Pt is awake, alert and able to converse. Daughter is at the bedside. Admission plan discussed with pt and daughter.

## 2024-10-27 NOTE — H&P ADULT - HISTORY OF PRESENT ILLNESS
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 She was hypoxic, tachypneic and placed on bipap for increased WOB. Labs signifigant for mild transaminitis, leukocytosis and elevated BNP. Found to be in afib rvr. ICU consulted given poor respiratory status.     Patient seen and examined at bedside. On bipap, 12/6/50%, tachypneic with RR 30 however able to speak in full sentences. Reports improvement in response to bipap and diuresis so far. History as above. Otherwise no acute complaints.     ROS: as above    PAST MEDICAL & SURGICAL HISTORY:  Esophageal dysphagia  Cerebrovascular accident (CVA), unspecified mechanism  Hyperlipidemia, unspecified hyperlipidemia type  Carotid artery disease, unspecified laterality  Other nonspecific abnormal finding of lung field  Hypertension  Carotid artery disease  s/p carotid bypass right side  S/P cataract surgery  both eyes  S/P cholecystectomy  S/P tubal ligation  History of left hip replacement    Medications:  piperacillin/tazobactam IVPB.. 3.375 Gram(s) IV Intermittent Once  furosemide   Injectable 40 milliGRAM(s) IV Push Once  metoprolol tartrate 12.5 milliGRAM(s) Oral Once  chlorhexidine 2% Cloths 1 Application(s) Topical <User Schedule>      ICU Vital Signs Last 24 Hrs  T(C): 37 (27 Oct 2024 03:24), Max: 37 (27 Oct 2024 03:24)  T(F): 98.6 (27 Oct 2024 03:24), Max: 98.6 (27 Oct 2024 03:24)  HR: 112 (27 Oct 2024 04:16) (112 - 141)  BP: 146/97 (27 Oct 2024 03:24) (146/97 - 146/97)  BP(mean): --  ABP: --  ABP(mean): --  RR: 26 (27 Oct 2024 03:24) (26 - 26)  SpO2: 100% (27 Oct 2024 04:16) (81% - 100%)    O2 Parameters below as of 27 Oct 2024 03:24  Patient On (Oxygen Delivery Method): room air    Physical Examination:    General: sitting in stretcher, anxious appearing   PULM: mild tachypnea + increased wob on bipap   CVS: irreg irreg fast   ABD: Soft, nondistended, nontender  EXT: No edema, nontender  SKIN: Warm and well perfused  NEURO: interactive, nonfocal     ABG - ( 27 Oct 2024 04:43 )  pH, Arterial: 7.320 pH, Blood: x     /  pCO2: 49    /  pO2: 78    / HCO3: 25    / Base Excess: -0.9  /  SaO2: 97.1            I&O's Detail      LABS:                        12.2   12.66 )-----------( 398      ( 27 Oct 2024 03:46 )             39.2     10-27    140  |  102  |  22.5[H]  ----------------------------<  175[H]  3.5   |  24.0  |  1.39[H]    Ca    9.4      27 Oct 2024 03:46  Mg     2.2     10-27    TPro  7.1  /  Alb  4.2  /  TBili  0.3[L]  /  DBili  x   /  AST  46[H]  /  ALT  34[H]  /  AlkPhos  93  10-27    CAPILLARY BLOOD GLUCOSE    PT/INR - ( 27 Oct 2024 03:46 )   PT: 12.6 sec;   INR: 1.09 ratio         PTT - ( 27 Oct 2024 03:46 )  PTT:27.0 sec  Urinalysis Basic - ( 27 Oct 2024 03:46 )    Color: x / Appearance: x / SG: x / pH: x  Gluc: 175 mg/dL / Ketone: x  / Bili: x / Urobili: x   Blood: x / Protein: x / Nitrite: x   Leuk Esterase: x / RBC: x / WBC x   Sq Epi: x / Non Sq Epi: x / Bacteria: x      CULTURES:      RADIOLOGY: < from: CT Angio Chest PE Protocol w/ IV Cont (10.27.24 @ 04:08) >        CRITICAL CARE TIME SPENT: *** minutes spent performing frequent bedside reassessments and augmenting plan of care to address problems of acute critical illness that pose high probability of life threatening deterioration and/or end organ damage/dysfunction and discussing goals of care, non-inclusive of time spent on procedures performed. Date of entry of this note is equal to the date of services rendered.    Admitting 40+/55+/75+  Follow up 25+/35+/50+    Time spent on this patient encounter, which includes documenting this note in the electronic medical record, was * minutes including assessing the presenting problems with associated risks, reviewing the medical record to prepare for the encounter, and meeting face to face with the patient to obtain additional history. I have also performed an appropriate physical exam, made interventions listed and ordered and interpreted appropriate diagnostic studies as documented. To improve communication and patient safety, I have coordinated care with the multidisciplinary team including the bedside nurse, appropriate attending of record and consultants as needed. Date of entry of this note is equal to the date of services rendered.       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 She was hypoxic, tachypneic and placed on bipap for increased WOB. Labs signifigant for mild transaminitis, leukocytosis and elevated BNP. Found to be in afib rvr. ICU consulted given poor respiratory status.     Patient seen and examined at bedside. On bipap, 12/6/50%, tachypneic with RR 30 however able to speak in full sentences. Reports improvement in response to bipap and diuresis so far. History as above. Otherwise no acute complaints.     ROS: as above    PAST MEDICAL & SURGICAL HISTORY:  Esophageal dysphagia  Cerebrovascular accident (CVA), unspecified mechanism  Hyperlipidemia, unspecified hyperlipidemia type  Carotid artery disease, unspecified laterality  Other nonspecific abnormal finding of lung field  Hypertension  Carotid artery disease  s/p carotid bypass right side  S/P cataract surgery  both eyes  S/P cholecystectomy  S/P tubal ligation  History of left hip replacement    Medications:  piperacillin/tazobactam IVPB.. 3.375 Gram(s) IV Intermittent Once  furosemide   Injectable 40 milliGRAM(s) IV Push Once  metoprolol tartrate 12.5 milliGRAM(s) Oral Once  chlorhexidine 2% Cloths 1 Application(s) Topical <User Schedule>      ICU Vital Signs Last 24 Hrs  T(C): 37 (27 Oct 2024 03:24), Max: 37 (27 Oct 2024 03:24)  T(F): 98.6 (27 Oct 2024 03:24), Max: 98.6 (27 Oct 2024 03:24)  HR: 112 (27 Oct 2024 04:16) (112 - 141)  BP: 146/97 (27 Oct 2024 03:24) (146/97 - 146/97)  BP(mean): --  ABP: --  ABP(mean): --  RR: 26 (27 Oct 2024 03:24) (26 - 26)  SpO2: 100% (27 Oct 2024 04:16) (81% - 100%)    O2 Parameters below as of 27 Oct 2024 03:24  Patient On (Oxygen Delivery Method): room air    Physical Examination:    General: sitting in stretcher, anxious appearing   PULM: mild tachypnea + increased wob on bipap   CVS: irreg irreg fast   ABD: Soft, nondistended, nontender  EXT: No edema, nontender  SKIN: Warm and well perfused  NEURO: interactive, nonfocal     ABG - ( 27 Oct 2024 04:43 )  pH, Arterial: 7.320 pH, Blood: x     /  pCO2: 49    /  pO2: 78    / HCO3: 25    / Base Excess: -0.9  /  SaO2: 97.1            I&O's Detail      LABS:                        12.2   12.66 )-----------( 398      ( 27 Oct 2024 03:46 )             39.2     10-27    140  |  102  |  22.5[H]  ----------------------------<  175[H]  3.5   |  24.0  |  1.39[H]    Ca    9.4      27 Oct 2024 03:46  Mg     2.2     10-27    TPro  7.1  /  Alb  4.2  /  TBili  0.3[L]  /  DBili  x   /  AST  46[H]  /  ALT  34[H]  /  AlkPhos  93  10-27    CAPILLARY BLOOD GLUCOSE    PT/INR - ( 27 Oct 2024 03:46 )   PT: 12.6 sec;   INR: 1.09 ratio         PTT - ( 27 Oct 2024 03:46 )  PTT:27.0 sec  Urinalysis Basic - ( 27 Oct 2024 03:46 )    Color: x / Appearance: x / SG: x / pH: x  Gluc: 175 mg/dL / Ketone: x  / Bili: x / Urobili: x   Blood: x / Protein: x / Nitrite: x   Leuk Esterase: x / RBC: x / WBC x   Sq Epi: x / Non Sq Epi: x / Bacteria: x      CULTURES:      RADIOLOGY: < from: CT Angio Chest PE Protocol w/ IV Cont (10.27.24 @ 04:08) >        CRITICAL CARE TIME SPENT: 35 minutes spent performing frequent bedside reassessments and augmenting plan of care to address problems of acute critical illness that pose high probability of life threatening deterioration and/or end organ damage/dysfunction and discussing goals of care, non-inclusive of time spent on procedures performed. Date of entry of this note is equal to the date of services rendered.

## 2024-10-27 NOTE — ED PROVIDER NOTE - FAMILY DETAILS FREE TEXT FOR MDM ADDL HISTORY OBTAINED FROM QUESTION
Daughter and  at bedside. Providing partial history such as following: they are aware lung mass, pt does not see a cardiologist.

## 2024-10-27 NOTE — ED PROVIDER NOTE - PROGRESS NOTE DETAILS
Beth Hutchison MD, Attending  Pt hypoxic to 80, tachyardic to 120, suspicion for PE. Benefits of CTA chest to pro contrast outweight the potential harm of contrastnephropathy. Pt agreeable to CT scan, states she has no IV contrast allergy Beth Hutchison MD, Attending  [facility] consulted for new onset Afib.  Pt reassessed, breathing better on bipap

## 2024-10-28 DIAGNOSIS — R79.89 OTHER SPECIFIED ABNORMAL FINDINGS OF BLOOD CHEMISTRY: ICD-10-CM

## 2024-10-28 DIAGNOSIS — I50.20 UNSPECIFIED SYSTOLIC (CONGESTIVE) HEART FAILURE: ICD-10-CM

## 2024-10-28 DIAGNOSIS — I48.91 UNSPECIFIED ATRIAL FIBRILLATION: ICD-10-CM

## 2024-10-28 LAB
ALBUMIN SERPL ELPH-MCNC: 4.1 G/DL — SIGNIFICANT CHANGE UP (ref 3.3–5.2)
ALP SERPL-CCNC: 78 U/L — SIGNIFICANT CHANGE UP (ref 40–120)
ALT FLD-CCNC: 26 U/L — SIGNIFICANT CHANGE UP
ANION GAP SERPL CALC-SCNC: 13 MMOL/L — SIGNIFICANT CHANGE UP (ref 5–17)
ANISOCYTOSIS BLD QL: SLIGHT — SIGNIFICANT CHANGE UP
APTT BLD: 155.6 SEC — CRITICAL HIGH (ref 24.5–35.6)
APTT BLD: 72.4 SEC — HIGH (ref 24.5–35.6)
APTT BLD: 84.6 SEC — HIGH (ref 24.5–35.6)
AST SERPL-CCNC: 20 U/L — SIGNIFICANT CHANGE UP
BASOPHILS # BLD AUTO: 0 K/UL — SIGNIFICANT CHANGE UP (ref 0–0.2)
BASOPHILS NFR BLD AUTO: 0 % — SIGNIFICANT CHANGE UP (ref 0–2)
BILIRUB SERPL-MCNC: 0.4 MG/DL — SIGNIFICANT CHANGE UP (ref 0.4–2)
BUN SERPL-MCNC: 21.1 MG/DL — HIGH (ref 8–20)
CALCIUM SERPL-MCNC: 9.4 MG/DL — SIGNIFICANT CHANGE UP (ref 8.4–10.5)
CHLORIDE SERPL-SCNC: 97 MMOL/L — SIGNIFICANT CHANGE UP (ref 96–108)
CO2 SERPL-SCNC: 29 MMOL/L — SIGNIFICANT CHANGE UP (ref 22–29)
CREAT SERPL-MCNC: 1.38 MG/DL — HIGH (ref 0.5–1.3)
EGFR: 38 ML/MIN/1.73M2 — LOW
ELLIPTOCYTES BLD QL SMEAR: SLIGHT — SIGNIFICANT CHANGE UP
EOSINOPHIL # BLD AUTO: 0 K/UL — SIGNIFICANT CHANGE UP (ref 0–0.5)
EOSINOPHIL NFR BLD AUTO: 0 % — SIGNIFICANT CHANGE UP (ref 0–6)
GIANT PLATELETS BLD QL SMEAR: PRESENT — SIGNIFICANT CHANGE UP
GLUCOSE SERPL-MCNC: 130 MG/DL — HIGH (ref 70–99)
HCT VFR BLD CALC: 36.9 % — SIGNIFICANT CHANGE UP (ref 34.5–45)
HGB BLD-MCNC: 11.8 G/DL — SIGNIFICANT CHANGE UP (ref 11.5–15.5)
LYMPHOCYTES # BLD AUTO: 0 % — LOW (ref 13–44)
LYMPHOCYTES # BLD AUTO: 0 K/UL — LOW (ref 1–3.3)
MACROCYTES BLD QL: SLIGHT — SIGNIFICANT CHANGE UP
MAGNESIUM SERPL-MCNC: 2 MG/DL — SIGNIFICANT CHANGE UP (ref 1.6–2.6)
MANUAL SMEAR VERIFICATION: SIGNIFICANT CHANGE UP
MCHC RBC-ENTMCNC: 27.6 PG — SIGNIFICANT CHANGE UP (ref 27–34)
MCHC RBC-ENTMCNC: 32 GM/DL — SIGNIFICANT CHANGE UP (ref 32–36)
MCV RBC AUTO: 86.2 FL — SIGNIFICANT CHANGE UP (ref 80–100)
MONOCYTES # BLD AUTO: 0.06 K/UL — SIGNIFICANT CHANGE UP (ref 0–0.9)
MONOCYTES NFR BLD AUTO: 0.9 % — LOW (ref 2–14)
NEUTROPHILS # BLD AUTO: 6.88 K/UL — SIGNIFICANT CHANGE UP (ref 1.8–7.4)
NEUTROPHILS NFR BLD AUTO: 99.1 % — HIGH (ref 43–77)
NT-PROBNP SERPL-SCNC: HIGH PG/ML (ref 0–300)
OVALOCYTES BLD QL SMEAR: SLIGHT — SIGNIFICANT CHANGE UP
PHOSPHATE SERPL-MCNC: 4.2 MG/DL — SIGNIFICANT CHANGE UP (ref 2.4–4.7)
PLAT MORPH BLD: NORMAL — SIGNIFICANT CHANGE UP
PLATELET # BLD AUTO: 319 K/UL — SIGNIFICANT CHANGE UP (ref 150–400)
POIKILOCYTOSIS BLD QL AUTO: SLIGHT — SIGNIFICANT CHANGE UP
POLYCHROMASIA BLD QL SMEAR: SLIGHT — SIGNIFICANT CHANGE UP
POTASSIUM SERPL-MCNC: 4.4 MMOL/L — SIGNIFICANT CHANGE UP (ref 3.5–5.3)
POTASSIUM SERPL-SCNC: 4.4 MMOL/L — SIGNIFICANT CHANGE UP (ref 3.5–5.3)
PROT SERPL-MCNC: 6.9 G/DL — SIGNIFICANT CHANGE UP (ref 6.6–8.7)
RBC # BLD: 4.28 M/UL — SIGNIFICANT CHANGE UP (ref 3.8–5.2)
RBC # FLD: 14.3 % — SIGNIFICANT CHANGE UP (ref 10.3–14.5)
RBC BLD AUTO: ABNORMAL
SODIUM SERPL-SCNC: 139 MMOL/L — SIGNIFICANT CHANGE UP (ref 135–145)
TROPONIN T, HIGH SENSITIVITY RESULT: 52 NG/L — HIGH (ref 0–51)
WBC # BLD: 6.94 K/UL — SIGNIFICANT CHANGE UP (ref 3.8–10.5)
WBC # FLD AUTO: 6.94 K/UL — SIGNIFICANT CHANGE UP (ref 3.8–10.5)

## 2024-10-28 PROCEDURE — 99233 SBSQ HOSP IP/OBS HIGH 50: CPT

## 2024-10-28 PROCEDURE — 99223 1ST HOSP IP/OBS HIGH 75: CPT

## 2024-10-28 RX ORDER — FUROSEMIDE 40 MG
40 TABLET ORAL DAILY
Refills: 0 | Status: DISCONTINUED | OUTPATIENT
Start: 2024-10-28 | End: 2024-10-28

## 2024-10-28 RX ORDER — FUROSEMIDE 40 MG
40 TABLET ORAL DAILY
Refills: 0 | Status: DISCONTINUED | OUTPATIENT
Start: 2024-10-29 | End: 2024-10-29

## 2024-10-28 RX ORDER — LOSARTAN POTASSIUM 25 MG/1
50 TABLET ORAL DAILY
Refills: 0 | Status: DISCONTINUED | OUTPATIENT
Start: 2024-10-28 | End: 2024-10-31

## 2024-10-28 RX ADMIN — METHYLPREDNISOLONE ACETATE 40 MILLIGRAM(S): 80 INJECTION, SUSPENSION INTRALESIONAL; INTRAMUSCULAR; INTRASYNOVIAL; SOFT TISSUE at 13:26

## 2024-10-28 RX ADMIN — METHYLPREDNISOLONE ACETATE 40 MILLIGRAM(S): 80 INJECTION, SUSPENSION INTRALESIONAL; INTRAMUSCULAR; INTRASYNOVIAL; SOFT TISSUE at 06:21

## 2024-10-28 RX ADMIN — LEVALBUTEROL HYDROCHLORIDE 0.63 MILLIGRAM(S): 0.63 SOLUTION RESPIRATORY (INHALATION) at 08:58

## 2024-10-28 RX ADMIN — Medication 81 MILLIGRAM(S): at 13:26

## 2024-10-28 RX ADMIN — HEPARIN SODIUM 1100 UNIT(S)/HR: 10000 INJECTION INTRAVENOUS; SUBCUTANEOUS at 05:24

## 2024-10-28 RX ADMIN — Medication 2 MILLIGRAM(S): at 21:38

## 2024-10-28 RX ADMIN — Medication 1 TABLET(S): at 16:58

## 2024-10-28 RX ADMIN — HEPARIN SODIUM 0 UNIT(S)/HR: 10000 INJECTION INTRAVENOUS; SUBCUTANEOUS at 04:20

## 2024-10-28 RX ADMIN — Medication 25 MILLIGRAM(S): at 06:21

## 2024-10-28 RX ADMIN — Medication 25 MILLIGRAM(S): at 16:58

## 2024-10-28 RX ADMIN — CHLORHEXIDINE GLUCONATE 1 APPLICATION(S): 40 SOLUTION TOPICAL at 06:21

## 2024-10-28 RX ADMIN — HEPARIN SODIUM 1100 UNIT(S)/HR: 10000 INJECTION INTRAVENOUS; SUBCUTANEOUS at 19:39

## 2024-10-28 RX ADMIN — Medication 40 MILLIGRAM(S): at 06:21

## 2024-10-28 RX ADMIN — LEVALBUTEROL HYDROCHLORIDE 0.63 MILLIGRAM(S): 0.63 SOLUTION RESPIRATORY (INHALATION) at 15:21

## 2024-10-28 RX ADMIN — Medication 10 MILLIGRAM(S): at 21:38

## 2024-10-28 RX ADMIN — FAMOTIDINE 10 MILLIGRAM(S): 10 INJECTION INTRAVENOUS at 13:27

## 2024-10-28 RX ADMIN — Medication 1 TABLET(S): at 08:24

## 2024-10-28 RX ADMIN — HEPARIN SODIUM 1100 UNIT(S)/HR: 10000 INJECTION INTRAVENOUS; SUBCUTANEOUS at 19:15

## 2024-10-28 RX ADMIN — HEPARIN SODIUM 1100 UNIT(S)/HR: 10000 INJECTION INTRAVENOUS; SUBCUTANEOUS at 12:15

## 2024-10-28 RX ADMIN — CETIRIZINE HCL 10 MILLIGRAM(S): 10 TABLET ORAL at 13:26

## 2024-10-28 RX ADMIN — HEPARIN SODIUM 1100 UNIT(S)/HR: 10000 INJECTION INTRAVENOUS; SUBCUTANEOUS at 07:33

## 2024-10-28 NOTE — CONSULT NOTE ADULT - SUBJECTIVE AND OBJECTIVE BOX
[facility]                                              CARDIOLOGY AT [facility]                                                   [address], [place] [address]                                             Telephone: [phone]. Fax:[phone]                                                       CARDIOLOGY CONSULTATION NOTE                                                                                             History obtained by: Patient and medical record  Community Cardiologist:   Brittnee in the past - now requesting [facility].     obtained: Yes [  ] No [  ]  Reason for Consultation:   Reduced EF/Afib  Available out pt records reviewed: Yes [  ] No [  ]  NA    Chief complaint:    Patient is a 79y old  Female who presents with a chief complaint of SOB.      HPI:  79F with pmhx "lung nodules" (path 2023 mucinous adenocarcinoma) treated with chemo/radiation, s/p R CEA c/b CVA, dysphagia, former smoker (25PPD),  came in this evening with sudden onset SOB when she got up to use the restroom this evening.   She was hypoxic, tachypneic and placed on bipap for increased WOB. Labs signifigant for mild transaminitis, leukocytosis and elevated BNP. Found to be in afib rvr.    [facility]  Patient has much improved overnight with diuresis and is sating 98% on room air, has no edema and lungs are clear.   Patient has seen Dr. Ho in the past, but now requesting for [facility]  Patient last echo and NST was 4 years ago.  Patient states afib is new and heart failure is new.   Denies headache dizziness, chest pain, palpations sob, pn, orthopnea, hemoptysis, n/v/d/c/abd pain, fever, chills, syncope, near syncope, urinary discomfort, cramps or any other discomfort.      PAST MEDICAL & SURGICAL HISTORY:  Esophageal dysphagia  Cerebrovascular accident (CVA), unspecified mechanism  Hyperlipidemia, unspecified hyperlipidemia type  Carotid artery disease, unspecified laterality  Other nonspecific abnormal finding of lung field  Hypertension  Carotid artery disease  s/p carotid bypass right side  S/P cataract surgery  both eyes  S/P cholecystectomy  S/P tubal ligation  History of left hip replacement    Medications:  piperacillin/tazobactam IVPB.. 3.375 Gram(s) IV Intermittent Once  furosemide   Injectable 40 milliGRAM(s) IV Push Once  metoprolol tartrate 12.5 milliGRAM(s) Oral Once  chlorhexidine 2% Cloths 1 Application(s) Topical <User Schedule>    Home Medications  · 	aspirin 81 mg oral tablet, chewable: 1 tab(s) orally once a day  · 	losartan 100 mg oral tablet: 1 tab(s) orally once a day (at bedtime)  · 	Claritin 10 mg oral tablet: 1 tab(s) orally once a day  · 	simvastatin 20 mg oral tablet: 1 tab(s) orally once a day (at bedtime)  · 	doxazosin 2 mg oral tablet: 1 tab(s) orally once a day  · 	hydroCHLOROthiazide 25 mg oral tablet: 1 tab(s) orally once a day  · 	Pepcid 20 mg oral tablet: orally once a day  · 	Metoprolol Tartrate 100 mg oral tablet: 1 tab(s) orally 2 times a day    ICU Vital Signs Last 24 Hrs  T(C): 37 (27 Oct 2024 03:24), Max: 37 (27 Oct 2024 03:24)  T(F): 98.6 (27 Oct 2024 03:24), Max: 98.6 (27 Oct 2024 03:24)  HR: 112 (27 Oct 2024 04:16) (112 - 141)  BP: 146/97 (27 Oct 2024 03:24) (146/97 - 146/97)  BP(mean): --  ABP: --  ABP(mean): --  RR: 26 (27 Oct 2024 03:24) (26 - 26)  SpO2: 100% (27 Oct 2024 04:16) (81% - 100%)    O2 Parameters below as of 27 Oct 2024 03:24  Patient On (Oxygen Delivery Method): room air    Physical Examination:    General: sitting in stretcher, anxious appearing   PULM: mild tachypnea + increased wob on bipap   CVS: irreg irreg fast   ABD: Soft, nondistended, nontender  EXT: No edema, nontender  SKIN: Warm and well perfused  NEURO: interactive, nonfocal     ABG - ( 27 Oct 2024 04:43 )  pH, Arterial: 7.320 pH, Blood: x     /  pCO2: 49    /  pO2: 78    / HCO3: 25    / Base Excess: -0.9  /  SaO2: 97.1      I&O's Detail      LABS:                        12.2   12.66 )-----------( 398      ( 27 Oct 2024 03:46 )             39.2     10-27    140  |  102  |  22.5[H]  ----------------------------<  175[H]  3.5   |  24.0  |  1.39[H]    Ca    9.4      27 Oct 2024 03:46  Mg     2.2     10-27    TPro  7.1  /  Alb  4.2  /  TBili  0.3[L]  /  DBili  x   /  AST  46[H]  /  ALT  34[H]  /  AlkPhos  93  10-27    CAPILLARY BLOOD GLUCOSE    PT/INR - ( 27 Oct 2024 03:46 )   PT: 12.6 sec;   INR: 1.09 ratio         PTT - ( 27 Oct 2024 03:46 )  PTT:27.0 sec  Urinalysis Basic - ( 27 Oct 2024 03:46 )    Color: x / Appearance: x / SG: x / pH: x  Gluc: 175 mg/dL / Ketone: x  / Bili: x / Urobili: x   Blood: x / Protein: x / Nitrite: x   Leuk Esterase: x / RBC: x / WBC x   Sq Epi: x / Non Sq Epi: x / Bacteria: x      CARDIAC TESTING   ECHO:    CONCLUSIONS:    1. Normal right ventricular cavity size and normal right ventricular systolic function.   2. Left atrium is moderately dilated.   3. The right atrium is severely dilated.   4. Moderate mitral regurgitation.   5. Mild to moderate tricuspid regurgitation.   6. Estimated pulmonary artery systolic pressure is 57 mmHg, consistent with moderate pulmonary hypertension.   7. No pericardial effusion seen.   8. Multiple segmental abnormalities exist. See findings.   9. Left ventricular systolic function is moderately decreased with an ejection fraction of 33 % by Penn's method of disks with an ejection fraction visually estimated at 30 to 35 %.    STRESS:    CATH:     ELECTROPHYSIOLOGY:     PAST MEDICAL HISTORY  Hypertension  Esophageal dysphagia  Cerebrovascular accident (CVA), unspecified mechanism  Hyperlipidemia, unspecified hyperlipidemia type  Carotid artery disease, unspecified laterality  Other nonspecific abnormal finding of lung field  Hypertension    PAST SURGICAL HISTORY  S/P cataract surgery  S/P cholecystectomy  S/P tubal ligation  History of left hip replacement    SOCIAL HISTORY:  Denies smoking/alcohol/drugs  CIGARETTES:     ALCOHOL:  DRUGS:    FAMILY HISTORY:  FH: HTN (hypertension) (Grandparent)  FH: heart disease (Grandparent)  FH: heart attack (Father) and angina -  at ag e 60 from MI  Family History of Cardiovascular Disease:  Yes [x  ] No [  ]  Coronary Artery Disease in first degree relative: Yes [x  ] No [  ]  Sudden Cardiac Death in First degree relative: Yes [x  ] No [  ]    HOME MEDICATIONS:  Acidophilus oral capsule: 1 cap(s) orally once a day (27 Oct 2024 11:02)  amLODIPine 10 mg oral tablet: 1 tab(s) orally once a day (27 Oct 2024 11:01)  aspirin 81 mg oral tablet, chewable: 1 tab(s) orally once a day (27 Oct 2024 11:03)  Claritin 10 mg oral tablet: 1 tab(s) orally once a day (27 Oct 2024 11:03)  doxazosin 2 mg oral tablet: 2 tab(s) orally once a day (27 Oct 2024 10:58)  famotidine 10 mg oral tablet: 1 tab(s) orally (27 Oct 2024 11:00)  losartan 100 mg oral tablet: 1 tab(s) orally once a day (at bedtime) (27 Oct 2024 11:03)  metoprolol tartrate 50 mg oral tablet: 1 tab(s) orally 2 times a day (27 Oct 2024 11:00)  simvastatin 20 mg oral tablet: 1 tab(s) orally once a day (at bedtime) (27 Oct 2024 11:03)      CURRENT CARDIAC MEDICATIONS:  doxazosin 2 milliGRAM(s) Oral at bedtime  furosemide   Injectable 40 milliGRAM(s) IV Push daily  metoprolol tartrate 25 milliGRAM(s) Oral two times a day  metoprolol tartrate Injectable 5 milliGRAM(s) IV Push once, Stop order after: 1 Doses PRN HR greater than 130    CURRENT OTHER MEDICATIONS:  cetirizine 10 milliGRAM(s) Oral daily  levalbuterol Inhalation 0.63 milliGRAM(s) Inhalation every 8 hours  famotidine    Tablet 10 milliGRAM(s) Oral daily  aspirin  chewable 81 milliGRAM(s) Oral daily  atorvastatin 10 milliGRAM(s) Oral at bedtime  chlorhexidine 2% Cloths 1 Application(s) Topical <User Schedule>  heparin   Injectable 6500 Unit(s) IV Push every 6 hours PRN For aPTT less than 40  heparin   Injectable 3000 Unit(s) IV Push every 6 hours PRN For aPTT between 40 - 57  heparin  Infusion.  Unit(s)/Hr (14 mL/Hr) IV Continuous <Continuous>  influenza  Vaccine (HIGH DOSE) 0.5 milliLiter(s) IntraMuscular once  lactobacillus acidophilus 1 Tablet(s) Oral two times a day with meals  methylPREDNISolone sodium succinate Injectable 40 milliGRAM(s) IV Push every 8 hours      ALLERGIES:   Biaxin (Other)  No Known Allergies  doxycycline (Diarrhea)    REVIEW OF SYMPTOMS:   CONSTITUTIONAL: No fever, no chills, no weight loss, no weight gain, no fatigue   ENMT:  No vertigo; No sinus or throat pain  NECK: No pain or stiffness  CARDIOVASCULAR: No chest pain, no dyspnea, no syncope/presyncope, no palpitations, no dizziness, no Orthopnea, no Paroxsymal nocturnal dyspnea  RESPIRATORY: no Shortness of breath, no cough, no wheezing  : No dysuria, no hematuria   GI: No dark color stool, no nausea, no diarrhea, no constipation, no abdominal pain   NEURO: No headache, no slurred speech   MUSCULOSKELETAL: No joint pain or swelling; No muscle, back, or extremity pain  PSYCH: No agitation, no anxiety.    ALL OTHER REVIEW OF SYSTEMS ARE NEGATIVE.    VITAL SIGNS:  T(C): 36.3 (10-28-24 @ 10:16), Max: 37.2 (10-28-24 @ 06:10)  T(F): 97.3 (10-28-24 @ 10:16), Max: 98.9 (10-28-24 @ 06:10)  HR: 105 (10-28-24 @ 10:16) (92 - 123)  BP: 120/71 (10-28-24 @ 10:16) (120/71 - 145/78)  RR: 18 (10-28-24 @ 10:16) (16 - 23)  SpO2: 98% (10-28-24 @ 10:16) (93% - 98%)    INTAKE AND OUTPUT:     10-27 @ 07:01  -  10-28 @ 07:00  --------------------------------------------------------  IN: 610 mL / OUT: 3775 mL / NET: -3165 mL    PHYSICAL EXAM:  Constitutional: Comfortable . No acute distress.   HEENT: Atraumatic and normocephalic , neck is supple . no JVD. No carotid bruit.  CNS: A&Ox3. No focal deficits.   Respiratory: CTAB, unlabored - diminished bilaterally  Cardiovascular: RRR normal s1 s2. No murmur. No rubs or gallop.  Gastrointestinal: Soft, non-tender. +Bowel sounds.   Extremities: 2+ Peripheral Pulses, No clubbing, cyanosis, thick scaly edema b/l LE.    Psychiatric: Calm. no agitation.   Skin: Warm and dry, no ulcers on extremities     LABS:                            11.8   6.94  )-----------( 319      ( 28 Oct 2024 03:35 )             36.9     10    139  |  97  |  21.1[H]  ----------------------------<  130[H]  4.4   |  29.0  |  1.38[H]    Ca    9.4      28 Oct 2024 06:30  Phos  4.2     10-28  Mg     2.0     10-28    TPro  6.9  /  Alb  4.1  /  TBili  0.4  /  DBili  x   /  AST  20  /  ALT  26  /  AlkPhos  78  10    PT/INR - ( 27 Oct 2024 03:46 )   PT: 12.6 sec;   INR: 1.09 ratio       PTT - ( 28 Oct 2024 11:36 )  PTT:84.6 sec  Urinalysis Basic - ( 28 Oct 2024 06:30 )    Color: x / Appearance: x / SG: x / pH: x  Gluc: 130 mg/dL / Ketone: x  / Bili: x / Urobili: x   Blood: x / Protein: x / Nitrite: x   Leuk Esterase: x / RBC: x / WBC x   Sq Epi: x / Non Sq Epi: x / Bacteria: x    INTERPRETATION OF TELEMETRY:  afib 100-110    ECG:   Ventricular Rate 110 BPM  Atrial Rate 138 BPM  QRS Duration 78 ms  Q-T Interval 316 ms  QTC Calculation(Bazett) 427 ms  R Axis 32 degrees  T Axis 59 degrees  Diagnosis Line Atrial fibrillation with rapid ventricular response  Increased R/S ratio in V1, consider early transition or posterior infarct  Abnormal ECG    Prior ECG: Yes [  ] No [ x ]

## 2024-10-28 NOTE — CONSULT NOTE ADULT - PROBLEM SELECTOR RECOMMENDATION 2
CHADVAS2 score 5 (age 1, female 1, CHF, HTN 1)  No history of afib  Ct heparin IV  Ct telemetry  Ct bb for rate control  Transition to DOAC for discharge  Consider cardioverions before discharge. CHADVAS2 score 5 (age 1, female 1, CHF, HTN 1)  No history of afib  Ct heparin IV  Ct telemetry  Ct bb for rate control  Transition to DOAC for discharge  Consider cardioversion after LHC

## 2024-10-28 NOTE — CONSULT NOTE ADULT - ASSESSMENT
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   She was hypoxic, tachypneic and placed on bipap for increased WOB. Labs signifigant for mild transaminitis, leukocytosis and elevated BNP. Found to be in afib rvr.    Patient admitted and evaluate and seen by ICU placed on [facility].  Patient has much improved overnight with diuresis and is sating 98% on room air, has no edema and lungs are clear.   Patient has seen Dr. Ho in the past, but now requesting for [facility]  Patient last echo and NST was 4 years ago.  Patient states afib is new and heart failure is new.   Denies headache dizziness, chest pain, palpations sob, pn, orthopnea, hemoptysis, n/v/d/c/abd pain, fever, chills, syncope, near syncope, urinary discomfort, cramps or any other discomfort.

## 2024-10-28 NOTE — PROGRESS NOTE ADULT - SUBJECTIVE AND OBJECTIVE BOX
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 In ED she was hypoxic, tachypneic and placed on bipap for increased WOB. Labs significant for mild transaminitis, leukocytosis and elevated BNP. Patient found to be in Afib RVR admitted to MICU for AHRF.  Pro BNP 4536. TTE LVEF 30-35%. . CTA chest  negative for PE., showed small bilateral effusions and known pulmonary nodule. She was continued on bipap and started on empiric IV abx, solumedrol, lasix for multifactorial respiratory failure. Patient responded well to tx plan and was downgraded to telemetry on 10/27.     LAST 24 HOURS: No acute events. Patient in Afib, but rate controlled. Cardio consulted.    TODAY: Pt seen at bedside this AM. Patient reports she has minimal dyspnea with ambulation. Denies palpations, headaches, dizziness, chest pain, SOB at rest, abdominal pain or lower ext pain. Reports she used to have a cardiologist 6 years PTA, but would like to establish care with [facility]. Offers no other acute complaints & ROS otherwise negative.     OBJECTIVE  PHYSICAL EXAM:  GENERAL: No acute distress, comfortably in bed  HEENT: Atraumatic, normocephalic, non-icteric, no JVD  NEURO: A&Ox3, no focal deficits, moving all extremities spontaneously, no dysarthria, CN II-XII grossly intact  PSYCH: Normal affect, calm, appropriate insight and judgment, fluent speech  LUNGS: CTAB, no wrr, non-labored breathing  HEART: RRR, no murmur appreciated  ABD: Soft, non-tender, non-distended, no organomegaly, no appreciable masses, +bs   EXTREMITIES: Nontender, no clubbing, cyanosis, or edema       Vital Signs Last 24 Hrs  T(C): 36.3 (28 Oct 2024 10:16), Max: 37.2 (28 Oct 2024 06:10)  T(F): 97.3 (28 Oct 2024 10:16), Max: 98.9 (28 Oct 2024 06:10)  HR: 105 (28 Oct 2024 10:16) (92 - 123)  BP: 120/71 (28 Oct 2024 10:16) (120/71 - 145/78)  BP(mean): 101 (27 Oct 2024 17:00) (101 - 101)  RR: 18 (28 Oct 2024 10:16) (16 - 19)  SpO2: 98% (28 Oct 2024 10:16) (93% - 98%)    Parameters below as of 28 Oct 2024 10:16  Patient On (Oxygen Delivery Method): nasal cannula  O2 Flow (L/min): 3          MEDICATIONS  (STANDING):  aspirin  chewable 81 milliGRAM(s) Oral daily  atorvastatin 10 milliGRAM(s) Oral at bedtime  cetirizine 10 milliGRAM(s) Oral daily  chlorhexidine 2% Cloths 1 Application(s) Topical <User Schedule>  doxazosin 2 milliGRAM(s) Oral at bedtime  famotidine    Tablet 10 milliGRAM(s) Oral daily  furosemide   Injectable 40 milliGRAM(s) IV Push daily  heparin  Infusion.  Unit(s)/Hr (14 mL/Hr) IV Continuous <Continuous>  influenza  Vaccine (HIGH DOSE) 0.5 milliLiter(s) IntraMuscular once  lactobacillus acidophilus 1 Tablet(s) Oral two times a day with meals  levalbuterol Inhalation 0.63 milliGRAM(s) Inhalation every 8 hours  methylPREDNISolone sodium succinate Injectable 40 milliGRAM(s) IV Push every 8 hours  metoprolol tartrate 25 milliGRAM(s) Oral two times a day    MEDICATIONS  (PRN):  heparin   Injectable 6500 Unit(s) IV Push every 6 hours PRN For aPTT less than 40  heparin   Injectable 3000 Unit(s) IV Push every 6 hours PRN For aPTT between 40 - 57  metoprolol tartrate Injectable 5 milliGRAM(s) IV Push once PRN HR greater than 130    Allergies    No Known Allergies    Intolerances    Biaxin (Other)  doxycycline (Diarrhea)      LABS:                        11.8   6.94  )-----------( 319      ( 28 Oct 2024 03:35 )             36.9     10-28    139  |  97  |  21.1[H]  ----------------------------<  130[H]  4.4   |  29.0  |  1.38[H]    Ca    9.4      28 Oct 2024 06:30  Phos  4.2     10-28  Mg     2.0     10-28    TPro  6.9  /  Alb  4.1  /  TBili  0.4  /  DBili  x   /  AST  20  /  ALT  26  /  AlkPhos  78  10-28    PT/INR - ( 27 Oct 2024 03:46 )   PT: 12.6 sec;   INR: 1.09 ratio         PTT - ( 28 Oct 2024 11:36 )  PTT:84.6 sec  Urinalysis Basic - ( 28 Oct 2024 06:30 )    Color: x / Appearance: x / SG: x / pH: x  Gluc: 130 mg/dL / Ketone: x  / Bili: x / Urobili: x   Blood: x / Protein: x / Nitrite: x   Leuk Esterase: x / RBC: x / WBC x   Sq Epi: x / Non Sq Epi: x / Bacteria: x      CAPILLARY BLOOD GLUCOSE          CULTURE DATA:    Culture - Blood (collected 10-27-24 @ 03:48)  Source: .Blood BLOOD  Preliminary Report (10-28-24 @ 13:01):    No growth at 24 hours        RADIOLOGY & ADDITIONAL TESTS:    < from: TTE W or WO Ultrasound Enhancing Agent (10.27.24 @ 14:12) >     CONCLUSIONS:      1. Normal right ventricular cavity size and normal right ventricular systolic function.   2. Left atrium is moderately dilated.   3. The right atrium is severely dilated.   4. Moderate mitral regurgitation.   5. Mild tomoderate tricuspid regurgitation.   6. Estimated pulmonary artery systolic pressure is 57 mmHg, consistent with moderate pulmonary hypertension.   7. No pericardial effusion seen.   8. Multiple segmental abnormalities exist. See findings.   9. Left ventricular systolic function is moderately decreased with an ejection fraction of 33 % by [employer]'s method of disks with an ejection fraction visually estimated at 30 to 35 %.    < end of copied text >      < from: CT Angio Chest PE Protocol w/ IV Cont (10.27.24 @ 04:08) >  IMPRESSION:  No pulmonary embolism.    New 5 cm left lower lobe lung mass with new subcarinal lymphadenopathy   suspicious for progression of tumor.    Pulmonary edema and small pleural effusions.      --- End of Report ---    < end of copied text >

## 2024-10-28 NOTE — CONSULT NOTE ADULT - TIME BILLING
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

## 2024-10-28 NOTE — CONSULT NOTE ADULT - PROBLEM SELECTOR RECOMMENDATION 3
Presents with SOB, and newly reduced EF  Trops 79--and 52  BNP on admission was 4539 and now 59181  Given Lasix 40mg IV bid and -3095ml/24hour Presents with SOB, and newly reduced EF; likely cause is HF exacerbation with afib  Trops 56--and 52  BNP on admission was 4539 and now 52197  Given Lasix 40mg IV bid and -3095ml/24hour

## 2024-10-28 NOTE — PROGRESS NOTE ADULT - ASSESSMENT
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     Acute hypoxic respiratory failure  - maintain tele  - likely 2/2 to new onset afib RVR  - Lasix 40 PO QD  - TTE LVEF 30-35%.   -solumedrol dc'd  - procal negative    Acute onset HFrEF  - Will start GDMT per cardio  - Losartan 50 QD started 10/28  - Will start SGLT 2 and MRA pending hemodynamic response to ARB      Suspected NSTEMI, type 1 vs type 2  - pt currently without chest pain  - c/w asa and heparin gtt  - Trops 120 in ED, likely demand, downtrended  - Cardio consulted, possible [facility] inpatient    Afib with RVR  - metoprolol 25 BID  - In afib on tele, mostly rate controlled  - titrate accordingly  - PRN IV lopressor  - Maintain heparin drip, transition to DOAC on dc    Urinary retention  - RN reports good urine output on lasix  - pt denies incomplete voiding sensation   - c/w doxazosin qhs    ZA  - resolved on repeat labs    Lung cancer  - pt to resume follow up with outpatient provider on d/c    DVT ppx: heparin gtt  Dispo: pending cardio decision on inpatient LHC, likely 1-2 days

## 2024-10-29 DIAGNOSIS — I50.21 ACUTE SYSTOLIC (CONGESTIVE) HEART FAILURE: ICD-10-CM

## 2024-10-29 LAB
ANION GAP SERPL CALC-SCNC: 15 MMOL/L — SIGNIFICANT CHANGE UP (ref 5–17)
ANION GAP SERPL CALC-SCNC: 18 MMOL/L — HIGH (ref 5–17)
APTT BLD: 80.9 SEC — HIGH (ref 24.5–35.6)
BASOPHILS # BLD AUTO: 0.02 K/UL — SIGNIFICANT CHANGE UP (ref 0–0.2)
BASOPHILS NFR BLD AUTO: 0.1 % — SIGNIFICANT CHANGE UP (ref 0–2)
BUN SERPL-MCNC: 35.4 MG/DL — HIGH (ref 8–20)
BUN SERPL-MCNC: 36.8 MG/DL — HIGH (ref 8–20)
CALCIUM SERPL-MCNC: 9 MG/DL — SIGNIFICANT CHANGE UP (ref 8.4–10.5)
CALCIUM SERPL-MCNC: 9.1 MG/DL — SIGNIFICANT CHANGE UP (ref 8.4–10.5)
CHLORIDE SERPL-SCNC: 93 MMOL/L — LOW (ref 96–108)
CHLORIDE SERPL-SCNC: 97 MMOL/L — SIGNIFICANT CHANGE UP (ref 96–108)
CO2 SERPL-SCNC: 25 MMOL/L — SIGNIFICANT CHANGE UP (ref 22–29)
CO2 SERPL-SCNC: 26 MMOL/L — SIGNIFICANT CHANGE UP (ref 22–29)
CREAT SERPL-MCNC: 1.5 MG/DL — HIGH (ref 0.5–1.3)
CREAT SERPL-MCNC: 1.64 MG/DL — HIGH (ref 0.5–1.3)
EGFR: 31 ML/MIN/1.73M2 — LOW
EGFR: 35 ML/MIN/1.73M2 — LOW
EOSINOPHIL # BLD AUTO: 0 K/UL — SIGNIFICANT CHANGE UP (ref 0–0.5)
EOSINOPHIL NFR BLD AUTO: 0 % — SIGNIFICANT CHANGE UP (ref 0–6)
GLUCOSE BLDC GLUCOMTR-MCNC: 95 MG/DL — SIGNIFICANT CHANGE UP (ref 70–99)
GLUCOSE SERPL-MCNC: 104 MG/DL — HIGH (ref 70–99)
GLUCOSE SERPL-MCNC: 87 MG/DL — SIGNIFICANT CHANGE UP (ref 70–99)
HCT VFR BLD CALC: 37.3 % — SIGNIFICANT CHANGE UP (ref 34.5–45)
HGB BLD-MCNC: 12.2 G/DL — SIGNIFICANT CHANGE UP (ref 11.5–15.5)
IMM GRANULOCYTES NFR BLD AUTO: 0.6 % — SIGNIFICANT CHANGE UP (ref 0–0.9)
LYMPHOCYTES # BLD AUTO: 0.4 K/UL — LOW (ref 1–3.3)
LYMPHOCYTES # BLD AUTO: 2.7 % — LOW (ref 13–44)
MCHC RBC-ENTMCNC: 27.3 PG — SIGNIFICANT CHANGE UP (ref 27–34)
MCHC RBC-ENTMCNC: 32.7 GM/DL — SIGNIFICANT CHANGE UP (ref 32–36)
MCV RBC AUTO: 83.4 FL — SIGNIFICANT CHANGE UP (ref 80–100)
MONOCYTES # BLD AUTO: 1.27 K/UL — HIGH (ref 0–0.9)
MONOCYTES NFR BLD AUTO: 8.7 % — SIGNIFICANT CHANGE UP (ref 2–14)
NEUTROPHILS # BLD AUTO: 12.89 K/UL — HIGH (ref 1.8–7.4)
NEUTROPHILS NFR BLD AUTO: 87.9 % — HIGH (ref 43–77)
PLATELET # BLD AUTO: 351 K/UL — SIGNIFICANT CHANGE UP (ref 150–400)
POTASSIUM SERPL-MCNC: 4 MMOL/L — SIGNIFICANT CHANGE UP (ref 3.5–5.3)
POTASSIUM SERPL-MCNC: 4.1 MMOL/L — SIGNIFICANT CHANGE UP (ref 3.5–5.3)
POTASSIUM SERPL-SCNC: 4 MMOL/L — SIGNIFICANT CHANGE UP (ref 3.5–5.3)
POTASSIUM SERPL-SCNC: 4.1 MMOL/L — SIGNIFICANT CHANGE UP (ref 3.5–5.3)
RBC # BLD: 4.47 M/UL — SIGNIFICANT CHANGE UP (ref 3.8–5.2)
RBC # FLD: 14.2 % — SIGNIFICANT CHANGE UP (ref 10.3–14.5)
SODIUM SERPL-SCNC: 136 MMOL/L — SIGNIFICANT CHANGE UP (ref 135–145)
SODIUM SERPL-SCNC: 138 MMOL/L — SIGNIFICANT CHANGE UP (ref 135–145)
WBC # BLD: 15.06 K/UL — HIGH (ref 3.8–10.5)
WBC # FLD AUTO: 15.06 K/UL — HIGH (ref 3.8–10.5)

## 2024-10-29 PROCEDURE — 99234 HOSP IP/OBS SM DT SF/LOW 45: CPT

## 2024-10-29 PROCEDURE — 99222 1ST HOSP IP/OBS MODERATE 55: CPT

## 2024-10-29 PROCEDURE — 99233 SBSQ HOSP IP/OBS HIGH 50: CPT

## 2024-10-29 RX ADMIN — CHLORHEXIDINE GLUCONATE 1 APPLICATION(S): 40 SOLUTION TOPICAL at 05:39

## 2024-10-29 RX ADMIN — Medication 10 MILLIGRAM(S): at 22:30

## 2024-10-29 RX ADMIN — Medication 1000 MILLILITER(S): at 09:12

## 2024-10-29 RX ADMIN — CETIRIZINE HCL 10 MILLIGRAM(S): 10 TABLET ORAL at 13:47

## 2024-10-29 RX ADMIN — HEPARIN SODIUM 1100 UNIT(S)/HR: 10000 INJECTION INTRAVENOUS; SUBCUTANEOUS at 07:41

## 2024-10-29 RX ADMIN — Medication 1 TABLET(S): at 09:10

## 2024-10-29 RX ADMIN — Medication 40 MILLIGRAM(S): at 05:39

## 2024-10-29 RX ADMIN — HEPARIN SODIUM 1100 UNIT(S)/HR: 10000 INJECTION INTRAVENOUS; SUBCUTANEOUS at 19:35

## 2024-10-29 RX ADMIN — Medication 1 TABLET(S): at 17:29

## 2024-10-29 RX ADMIN — Medication 25 MILLIGRAM(S): at 17:29

## 2024-10-29 RX ADMIN — HEPARIN SODIUM 1100 UNIT(S)/HR: 10000 INJECTION INTRAVENOUS; SUBCUTANEOUS at 09:11

## 2024-10-29 RX ADMIN — Medication 2 MILLIGRAM(S): at 22:30

## 2024-10-29 RX ADMIN — Medication 81 MILLIGRAM(S): at 13:47

## 2024-10-29 RX ADMIN — Medication 25 MILLIGRAM(S): at 05:39

## 2024-10-29 RX ADMIN — FAMOTIDINE 10 MILLIGRAM(S): 10 INJECTION INTRAVENOUS at 13:48

## 2024-10-29 RX ADMIN — LOSARTAN POTASSIUM 50 MILLIGRAM(S): 25 TABLET ORAL at 05:39

## 2024-10-29 NOTE — PROGRESS NOTE ADULT - ASSESSMENT
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   She was hypoxic, tachypneic and placed on bipap for increased WOB. Labs signifigant for mild transaminitis, leukocytosis and elevated BNP. Found to be in afib rvr.    Patient admitted and evaluate and seen by ICU placed on [facility].  Patient has much improved overnight with diuresis and is sating 98% on room air, has no edema and lungs are clear.   Patient has seen Dr. Ho in the past, but now requesting for [facility]  Patient last echo and NST was 4 years ago.  Patient states afib is new and heart failure is new.

## 2024-10-29 NOTE — CONSULT NOTE ADULT - ASSESSMENT
Incomplete note, patient not seen   78yo female, former smoker with history of HTN, HLD, lung CA (adenocarcinoma s/p XRT), PAD s/p R CEA c/b CVA, dysphagia who presented with sudden onset SOB found to be hypoxic, tachypneic, and in new AF w/ RVR. Pt placed on  BIPAP and admitted to MICU.  Echo with newly depressed LV function (30-35%), moderate MR, mild-mod TR, mod pHTN. Troponins 61--76-52. Pt seen by general cardiology and [facility] planned. Overnight, patient improved with diuresis and is currently sating well on room air. CTA Chest notable for a new 5cm LLL lung mass with new subcarinal lymphadenopathy suspicious for progression of tumor. EP called for AF management.       Recommendations:    # Atrial fibrillation, new onset   - Continue to monitor on telemetry   - [id number] = 8; age, sex, HTN, CVA, PAD. Currently on heparin. Maintain therapeutic PTTs  - Rates currently well controlled on Metoprolol 25mg twice daily.   - Given new cardiomyopathy, patient would benefit from restoring and maintaining sinus rhythm. Will plan on HERBIE guided cardioversion with likely AAT if there are no planned interventions. Following a cardioversion, patient will need to be on uninterrupted AC for at least 30 days. (CT notable for new 5cm LLL lung mass- pt is aware that no inventions can be done for at least 30 days and is agreeable).   - Maintain Mg > 2 and K > 4    # HFrEF (newly depressed LVEF 30-35%  - [facility] planned for tomorrow   - GDMT per general cardiology  - Diuresis per general cardiology     # Elevated troponin (91--76-52)  - [facility] planned with general cardiology   - Denies active chest pain    - To be d/w Attending, full recommendations to follow       78yo female, former smoker with history of HTN, HLD, lung CA (adenocarcinoma s/p XRT), PAD s/p R CEA c/b CVA, dysphagia who presented with sudden onset SOB found to be hypoxic, tachypneic, and in new AF w/ RVR. Pt placed on  BIPAP and admitted to MICU.  Echo with newly depressed LV function (30-35%), WMA, moderate MR, mild-mod TR, mod pHTN. Troponins 12--76-52. Pt seen by general cardiology and [facility] planned. Overnight, patient improved with diuresis and is currently sating well on room air. CTA Chest notable for a new 5cm LLL lung mass with new subcarinal lymphadenopathy suspicious for progression of tumor. EP called for AF management.       Recommendations:    # Atrial fibrillation, new onset   - Continue to monitor on telemetry   - [id number] = 8; age, sex, HTN, CVA, PAD. Currently on heparin. Maintain therapeutic PTTs  - Rates currently well controlled on Metoprolol 25mg twice daily.   - Given new cardiomyopathy, patient would benefit from restoring and maintaining sinus rhythm. Will plan on HERBIE guided cardioversion with likely AAT if there are no planned interventions. Following a cardioversion, patient will need to be on uninterrupted AC for at least 30 days. (CT notable for new 5cm LLL lung mass- pt is aware that no inventions can be done for at least 30 days and is agreeable).   - Maintain Mg > 2 and K > 4    # HFrEF (newly depressed LVEF 30-35%  - [facility] planned for tomorrow   - GDMT per general cardiology  - Diuresis per general cardiology     # Elevated troponin (37--76-52)  - TTE w/ WMA  - C planned with general cardiology   - Denies active chest pain    - To be d/w Attending, full recommendations to follow

## 2024-10-29 NOTE — PROVIDER CONTACT NOTE (CHANGE IN STATUS NOTIFICATION) - SITUATION
Pt ambulated to restroom with 2 assist, unsteady gait when asked if she is dizzy? pt verbalized "knees feel dizzy",Noted when reaching to wash hands- pt appeared to see water stream but arms deviated towards right side,    Pt appears significantly different than report received from CDU.

## 2024-10-29 NOTE — CONSULT NOTE ADULT - SUBJECTIVE AND OBJECTIVE BOX
[place] [facility]  [facility]   Office: [address] [address]  Telephone: [phone] Fax:[phone]      HPI: 80yo female, former smoker with history of HTN, HLD, lung CA (adenocarcinoma s/p chemo/radiation), PAD s/p R CEA c/b CVA, dysphagia who presented with sudden onset SOB found to be hypoxic, tachypneic, and in new AF w/ RVR. Pt placed on  BIPAP and admitted to MICU. Echo with newly depressed LV function (30-35%), moderate MR, mild-mod TR, mod pHTN. Overnight, patient improved with diuresis and is currently sating well on room air. Pt seen by general cardiology and [facility] planned. CTA Chest notable for a new 5cm LLL lung mass with new subcarinal lymphadenopathy suspicious for progression of tumor. EP called for AF management.     Cardiologist: Dr. Ho in the past, but now requesting for [facility].    EKG: 10/27/2024; AF at 110bpm. QRSD 78ms  TELE: AF, mostly rate controlled    TTE 10/27/2024  CONCLUSIONS:   1. Normal right ventricular cavity size and normal right ventricular systolic function.   2. Left atrium is moderately dilated.   3. The right atrium is severely dilated.   4. Moderate mitral regurgitation.   5. Mild to moderate tricuspid regurgitation.   6. Estimated pulmonary artery systolic pressure is 57 mmHg, consistent with moderate pulmonary hypertension.   7. No pericardial effusion seen.   8. Multiple segmental abnormalities exist. See findings.   9. Left ventricular systolic function is moderately decreased with an ejection fraction of 33 % by Penn's method of disks with an ejection fraction visually estimated at 30 to 35 %.      Home Medications  Aspirin 81 mg oral tablet, chewable: 1 tab(s) orally once a day  Losartan 100 mg oral tablet: 1 tab(s) orally once a day (at bedtime)  Claritin 10 mg oral tablet: 1 tab(s) orally once a day  Simvastatin 20 mg oral tablet: 1 tab(s) orally once a day (at bedtime)  Doxazosin 2 mg oral tablet: 1 tab(s) orally once a day  HydroCHLOROthiazide 25 mg oral tablet: 1 tab(s) orally once a day  Pepcid 20 mg oral tablet: orally once a day  Metoprolol Tartrate 100 mg oral tablet: 1 tab(s) orally 2 times a day      PAST MEDICAL & SURGICAL HISTORY:  Lung CA  Esophageal dysphagia  Cerebrovascular accident (CVA), unspecified mechanism  Hyperlipidemia, unspecified hyperlipidemia type  Carotid artery disease, unspecified laterality  Hypertension  Carotid artery disease  s/p carotid bypass right side  S/P cataract surgery  both eyes  S/P cholecystectomy  S/P tubal ligation  History of left hip replacement      REVIEW OF SYSTEMS:    CONSTITUTIONAL: No fever, weight loss, or fatigue  EYES: No visual disturbances  NECK: No pain or stiffness  RESPIRATORY: No cough, wheezing, chills or hemoptysis; No shortness of breath  CARDIOVASCULAR: see HPI  GASTROINTESTINAL: No abdominal or epigastric pain. No nausea, vomiting, or hematemesis; No diarrhea or constipation. No melena or hematochezia.  NEUROLOGICAL: No headaches, memory loss, loss of strength, numbness, or tremors  SKIN: No itching, burning, rashes, or lesions   LYMPH NODES: No enlarged glands  ENDOCRINE: No heat or cold intolerance; No hair loss  PSYCHIATRIC: No depression, anxiety, mood swings, or difficulty sleeping  HEME/LYMPH: No easy bruising, or bleeding gums      MEDICATIONS  (STANDING):  aspirin  chewable 81 milliGRAM(s) Oral daily  atorvastatin 10 milliGRAM(s) Oral at bedtime  cetirizine 10 milliGRAM(s) Oral daily  chlorhexidine 2% Cloths 1 Application(s) Topical <User Schedule>  doxazosin 2 milliGRAM(s) Oral at bedtime  famotidine    Tablet 10 milliGRAM(s) Oral daily  heparin  Infusion.  Unit(s)/Hr (14 mL/Hr) IV Continuous <Continuous>  influenza  Vaccine (HIGH DOSE) 0.5 milliLiter(s) IntraMuscular once  lactobacillus acidophilus 1 Tablet(s) Oral two times a day with meals  losartan 50 milliGRAM(s) Oral daily  metoprolol tartrate 25 milliGRAM(s) Oral two times a day    MEDICATIONS  (PRN):  heparin   Injectable 6500 Unit(s) IV Push every 6 hours PRN For aPTT less than 40  heparin   Injectable 3000 Unit(s) IV Push every 6 hours PRN For aPTT between 40 - 57  metoprolol tartrate Injectable 5 milliGRAM(s) IV Push once PRN HR greater than 130      Allergies  No Known Allergies    Intolerances  Biaxin (Other)  doxycycline (Diarrhea)      SOCIAL HISTORY:  Former smoker ( 2 ppd x 30 years, quit 1995)    FAMILY HISTORY:  FH: HTN (hypertension) (Grandparent)  FH: heart disease (Grandparent)  FH: heart attack (Father)      Vital Signs Last 24 Hrs  T(C): 37.1 (29 Oct 2024 05:07), Max: 37.3 (28 Oct 2024 17:08)  T(F): 98.7 (29 Oct 2024 05:07), Max: 99.1 (28 Oct 2024 17:08)  HR: 84 (29 Oct 2024 05:07) (84 - 105)  BP: 139/75 (29 Oct 2024 05:07) (116/72 - 140/69)  BP(mean): 87 (28 Oct 2024 20:20) (87 - 87)  RR: 20 (29 Oct 2024 05:07) (18 - 20)  SpO2: 95% (29 Oct 2024 05:07) (94% - 98%)    Parameters below as of 29 Oct 2024 05:07  Patient On (Oxygen Delivery Method): nasal cannula        Physical Exam:  Constitutional: AAOx3, NAD  Neck: supple, No JVD  Cardiovascular: +S1S2 RRR, no murmurs, rubs, gallops   Pulmonary: CTA b/l, unlabored, no wheezes, rales. rhonci  Abdomen: +BS, soft NTND  Extremities: no edema b/l, +distal pulses b/l  Neuro: non focal, speech clear, KANG x 4    LABS:                        12.2   15.06 )-----------( 351      ( 29 Oct 2024 05:45 )             37.3   10-29    136  |  93[L]  |  36.8[H]  ----------------------------<  104[H]  4.0   |  25.0  |  1.64[H]    Ca    9.1      29 Oct 2024 05:45  Phos  4.2     10-28  Mg     2.0     10-28    TPro  6.9  /  Alb  4.1  /  TBili  0.4  /  DBili  x   /  AST  20  /  ALT  26  /  AlkPhos  78  10-28  LIVER FUNCTIONS - ( 28 Oct 2024 06:30 )  Alb: 4.1 g/dL / Pro: 6.9 g/dL / ALK PHOS: 78 U/L / ALT: 26 U/L / AST: 20 U/L / GGT: x           PTT - ( 29 Oct 2024 05:45 )  PTT:80.9 sec    Urinalysis Basic - ( 29 Oct 2024 05:45 )    Color: x / Appearance: x / SG: x / pH: x  Gluc: 104 mg/dL / Ketone: x  / Bili: x / Urobili: x   Blood: x / Protein: x / Nitrite: x   Leuk Esterase: x / RBC: x / WBC x   Sq Epi: x / Non Sq Epi: x / Bacteria: x        RADIOLOGY & ADDITIONAL STUDIES:  TTE 10/27/2024  CONCLUSIONS:   1. Normal right ventricular cavity size and normal right ventricular systolic function.   2. Left atrium is moderately dilated.   3. The right atrium is severely dilated.   4. Moderate mitral regurgitation.   5. Mild to moderate tricuspid regurgitation.   6. Estimated pulmonary artery systolic pressure is 57 mmHg, consistent with moderate pulmonary hypertension.   7. No pericardial effusion seen.   8. Multiple segmental abnormalities exist. See findings.   9. Left ventricular systolic function is moderately decreased with an ejection fraction of 33 % by Penn's method of disks with an ejection fraction visually estimated at 30 to 35 %.    CTA Chest 10/27/2024  IMPRESSION:  No pulmonary embolism.  New 5 cm left lower lobe lung mass with new subcarinal lymphadenopathy   suspicious for progression of tumor.  Pulmonary edema and small pleural effusions.       [place] [facility]ROPHYSIOLOG[facility]  [facility]   Office: [address] [place] [address]  Telephone: [phone] Fax:[phone]      HPI: 80yo female, former smoker with history of HTN, HLD, lung CA (adenocarcinoma s/p chemo/radiation), PAD s/p R CEA c/b CVA, dysphagia who presented with sudden onset SOB found to be hypoxic, tachypneic, and in new AF w/ RVR. Pt placed on  BIPAP and admitted to MICU.  Echo with newly depressed LV function (30-35%), moderate MR, mild-mod TR, mod pHTN. Troponins 59--76-52. Pt seen by general cardiology and [facility] planned. Overnight, patient improved with diuresis and is currently sating well on room air. CTA Chest notable for a new 5cm LLL lung mass with new subcarinal lymphadenopathy suspicious for progression of tumor. EP called for AF management.     Cardiologist: Dr. Ho in the past, but now requesting for [facility].    EKG: 10/27/2024; AF at 110bpm. QRSD 78ms  TELE: AF, mostly rate controlled    TTE 10/27/2024  CONCLUSIONS:   1. Normal right ventricular cavity size and normal right ventricular systolic function.   2. Left atrium is moderately dilated.   3. The right atrium is severely dilated.   4. Moderate mitral regurgitation.   5. Mild to moderate tricuspid regurgitation.   6. Estimated pulmonary artery systolic pressure is 57 mmHg, consistent with moderate pulmonary hypertension.   7. No pericardial effusion seen.   8. Multiple segmental abnormalities exist. See findings.   9. Left ventricular systolic function is moderately decreased with an ejection fraction of 33 % by Penn's method of disks with an ejection fraction visually estimated at 30 to 35 %.      Home Medications  Aspirin 81 mg oral tablet, chewable: 1 tab(s) orally once a day  Losartan 100 mg oral tablet: 1 tab(s) orally once a day (at bedtime)  Claritin 10 mg oral tablet: 1 tab(s) orally once a day  Simvastatin 20 mg oral tablet: 1 tab(s) orally once a day (at bedtime)  Doxazosin 2 mg oral tablet: 1 tab(s) orally once a day  HydroCHLOROthiazide 25 mg oral tablet: 1 tab(s) orally once a day  Pepcid 20 mg oral tablet: orally once a day  Metoprolol Tartrate 100 mg oral tablet: 1 tab(s) orally 2 times a day      PAST MEDICAL & SURGICAL HISTORY:  Lung CA  Esophageal dysphagia  Cerebrovascular accident (CVA), unspecified mechanism  Hyperlipidemia, unspecified hyperlipidemia type  Carotid artery disease, unspecified laterality  Hypertension  Carotid artery disease  s/p carotid bypass right side  S/P cataract surgery  both eyes  S/P cholecystectomy  S/P tubal ligation  History of left hip replacement      REVIEW OF SYSTEMS:    CONSTITUTIONAL: No fever, weight loss, or fatigue  EYES: No visual disturbances  NECK: No pain or stiffness  RESPIRATORY: No cough, wheezing, chills or hemoptysis; No shortness of breath  CARDIOVASCULAR: see HPI  GASTROINTESTINAL: No abdominal or epigastric pain. No nausea, vomiting, or hematemesis; No diarrhea or constipation. No melena or hematochezia.  NEUROLOGICAL: No headaches, memory loss, loss of strength, numbness, or tremors  SKIN: No itching, burning, rashes, or lesions   LYMPH NODES: No enlarged glands  ENDOCRINE: No heat or cold intolerance; No hair loss  PSYCHIATRIC: No depression, anxiety, mood swings, or difficulty sleeping  HEME/LYMPH: No easy bruising, or bleeding gums      MEDICATIONS  (STANDING):  aspirin  chewable 81 milliGRAM(s) Oral daily  atorvastatin 10 milliGRAM(s) Oral at bedtime  cetirizine 10 milliGRAM(s) Oral daily  chlorhexidine 2% Cloths 1 Application(s) Topical <User Schedule>  doxazosin 2 milliGRAM(s) Oral at bedtime  famotidine    Tablet 10 milliGRAM(s) Oral daily  heparin  Infusion.  Unit(s)/Hr (14 mL/Hr) IV Continuous <Continuous>  influenza  Vaccine (HIGH DOSE) 0.5 milliLiter(s) IntraMuscular once  lactobacillus acidophilus 1 Tablet(s) Oral two times a day with meals  losartan 50 milliGRAM(s) Oral daily  metoprolol tartrate 25 milliGRAM(s) Oral two times a day    MEDICATIONS  (PRN):  heparin   Injectable 6500 Unit(s) IV Push every 6 hours PRN For aPTT less than 40  heparin   Injectable 3000 Unit(s) IV Push every 6 hours PRN For aPTT between 40 - 57  metoprolol tartrate Injectable 5 milliGRAM(s) IV Push once PRN HR greater than 130      Allergies  No Known Allergies    Intolerances  Biaxin (Other)  doxycycline (Diarrhea)      SOCIAL HISTORY:  Former smoker ( 2 ppd x 30 years, quit 1995)    FAMILY HISTORY:  FH: HTN (hypertension) (Grandparent)  FH: heart disease (Grandparent)  FH: heart attack (Father)      Vital Signs Last 24 Hrs  T(C): 37.1 (29 Oct 2024 05:07), Max: 37.3 (28 Oct 2024 17:08)  T(F): 98.7 (29 Oct 2024 05:07), Max: 99.1 (28 Oct 2024 17:08)  HR: 84 (29 Oct 2024 05:07) (84 - 105)  BP: 139/75 (29 Oct 2024 05:07) (116/72 - 140/69)  BP(mean): 87 (28 Oct 2024 20:20) (87 - 87)  RR: 20 (29 Oct 2024 05:07) (18 - 20)  SpO2: 95% (29 Oct 2024 05:07) (94% - 98%)    Parameters below as of 29 Oct 2024 05:07  Patient On (Oxygen Delivery Method): nasal cannula        Physical Exam:  Constitutional: AAOx3, NAD  Neck: supple, No JVD  Cardiovascular: +S1S2 RRR, no murmurs, rubs, gallops   Pulmonary: CTA b/l, unlabored, no wheezes, rales. rhonci  Abdomen: +BS, soft NTND  Extremities: no edema b/l, +distal pulses b/l  Neuro: non focal, speech clear, KANG x 4    LABS:                        12.2   15.06 )-----------( 351      ( 29 Oct 2024 05:45 )             37.3   10-29    136  |  93[L]  |  36.8[H]  ----------------------------<  104[H]  4.0   |  25.0  |  1.64[H]    Ca    9.1      29 Oct 2024 05:45  Phos  4.2     10-28  Mg     2.0     10-28    TPro  6.9  /  Alb  4.1  /  TBili  0.4  /  DBili  x   /  AST  20  /  ALT  26  /  AlkPhos  78  10-28  LIVER FUNCTIONS - ( 28 Oct 2024 06:30 )  Alb: 4.1 g/dL / Pro: 6.9 g/dL / ALK PHOS: 78 U/L / ALT: 26 U/L / AST: 20 U/L / GGT: x           PTT - ( 29 Oct 2024 05:45 )  PTT:80.9 sec    Urinalysis Basic - ( 29 Oct 2024 05:45 )    Color: x / Appearance: x / SG: x / pH: x  Gluc: 104 mg/dL / Ketone: x  / Bili: x / Urobili: x   Blood: x / Protein: x / Nitrite: x   Leuk Esterase: x / RBC: x / WBC x   Sq Epi: x / Non Sq Epi: x / Bacteria: x        RADIOLOGY & ADDITIONAL STUDIES:  TTE 10/27/2024  CONCLUSIONS:   1. Normal right ventricular cavity size and normal right ventricular systolic function.   2. Left atrium is moderately dilated.   3. The right atrium is severely dilated.   4. Moderate mitral regurgitation.   5. Mild to moderate tricuspid regurgitation.   6. Estimated pulmonary artery systolic pressure is 57 mmHg, consistent with moderate pulmonary hypertension.   7. No pericardial effusion seen.   8. Multiple segmental abnormalities exist. See findings.   9. Left ventricular systolic function is moderately decreased with an ejection fraction of 33 % by Penn's method of disks with an ejection fraction visually estimated at 30 to 35 %.    CTA Chest 10/27/2024  IMPRESSION:  No pulmonary embolism.  New 5 cm left lower lobe lung mass with new subcarinal lymphadenopathy   suspicious for progression of tumor.  Pulmonary edema and small pleural effusions.       [place] [facility]  [facility]   Office: [address] [place] [address]  Telephone: [phone] Fax:[phone]      HPI: 78yo female, former smoker with history of HTN, HLD, breast CA (s/p mastectomy 2015), lung CA (adenocarcinoma s/p XRT), PAD s/p R CEA c/b CVA, dysphagia who presented with sudden onset SOB found to be hypoxic, tachypneic, and in new AF w/ RVR. Pt placed on  BIPAP and admitted to MICU.  Echo with newly depressed LV function (30-35%), moderate MR, mild-mod TR, mod pHTN. Troponins 92--76-52. Pt seen by general cardiology and [facility] planned. Overnight, patient improved with diuresis and is currently sating well on room air. CTA Chest notable for a new 5cm LLL lung mass with new subcarinal lymphadenopathy suspicious for progression of tumor. EP called for AF management.     Cardiologist: Dr. Ho in the past, but now requesting for [facility].    EKG: 10/27/2024; AF at 110bpm. QRSD 78ms  TELE: AF, mostly rate controlled    TTE 10/27/2024  CONCLUSIONS:   1. Normal right ventricular cavity size and normal right ventricular systolic function.   2. Left atrium is moderately dilated.   3. The right atrium is severely dilated.   4. Moderate mitral regurgitation.   5. Mild to moderate tricuspid regurgitation.   6. Estimated pulmonary artery systolic pressure is 57 mmHg, consistent with moderate pulmonary hypertension.   7. No pericardial effusion seen.   8. Multiple segmental abnormalities exist. See findings.   9. Left ventricular systolic function is moderately decreased with an ejection fraction of 33 % by Penn's method of disks with an ejection fraction visually estimated at 30 to 35 %.      Home Medications  Aspirin 81 mg oral tablet, chewable: 1 tab(s) orally once a day  Losartan 100 mg oral tablet: 1 tab(s) orally once a day (at bedtime)  Claritin 10 mg oral tablet: 1 tab(s) orally once a day  Simvastatin 20 mg oral tablet: 1 tab(s) orally once a day (at bedtime)  Doxazosin 2 mg oral tablet: 1 tab(s) orally once a day  HydroCHLOROthiazide 25 mg oral tablet: 1 tab(s) orally once a day  Pepcid 20 mg oral tablet: orally once a day  Metoprolol Tartrate 100 mg oral tablet: 1 tab(s) orally 2 times a day      PAST MEDICAL & SURGICAL HISTORY:  Lung CA  Esophageal dysphagia  Cerebrovascular accident (CVA), unspecified mechanism  Hyperlipidemia, unspecified hyperlipidemia type  Carotid artery disease, unspecified laterality  Hypertension  Carotid artery disease  s/p carotid bypass right side  S/P cataract surgery  both eyes  S/P cholecystectomy  S/P tubal ligation  History of left hip replacement      REVIEW OF SYSTEMS:    CONSTITUTIONAL: No fever, weight loss, or fatigue  EYES: No visual disturbances  NECK: No pain or stiffness  RESPIRATORY: No cough, wheezing, chills or hemoptysis; No shortness of breath  CARDIOVASCULAR: see HPI  GASTROINTESTINAL: No abdominal or epigastric pain. No nausea, vomiting, or hematemesis; No diarrhea or constipation. No melena or hematochezia.  NEUROLOGICAL: No headaches, memory loss, loss of strength, numbness, or tremors  SKIN: No itching, burning, rashes, or lesions   LYMPH NODES: No enlarged glands  ENDOCRINE: No heat or cold intolerance; No hair loss  PSYCHIATRIC: No depression, anxiety, mood swings, or difficulty sleeping  HEME/LYMPH: No easy bruising, or bleeding gums      MEDICATIONS  (STANDING):  aspirin  chewable 81 milliGRAM(s) Oral daily  atorvastatin 10 milliGRAM(s) Oral at bedtime  cetirizine 10 milliGRAM(s) Oral daily  chlorhexidine 2% Cloths 1 Application(s) Topical <User Schedule>  doxazosin 2 milliGRAM(s) Oral at bedtime  famotidine    Tablet 10 milliGRAM(s) Oral daily  heparin  Infusion.  Unit(s)/Hr (14 mL/Hr) IV Continuous <Continuous>  influenza  Vaccine (HIGH DOSE) 0.5 milliLiter(s) IntraMuscular once  lactobacillus acidophilus 1 Tablet(s) Oral two times a day with meals  losartan 50 milliGRAM(s) Oral daily  metoprolol tartrate 25 milliGRAM(s) Oral two times a day    MEDICATIONS  (PRN):  heparin   Injectable 6500 Unit(s) IV Push every 6 hours PRN For aPTT less than 40  heparin   Injectable 3000 Unit(s) IV Push every 6 hours PRN For aPTT between 40 - 57  metoprolol tartrate Injectable 5 milliGRAM(s) IV Push once PRN HR greater than 130      Allergies  No Known Allergies    Intolerances  Biaxin (Other)  doxycycline (Diarrhea)      SOCIAL HISTORY:  Former smoker ( 2 ppd x 30 years, quit 1995)    FAMILY HISTORY:  FH: HTN (hypertension) (Grandparent)  FH: heart disease (Grandparent)  FH: heart attack (Father)      Vital Signs Last 24 Hrs  T(C): 37.1 (29 Oct 2024 05:07), Max: 37.3 (28 Oct 2024 17:08)  T(F): 98.7 (29 Oct 2024 05:07), Max: 99.1 (28 Oct 2024 17:08)  HR: 84 (29 Oct 2024 05:07) (84 - 105)  BP: 139/75 (29 Oct 2024 05:07) (116/72 - 140/69)  BP(mean): 87 (28 Oct 2024 20:20) (87 - 87)  RR: 20 (29 Oct 2024 05:07) (18 - 20)  SpO2: 95% (29 Oct 2024 05:07) (94% - 98%)    Parameters below as of 29 Oct 2024 05:07  Patient On (Oxygen Delivery Method): nasal cannula        Physical Exam:  Constitutional: AAOx3, NAD  Neck: supple, No JVD  Cardiovascular: +S1S2 RRR, no murmurs, rubs, gallops   Pulmonary: CTA b/l, unlabored, no wheezes, rales. rhonci  Abdomen: +BS, soft NTND  Extremities: no edema b/l, +distal pulses b/l  Neuro: non focal, speech clear, KANG x 4    LABS:                        12.2   15.06 )-----------( 351      ( 29 Oct 2024 05:45 )             37.3   10-29    136  |  93[L]  |  36.8[H]  ----------------------------<  104[H]  4.0   |  25.0  |  1.64[H]    Ca    9.1      29 Oct 2024 05:45  Phos  4.2     10-28  Mg     2.0     10-28    TPro  6.9  /  Alb  4.1  /  TBili  0.4  /  DBili  x   /  AST  20  /  ALT  26  /  AlkPhos  78  10-28  LIVER FUNCTIONS - ( 28 Oct 2024 06:30 )  Alb: 4.1 g/dL / Pro: 6.9 g/dL / ALK PHOS: 78 U/L / ALT: 26 U/L / AST: 20 U/L / GGT: x           PTT - ( 29 Oct 2024 05:45 )  PTT:80.9 sec    Urinalysis Basic - ( 29 Oct 2024 05:45 )    Color: x / Appearance: x / SG: x / pH: x  Gluc: 104 mg/dL / Ketone: x  / Bili: x / Urobili: x   Blood: x / Protein: x / Nitrite: x   Leuk Esterase: x / RBC: x / WBC x   Sq Epi: x / Non Sq Epi: x / Bacteria: x        RADIOLOGY & ADDITIONAL STUDIES:  TTE 10/27/2024  CONCLUSIONS:   1. Normal right ventricular cavity size and normal right ventricular systolic function.   2. Left atrium is moderately dilated.   3. The right atrium is severely dilated.   4. Moderate mitral regurgitation.   5. Mild to moderate tricuspid regurgitation.   6. Estimated pulmonary artery systolic pressure is 57 mmHg, consistent with moderate pulmonary hypertension.   7. No pericardial effusion seen.   8. Multiple segmental abnormalities exist. See findings.   9. Left ventricular systolic function is moderately decreased with an ejection fraction of 33 % by Penn's method of disks with an ejection fraction visually estimated at 30 to 35 %.    CTA Chest 10/27/2024  IMPRESSION:  No pulmonary embolism.  New 5 cm left lower lobe lung mass with new subcarinal lymphadenopathy   suspicious for progression of tumor.  Pulmonary edema and small pleural effusions.       [place] [facility]  [facility]   Office: [address] [address]  Telephone: [phone] Fax:[phone]      HPI: 80yo female, former smoker with history of HTN, HLD, lung CA (adenocarcinoma s/p XRT), PAD s/p R CEA c/b CVA, dysphagia who presented with sudden onset SOB found to be hypoxic, tachypneic, and in new AF w/ RVR. Pt placed on  BIPAP and admitted to MICU.  Echo with newly depressed LV function (30-35%), moderate MR, mild-mod TR, mod pHTN. Troponins 01--76-52. Pt seen by general cardiology and [facility] planned. Overnight, patient improved with diuresis and is currently sating well on room air. CTA Chest notable for a new 5cm LLL lung mass with new subcarinal lymphadenopathy suspicious for progression of tumor. EP called for AF management.     Pt seen and examined, reports feeling well, denies any complaints. Denies any prior h/o of AF, denies any palpitations, CP, dizziness, syncope, near syncope, abdominal pain, N/V/D, hematuria, bloody and or dark stools. She reports that she followed with Dr. Ho in the past, but now requesting for [facility].     EKG: 10/27/2024; AF at 110bpm. QRSD 78ms  TELE: AF, mostly rate controlled    TTE 10/27/2024  CONCLUSIONS:   1. Normal right ventricular cavity size and normal right ventricular systolic function.   2. Left atrium is moderately dilated.   3. The right atrium is severely dilated.   4. Moderate mitral regurgitation.   5. Mild to moderate tricuspid regurgitation.   6. Estimated pulmonary artery systolic pressure is 57 mmHg, consistent with moderate pulmonary hypertension.   7. No pericardial effusion seen.   8. Multiple segmental abnormalities exist. See findings.   9. Left ventricular systolic function is moderately decreased with an ejection fraction of 33 % by Penn's method of disks with an ejection fraction visually estimated at 30 to 35 %.      Home Medications  Aspirin 81 mg oral tablet, chewable: 1 tab(s) orally once a day  Losartan 100 mg oral tablet: 1 tab(s) orally once a day (at bedtime)  Claritin 10 mg oral tablet: 1 tab(s) orally once a day  Simvastatin 20 mg oral tablet: 1 tab(s) orally once a day (at bedtime)  Doxazosin 2 mg oral tablet: 1 tab(s) orally once a day  HydroCHLOROthiazide 25 mg oral tablet: 1 tab(s) orally once a day  Pepcid 20 mg oral tablet: orally once a day  Metoprolol Tartrate 100 mg oral tablet: 1 tab(s) orally 2 times a day      PAST MEDICAL & SURGICAL HISTORY:  Lung CA  Esophageal dysphagia  Cerebrovascular accident (CVA), unspecified mechanism  Hyperlipidemia, unspecified hyperlipidemia type  Carotid artery disease, unspecified laterality  Hypertension  Carotid artery disease  s/p carotid bypass right side  S/P cataract surgery  both eyes  S/P cholecystectomy  S/P tubal ligation  History of left hip replacement      REVIEW OF SYSTEMS:    CONSTITUTIONAL: No fever, weight loss, or fatigue  NECK: No pain or stiffness  RESPIRATORY: No cough, wheezing, chills or hemoptysis; + shortness of breath  CARDIOVASCULAR: see HPI  GASTROINTESTINAL: No abdominal or epigastric pain. No nausea, vomiting, or hematemesis; No diarrhea or constipation. No melena or hematochezia.  NEUROLOGICAL: No headaches, memory loss, loss of strength, numbness, or tremors  SKIN: No itching, burning, rashes, or lesions   LYMPH NODES: No enlarged glands  ENDOCRINE: No heat or cold intolerance; No hair loss  PSYCHIATRIC: No depression, anxiety, mood swings, or difficulty sleeping  HEME/LYMPH: No easy bruising, or bleeding gums      MEDICATIONS  (STANDING):  aspirin  chewable 81 milliGRAM(s) Oral daily  atorvastatin 10 milliGRAM(s) Oral at bedtime  cetirizine 10 milliGRAM(s) Oral daily  chlorhexidine 2% Cloths 1 Application(s) Topical <User Schedule>  doxazosin 2 milliGRAM(s) Oral at bedtime  famotidine    Tablet 10 milliGRAM(s) Oral daily  heparin  Infusion.  Unit(s)/Hr (14 mL/Hr) IV Continuous <Continuous>  influenza  Vaccine (HIGH DOSE) 0.5 milliLiter(s) IntraMuscular once  lactobacillus acidophilus 1 Tablet(s) Oral two times a day with meals  losartan 50 milliGRAM(s) Oral daily  metoprolol tartrate 25 milliGRAM(s) Oral two times a day    MEDICATIONS  (PRN):  heparin   Injectable 6500 Unit(s) IV Push every 6 hours PRN For aPTT less than 40  heparin   Injectable 3000 Unit(s) IV Push every 6 hours PRN For aPTT between 40 - 57  metoprolol tartrate Injectable 5 milliGRAM(s) IV Push once PRN HR greater than 130      Allergies  No Known Allergies    Intolerances  Biaxin (Other)  doxycycline (Diarrhea)      SOCIAL HISTORY:  Former smoker ( 2 ppd x 30 years, quit 1995)  Denies ETOH  Denies drug use    FAMILY HISTORY:  FH: HTN (hypertension) (Grandparent)  FH: heart disease (Grandparent)  FH: heart attack (Father)      Vital Signs Last 24 Hrs  T(C): 37.1 (29 Oct 2024 05:07), Max: 37.3 (28 Oct 2024 17:08)  T(F): 98.7 (29 Oct 2024 05:07), Max: 99.1 (28 Oct 2024 17:08)  HR: 84 (29 Oct 2024 05:07) (84 - 105)  BP: 139/75 (29 Oct 2024 05:07) (116/72 - 140/69)  BP(mean): 87 (28 Oct 2024 20:20) (87 - 87)  RR: 20 (29 Oct 2024 05:07) (18 - 20)  SpO2: 95% (29 Oct 2024 05:07) (94% - 98%)    Parameters below as of 29 Oct 2024 05:07  Patient On (Oxygen Delivery Method): nasal cannula        Physical Exam:  Constitutional: AAOx3, NAD  Neck: supple, No JVD  Cardiovascular: Irregularly irregular   Pulmonary: CTA b/l, unlabored, no wheezes, rales. rhonci  Abdomen: soft NTTP  Extremities: no edema b/l  Neuro: non focal, speech clear, KANG x 4    LABS:                        12.2   15.06 )-----------( 351      ( 29 Oct 2024 05:45 )             37.3   10-29    136  |  93[L]  |  36.8[H]  ----------------------------<  104[H]  4.0   |  25.0  |  1.64[H]    Ca    9.1      29 Oct 2024 05:45  Phos  4.2     10-28  Mg     2.0     10-28    TPro  6.9  /  Alb  4.1  /  TBili  0.4  /  DBili  x   /  AST  20  /  ALT  26  /  AlkPhos  78  10-28  LIVER FUNCTIONS - ( 28 Oct 2024 06:30 )  Alb: 4.1 g/dL / Pro: 6.9 g/dL / ALK PHOS: 78 U/L / ALT: 26 U/L / AST: 20 U/L / GGT: x           PTT - ( 29 Oct 2024 05:45 )  PTT:80.9 sec    Urinalysis Basic - ( 29 Oct 2024 05:45 )    Color: x / Appearance: x / SG: x / pH: x  Gluc: 104 mg/dL / Ketone: x  / Bili: x / Urobili: x   Blood: x / Protein: x / Nitrite: x   Leuk Esterase: x / RBC: x / WBC x   Sq Epi: x / Non Sq Epi: x / Bacteria: x        RADIOLOGY & ADDITIONAL STUDIES:  TTE 10/27/2024  CONCLUSIONS:   1. Normal right ventricular cavity size and normal right ventricular systolic function.   2. Left atrium is moderately dilated.   3. The right atrium is severely dilated.   4. Moderate mitral regurgitation.   5. Mild to moderate tricuspid regurgitation.   6. Estimated pulmonary artery systolic pressure is 57 mmHg, consistent with moderate pulmonary hypertension.   7. No pericardial effusion seen.   8. Multiple segmental abnormalities exist. See findings.   9. Left ventricular systolic function is moderately decreased with an ejection fraction of 33 % by Penn's method of disks with an ejection fraction visually estimated at 30 to 35 %.    CTA Chest 10/27/2024  IMPRESSION:  No pulmonary embolism.  New 5 cm left lower lobe lung mass with new subcarinal lymphadenopathy   suspicious for progression of tumor.  Pulmonary edema and small pleural effusions.       [facility]  [facility]   Office: [address] [address]  Telephone: [phone] Fax:[phone]      HPI: 80yo female, former smoker with history of HTN, HLD, lung CA (adenocarcinoma s/p XRT), PAD s/p R CEA c/b CVA, dysphagia who presented with sudden onset SOB found to be hypoxic, tachypneic, and in new AF w/ RVR. Pt placed on  BIPAP and admitted to MICU.  Echo with newly depressed LV function (30-35%), WMA, moderate MR, mild-mod TR, mod pHTN. Troponins 68--76-52. Pt seen by general cardiology and [facility] planned. Overnight, patient improved with diuresis and is currently sating well on room air. CTA Chest notable for a new 5cm LLL lung mass with new subcarinal lymphadenopathy suspicious for progression of tumor. EP called for AF management.     Pt seen and examined, reports feeling well, denies any complaints. Denies any prior h/o of AF, denies any palpitations, CP, dizziness, syncope, near syncope, abdominal pain, N/V/D, hematuria, bloody and or dark stools. She reports that she followed with Dr. Ho in the past, but now requesting for [facility].     EKG: 10/27/2024; AF at 110bpm. QRSD 78ms  TELE: AF, mostly rate controlled    TTE 10/27/2024  CONCLUSIONS:   1. Normal right ventricular cavity size and normal right ventricular systolic function.   2. Left atrium is moderately dilated.   3. The right atrium is severely dilated.   4. Moderate mitral regurgitation.   5. Mild to moderate tricuspid regurgitation.   6. Estimated pulmonary artery systolic pressure is 57 mmHg, consistent with moderate pulmonary hypertension.   7. No pericardial effusion seen.   8. Multiple segmental abnormalities exist. See findings.   9. Left ventricular systolic function is moderately decreased with an ejection fraction of 33 % by Penn's method of disks with an ejection fraction visually estimated at 30 to 35 %.      Home Medications  Aspirin 81 mg oral tablet, chewable: 1 tab(s) orally once a day  Losartan 100 mg oral tablet: 1 tab(s) orally once a day (at bedtime)  Claritin 10 mg oral tablet: 1 tab(s) orally once a day  Simvastatin 20 mg oral tablet: 1 tab(s) orally once a day (at bedtime)  Doxazosin 2 mg oral tablet: 1 tab(s) orally once a day  HydroCHLOROthiazide 25 mg oral tablet: 1 tab(s) orally once a day  Pepcid 20 mg oral tablet: orally once a day  Metoprolol Tartrate 100 mg oral tablet: 1 tab(s) orally 2 times a day      PAST MEDICAL & SURGICAL HISTORY:  Lung CA  Esophageal dysphagia  Cerebrovascular accident (CVA), unspecified mechanism  Hyperlipidemia, unspecified hyperlipidemia type  Carotid artery disease, unspecified laterality  Hypertension  Carotid artery disease  s/p carotid bypass right side  S/P cataract surgery  both eyes  S/P cholecystectomy  S/P tubal ligation  History of left hip replacement      REVIEW OF SYSTEMS:    CONSTITUTIONAL: No fever, weight loss, or fatigue  NECK: No pain or stiffness  RESPIRATORY: No cough, wheezing, chills or hemoptysis; + shortness of breath  CARDIOVASCULAR: see HPI  GASTROINTESTINAL: No abdominal or epigastric pain. No nausea, vomiting, or hematemesis; No diarrhea or constipation. No melena or hematochezia.  NEUROLOGICAL: No headaches, memory loss, loss of strength, numbness, or tremors  SKIN: No itching, burning, rashes, or lesions   LYMPH NODES: No enlarged glands  ENDOCRINE: No heat or cold intolerance; No hair loss  PSYCHIATRIC: No depression, anxiety, mood swings, or difficulty sleeping  HEME/LYMPH: No easy bruising, or bleeding gums      MEDICATIONS  (STANDING):  aspirin  chewable 81 milliGRAM(s) Oral daily  atorvastatin 10 milliGRAM(s) Oral at bedtime  cetirizine 10 milliGRAM(s) Oral daily  chlorhexidine 2% Cloths 1 Application(s) Topical <User Schedule>  doxazosin 2 milliGRAM(s) Oral at bedtime  famotidine    Tablet 10 milliGRAM(s) Oral daily  heparin  Infusion.  Unit(s)/Hr (14 mL/Hr) IV Continuous <Continuous>  influenza  Vaccine (HIGH DOSE) 0.5 milliLiter(s) IntraMuscular once  lactobacillus acidophilus 1 Tablet(s) Oral two times a day with meals  losartan 50 milliGRAM(s) Oral daily  metoprolol tartrate 25 milliGRAM(s) Oral two times a day    MEDICATIONS  (PRN):  heparin   Injectable 6500 Unit(s) IV Push every 6 hours PRN For aPTT less than 40  heparin   Injectable 3000 Unit(s) IV Push every 6 hours PRN For aPTT between 40 - 57  metoprolol tartrate Injectable 5 milliGRAM(s) IV Push once PRN HR greater than 130      Allergies  No Known Allergies    Intolerances  Biaxin (Other)  doxycycline (Diarrhea)      SOCIAL HISTORY:  Former smoker ( 2 ppd x 30 years, quit 1995)  Denies ETOH  Denies drug use    FAMILY HISTORY:  FH: HTN (hypertension) (Grandparent)  FH: heart disease (Grandparent)  FH: heart attack (Father)      Vital Signs Last 24 Hrs  T(C): 37.1 (29 Oct 2024 05:07), Max: 37.3 (28 Oct 2024 17:08)  T(F): 98.7 (29 Oct 2024 05:07), Max: 99.1 (28 Oct 2024 17:08)  HR: 84 (29 Oct 2024 05:07) (84 - 105)  BP: 139/75 (29 Oct 2024 05:07) (116/72 - 140/69)  BP(mean): 87 (28 Oct 2024 20:20) (87 - 87)  RR: 20 (29 Oct 2024 05:07) (18 - 20)  SpO2: 95% (29 Oct 2024 05:07) (94% - 98%)    Parameters below as of 29 Oct 2024 05:07  Patient On (Oxygen Delivery Method): nasal cannula        Physical Exam:  Constitutional: AAOx3, NAD  Neck: supple, No JVD  Cardiovascular: Irregularly irregular   Pulmonary: CTA b/l, unlabored, no wheezes, rales. rhonci  Abdomen: soft NTTP  Extremities: no edema b/l  Neuro: non focal, speech clear, KANG x 4    LABS:                        12.2   15.06 )-----------( 351      ( 29 Oct 2024 05:45 )             37.3   10-29    136  |  93[L]  |  36.8[H]  ----------------------------<  104[H]  4.0   |  25.0  |  1.64[H]    Ca    9.1      29 Oct 2024 05:45  Phos  4.2     10-28  Mg     2.0     10-28    TPro  6.9  /  Alb  4.1  /  TBili  0.4  /  DBili  x   /  AST  20  /  ALT  26  /  AlkPhos  78  10-28  LIVER FUNCTIONS - ( 28 Oct 2024 06:30 )  Alb: 4.1 g/dL / Pro: 6.9 g/dL / ALK PHOS: 78 U/L / ALT: 26 U/L / AST: 20 U/L / GGT: x           PTT - ( 29 Oct 2024 05:45 )  PTT:80.9 sec    Urinalysis Basic - ( 29 Oct 2024 05:45 )    Color: x / Appearance: x / SG: x / pH: x  Gluc: 104 mg/dL / Ketone: x  / Bili: x / Urobili: x   Blood: x / Protein: x / Nitrite: x   Leuk Esterase: x / RBC: x / WBC x   Sq Epi: x / Non Sq Epi: x / Bacteria: x        RADIOLOGY & ADDITIONAL STUDIES:  TTE 10/27/2024  CONCLUSIONS:   1. Normal right ventricular cavity size and normal right ventricular systolic function.   2. Left atrium is moderately dilated.   3. The right atrium is severely dilated.   4. Moderate mitral regurgitation.   5. Mild to moderate tricuspid regurgitation.   6. Estimated pulmonary artery systolic pressure is 57 mmHg, consistent with moderate pulmonary hypertension.   7. No pericardial effusion seen.   8. Multiple segmental abnormalities exist. See findings.   9. Left ventricular systolic function is moderately decreased with an ejection fraction of 33 % by Penn's method of disks with an ejection fraction visually estimated at 30 to 35 %.    CTA Chest 10/27/2024  IMPRESSION:  No pulmonary embolism.  New 5 cm left lower lobe lung mass with new subcarinal lymphadenopathy   suspicious for progression of tumor.  Pulmonary edema and small pleural effusions.

## 2024-10-29 NOTE — CONSULT NOTE ADULT - NS ATTEND AMEND GEN_ALL_CORE FT
79 year old female with history of lung cancer (adenocarcinoma s/p XRT), PAD s/p R CEA c/b CVA who presents with acute decompensated systolic heart failure (EF 30-35%) and new onset atrial fibrillation with rapid response.  She has been started on metoprolol and her rates are better controlled.  Agree with a rhythm control strategy given her cardiomyopathy.  To undergo LHC to rule out obstructive CAD. If negative, will plan for HERBIE/DCCV and amiodarone therapy for maintenance of sinus rhythm.  Continue with current metoprolol dosing and maintain on anticoagulation.
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     She presented with sudden onset dyspnea when she got up to use the restroom this evening.   She was hypoxic, tachypneic and placed on bipap for increased work of breathing. Labs significant for mild transaminitis, leukocytosis and elevated BNP. Found to be in afib rvr.    Patient admitted and seen by ICU placed on BiPAP.  Patient has much improved overnight with diuresis and is sating 98% on room air, has no edema and lungs are clear.   Patient has seen Dr. Ho in the past, but now requesting for [facility]. Patient last echo and NST was 4 years ago.  Patient states afib is new and heart failure is new.   Denies headache dizziness, chest pain, palpations sob, pn, orthopnea, hemoptysis, n/v/d/c/abd pain, fever, chills, syncope, near syncope, urinary discomfort, cramps or any other discomfort.      --Recommend left heart catheterization with coronary angiogram tomorrow. NPO midnight. The risks and benefits of procedure as known by me was discussed in detail to the patient and family identified by patient as appropriate for sensitive medical information. The possible recommendations determined from the information derived from the cardiac catheterization (including medical therapy, transcatheter coronary intervention, and surgery) were also explained. Risks, benefits and alternatives explained to patient who wishes to proceed. Time was allowed for questions which were answered.  Informed consent will be signed by and detailed procedural technique, risks, benefits and alternatives will be discussed and signed by my partner the performing proceduralist. I will defer further treatment of this cardiac pathology to their expertise tomorrow.  --Continue UFH for now, will need to be switched over to a DOAC prior to discharge.   --Can change furosemide to po tomorrow.  --Continue metoprolol tartrate for now but please change to metoprolol succinate prior to discharge.  --Add ARNi after cath but prior to discharge.   --Add SGLT2i after cath but prior to discharge  --Add MRA after cath but prior to discharge.  --Keep K>4 and Mg>2.   --Continue tele

## 2024-10-29 NOTE — PROGRESS NOTE ADULT - SUBJECTIVE AND OBJECTIVE BOX
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 In ED she was hypoxic, tachypneic and placed on bipap for increased WOB. Labs significant for mild transaminitis, leukocytosis and elevated BNP. Patient found to be in Afib RVR admitted to MICU for AHRF.  Pro BNP 4536. TTE LVEF 30-35%. . CTA chest  negative for PE., showed small bilateral effusions and known pulmonary nodule. She was continued on bipap and started on empiric IV abx, solumedrol, lasix for multifactorial respiratory failure. Patient responded well to tx plan and was downgraded to telemetry on 10/27.     LAST 24 HOURS: [place] noted on AM labs. LHC deferred. Diuresis help, IVF started.    TODAY: Pt seen at bedside this AM. Pt states she feels as if her respiratory status has improved. Oxygen has been slipping out of her nares and she has not felt dyspnea. Patient expresses hunger. denies headache, dizziness, chest pain, or abdominal pain.  Offers no other acute complaints & ROS otherwise negative.     OBJECTIVE  PHYSICAL EXAM:  GENERAL: No acute distress, comfortably in bed  HEENT: Atraumatic, normocephalic, non-icteric, no JVD  NEURO: A&Ox3, no focal deficits, moving all extremities spontaneously, no dysarthria, CN II-XII grossly intact  PSYCH: Normal affect, calm, appropriate insight and judgment, fluent speech  LUNGS: CTAB, no wrr, non-labored breathing  HEART: RRR, no murmur appreciated  ABD: Soft, non-tender, non-distended, no organomegaly, no appreciable masses, +bs   Skin: wrinkling noted on lower ext, mildly decreased skin turgor, overall exam suggestive of volume depletion.   EXTREMITIES: Nontender, no clubbing, cyanosis, or edema       Vital Signs Last 24 Hrs  T(C): 37.1 (29 Oct 2024 09:50), Max: 37.3 (28 Oct 2024 17:08)  T(F): 98.8 (29 Oct 2024 09:50), Max: 99.1 (28 Oct 2024 17:08)  HR: 96 (29 Oct 2024 09:50) (84 - 104)  BP: 154/79 (29 Oct 2024 09:50) (116/72 - 154/79)  BP(mean): 87 (28 Oct 2024 20:20) (87 - 87)  RR: 18 (29 Oct 2024 09:50) (18 - 20)  SpO2: 96% (29 Oct 2024 09:50) (94% - 96%)    Parameters below as of 29 Oct 2024 09:50  Patient On (Oxygen Delivery Method): nasal cannula  O2 Flow (L/min): 3          MEDICATIONS  (STANDING):  aspirin  chewable 81 milliGRAM(s) Oral daily  atorvastatin 10 milliGRAM(s) Oral at bedtime  cetirizine 10 milliGRAM(s) Oral daily  chlorhexidine 2% Cloths 1 Application(s) Topical <User Schedule>  doxazosin 2 milliGRAM(s) Oral at bedtime  famotidine    Tablet 10 milliGRAM(s) Oral daily  heparin  Infusion.  Unit(s)/Hr (14 mL/Hr) IV Continuous <Continuous>  influenza  Vaccine (HIGH DOSE) 0.5 milliLiter(s) IntraMuscular once  lactobacillus acidophilus 1 Tablet(s) Oral two times a day with meals  losartan 50 milliGRAM(s) Oral daily  metoprolol tartrate 25 milliGRAM(s) Oral two times a day    MEDICATIONS  (PRN):  heparin   Injectable 3000 Unit(s) IV Push every 6 hours PRN For aPTT between 40 - 57  heparin   Injectable 6500 Unit(s) IV Push every 6 hours PRN For aPTT less than 40  metoprolol tartrate Injectable 5 milliGRAM(s) IV Push once PRN HR greater than 130    Allergies    No Known Allergies    Intolerances    Biaxin (Other)  doxycycline (Diarrhea)      LABS:                        12.2   15.06 )-----------( 351      ( 29 Oct 2024 05:45 )             37.3     10-29    138  |  97  |  35.4[H]  ----------------------------<  87  4.1   |  26.0  |  1.50[H]    Ca    9.0      29 Oct 2024 11:44  Phos  4.2     10-28  Mg     2.0     10-28    TPro  6.9  /  Alb  4.1  /  TBili  0.4  /  DBili  x   /  AST  20  /  ALT  26  /  AlkPhos  78  10-28    PTT - ( 29 Oct 2024 05:45 )  PTT:80.9 sec  Urinalysis Basic - ( 29 Oct 2024 11:44 )    Color: x / Appearance: x / SG: x / pH: x  Gluc: 87 mg/dL / Ketone: x  / Bili: x / Urobili: x   Blood: x / Protein: x / Nitrite: x   Leuk Esterase: x / RBC: x / WBC x   Sq Epi: x / Non Sq Epi: x / Bacteria: x      CAPILLARY BLOOD GLUCOSE      POCT Blood Glucose.: 95 mg/dL (29 Oct 2024 08:26)      CULTURE DATA:    Culture - Blood (collected 10-27-24 @ 03:48)  Source: .Blood BLOOD  Preliminary Report (10-29-24 @ 13:01):    No growth at 48 Hours        RADIOLOGY & ADDITIONAL TESTS:      < from: TTE W or WO Ultrasound Enhancing Agent (10.27.24 @ 14:12) >     CONCLUSIONS:      1. Normal right ventricular cavity size and normal right ventricular systolic function.   2. Left atrium is moderately dilated.   3. The right atrium is severely dilated.   4. Moderate mitral regurgitation.   5. Mild tomoderate tricuspid regurgitation.   6. Estimated pulmonary artery systolic pressure is 57 mmHg, consistent with moderate pulmonary hypertension.   7. No pericardial effusion seen.   8. Multiple segmental abnormalities exist. See findings.   9. Left ventricular systolic function is moderately decreased with an ejection fraction of 33 % by Penn's method of disks with an ejection fraction visually estimated at 30 to 35 %.    < end of copied text >      < from: CT Angio Chest PE Protocol w/ IV Cont (10.27.24 @ 04:08) >  IMPRESSION:  No pulmonary embolism.    New 5 cm left lower lobe lung mass with new subcarinal lymphadenopathy   suspicious for progression of tumor.    Pulmonary edema and small pleural effusions.      --- End of Report ---    < end of copied text >

## 2024-10-29 NOTE — PROVIDER CONTACT NOTE (CHANGE IN STATUS NOTIFICATION) - ASSESSMENT
Pt a/o4 w/ confusion, VSS, glucose 107, however bladder scan 1016ml, pt voided on toilet, bladder scan recheck 292ml .

## 2024-10-29 NOTE — PROGRESS NOTE ADULT - ASSESSMENT
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     Acute hypoxic respiratory failure  - maintain tele  - likely 2/2 to new onset afib RVR  - Lasix held, given 500 ml LR and PO fluid intake encouraged.   - TTE LVEF 30-35%.   -solumedrol dc'd  - procal negative    ZA  Cr 1.64 -> 1.50  - Likely due to dehydration and diuresis  - diuresis held, given fluids cautiously in setting of HFrEF     Acute onset HFrEF  - Will start GDMT per cardio  - Losartan 50 QD started 10/28  - Will start SGLT 2 and MRA pending hemodynamic response to ARB      Suspected NSTEMI, type 1 vs type 2  - pt currently without chest pain  - c/w asa and heparin gtt  - Trops 120 in ED, likely demand, downtrended  - Cardio consulted, Inpatient [facility] 10/30 pending renal fxn    Afib with RVR  - metoprolol 25 BID  - In afib on tele, mostly rate controlled  - titrate accordingly  - PRN IV lopressor  - Maintain heparin drip, transition to DOAC on dc    Urinary retention  - RN reports good urine output on lasix  - pt denies incomplete voiding sensation   - c/w doxazosin qhs    ZA  - resolved on repeat labs    Lung cancer  - pt to resume follow up with outpatient provider on d/c    DVT ppx: heparin gtt  Dispo: pending cardio decision on inpatient LHC, likely 1-2 days

## 2024-10-29 NOTE — PROGRESS NOTE ADULT - SUBJECTIVE AND OBJECTIVE BOX
[facility]                                                         CARDIOLOGY AT [place] [address]                                                         Telephone: [phone]. Fax:[phone]                                                                             PROGRESS NOTE    Reason for follow up: new HFrEF / afib, ZA  Update: has ZA      Review of symptoms:   Cardiac:  No chest pain. No dyspnea. No palpitations.  Respiratory: no cough. No dyspnea  Gastrointestinal: No diarrhea. No abdominal pain. No bleeding.   Neuro: No focal neuro complaints.    Vitals:  T(C): 37.1 (10-29-24 @ 09:50), Max: 37.3 (10-28-24 @ 17:08)  HR: 96 (10-29-24 @ 09:50) (84 - 104)  BP: 154/79 (10-29-24 @ 09:50) (116/72 - 154/79)  RR: 18 (10-29-24 @ 09:50) (18 - 20)  SpO2: 96% (10-29-24 @ 09:50) (94% - 96%)  Wt(kg): --  I&O's Summary    28 Oct 2024 07:01  -  29 Oct 2024 07:00  --------------------------------------------------------  IN: 0 mL / OUT: 400 mL / NET: -400 mL    29 Oct 2024 07:01  -  29 Oct 2024 11:12  --------------------------------------------------------  IN: 0 mL / OUT: 700 mL / NET: -700 mL      Weight (kg): 68.3 (10-29 @ 05:07)    PHYSICAL EXAM:  Appearance: Comfortable. No acute distress  HEENT:  Atraumatic. Normocephalic.  Normal oral mucosa  Neurologic: A & O x 3, no gross focal deficits.  Cardiovascular: irregular S1 S2, No murmur, no rubs/gallops. No JVD  Respiratory: Lungs clear to auscultation, unlabored   Gastrointestinal:  Soft, Non-tender, + BS  Lower Extremities: 2+ Peripheral Pulses, No clubbing, cyanosis, or edema  Psychiatry: Patient is calm. No agitation.   Skin: warm and dry.    CURRENT CARDIAC MEDICATIONS:  doxazosin 2 milliGRAM(s) Oral at bedtime  losartan 50 milliGRAM(s) Oral daily  metoprolol tartrate 25 milliGRAM(s) Oral two times a day  metoprolol tartrate Injectable 5 milliGRAM(s) IV Push once PRN      CURRENT OTHER MEDICATIONS:  cetirizine 10 milliGRAM(s) Oral daily  famotidine    Tablet 10 milliGRAM(s) Oral daily  atorvastatin 10 milliGRAM(s) Oral at bedtime  aspirin  chewable 81 milliGRAM(s) Oral daily  chlorhexidine 2% Cloths 1 Application(s) Topical <User Schedule>  heparin   Injectable 6500 Unit(s) IV Push every 6 hours PRN For aPTT less than 40  heparin   Injectable 3000 Unit(s) IV Push every 6 hours PRN For aPTT between 40 - 57  heparin  Infusion.  Unit(s)/Hr (14 mL/Hr) IV Continuous <Continuous>  influenza  Vaccine (HIGH DOSE) 0.5 milliLiter(s) IntraMuscular once      LABS:	 	                            12.2   15.06 )-----------( 351      ( 29 Oct 2024 05:45 )             37.3     10-29    136  |  93[L]  |  36.8[H]  ----------------------------<  104[H]  4.0   |  25.0  |  1.64[H]    Ca    9.1      29 Oct 2024 05:45  Phos  4.2     10-28  Mg     2.0     10-28    TPro  6.9  /  Alb  4.1  /  TBili  0.4  /  DBili  x   /  AST  20  /  ALT  26  /  AlkPhos  78  10-28    PT/INR/PTT ( 29 Oct 2024 05:45 )                       :                       :      X            :       80.9                  .        .                   .              .           .       X           .                                       Lipid Profile:   HgA1c:   TSH: Thyroid Stimulating Hormone, Serum: 2.66 uIU/mL      TELEMETRY: afib      DIAGNOSTIC TESTING:  [ ] Echocardiogram:   < from: TTE W or WO Ultrasound Enhancing Agent (10.27.24 @ 14:12) >  CONCLUSIONS:      1. Normal right ventricular cavity size and normal right ventricular systolic function.   2. Left atrium is moderately dilated.   3. The right atrium is severely dilated.   4. Moderate mitral regurgitation.   5. Mild tomoderate tricuspid regurgitation.   6. Estimated pulmonary artery systolic pressure is 57 mmHg, consistent with moderate pulmonary hypertension.   7. No pericardial effusion seen.   8. Multiple segmental abnormalities exist. See findings.   9. Left ventricular systolic function is moderately decreased with an ejection fraction of 33 % by Penn's method of disks with an ejection fraction visually estimated at 30 to 35 %.    < end of copied text >                                                                    [facility]                                                         CARDIOLOGY AT [place] [address]                                                         Telephone: [phone]. Fax:[phone]                                                                             PROGRESS NOTE    Reason for follow up: new HFrEF / Afib  Update: has ZA on CKD       Review of symptoms:   Cardiac:  No chest pain. No dyspnea. No palpitations.  Respiratory: no cough. No dyspnea  Gastrointestinal: No diarrhea. No abdominal pain. No bleeding.   Neuro: No focal neuro complaints.    Vitals:  T(C): 37.1 (10-29-24 @ 09:50), Max: 37.3 (10-28-24 @ 17:08)  HR: 96 (10-29-24 @ 09:50) (84 - 104)  BP: 154/79 (10-29-24 @ 09:50) (116/72 - 154/79)  RR: 18 (10-29-24 @ 09:50) (18 - 20)  SpO2: 96% (10-29-24 @ 09:50) (94% - 96%)  Wt(kg): --  I&O's Summary    28 Oct 2024 07:01  -  29 Oct 2024 07:00  --------------------------------------------------------  IN: 0 mL / OUT: 400 mL / NET: -400 mL    29 Oct 2024 07:01  -  29 Oct 2024 11:12  --------------------------------------------------------  IN: 0 mL / OUT: 700 mL / NET: -700 mL      Weight (kg): 68.3 (10-29 @ 05:07)    PHYSICAL EXAM:  Appearance: Comfortable. No acute distress  HEENT:  Atraumatic. Normocephalic.  Normal oral mucosa  Neurologic: A & O x 3, no gross focal deficits.  Cardiovascular: irregular S1 S2, No murmur, no rubs/gallops. No JVD  Respiratory: Lungs clear to auscultation, unlabored   Gastrointestinal:  Soft, Non-tender, + BS  Lower Extremities: 2+ Peripheral Pulses, No clubbing, cyanosis, or edema  Psychiatry: Patient is calm. No agitation.   Skin: warm and dry.    CURRENT CARDIAC MEDICATIONS:  doxazosin 2 milliGRAM(s) Oral at bedtime  losartan 50 milliGRAM(s) Oral daily  metoprolol tartrate 25 milliGRAM(s) Oral two times a day  metoprolol tartrate Injectable 5 milliGRAM(s) IV Push once PRN      CURRENT OTHER MEDICATIONS:  cetirizine 10 milliGRAM(s) Oral daily  famotidine    Tablet 10 milliGRAM(s) Oral daily  atorvastatin 10 milliGRAM(s) Oral at bedtime  aspirin  chewable 81 milliGRAM(s) Oral daily  chlorhexidine 2% Cloths 1 Application(s) Topical <User Schedule>  heparin   Injectable 6500 Unit(s) IV Push every 6 hours PRN For aPTT less than 40  heparin   Injectable 3000 Unit(s) IV Push every 6 hours PRN For aPTT between 40 - 57  heparin  Infusion.  Unit(s)/Hr (14 mL/Hr) IV Continuous <Continuous>  influenza  Vaccine (HIGH DOSE) 0.5 milliLiter(s) IntraMuscular once      LABS:	 	                            12.2   15.06 )-----------( 351      ( 29 Oct 2024 05:45 )             37.3     10-29    136  |  93[L]  |  36.8[H]  ----------------------------<  104[H]  4.0   |  25.0  |  1.64[H]    Ca    9.1      29 Oct 2024 05:45  Phos  4.2     10-28  Mg     2.0     10-28    TPro  6.9  /  Alb  4.1  /  TBili  0.4  /  DBili  x   /  AST  20  /  ALT  26  /  AlkPhos  78  10-28    PT/INR/PTT ( 29 Oct 2024 05:45 )                       :                       :      X            :       80.9                  .        .                   .              .           .       X           .                                       Lipid Profile:   HgA1c:   TSH: Thyroid Stimulating Hormone, Serum: 2.66 uIU/mL      TELEMETRY: afib      DIAGNOSTIC TESTING:  [ ] Echocardiogram:   < from: TTE W or WO Ultrasound Enhancing Agent (10.27.24 @ 14:12) >  CONCLUSIONS:      1. Normal right ventricular cavity size and normal right ventricular systolic function.   2. Left atrium is moderately dilated.   3. The right atrium is severely dilated.   4. Moderate mitral regurgitation.   5. Mild tomoderate tricuspid regurgitation.   6. Estimated pulmonary artery systolic pressure is 57 mmHg, consistent with moderate pulmonary hypertension.   7. No pericardial effusion seen.   8. Multiple segmental abnormalities exist. See findings.   9. Left ventricular systolic function is moderately decreased with an ejection fraction of 33 % by Penn's method of disks with an ejection fraction visually estimated at 30 to 35 %.    < end of copied text >

## 2024-10-30 ENCOUNTER — TRANSCRIPTION ENCOUNTER (OUTPATIENT)
Age: 79
End: 2024-10-30

## 2024-10-30 LAB
ANION GAP SERPL CALC-SCNC: 16 MMOL/L — SIGNIFICANT CHANGE UP (ref 5–17)
ANION GAP SERPL CALC-SCNC: 16 MMOL/L — SIGNIFICANT CHANGE UP (ref 5–17)
BUN SERPL-MCNC: 27 MG/DL — HIGH (ref 8–20)
BUN SERPL-MCNC: 30.8 MG/DL — HIGH (ref 8–20)
CALCIUM SERPL-MCNC: 8.9 MG/DL — SIGNIFICANT CHANGE UP (ref 8.4–10.5)
CALCIUM SERPL-MCNC: 9 MG/DL — SIGNIFICANT CHANGE UP (ref 8.4–10.5)
CHLORIDE SERPL-SCNC: 101 MMOL/L — SIGNIFICANT CHANGE UP (ref 96–108)
CHLORIDE SERPL-SCNC: 98 MMOL/L — SIGNIFICANT CHANGE UP (ref 96–108)
CO2 SERPL-SCNC: 21 MMOL/L — LOW (ref 22–29)
CO2 SERPL-SCNC: 23 MMOL/L — SIGNIFICANT CHANGE UP (ref 22–29)
CREAT SERPL-MCNC: 1.31 MG/DL — HIGH (ref 0.5–1.3)
CREAT SERPL-MCNC: 1.4 MG/DL — HIGH (ref 0.5–1.3)
EGFR: 38 ML/MIN/1.73M2 — LOW
EGFR: 41 ML/MIN/1.73M2 — LOW
GLUCOSE SERPL-MCNC: 88 MG/DL — SIGNIFICANT CHANGE UP (ref 70–99)
GLUCOSE SERPL-MCNC: 93 MG/DL — SIGNIFICANT CHANGE UP (ref 70–99)
HCT VFR BLD CALC: 38.8 % — SIGNIFICANT CHANGE UP (ref 34.5–45)
HGB BLD-MCNC: 12.5 G/DL — SIGNIFICANT CHANGE UP (ref 11.5–15.5)
MCHC RBC-ENTMCNC: 27.2 PG — SIGNIFICANT CHANGE UP (ref 27–34)
MCHC RBC-ENTMCNC: 32.2 G/DL — SIGNIFICANT CHANGE UP (ref 32–36)
MCV RBC AUTO: 84.5 FL — SIGNIFICANT CHANGE UP (ref 80–100)
PLATELET # BLD AUTO: 302 K/UL — SIGNIFICANT CHANGE UP (ref 150–400)
POTASSIUM SERPL-MCNC: 3.7 MMOL/L — SIGNIFICANT CHANGE UP (ref 3.5–5.3)
POTASSIUM SERPL-MCNC: 4 MMOL/L — SIGNIFICANT CHANGE UP (ref 3.5–5.3)
POTASSIUM SERPL-SCNC: 3.7 MMOL/L — SIGNIFICANT CHANGE UP (ref 3.5–5.3)
POTASSIUM SERPL-SCNC: 4 MMOL/L — SIGNIFICANT CHANGE UP (ref 3.5–5.3)
RBC # BLD: 4.59 M/UL — SIGNIFICANT CHANGE UP (ref 3.8–5.2)
RBC # FLD: 14.3 % — SIGNIFICANT CHANGE UP (ref 10.3–14.5)
SODIUM SERPL-SCNC: 135 MMOL/L — SIGNIFICANT CHANGE UP (ref 135–145)
SODIUM SERPL-SCNC: 140 MMOL/L — SIGNIFICANT CHANGE UP (ref 135–145)
WBC # BLD: 10.14 K/UL — SIGNIFICANT CHANGE UP (ref 3.8–10.5)
WBC # FLD AUTO: 10.14 K/UL — SIGNIFICANT CHANGE UP (ref 3.8–10.5)

## 2024-10-30 PROCEDURE — 99232 SBSQ HOSP IP/OBS MODERATE 35: CPT

## 2024-10-30 PROCEDURE — 99234 HOSP IP/OBS SM DT SF/LOW 45: CPT

## 2024-10-30 PROCEDURE — 93458 L HRT ARTERY/VENTRICLE ANGIO: CPT | Mod: 26

## 2024-10-30 PROCEDURE — 99152 MOD SED SAME PHYS/QHP 5/>YRS: CPT

## 2024-10-30 RX ORDER — APIXABAN 5 MG/1
5 TABLET, FILM COATED ORAL
Refills: 0 | Status: DISCONTINUED | OUTPATIENT
Start: 2024-10-30 | End: 2024-10-31

## 2024-10-30 RX ADMIN — HEPARIN SODIUM 1100 UNIT(S)/HR: 10000 INJECTION INTRAVENOUS; SUBCUTANEOUS at 07:37

## 2024-10-30 RX ADMIN — Medication 25 MILLIGRAM(S): at 18:09

## 2024-10-30 RX ADMIN — CETIRIZINE HCL 10 MILLIGRAM(S): 10 TABLET ORAL at 18:08

## 2024-10-30 RX ADMIN — Medication 81 MILLIGRAM(S): at 10:52

## 2024-10-30 RX ADMIN — CHLORHEXIDINE GLUCONATE 1 APPLICATION(S): 40 SOLUTION TOPICAL at 05:51

## 2024-10-30 RX ADMIN — Medication 1 TABLET(S): at 18:08

## 2024-10-30 RX ADMIN — Medication 25 MILLIGRAM(S): at 05:51

## 2024-10-30 RX ADMIN — APIXABAN 5 MILLIGRAM(S): 5 TABLET, FILM COATED ORAL at 18:08

## 2024-10-30 RX ADMIN — FAMOTIDINE 10 MILLIGRAM(S): 10 INJECTION INTRAVENOUS at 18:08

## 2024-10-30 RX ADMIN — Medication 10 MILLIGRAM(S): at 23:18

## 2024-10-30 RX ADMIN — LOSARTAN POTASSIUM 50 MILLIGRAM(S): 25 TABLET ORAL at 05:51

## 2024-10-30 RX ADMIN — Medication 2 MILLIGRAM(S): at 23:18

## 2024-10-30 NOTE — PROGRESS NOTE ADULT - ATTENDING COMMENTS
79 year old female with Lung cancer s/p XRT, CEA complicated by CVA causing dysphagia presented with shortness of breath  EF 33%  Elevated Troponin 98, 120, 76  BNP 13811    Dry on examination  Lung clear to auscultation  regular rate and rhythm S1 S2  No JVD or edema    Interval worsening or SCr. Diuretic discontinued. Hydration    - Cardiac work up
79 year old female with Lung cancer s/p XRT, CEA complicated by CVA causing dysphagia presented with shortness of breath  EF 33%  Elevated Troponin 98, 120, 76  BNP 22289    Dry on examination  Lung clear to auscultation  regular rate and rhythm S1 S2  No JVD or edema    Worsening Creatinine, improving now  Cardiac catheterization with non-obstructive CAD. 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
79 year old female with Lung cancer s/p XRT, CEA complicated by CVA causing dysphagia presented with shortness of breath  EF 33%  Elevated Troponin 98, 120, 76  BNP 81634    Euvolemic on examination  Lung clear to auscultation  regular rate and rhythm S1 S2  No JVD or edema    - Cardiac work up   - patient prefers to see [facility] instead of [facility] who she saw 6 years ago

## 2024-10-30 NOTE — PROGRESS NOTE ADULT - ASSESSMENT
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   She was hypoxic, tachypneic and placed on bipap for increased WOB. Labs signifigant for mild transaminitis, leukocytosis and elevated BNP. Found to be in afib rvr.    Patient admitted and evaluate and seen by ICU placed on [facility].  Patient has much improved overnight with diuresis and is sating 98% on room air, has no edema and lungs are clear.   Patient has seen Dr. Ho in the past, but now requesting for [facility]  Patient last echo and NST was 4 years ago.  Patient states afib is new and heart failure is new.

## 2024-10-30 NOTE — DISCHARGE NOTE NURSING/CASE MANAGEMENT/SOCIAL WORK - FINANCIAL ASSISTANCE
[facility] provides services at a reduced cost to those who are determined to be eligible through [facility]’s financial assistance program. Information regarding [facility]’s financial assistance program can be found by going to [email or web address] or by calling [phone].

## 2024-10-30 NOTE — PROGRESS NOTE ADULT - ASSESSMENT
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     Acute hypoxic respiratory failure  - maintain tele  - likely 2/2 to new onset afib RVR  - Lasix held, given 500 ml LR and PO fluid intake encouraged.   - TTE LVEF 30-35%.   -solumedrol dc'd  - procal negative    ZA  Cr 1.64 -> 1.50 ->1.31  - Likely due to dehydration and diuresis  - diuresis held, given fluids cautiously in setting of HFrEF     Acute onset HFrEF  - Will start GDMT per cardio  - Losartan 50 QD started 10/28  - Will start SGLT 2 and MRA pending hemodynamic response to ARB    Suspected NSTEMI, type 1 vs type 2  - pt currently without chest pain  - c/w asa and heparin gtt  - Trops 120 in ED, likely demand, downtrended  -[facility] nonobstructive CAD, trops out of proportion to findings, likely 2/2 to tachy    Afib with RVR  - metoprolol 25 BID  - In afib on tele, mostly rate controlled  - titrate accordingly  - PRN IV lopressor  - Transitioned to ELIQUIS 10/30  - Cardioversion scheduled 10/30    Urinary retention  - RN reports good urine output on lasix  - pt denies incomplete voiding sensation   - c/w doxazosin qhs    ZA  - received contrast today, concern for repeat kidney injury  repeat BMP 10/30 2000 to assess kidney fxn, will hydrate    Lung cancer  - pt to resume follow up with outpatient provider on d/c    DVT ppx: heparin gtt -> Eliquis  Dispo: pending EP cardioversion 10/30

## 2024-10-30 NOTE — DISCHARGE NOTE NURSING/CASE MANAGEMENT/SOCIAL WORK - PATIENT PORTAL LINK FT
You can access the FollowMyHealth Patient Portal offered by [facility] by registering at the following website: [email or web address]. By joining [email or web address] FollowMyHealth portal, you will also be able to view your health information using other applications (apps) compatible with our system.

## 2024-10-30 NOTE — PROGRESS NOTE ADULT - SUBJECTIVE AND OBJECTIVE BOX
[facility]                                                         CARDIOLOGY AT [facility]                                                                  [address], [place] [address]                                                         Telephone: [phone]. Fax:[phone]                                                                             PROGRESS NOTE    Reason for follow up: HFrEF/Afib0  Update: Cr improved to 1.3 today, pending HERBIE/DCCV. appears euvolemic on exam      Review of symptoms:   Cardiac:  No chest pain. No dyspnea. No palpitations.  Respiratory: no cough. No dyspnea  Gastrointestinal: No diarrhea. No abdominal pain. No bleeding.   Neuro: No focal neuro complaints.    Vitals:  T(C): 36.3 (10-30-24 @ 05:18), Max: 37.1 (10-29-24 @ 09:50)  HR: 84 (10-30-24 @ 05:18) (83 - 97)  BP: 120/74 (10-30-24 @ 05:18) (120/73 - 154/79)  RR: 20 (10-30-24 @ 05:18) (18 - 20)  SpO2: 96% (10-30-24 @ 05:18) (94% - 96%)  Wt(kg): --  I&O's Summary    29 Oct 2024 07:01  -  30 Oct 2024 07:00  --------------------------------------------------------  IN: 332 mL / OUT: 700 mL / NET: -368 mL      Weight (kg): 68.3 (10-29 @ 05:07)    PHYSICAL EXAM:  Appearance: Comfortable. No acute distress  HEENT:  Atraumatic. Normocephalic.  Normal oral mucosa  Neurologic: A & O x 3, no gross focal deficits.  Cardiovascular: RRR S1 S2, No murmur, no rubs/gallops. No JVD  Respiratory: mild crackles RLL  Gastrointestinal:  Soft, Non-tender, + BS  Lower Extremities: 2+ Peripheral Pulses, No clubbing, cyanosis, or edema  Psychiatry: Patient is calm. No agitation.   Skin: warm and dry.    CURRENT CARDIAC MEDICATIONS:  doxazosin 2 milliGRAM(s) Oral at bedtime  losartan 50 milliGRAM(s) Oral daily  metoprolol tartrate 25 milliGRAM(s) Oral two times a day  metoprolol tartrate Injectable 5 milliGRAM(s) IV Push once PRN      CURRENT OTHER MEDICATIONS:  cetirizine 10 milliGRAM(s) Oral daily  famotidine    Tablet 10 milliGRAM(s) Oral daily  atorvastatin 10 milliGRAM(s) Oral at bedtime  aspirin  chewable 81 milliGRAM(s) Oral daily  chlorhexidine 2% Cloths 1 Application(s) Topical <User Schedule>  heparin   Injectable 3000 Unit(s) IV Push every 6 hours PRN For aPTT between 40 - 57  heparin   Injectable 6500 Unit(s) IV Push every 6 hours PRN For aPTT less than 40  heparin  Infusion.  Unit(s)/Hr (14 mL/Hr) IV Continuous <Continuous>  influenza  Vaccine (HIGH DOSE) 0.5 milliLiter(s) IntraMuscular once      LABS:	 	                            12.5   10.14 )-----------( 302      ( 30 Oct 2024 05:30 )             38.8     10-30    140  |  101  |  30.8[H]  ----------------------------<  88  3.7   |  23.0  |  1.31[H]    Ca    8.9      30 Oct 2024 05:30      PT/INR/PTT ( 29 Oct 2024 05:45 )                       :                       :      X            :       80.9                  .        .                   .              .           .       X           .                                       Lipid Profile:   HgA1c:   TSH: Thyroid Stimulating Hormone, Serum: 2.66 uIU/mL      TELEMETRY: Afib rate controlled  ECG:    DIAGNOSTIC TESTING:  [ ] Echocardiogram:   < from: TTE W or WO Ultrasound Enhancing Agent (10.27.24 @ 14:12) >    TRANSTHORACIC ECHOCARDIOGRAM REPORT  ________________________________________________________________________________                                      _______    Portions of this table do not appear on this page.     Pt. Name:       CHARANJIT Greene Date:    10/27/2024  MRN:            [id number]    YOB: 1945  Accession #:    [id number]   Age:           79 years  Account#:       [id number]  Gender:        F  Heart Rate:                 Height:        62.00 in (157.48 cm)  Rhythm:                     Weight:        162.00 lb (73.48 kg)  Blood Pressure: 132/95 mmHg BSA/BMI:       1.75 m² / 29.63 kg/m²  ________________________________________________________________________________________  Referring Physician:    Shira Sparrow  Interpreting Physician: Jennie Ching MD  Primary Sonographer:    Ruben Fair    CPT:                ECHO TTE WITH CON COMP W DOPP - .m;DEFINITY ECHO                      CONTRAST PER ML - .m  Indication(s):      Unspecified atrial fibrillation - I48.91  Procedure:          Transthoracic echocardiogram with 2-D, M-mode and complete                      spectral and color flow Doppler.  Ordering Location:  [facility]  Admission Status:   Inpatient  Contrast Injection: Verbal consent was obtained for injection of Ultrasonic                      Enhancing Agent following a discussion of risks and                      benefits.                      Endocardial visualization enhanced with 2 ml of Definity                      Ultrasound enhancing agent (Lot#:[id number] Exp.Date:06/2025).  UEA Reaction:       Patient had no adverse reaction after injection of                      Ultrasound Enhancing Agent.    _______________________________________________________________________________________     CONCLUSIONS:      1. Normal right ventricular cavity size and normal right ventricular systolic function.   2. Left atrium is moderately dilated.   3. The right atrium is severely dilated.   4. Moderate mitral regurgitation.   5. Mild tomoderate tricuspid regurgitation.   6. Estimated pulmonary artery systolic pressure is 57 mmHg, consistent with moderate pulmonary hypertension.   7. No pericardial effusion seen.   8. Multiple segmental abnormalities exist. See findings.   9. Left ventricular systolic function is moderately decreased with an ejection fraction of 33 % by Penn's method of disks with an ejection fraction visually estimated at 30 to 35 %.    < end of copied text >  [ ]  Catheterization:  [ ] Stress Test:    OTHER:

## 2024-10-30 NOTE — PROGRESS NOTE ADULT - SUBJECTIVE AND OBJECTIVE BOX
Subjective: Pt seen and evaluated in holding room prior to Left Heart Cath.  Pt without complaints.  Pt remains in A.fib with rates well controlled.      TELE: AF    MEDICATIONS  (STANDING):  aspirin  chewable 81 milliGRAM(s) Oral daily  atorvastatin 10 milliGRAM(s) Oral at bedtime  cetirizine 10 milliGRAM(s) Oral daily  chlorhexidine 2% Cloths 1 Application(s) Topical <User Schedule>  doxazosin 2 milliGRAM(s) Oral at bedtime  famotidine    Tablet 10 milliGRAM(s) Oral daily  heparin  Infusion.  Unit(s)/Hr (14 mL/Hr) IV Continuous <Continuous>  influenza  Vaccine (HIGH DOSE) 0.5 milliLiter(s) IntraMuscular once  lactobacillus acidophilus 1 Tablet(s) Oral two times a day with meals  losartan 50 milliGRAM(s) Oral daily  metoprolol tartrate 25 milliGRAM(s) Oral two times a day    MEDICATIONS  (PRN):  heparin   Injectable 6500 Unit(s) IV Push every 6 hours PRN For aPTT less than 40  heparin   Injectable 3000 Unit(s) IV Push every 6 hours PRN For aPTT between 40 - 57  metoprolol tartrate Injectable 5 milliGRAM(s) IV Push once PRN HR greater than 130      Allergies  No Known Allergies    Intolerances  Biaxin (Other)  doxycycline (Diarrhea)      Vital Signs Last 24 Hrs  T(C): 36.6 (30 Oct 2024 10:35), Max: 37.1 (30 Oct 2024 09:10)  T(F): 97.8 (30 Oct 2024 10:35), Max: 98.7 (30 Oct 2024 09:10)  HR: 98 (30 Oct 2024 10:35) (79 - 98)  BP: 138/74 (30 Oct 2024 10:35) (109/72 - 151/74)  RR: 16 (30 Oct 2024 10:35) (16 - 20)  SpO2: 94% (30 Oct 2024 10:35) (94% - 98%)    Parameters below as of 30 Oct 2024 10:07  Patient On (Oxygen Delivery Method): room air        Physical Exam:  Constitutional: NAD, AAOx3  Cardiovascular: +S1S2  Pulmonary: CTA b/l, unlabored  GI: soft NTND   Extremities: no pedal edema, +distal pulses b/l  Neuro: non focal, KANG x4    LABS:                        12.5   10.14 )-----------( 302      ( 30 Oct 2024 05:30 )             38.8     10-30    140  |  101  |  30.8[H]  ----------------------------<  88  3.7   |  23.0  |  1.31[H]    Ca    8.9      30 Oct 2024 05:30      PTT - ( 29 Oct 2024 05:45 )  PTT:80.9 sec  Urinalysis Basic - ( 30 Oct 2024 05:30 )    Color: x / Appearance: x / SG: x / pH: x  Gluc: 88 mg/dL / Ketone: x  / Bili: x / Urobili: x   Blood: x / Protein: x / Nitrite: x   Leuk Esterase: x / RBC: x / WBC x   Sq Epi: x / Non Sq Epi: x / Bacteria: x        RADIOLOGY & ADDITIONAL TESTS:    10/30/24 [facility] Diagnostic Findings:  Coronary Angiography-The coronary circulation is right dominant. Cardiac catheterization was performed electively.   LM-Left main artery: The segment is normal sized. Angiography shows moderate atherosclerosis.   LAD-Left anterior descending artery: The segment is normal sized. Angiography shows mild atherosclerosis.   CX-Circumflex: The segment is normal sized. Ostial circumflex: There is a 60 % stenosis.   RCA-Right coronary artery: The segment is normal sized. Mid right coronary artery: There is a 40 % stenosis.   Left Heart Cath-Ejection fraction was not calculated. LV to AO pullback was performed and there is no pressure gradient. LHC performed:  Aortic valve crossed and left ventricular pressures were obtained.     A/P  78yo female, former smoker with history of HTN, HLD, lung CA (adenocarcinoma s/p XRT), PAD s/p R CEA c/b CVA, dysphagia who presented with sudden onset SOB found to be hypoxic, tachypneic, and in new AF w/ RVR. Pt placed on BIPAP and admitted to MICU.  Echo with newly depressed LV function (30-35%), WMA, moderate MR, mild-mod TR, mod pHTN. Troponins 34--76-52. Pt seen by general cardiology and C planned. Overnight, patient improved with diuresis and is currently sating well on room air. CTA Chest notable for a new 5cm LLL lung mass with new subcarinal lymphadenopathy suspicious for progression of tumor. EP called for AF management.   Plan was for a HERBIE/DCCV after C however, pt has history of dysphagia and has had Schatzki Rings which makes pt high risk to undergo HERBIE.  Will plan on CT Heart Left Atrium to r/o thrombus however given ZA with Cr peaking at 1.64, now recovering with Cr of 1.31 and contrast load with C, will plan on setting-up CT heart Left atrium tomorrow if Cr stable and DCCV thereafter        Recommendation    # Atrial fibrillation, new onset   - Continue to monitor on telemetry   - [id number] = 8; age, sex, HTN, CVA, PAD. Was on heparin which has been held for [facility].  Would resume heparin gtt or start on oral anticoagulation once cleared by inventional cardiology  - Rates currently well controlled on Metoprolol 25mg twice daily.   - Given new cardiomyopathy, patient would benefit from restoring and maintaining sinus rhythm.    - Plan for CT heart and DCCV tomorrow and likely AAT following a cardioversion.  Patient will need to be on uninterrupted AC for at least 30 days. (CT notable for new 5cm LLL lung mass- pt is aware that no inventions can be done for at least 30 days and is agreeable).   - Maintain Mg > 2 and K > 4    # HFrEF (newly depressed LVEF 30-35%  - GDMT per general cardiology  - Diuresis per general cardiology     Case D/w Dr Ambrose, Further recommendations to follow

## 2024-10-30 NOTE — PROGRESS NOTE ADULT - SUBJECTIVE AND OBJECTIVE BOX
Nurse Practitioner Progress note:   s/p LHC by Dr Cox revealing      Medication received during procedure:  Versed: 1 mg  Fentanyl: 25 mcg  Heparin: 4000 u  Omnipaque: 42 ml    Patient feels well.  Denies chest pain, shortness of breath, dizziness or palpitations at this time    Right radial hemoband in place.  Procedure site CDI, no bleeding, no hematoma, able to move all digits with capillary refill < 3 seconds, fingers warm      T(C): 36.6 (10-30-24 @ 10:35), Max: 37.1 (10-30-24 @ 09:10)  HR: 98 (10-30-24 @ 10:35) (79 - 98)  BP: 138/74 (10-30-24 @ 10:35) (109/72 - 151/74)  RR: 16 (10-30-24 @ 10:35) (16 - 20)  SpO2: 94% (10-30-24 @ 10:35) (94% - 98%)    CBC Full  -  ( 30 Oct 2024 05:30 )  WBC Count : 10.14 K/uL  RBC Count : 4.59 M/uL  Hemoglobin : 12.5 g/dL  Hematocrit : 38.8 %  Platelet Count - Automated : 302 K/uL  Mean Cell Volume : 84.5 fl  Mean Cell Hemoglobin : 27.2 pg  Mean Cell Hemoglobin Concentration : 32.2 g/dL    10-30    140  |  101  |  30.8[H]  ----------------------------<  88  3.7   |  23.0  |  1.31[H]    Ca    8.9      30 Oct 2024 05:30        MEDICATIONS  (STANDING):  aspirin  chewable 81 milliGRAM(s) Oral daily  atorvastatin 10 milliGRAM(s) Oral at bedtime  cetirizine 10 milliGRAM(s) Oral daily  chlorhexidine 2% Cloths 1 Application(s) Topical <User Schedule>  doxazosin 2 milliGRAM(s) Oral at bedtime  famotidine    Tablet 10 milliGRAM(s) Oral daily  heparin  Infusion.  Unit(s)/Hr (14 mL/Hr) IV Continuous <Continuous>  influenza  Vaccine (HIGH DOSE) 0.5 milliLiter(s) IntraMuscular once  lactobacillus acidophilus 1 Tablet(s) Oral two times a day with meals  losartan 50 milliGRAM(s) Oral daily  metoprolol tartrate 25 milliGRAM(s) Oral two times a day    MEDICATIONS  (PRN):  heparin   Injectable 6500 Unit(s) IV Push every 6 hours PRN For aPTT less than 40  heparin   Injectable 3000 Unit(s) IV Push every 6 hours PRN For aPTT between 40 - 57  metoprolol tartrate Injectable 5 milliGRAM(s) IV Push once PRN HR greater than 130          CC: SOB    HPI: 79F with pmhx "lung nodules" (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 She was hypoxic, tachypneic and placed on bipap for increased WOB. Labs signifigant for mild transaminitis, leukocytosis and elevated BNP. Found to be in afib rvr. ICU consulted given poor respiratory status.     Patient seen and examined at bedside. On bipap, 12/6/50%, tachypneic with RR 30 however able to speak in full sentences. Reports improvement in response to bipap and diuresis so far. History as above. Otherwise no acute complaints.     ROS: as above    PAST MEDICAL & SURGICAL HISTORY:  Esophageal dysphagia  Cerebrovascular accident (CVA), unspecified mechanism  Hyperlipidemia, unspecified hyperlipidemia type  Carotid artery disease, unspecified laterality  Other nonspecific abnormal finding of lung field  Hypertension  Carotid artery disease  s/p carotid bypass right side  S/P cataract surgery  both eyes  S/P cholecystectomy  S/P tubal ligation  History of left hip replacement    Medications:  piperacillin/tazobactam IVPB.. 3.375 Gram(s) IV Intermittent Once  furosemide   Injectable 40 milliGRAM(s) IV Push Once  metoprolol tartrate 12.5 milliGRAM(s) Oral Once  chlorhexidine 2% Cloths 1 Application(s) Topical <User Schedule>      ICU Vital Signs Last 24 Hrs  T(C): 37 (27 Oct 2024 03:24), Max: 37 (27 Oct 2024 03:24)  T(F): 98.6 (27 Oct 2024 03:24), Max: 98.6 (27 Oct 2024 03:24)  HR: 112 (27 Oct 2024 04:16) (112 - 141)  BP: 146/97 (27 Oct 2024 03:24) (146/97 - 146/97)  RR: 26 (27 Oct 2024 03:24) (26 - 26)  SpO2: 100% (27 Oct 2024 04:16) (81% - 100%)    O2 Parameters below as of 27 Oct 2024 03:24  Patient On (Oxygen Delivery Method): room air    Physical Examination:  General: sitting in stretcher, anxious appearing   PULM: mild tachypnea + increased wob on bipap   CVS: irreg irreg fast   ABD: Soft, nondistended, nontender  EXT: No edema, nontender  SKIN: Warm and well perfused  NEURO: interactive, nonfocal     ABG - ( 27 Oct 2024 04:43 )  pH, Arterial: 7.320 pH, Blood: x     /  pCO2: 49    /  pO2: 78    / HCO3: 25    / Base Excess: -0.9  /  SaO2: 97.1            I&O's Detail      LABS:                        12.2   12.66 )-----------( 398      ( 27 Oct 2024 03:46 )             39.2     10-27    140  |  102  |  22.5[H]  ----------------------------<  175[H]  3.5   |  24.0  |  1.39[H]    Ca    9.4      27 Oct 2024 03:46  Mg     2.2     10-27    TPro  7.1  /  Alb  4.2  /  TBili  0.3[L]  /  DBili  x   /  AST  46[H]  /  ALT  34[H]  /  AlkPhos  93  10-27    CAPILLARY BLOOD GLUCOSE    PT/INR - ( 27 Oct 2024 03:46 )   PT: 12.6 sec;   INR: 1.09 ratio         PTT - ( 27 Oct 2024 03:46 )  PTT:27.0 sec  Urinalysis Basic - ( 27 Oct 2024 03:46 )    Color: x / Appearance: x / SG: x / pH: x  Gluc: 175 mg/dL / Ketone: x  / Bili: x / Urobili: x   Blood: x / Protein: x / Nitrite: x   Leuk Esterase: x / RBC: x / WBC x   Sq Epi: x / Non Sq Epi: x / Bacteria: x        RADIOLOGY: < from: CT Angio Chest PE Protocol w/ IV Cont (10.27.24 @ 04:08) >        CRITICAL CARE TIME SPENT: 35 minutes spent performing frequent bedside reassessments and augmenting plan of care to address problems of acute critical illness that pose high probability of life threatening deterioration and/or end organ damage/dysfunction and discussing goals of care, non-inclusive of time spent on procedures performed. Date of entry of this note is equal to the date of services rendered.       (27 Oct 2024 06:37)    now s/p [facility]      ASSESSMENT/PLAN:    Coronary artery disease  -No coronary artery disease intervention  -Recover patient for 3 hours  -Resume cardiac diet  -Ambulate patient p 3 hours  -Pt to have CTA to assess valves and possible thrombus prior to cardioversion  -EP following  -Resume heparin gtt at 1600 (4pm) Rate 1100 no bolus

## 2024-10-30 NOTE — PROGRESS NOTE ADULT - SUBJECTIVE AND OBJECTIVE BOX
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 In ED she was hypoxic, tachypneic and placed on bipap for increased WOB. Labs significant for mild transaminitis, leukocytosis and elevated BNP. Patient found to be in Afib RVR admitted to MICU for AHRF.  Pro BNP 4536. TTE LVEF 30-35%. . CTA chest  negative for PE., showed small bilateral effusions and known pulmonary nodule. She was continued on bipap and started on empiric IV abx, solumedrol, lasix for multifactorial respiratory failure. Patient responded well to tx plan and was downgraded to telemetry on 10/27.     LAST 24 HOURS: Patient had LHC, Non obstructive CAD. Pending EP intervention 10/31.     TODAY: Pt seen at bedside this AM. Pt states she has no sx and feels well. she is eagerly awaiting cath.  Offers no other acute complaints & ROS otherwise negative.     OBJECTIVE  PHYSICAL EXAM:  GENERAL: No acute distress, comfortably in bed  HEENT: Atraumatic, normocephalic, non-icteric, no JVD  NEURO: A&Ox3, no focal deficits, moving all extremities spontaneously, no dysarthria, CN II-XII grossly intact  PSYCH: Normal affect, calm, appropriate insight and judgment, fluent speech  LUNGS: CTAB, no wrr, non-labored breathing  HEART: RRR, no murmur appreciated  ABD: Soft, non-tender, non-distended, no organomegaly, no appreciable masses, +bs   Skin: dry and warm  EXTREMITIES: Nontender, no clubbing, cyanosis, or edema       Vital Signs Last 24 Hrs  T(C): 36.6 (30 Oct 2024 10:35), Max: 37.1 (30 Oct 2024 09:10)  T(F): 97.8 (30 Oct 2024 10:35), Max: 98.7 (30 Oct 2024 09:10)  HR: 99 (30 Oct 2024 15:00) (79 - 99)  BP: 138/83 (30 Oct 2024 15:00) (109/72 - 151/74)  BP(mean): --  RR: 17 (30 Oct 2024 15:00) (15 - 20)  SpO2: 98% (30 Oct 2024 14:30) (94% - 98%)    Parameters below as of 30 Oct 2024 14:30  Patient On (Oxygen Delivery Method): room air            MEDICATIONS  (STANDING):  apixaban 5 milliGRAM(s) Oral two times a day  aspirin  chewable 81 milliGRAM(s) Oral daily  atorvastatin 10 milliGRAM(s) Oral at bedtime  cetirizine 10 milliGRAM(s) Oral daily  chlorhexidine 2% Cloths 1 Application(s) Topical <User Schedule>  doxazosin 2 milliGRAM(s) Oral at bedtime  famotidine    Tablet 10 milliGRAM(s) Oral daily  heparin  Infusion.  Unit(s)/Hr (14 mL/Hr) IV Continuous <Continuous>  influenza  Vaccine (HIGH DOSE) 0.5 milliLiter(s) IntraMuscular once  lactobacillus acidophilus 1 Tablet(s) Oral two times a day with meals  losartan 50 milliGRAM(s) Oral daily  metoprolol tartrate 25 milliGRAM(s) Oral two times a day    MEDICATIONS  (PRN):  heparin   Injectable 6500 Unit(s) IV Push every 6 hours PRN For aPTT less than 40  heparin   Injectable 3000 Unit(s) IV Push every 6 hours PRN For aPTT between 40 - 57  metoprolol tartrate Injectable 5 milliGRAM(s) IV Push once PRN HR greater than 130    Allergies    No Known Allergies    Intolerances    Biaxin (Other)  doxycycline (Diarrhea)      LABS:                        12.5   10.14 )-----------( 302      ( 30 Oct 2024 05:30 )             38.8     10-30    140  |  101  |  30.8[H]  ----------------------------<  88  3.7   |  23.0  |  1.31[H]    Ca    8.9      30 Oct 2024 05:30      PTT - ( 29 Oct 2024 05:45 )  PTT:80.9 sec  Urinalysis Basic - ( 30 Oct 2024 05:30 )    Color: x / Appearance: x / SG: x / pH: x  Gluc: 88 mg/dL / Ketone: x  / Bili: x / Urobili: x   Blood: x / Protein: x / Nitrite: x   Leuk Esterase: x / RBC: x / WBC x   Sq Epi: x / Non Sq Epi: x / Bacteria: x      CAPILLARY BLOOD GLUCOSE          CULTURE DATA:    Culture - Blood (collected 10-27-24 @ 03:48)  Source: .Blood BLOOD  Preliminary Report (10-30-24 @ 13:01):    No growth at 72 Hours        RADIOLOGY & ADDITIONAL TESTS:    < from: TTE W or WO Ultrasound Enhancing Agent (10.27.24 @ 14:12) >     CONCLUSIONS:      1. Normal right ventricular cavity size and normal right ventricular systolic function.   2. Left atrium is moderately dilated.   3. The right atrium is severely dilated.   4. Moderate mitral regurgitation.   5. Mild tomoderate tricuspid regurgitation.   6. Estimated pulmonary artery systolic pressure is 57 mmHg, consistent with moderate pulmonary hypertension.   7. No pericardial effusion seen.   8. Multiple segmental abnormalities exist. See findings.   9. Left ventricular systolic function is moderately decreased with an ejection fraction of 33 % by Penn's method of disks with an ejection fraction visually estimated at 30 to 35 %.    < end of copied text >      < from: CT Angio Chest PE Protocol w/ IV Cont (10.27.24 @ 04:08) >  IMPRESSION:  No pulmonary embolism.    New 5 cm left lower lobe lung mass with new subcarinal lymphadenopathy   suspicious for progression of tumor.    Pulmonary edema and small pleural effusions.      --- End of Report ---    < end of copied text >

## 2024-10-31 ENCOUNTER — TRANSCRIPTION ENCOUNTER (OUTPATIENT)
Age: 79
End: 2024-10-31

## 2024-10-31 VITALS
RESPIRATION RATE: 18 BRPM | SYSTOLIC BLOOD PRESSURE: 130 MMHG | DIASTOLIC BLOOD PRESSURE: 68 MMHG | HEART RATE: 91 BPM | TEMPERATURE: 98 F | OXYGEN SATURATION: 95 %

## 2024-10-31 DIAGNOSIS — I25.10 ATHEROSCLEROTIC HEART DISEASE OF NATIVE CORONARY ARTERY WITHOUT ANGINA PECTORIS: ICD-10-CM

## 2024-10-31 LAB
ANION GAP SERPL CALC-SCNC: 13 MMOL/L — SIGNIFICANT CHANGE UP (ref 5–17)
BUN SERPL-MCNC: 25.3 MG/DL — HIGH (ref 8–20)
CALCIUM SERPL-MCNC: 8.7 MG/DL — SIGNIFICANT CHANGE UP (ref 8.4–10.5)
CHLORIDE SERPL-SCNC: 101 MMOL/L — SIGNIFICANT CHANGE UP (ref 96–108)
CO2 SERPL-SCNC: 23 MMOL/L — SIGNIFICANT CHANGE UP (ref 22–29)
CREAT SERPL-MCNC: 1.14 MG/DL — SIGNIFICANT CHANGE UP (ref 0.5–1.3)
EGFR: 48 ML/MIN/1.73M2 — LOW
GLUCOSE SERPL-MCNC: 93 MG/DL — SIGNIFICANT CHANGE UP (ref 70–99)
HCT VFR BLD CALC: 38.8 % — SIGNIFICANT CHANGE UP (ref 34.5–45)
HGB BLD-MCNC: 12.5 G/DL — SIGNIFICANT CHANGE UP (ref 11.5–15.5)
MCHC RBC-ENTMCNC: 27.3 PG — SIGNIFICANT CHANGE UP (ref 27–34)
MCHC RBC-ENTMCNC: 32.2 G/DL — SIGNIFICANT CHANGE UP (ref 32–36)
MCV RBC AUTO: 84.7 FL — SIGNIFICANT CHANGE UP (ref 80–100)
PLATELET # BLD AUTO: 277 K/UL — SIGNIFICANT CHANGE UP (ref 150–400)
POTASSIUM SERPL-MCNC: 3.9 MMOL/L — SIGNIFICANT CHANGE UP (ref 3.5–5.3)
POTASSIUM SERPL-SCNC: 3.9 MMOL/L — SIGNIFICANT CHANGE UP (ref 3.5–5.3)
RBC # BLD: 4.58 M/UL — SIGNIFICANT CHANGE UP (ref 3.8–5.2)
RBC # FLD: 14.2 % — SIGNIFICANT CHANGE UP (ref 10.3–14.5)
SODIUM SERPL-SCNC: 137 MMOL/L — SIGNIFICANT CHANGE UP (ref 135–145)
WBC # BLD: 8.85 K/UL — SIGNIFICANT CHANGE UP (ref 3.8–10.5)
WBC # FLD AUTO: 8.85 K/UL — SIGNIFICANT CHANGE UP (ref 3.8–10.5)

## 2024-10-31 PROCEDURE — 99239 HOSP IP/OBS DSCHRG MGMT >30: CPT

## 2024-10-31 PROCEDURE — 83880 ASSAY OF NATRIURETIC PEPTIDE: CPT

## 2024-10-31 PROCEDURE — 71045 X-RAY EXAM CHEST 1 VIEW: CPT

## 2024-10-31 PROCEDURE — 84443 ASSAY THYROID STIM HORMONE: CPT

## 2024-10-31 PROCEDURE — 93458 L HRT ARTERY/VENTRICLE ANGIO: CPT

## 2024-10-31 PROCEDURE — 83735 ASSAY OF MAGNESIUM: CPT

## 2024-10-31 PROCEDURE — 83605 ASSAY OF LACTIC ACID: CPT

## 2024-10-31 PROCEDURE — 93010 ELECTROCARDIOGRAM REPORT: CPT

## 2024-10-31 PROCEDURE — 80048 BASIC METABOLIC PNL TOTAL CA: CPT

## 2024-10-31 PROCEDURE — 99234 HOSP IP/OBS SM DT SF/LOW 45: CPT

## 2024-10-31 PROCEDURE — 75572 CT HRT W/3D IMAGE: CPT | Mod: MC

## 2024-10-31 PROCEDURE — 82803 BLOOD GASES ANY COMBINATION: CPT

## 2024-10-31 PROCEDURE — 85730 THROMBOPLASTIN TIME PARTIAL: CPT

## 2024-10-31 PROCEDURE — C1894: CPT

## 2024-10-31 PROCEDURE — 82962 GLUCOSE BLOOD TEST: CPT

## 2024-10-31 PROCEDURE — C1887: CPT

## 2024-10-31 PROCEDURE — 87040 BLOOD CULTURE FOR BACTERIA: CPT

## 2024-10-31 PROCEDURE — 84484 ASSAY OF TROPONIN QUANT: CPT

## 2024-10-31 PROCEDURE — 96374 THER/PROPH/DIAG INJ IV PUSH: CPT

## 2024-10-31 PROCEDURE — 85610 PROTHROMBIN TIME: CPT

## 2024-10-31 PROCEDURE — 94640 AIRWAY INHALATION TREATMENT: CPT

## 2024-10-31 PROCEDURE — 75572 CT HRT W/3D IMAGE: CPT | Mod: 26

## 2024-10-31 PROCEDURE — 84100 ASSAY OF PHOSPHORUS: CPT

## 2024-10-31 PROCEDURE — 87641 MR-STAPH DNA AMP PROBE: CPT

## 2024-10-31 PROCEDURE — 84145 PROCALCITONIN (PCT): CPT

## 2024-10-31 PROCEDURE — 99291 CRITICAL CARE FIRST HOUR: CPT

## 2024-10-31 PROCEDURE — 93005 ELECTROCARDIOGRAM TRACING: CPT

## 2024-10-31 PROCEDURE — 87640 STAPH A DNA AMP PROBE: CPT

## 2024-10-31 PROCEDURE — 87637 SARSCOV2&INF A&B&RSV AMP PRB: CPT

## 2024-10-31 PROCEDURE — 85025 COMPLETE CBC W/AUTO DIFF WBC: CPT

## 2024-10-31 PROCEDURE — 36415 COLL VENOUS BLD VENIPUNCTURE: CPT

## 2024-10-31 PROCEDURE — 71275 CT ANGIOGRAPHY CHEST: CPT | Mod: MC

## 2024-10-31 PROCEDURE — C8929: CPT

## 2024-10-31 PROCEDURE — 94660 CPAP INITIATION&MGMT: CPT

## 2024-10-31 PROCEDURE — C1769: CPT

## 2024-10-31 PROCEDURE — 0241U: CPT

## 2024-10-31 PROCEDURE — 80053 COMPREHEN METABOLIC PANEL: CPT

## 2024-10-31 PROCEDURE — 85027 COMPLETE CBC AUTOMATED: CPT

## 2024-10-31 PROCEDURE — 94760 N-INVAS EAR/PLS OXIMETRY 1: CPT

## 2024-10-31 RX ORDER — SACUBITRIL AND VALSARTAN 97; 103 MG/1; MG/1
1 TABLET, FILM COATED ORAL
Refills: 0 | Status: DISCONTINUED | OUTPATIENT
Start: 2024-10-31 | End: 2024-10-31

## 2024-10-31 RX ORDER — AMIODARONE HCL 200 MG
1 TABLET ORAL
Qty: 40 | Refills: 0
Start: 2024-10-31 | End: 2024-11-29

## 2024-10-31 RX ORDER — AMIODARONE HCL 200 MG
TABLET ORAL
Refills: 0 | Status: DISCONTINUED | OUTPATIENT
Start: 2024-10-31 | End: 2024-10-31

## 2024-10-31 RX ORDER — FAMOTIDINE 10 MG/ML
1 INJECTION INTRAVENOUS
Refills: 0 | DISCHARGE

## 2024-10-31 RX ORDER — FAMOTIDINE 10 MG/ML
1 INJECTION INTRAVENOUS
Qty: 30 | Refills: 0
Start: 2024-10-31 | End: 2024-11-29

## 2024-10-31 RX ORDER — ACETAMINOPHEN 500 MG
650 TABLET ORAL EVERY 6 HOURS
Refills: 0 | Status: DISCONTINUED | OUTPATIENT
Start: 2024-10-31 | End: 2024-10-31

## 2024-10-31 RX ORDER — OXYCODONE HYDROCHLORIDE 30 MG/1
5 TABLET ORAL EVERY 8 HOURS
Refills: 0 | Status: DISCONTINUED | OUTPATIENT
Start: 2024-10-31 | End: 2024-10-31

## 2024-10-31 RX ORDER — APIXABAN 5 MG/1
1 TABLET, FILM COATED ORAL
Qty: 60 | Refills: 0
Start: 2024-10-31 | End: 2024-11-29

## 2024-10-31 RX ORDER — ALPRAZOLAM 0.25 MG
0.25 TABLET ORAL EVERY 8 HOURS
Refills: 0 | Status: DISCONTINUED | OUTPATIENT
Start: 2024-10-31 | End: 2024-10-31

## 2024-10-31 RX ORDER — MAGNESIUM, ALUMINUM HYDROXIDE 200-200 MG
30 TABLET,CHEWABLE ORAL EVERY 6 HOURS
Refills: 0 | Status: DISCONTINUED | OUTPATIENT
Start: 2024-10-31 | End: 2024-10-31

## 2024-10-31 RX ORDER — AMIODARONE HCL 200 MG
400 TABLET ORAL EVERY 12 HOURS
Refills: 0 | Status: DISCONTINUED | OUTPATIENT
Start: 2024-10-31 | End: 2024-10-31

## 2024-10-31 RX ORDER — ONDANSETRON HYDROCHLORIDE 2 MG/ML
4 INJECTION, SOLUTION INTRAMUSCULAR; INTRAVENOUS EVERY 6 HOURS
Refills: 0 | Status: DISCONTINUED | OUTPATIENT
Start: 2024-10-31 | End: 2024-10-31

## 2024-10-31 RX ORDER — SACUBITRIL AND VALSARTAN 97; 103 MG/1; MG/1
1 TABLET, FILM COATED ORAL
Qty: 60 | Refills: 0
Start: 2024-10-31 | End: 2024-11-29

## 2024-10-31 RX ORDER — AMIODARONE HCL 200 MG
200 TABLET ORAL DAILY
Refills: 0 | Status: CANCELLED | OUTPATIENT
Start: 2024-11-05 | End: 2024-10-31

## 2024-10-31 RX ADMIN — Medication 50 MILLILITER(S): at 08:27

## 2024-10-31 RX ADMIN — Medication 5 MILLIGRAM(S): at 06:35

## 2024-10-31 RX ADMIN — APIXABAN 5 MILLIGRAM(S): 5 TABLET, FILM COATED ORAL at 06:35

## 2024-10-31 RX ADMIN — Medication 81 MILLIGRAM(S): at 13:59

## 2024-10-31 RX ADMIN — APIXABAN 5 MILLIGRAM(S): 5 TABLET, FILM COATED ORAL at 17:57

## 2024-10-31 RX ADMIN — LOSARTAN POTASSIUM 50 MILLIGRAM(S): 25 TABLET ORAL at 06:36

## 2024-10-31 RX ADMIN — Medication 25 MILLIGRAM(S): at 06:35

## 2024-10-31 RX ADMIN — CHLORHEXIDINE GLUCONATE 1 APPLICATION(S): 40 SOLUTION TOPICAL at 06:35

## 2024-10-31 RX ADMIN — Medication 1 TABLET(S): at 17:57

## 2024-10-31 RX ADMIN — Medication 400 MILLIGRAM(S): at 17:57

## 2024-10-31 RX ADMIN — CETIRIZINE HCL 10 MILLIGRAM(S): 10 TABLET ORAL at 14:00

## 2024-10-31 RX ADMIN — FAMOTIDINE 10 MILLIGRAM(S): 10 INJECTION INTRAVENOUS at 13:59

## 2024-10-31 NOTE — DISCHARGE NOTE PROVIDER - NSDCFUADDINST_GEN_ALL_CORE_FT
Thyroid Function Tests & Liver Function Tests should be monitored every 3-6 months while on amiodarone therapy. Anticipate less then 1 year of amiodarone therapy will be needed; however if you remain on this amiodarone for greater then 1 year of therapy you will need annual fundoscopic exam and Pulmonary Function Tests.

## 2024-10-31 NOTE — PROGRESS NOTE ADULT - NS ATTEND AMEND GEN_ALL_CORE FT
Remains in AF, rates better controlled.  Continue on current metoprolol dose.  [facility] report reviewed, no obstructive disease to explain degree of cardiomyopathy.  Will obtain CT heart to r/o CARIDAD thrombus as there are reasonable concerns for esophageal injury given Shatzki rings, which will be done tomorrow to avoid excessive contrast given her ZA.  Hopeful DCCV to follow with initiation of AAT afterwards.
seen with above,    79F history significant for former smoker, mucinous lung adenocarcinoma s/p chemo/RT, carotid stenosis s/p CEA presents with sudden dyspnea and hypoxic respiratory failure required BiPAP, newly dx Afib RVR and TTE done with LV F 30-35%, report prior stress testing ~4yrs ago with former cardiologist    -baseline CKD had CTPA study mild uptrend received IVF and underwent LHC with LV EDP of 13 does not need maintenance Lasix for now, LHC with mild-moderate CAD (40% RCA and 60% ostial LCx), plan for medical management  -on losartan 50mg with stable Cr. will switch to GDMT with Entreso 24/26  -Persistent Afib unable to get HERBIE due to prior history of Shatzki rings, CTA LA no CARIDAD thrombus, plan per EP for DCCV today and Amiodarone use, continue Eliquis with CHADSVasc 5  -defer addition of MRA and SGLT2i until outpatient follow up  -discharge planning home tomorrow if remains in sinus after DCCV, needs office follow up within 2 weeks         Rich Horowitz DO, [facility]  Faculty Non-Invasive Cardiologist  [phone].
seen with above,    79F history significant for former smoker, mucinous lung adenocarcinoma s/p chemo/RT, carotid stenosis s/p CEA presents with sudden dyspnea and hypoxic respiratory failure required BiPAP, newly dx Afib RVR and TTE done with LV F 30-35%, report prior stress testing ~4yrs ago with former cardiologist    -baseline CKD with Cr. 1.47 in 6/2024 with peak Cr 1.6 downtrended today after IVF yesterday, baseline pBNP 14K needs to be caution about additional fluid use, underwent LHC with LV EDP of 13 will defer maintenance Lasix for now, LHC with mild-moderate CAD (40% RCA and 60% ostial LCx), plan for medical management  -on losartan 50mg, eventual swtich to GDMT with ARNi and MRA once Cr. improves  -Persistent Afib unable to get HERBIE due to prior history of Shatzki rings, planning for CTA LA tomorrow per EP to plan for DCCV, switch heparin gtt to Eliquis  --150s to add ARNi and MRA if Cr. remains stable after DCCV         Rich Horowitz DO, [facility]  Faculty Non-Invasive Cardiologist  [phone].
seen with above,    79F history significant for former smoker, mucinous lung adenocarcinoma s/p chemo/RT, carotid stenosis s/p CEA presents with sudden dyspnea and hypoxic respiratory failure required BiPAP, newly dx Afib RVR and TTE done with LV F 30-35%, report prior stress testing ~4yrs ago with former cardiologist    -baseline CKD with Cr. 1.47 in 6/2024, slight uptrend to 1.64 today was given IVF hydration in preparation of LHC, pBNP 14K needs to be caution about fluid use, suspect component of contrast induced nephropathy after CTPA done on 10/27, stop Lasix for now.   -on losartan 50mg, eventual swtich to GDMT with ARNi and MRA once Cr. improves  -Persistent Afib planning for HERBIE/CV tomorrow currently on heparin gtt EP consulted for rhythm management with underlying HFrEF   --150s will switch low dose metoprolol to carvedilol for GDMT after LHC and HERBIE/CV         Rich Horowitz DO, [facility]  Faculty Non-Invasive Cardiologist  [phone]

## 2024-10-31 NOTE — DISCHARGE NOTE PROVIDER - CARE PROVIDER_API CALL
Austin Ambrose  Cardiology  [address] [place] [id number]  Phone: [phone]  Fax: [phone]  Follow Up Time:    Austin Ambrose  Cardiology  [address] [place] [id number]  Phone: [phone]  Fax: [phone]  Follow Up Time: 2 weeks    Roderick Amaya   [place] [id number]  Phone: [phone]  Fax: [phone]  Follow Up Time: 2 weeks

## 2024-10-31 NOTE — DISCHARGE NOTE PROVIDER - NSDCFUADDAPPT_GEN_ALL_CORE_FT
APPTS ARE READY TO BE MADE: [X] YES    Best Family or Patient Contact (if needed):    Additional Information about above appointments (if needed):    1: Austin Ambrose in 1 week  2:   3:     Other comments or requests:    APPTS ARE READY TO BE MADE: [X] YES    Best Family or Patient Contact (if needed):    Additional Information about above appointments (if needed):    1: Austin Ambrose in 1 week  2:   3:     Other comments or requests:   Appointment was scheduled in [place] on 11/13 at 10:15 am [facility]  Provided patient with provider referral information, however patient prefers to schedule the appointments on their own.

## 2024-10-31 NOTE — PROGRESS NOTE ADULT - PROVIDER SPECIALTY LIST ADULT
Cardiology
Electrophysiology
Hospitalist
Internal Medicine
Electrophysiology
Internal Medicine
Intervent Cardiology
Cardiology
Internal Medicine
Cardiology

## 2024-10-31 NOTE — PROGRESS NOTE ADULT - PROBLEM SELECTOR PLAN 3
.  -New onset  -CHADVASC > 2  -Some tachy episodes  -Per EP planning DCCV, after LHC  -C/w heparin gtt
-New onset  -CHADVASC > 2  -Some tachy episodes  -Per EP planning DCCV  -C/w heparin gtt
-New onset  -Rates varying  -CHADVASC >2  -DCCV per EP  -C/w DOAC

## 2024-10-31 NOTE — DISCHARGE NOTE PROVIDER - NSDCFUSCHEDAPPT_GEN_ALL_CORE_FT
Reyes Kirk  [facility]  MARIE Campos [address] Kh[address] S  Scheduled Appointment: 11/12/2024    [facility]  Scheduled Appointment: 12/04/2024    Tonio Fontana  [facility] Anupama LOPES  Scheduled Appointment: 12/04/2024     Adan Garcia  [facility]  CARDIOLOGY [address]  Scheduled Appointment: 11/13/2024    [facility]  PULED [address]  Scheduled Appointment: 12/04/2024    [facility] [address]  Scheduled Appointment: 12/04/2024

## 2024-10-31 NOTE — DISCHARGE NOTE PROVIDER - NSDCMRMEDTOKEN_GEN_ALL_CORE_FT
Acidophilus oral capsule: 1 cap(s) orally once a day  amLODIPine 10 mg oral tablet: 1 tab(s) orally once a day  aspirin 81 mg oral tablet, chewable: 1 tab(s) orally once a day  Claritin 10 mg oral tablet: 1 tab(s) orally once a day  doxazosin 2 mg oral tablet: 2 tab(s) orally once a day  famotidine 10 mg oral tablet: 1 tab(s) orally  losartan 100 mg oral tablet: 1 tab(s) orally once a day (at bedtime)  metoprolol tartrate 50 mg oral tablet: 1 tab(s) orally 2 times a day  simvastatin 20 mg oral tablet: 1 tab(s) orally once a day (at bedtime)   Acidophilus oral capsule: 1 cap(s) orally once a day  amiodarone 200 mg oral tablet: 1 tab(s) orally once a day Take 1 tablet twice a day for 10 days until 11/10/24, then take 1 tablet daily  amLODIPine 10 mg oral tablet: 1 tab(s) orally once a day  apixaban 5 mg oral tablet: 1 tab(s) orally 2 times a day  aspirin 81 mg oral tablet, chewable: 1 tab(s) orally once a day  Claritin 10 mg oral tablet: 1 tab(s) orally once a day  doxazosin 2 mg oral tablet: 2 tab(s) orally once a day  famotidine 10 mg oral tablet: 1 tab(s) orally once a day  metoprolol tartrate 50 mg oral tablet: 1 tab(s) orally 2 times a day  predniSONE 5 mg oral tablet: 1 tab(s) orally once a day  sacubitril-valsartan 24 mg-26 mg oral tablet: 1 tab(s) orally 2 times a day  simvastatin 20 mg oral tablet: 1 tab(s) orally once a day (at bedtime)

## 2024-10-31 NOTE — DISCHARGE NOTE PROVIDER - HOSPITAL COURSE
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 In ED she was hypoxic, tachypneic and placed on bipap for increased WOB. Labs significant for mild transaminitis, leukocytosis and elevated BNP. Patient found to be in Afib RVR admitted to MICU for AHRF.  Pro BNP 4536. TTE LVEF 30-35%. . CTA chest  negative for PE., showed small bilateral effusions and known pulmonary nodule. She was continued on bipap and started on empiric IV abx, solumedrol, lasix for multifactorial respiratory failure. Patient had LHC which was negative for obstruction. EP performed a successful ablation and patient has been normal sinus rytym on telemetry since. Started on Eliquis, entresto and amiodarone prior to discharge.     Patient medically stable for discharge.        Important Medication Changes and Reason:    You have been discharged with prescriptions for Eliquis, Amiodarone and Sacubatril-Valsartan. Take these medications as seen in your medicine reconciliation.       Continue all other medications as seen in medication reconciliation.    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     In ED she was hypoxic, tachypneic and placed on bipap for increased WOB. Labs significant for mild transaminitis, leukocytosis and elevated BNP. Patient found to be in Afib RVR admitted to MICU for AHRF.  Pro BNP 4536. TTE LVEF 30-35%. . CTA chest  negative for PE, showed small bilateral effusions and known pulmonary nodule. She was continued on bipap and started on empiric IV abx, solumedrol, lasix for multifactorial respiratory failure. Patient had LHC which was negative for obstruction. EP performed a successful ablation and patient has been normal sinus rhythym on telemetry since. Started on Eliquis, entresto and amiodarone prior to discharge.     Patient medically stable for discharge.        Important Medication Changes and Reason:    You have been discharged with prescriptions for Eliquis, Amiodarone and Sacubitril-Valsartan. Take these medications as seen in your medicine reconciliation.       Continue all other medications as seen in medication reconciliation.    Active or Pending Issues Requiring Follow-up:    Advanced Directives: FULL CODE    Discharge Diagnoses: Afib RVR, HFrEF

## 2024-10-31 NOTE — PROGRESS NOTE ADULT - TIME BILLING
chart review, patient evaluation, documentation, coordination of care and discussion with nurses and ICU team.
Acute HFrEF, NICM, CAD, Persistent Afib with RVR
Acute HFrEF, Persistent Afib with RVR, CAD
Acute HFrEF, Persistent Afib with RVR, ZA on CKD

## 2024-10-31 NOTE — DISCHARGE NOTE PROVIDER - CARE PROVIDERS DIRECT ADDRESSES
[email or web address]

## 2024-10-31 NOTE — PROGRESS NOTE ADULT - SUBJECTIVE AND OBJECTIVE BOX
Pt doing well s/p uncomplicated HERBIE & DCCV 200j x 1. Denies complaint.     Exam:   VSS, NAD, A&O x 3  Skin: no erythema/edema/blistering at defib pad sites.   Card: S1/S2, RRR, no m/g/r  Resp: lungs CTA b/l  Abd: S/NT/ND  Ext: no edema, distal pulses intact    Post procedure VS  HR 91bpm  BP 86/66mmHg  SpO2 96% on RA    EKG - pending    Assessment:   80yo female, former smoker with history of HTN, HLD, lung CA (adenocarcinoma s/p XRT), PAD s/p R CEA c/b CVA, dysphagia who presented with sudden onset SOB found to be hypoxic, tachypneic, and in new AF w/ RVR. Pt placed on BIPAP and admitted to MICU.  Echo with newly depressed LV function (30-35%), WMA, moderate MR, mild-mod TR, mod pHTN. Troponins 31--76-52. Pt seen by general cardiology and [facility] planned. Overnight, patient improved with diuresis and is currently sating well on room air. CTA Chest notable for a new 5cm LLL lung mass with new subcarinal lymphadenopathy suspicious for progression of tumor. EP called for AF management.   Plan was for a HERBIE/DCCV after [facility] however, pt has history of dysphagia and has had Schatzki Rings which makes pt high risk to undergo HERBIE.  Pt now s/p CT Heart Left Atrium which revealed no CARIDAD thrombus and uncomplicated DCCV with restoration of sinus rhythm.     Plan:   Observation on telemetry per post op protocol.    Resume PO intake.   Ambulate w/ assist once fully awake & back to baseline mental status w/ VSS.  Continue Eliquis 5mg bid. Importance of strict compliance with anticoagulation regimen reinforced with pt.   Start on Amiodarone 400mg bid x 10 doses, followed by 200mg daily  TFTs & LFTs should be monitored q 3-6 months while on amiodarone therapy. Anticipate <1 year of amiodarone therapy for this patient; however if > 1 year, patient will need annual fundoscopic exam and PFTs. Patient is advised of associated risks and need for monitoring; all questions answered to pt's expressed understanding.   Resume other home medications.   Ok for d/c home from EP standpoint once all criteria met, with outpt f/up in 1 month with Dr Ambrose.    Pt doing well s/p uncomplicated HERBIE & DCCV 200j x 1. Denies complaint.     Exam:   VSS, NAD, A&O x 3  Skin: no erythema/edema/blistering at defib pad sites.   Card: S1/S2, RRR, no m/g/r  Resp: lungs CTA b/l  Abd: S/NT/ND  Ext: no edema, distal pulses intact    Post procedure VS  HR 91bpm  BP 86/66mmHg  SpO2 96% on RA    EKG - SR 92bpm, JANNA 196ms, QRSD 80ms    Assessment:   80yo female, former smoker with history of HTN, HLD, lung CA (adenocarcinoma s/p XRT), PAD s/p R CEA c/b CVA, dysphagia who presented with sudden onset SOB found to be hypoxic, tachypneic, and in new AF w/ RVR. Pt placed on BIPAP and admitted to MICU.  Echo with newly depressed LV function (30-35%), WMA, moderate MR, mild-mod TR, mod pHTN. Troponins 91--76-52. Pt seen by general cardiology and [facility] planned. Overnight, patient improved with diuresis and is currently sating well on room air. CTA Chest notable for a new 5cm LLL lung mass with new subcarinal lymphadenopathy suspicious for progression of tumor. EP called for AF management.   Plan was for a HERBIE/DCCV after [facility] however, pt has history of dysphagia and has had Schatzki Rings which makes pt high risk to undergo HERBIE.  Pt now s/p CT Heart Left Atrium which revealed no CARIDAD thrombus and uncomplicated DCCV with restoration of sinus rhythm.     Plan:   Observation on telemetry per post op protocol.    Resume PO intake.   Ambulate w/ assist once fully awake & back to baseline mental status w/ VSS.  Continue Eliquis 5mg bid. Importance of strict compliance with anticoagulation regimen reinforced with pt.   Start on Amiodarone 400mg bid x 10 doses, followed by 200mg daily  TFTs & LFTs should be monitored q 3-6 months while on amiodarone therapy. Anticipate <1 year of amiodarone therapy for this patient; however if > 1 year, patient will need annual fundoscopic exam and PFTs. Patient is advised of associated risks and need for monitoring; all questions answered to pt's expressed understanding.   Resume other home medications.   Ok for d/c home from EP standpoint once all criteria met, with outpt f/up in 1 month with Dr Ambrose.

## 2024-10-31 NOTE — PROGRESS NOTE ADULT - PROBLEM SELECTOR PLAN 1
-New onset  -Presumed ischemic vs tachy arrythmia induced  -Has moderate MR  -CTPA with small bilateral effusions  -BNP 4.5k -> 14K  -Volume status mildly up  -Lasix DCed 2/2 ZA  -Overall net negative and 02 requirements coming down  -Remains on BB, ARB
-New onset  -NICM, moderate MR  -S/p LHC as above  -EDP 13  -On room air, euvolemic   -Losartan DCed and ARNI initiated. ZA resolved. C/w BB. SGLT2 on DC. Eventual MRA
.  - New onset. LVEF 30-35%, mod MR, mod pHTN  - Presumed ischemic vs tachy arrythmia induced   - CTPA with small bilateral effusions  - BNP 4.5k -> 14K  - appears euvolemic today  - continue GDMT       Beta Blocker: Metoprolol 25mg PO BID        RAAS Inhibitor: Losartan for now, transition to ARNi post LHC/DCCV       MRA: defer for now        Diuretic: euvolemic on exam, defer for now       ARNi/SGLT2i: transition to Entresto 24/26mg PO BID  - Strict I&O's  - Daily standing weights (if able).  - Keep K > 4, Mg > 2.    - Monitor renal function with ongoing diuresis.

## 2024-10-31 NOTE — DISCHARGE NOTE PROVIDER - NSDCCPCAREPLAN_GEN_ALL_CORE_FT
PRINCIPAL DISCHARGE DIAGNOSIS  Diagnosis: Atrial fibrillation  Assessment and Plan of Treatment: Atrial Fibrillation  Atrial fibrillation is a type of irregular heartbeat (arrhythmia) where the heart quivers continuously in a chaotic pattern that makes the heart unable to pump blood normally. This can increase the risk for stroke, heart failure, and other heart-related conditions. Atrial fibrillation can be caused by a variety of conditions and may be temporary, intermittent, or permanent. Symptoms include feeling that your heart is beating rapidly or irregularly, chest discomfort, shortness of breath, or dizziness/lightheadedness that may be worse with exertion. Treatment is varied but may involve medication or electrical shock (cardioversion).  YOU MUST TAKE ALL MEDICATIONS AS SEEN IN YOUR MEDICINE RECONCILLIATION AND FOLLOW UP WITH CARDIOLOGY.   SEEK IMMEDIATE MEDICAL CARE IF YOU HAVE ANY OF THE FOLLOWING SYMPTOMS: chest pain, shortness of breath, abdominal pain, sweating, vomiting, blood in vomit/bowel movements/urine, dizziness/lightheadedness, weakness or numbness to face/arm/leg, trouble speaking or understanding, facial droop.      SECONDARY DISCHARGE DIAGNOSES  Diagnosis: ZA (acute kidney injury)  Assessment and Plan of Treatment: During your hospital stay you were found to have an Acute Kidney Injury. The exact cause of this injury is unknown but it is likely that you were somewhat dehydrated, causing lack of blood flow to your kidneys and causing some mild reversible kidney injury. The Blood levels have returned back to your baseline.  Follow up with your PMD regarding this issue and if there is any further assessment needed to follow up and or medication adjustments needed.  Keep Hydrated as directed by your physician to prevent future occurences of any Kidney Injury.   SEEK IMMEDIATE MEDICAL CARE IF YOU HAVE ANY OF THE FOLLOWING SYMPTOMS: chest pain, shortness of breath, abdominal pain, sweating, vomiting, blood in vomit/bowel movements/urine, dizziness/lightheadedness, weakness or numbness to face/arm/leg, trouble speaking or understanding, facial droop.    Diagnosis: NSTEMI (non-ST elevation myocardial infarction)  Assessment and Plan of Treatment: You were found to have an injury to your heart. You were treated for it and we identified the cause to be secondary to the abnormal rythym you developed.    Diagnosis: Lung cancer  Assessment and Plan of Treatment: Follow up with your outpatient doctors for surveillance and management.    Diagnosis: Acute HFrEF (heart failure with reduced ejection fraction)  Assessment and Plan of Treatment: You heart's ability to pump has been compromised. You had a catherization of your heart which showed no obstructuions. This decrease is likely due to the abnormal heart rythym you developed, which has since ablated. You must follow up with cardiology for surveillance and management outpatient.

## 2024-10-31 NOTE — PROGRESS NOTE ADULT - SUBJECTIVE AND OBJECTIVE BOX
[facility]                                                         CARDIOLOGY AT [facility]                                                                  [address], [place] [address]                                                         Telephone: [phone]. Fax:[phone]                                                                             PROGRESS NOTE    Reason for follow up: NICM, new afib  Update: for possible DCCV today with EP      Review of symptoms:   Cardiac:  No chest pain. No dyspnea. No palpitations.  Respiratory: no cough. No dyspnea  Gastrointestinal: No diarrhea. No abdominal pain. No bleeding.   Neuro: No focal neuro complaints.    Vitals:  T(C): 37.3 (10-31-24 @ 09:14), Max: 37.3 (10-31-24 @ 09:14)  HR: 90 (10-31-24 @ 09:14) (79 - 99)  BP: 146/75 (10-31-24 @ 09:14) (109/72 - 155/74)  RR: 18 (10-31-24 @ 09:14) (15 - 18)  SpO2: 94% (10-31-24 @ 09:14) (94% - 98%)  Wt(kg): --  I&O's Summary    30 Oct 2024 07:01  -  31 Oct 2024 07:00  --------------------------------------------------------  IN: 600 mL / OUT: 200 mL / NET: 400 mL      Weight (kg): 68.3 (10-29 @ 05:07)    PHYSICAL EXAM:  Appearance: Comfortable. No acute distress  HEENT:  Atraumatic. Normocephalic.  Normal oral mucosa  Neurologic: A & O x 3, no gross focal deficits.  Cardiovascular: irregular S1 S2, No murmur, no rubs/gallops. No JVD  Respiratory: Lungs clear to auscultation, unlabored   Gastrointestinal:  Soft, Non-tender, + BS  Lower Extremities: 2+ Peripheral Pulses, No clubbing, cyanosis, or edema  Psychiatry: Patient is calm. No agitation.   Skin: warm and dry.  Right radial site ecchymotic, no hematoma, no pain per patient, +2 pulses, warm to touch    CURRENT CARDIAC MEDICATIONS:  doxazosin 2 milliGRAM(s) Oral at bedtime  metoprolol tartrate 25 milliGRAM(s) Oral two times a day  metoprolol tartrate Injectable 5 milliGRAM(s) IV Push once PRN  sacubitril 24 mG/valsartan 26 mG 1 Tablet(s) Oral two times a day      CURRENT OTHER MEDICATIONS:  cetirizine 10 milliGRAM(s) Oral daily  famotidine    Tablet 10 milliGRAM(s) Oral daily  atorvastatin 10 milliGRAM(s) Oral at bedtime  predniSONE   Tablet 5 milliGRAM(s) Oral daily  apixaban 5 milliGRAM(s) Oral two times a day  aspirin  chewable 81 milliGRAM(s) Oral daily  chlorhexidine 2% Cloths 1 Application(s) Topical <User Schedule>  influenza  Vaccine (HIGH DOSE) 0.5 milliLiter(s) IntraMuscular once      LABS:	 	                            12.5   8.85  )-----------( 277      ( 31 Oct 2024 06:04 )             38.8     10-31    137  |  101  |  25.3[H]  ----------------------------<  93  3.9   |  23.0  |  1.14    Ca    8.7      31 Oct 2024 06:04      PT/INR/PTT ( 29 Oct 2024 05:45 )                       :                       :      X            :       80.9                  .        .                   .              .           .       X           .                                       Lipid Profile:   HgA1c:   TSH: Thyroid Stimulating Hormone, Serum: 2.66 uIU/mL      TELEMETRY: afib      DIAGNOSTIC TESTING:  [ ] Echocardiogram:   < from: TTE W or WO Ultrasound Enhancing Agent (10.27.24 @ 14:12) >  CONCLUSIONS:      1. Normal right ventricular cavity size and normal right ventricular systolic function.   2. Left atrium is moderately dilated.   3. The right atrium is severely dilated.   4. Moderate mitral regurgitation.   5. Mild tomoderate tricuspid regurgitation.   6. Estimated pulmonary artery systolic pressure is 57 mmHg, consistent with moderate pulmonary hypertension.   7. No pericardial effusion seen.   8. Multiple segmental abnormalities exist. See findings.   9. Left ventricular systolic function is moderately decreased with an ejection fraction of 33 % by Penn's method of disks with an ejection fraction visually estimated at 30 to 35 %.    < end of copied text >    [ ]  Catheterization:  < from: Cardiac Catheterization (10.30.24 @ 12:11) >  Diagnostic Findings:     Coronary Angiography   The coronary circulation is right dominant. Cardiac catheterization  was performed electively.    LM   Left main artery: The segment is normal sized. Angiography shows  moderate atherosclerosis.    LAD   Left anterior descending artery: The segment is normal sized.  Angiography shows mild atherosclerosis.    CX   Circumflex: The segment is normal sized. Ostial circumflex: There is a  60 % stenosis.    RCA   Right coronary artery: The segment is normal sized. Mid right coronary  artery: There is a 40 % stenosis.    < end of copied text >                                                                [facility]                                                         CARDIOLOGY AT [facility]                                                                  [address], [place] [address]                                                         Telephone: [phone]. Fax:[phone]                                                                             PROGRESS NOTE    Reason for follow up: NICM, new afib  Update: CT LA no CARIDAD thrombus, for possible DCCV today with EP      Review of symptoms:   Cardiac:  No chest pain. No dyspnea. No palpitations.  Respiratory: no cough. No dyspnea  Gastrointestinal: No diarrhea. No abdominal pain. No bleeding.   Neuro: No focal neuro complaints.    Vitals:  T(C): 37.3 (10-31-24 @ 09:14), Max: 37.3 (10-31-24 @ 09:14)  HR: 90 (10-31-24 @ 09:14) (79 - 99)  BP: 146/75 (10-31-24 @ 09:14) (109/72 - 155/74)  RR: 18 (10-31-24 @ 09:14) (15 - 18)  SpO2: 94% (10-31-24 @ 09:14) (94% - 98%)  Wt(kg): --  I&O's Summary    30 Oct 2024 07:01  -  31 Oct 2024 07:00  --------------------------------------------------------  IN: 600 mL / OUT: 200 mL / NET: 400 mL      Weight (kg): 68.3 (10-29 @ 05:07)    PHYSICAL EXAM:  Appearance: Comfortable. No acute distress  HEENT:  Atraumatic. Normocephalic.  Normal oral mucosa  Neurologic: A & O x 3, no gross focal deficits.  Cardiovascular: irregular S1 S2, No murmur, no rubs/gallops. No JVD  Respiratory: Lungs clear to auscultation, unlabored   Gastrointestinal:  Soft, Non-tender, + BS  Lower Extremities: 2+ Peripheral Pulses, No clubbing, cyanosis, or edema  Psychiatry: Patient is calm. No agitation.   Skin: warm and dry.  Right radial site ecchymotic, no hematoma, no pain per patient, +2 pulses, warm to touch    CURRENT CARDIAC MEDICATIONS:  doxazosin 2 milliGRAM(s) Oral at bedtime  metoprolol tartrate 25 milliGRAM(s) Oral two times a day  metoprolol tartrate Injectable 5 milliGRAM(s) IV Push once PRN  sacubitril 24 mG/valsartan 26 mG 1 Tablet(s) Oral two times a day      CURRENT OTHER MEDICATIONS:  cetirizine 10 milliGRAM(s) Oral daily  famotidine    Tablet 10 milliGRAM(s) Oral daily  atorvastatin 10 milliGRAM(s) Oral at bedtime  predniSONE   Tablet 5 milliGRAM(s) Oral daily  apixaban 5 milliGRAM(s) Oral two times a day  aspirin  chewable 81 milliGRAM(s) Oral daily  chlorhexidine 2% Cloths 1 Application(s) Topical <User Schedule>  influenza  Vaccine (HIGH DOSE) 0.5 milliLiter(s) IntraMuscular once      LABS:	 	                            12.5   8.85  )-----------( 277      ( 31 Oct 2024 06:04 )             38.8     10-31    137  |  101  |  25.3[H]  ----------------------------<  93  3.9   |  23.0  |  1.14    Ca    8.7      31 Oct 2024 06:04      PT/INR/PTT ( 29 Oct 2024 05:45 )                       :                       :      X            :       80.9                  .        .                   .              .           .       X           .                                       Lipid Profile:   HgA1c:   TSH: Thyroid Stimulating Hormone, Serum: 2.66 uIU/mL      TELEMETRY: afib      DIAGNOSTIC TESTING:  [ ] Echocardiogram:   < from: TTE W or WO Ultrasound Enhancing Agent (10.27.24 @ 14:12) >  CONCLUSIONS:      1. Normal right ventricular cavity size and normal right ventricular systolic function.   2. Left atrium is moderately dilated.   3. The right atrium is severely dilated.   4. Moderate mitral regurgitation.   5. Mild tomoderate tricuspid regurgitation.   6. Estimated pulmonary artery systolic pressure is 57 mmHg, consistent with moderate pulmonary hypertension.   7. No pericardial effusion seen.   8. Multiple segmental abnormalities exist. See findings.   9. Left ventricular systolic function is moderately decreased with an ejection fraction of 33 % by Penn's method of disks with an ejection fraction visually estimated at 30 to 35 %.    < end of copied text >    [ ]  Catheterization:  < from: Cardiac Catheterization (10.30.24 @ 12:11) >  Diagnostic Findings:     Coronary Angiography   The coronary circulation is right dominant. Cardiac catheterization  was performed electively.    LM   Left main artery: The segment is normal sized. Angiography shows  moderate atherosclerosis.    LAD   Left anterior descending artery: The segment is normal sized.  Angiography shows mild atherosclerosis.    CX   Circumflex: The segment is normal sized. Ostial circumflex: There is a  60 % stenosis.    RCA   Right coronary artery: The segment is normal sized. Mid right coronary  artery: There is a 40 % stenosis.    < end of copied text >

## 2024-10-31 NOTE — PROGRESS NOTE ADULT - PROBLEM SELECTOR PLAN 2
.  -Trop peak at 120  -Has WMA on TTE  -Cr improved today, will plan for [facility] today for further evaluation   -C/w ASA, statin
-Trop peak at 120  -Has WMA on TTE  -Pending LHC, has ZA, postponed till tomorrow, diuretics stopped and she is receiving IVF  -C/w ASA, statin
-No anginal symptoms  -Trop peak at 120  -S/p LHC as above  -C/w ASA, statin

## 2024-10-31 NOTE — DISCHARGE NOTE PROVIDER - PROVIDER TOKENS
PROVIDER:[TOKEN:[[id number]]] PROVIDER:[TOKEN:[[id number]],FOLLOWUP:[2 weeks]],PROVIDER:[TOKEN:[[id number]],FOLLOWUP:[2 weeks]]

## 2024-10-31 NOTE — DISCHARGE NOTE PROVIDER - NSDCCPTREATMENT_GEN_ALL_CORE_FT
PRINCIPAL PROCEDURE  Procedure: Cardioversion external  Findings and Treatment: -Take each dose of your anticoagulation medication (blood thinner) exactly as prescribed.   - Do not drive, operate heavy machinery or make important decisions for 24 hours following the procedure.  - You may resume all other activities the day after the procedure.  Call your doctor if:   - your rapid heart rhythm returns.  - you have any questions or concerns regarding the procedure.  If you experience increased difficulty breathing or chest pain, or if you faint or have dizzy spells, please seek immediate medical attention.

## 2024-10-31 NOTE — DISCHARGE NOTE PROVIDER - ATTENDING DISCHARGE PHYSICAL EXAMINATION:
OBJECTIVE:  Vital Signs Last 24 Hrs  T(C): 37.3 (31 Oct 2024 09:14), Max: 37.3 (31 Oct 2024 09:14)  T(F): 99.2 (31 Oct 2024 09:14), Max: 99.2 (31 Oct 2024 09:14)  HR: 96 (31 Oct 2024 12:30) (87 - 109)  BP: 106/57 (31 Oct 2024 12:45) (84/66 - 146/75)  BP(mean): 75 (31 Oct 2024 12:00) (75 - 75)  RR: 20 (31 Oct 2024 12:45) (17 - 21)  SpO2: 97% (31 Oct 2024 12:45) (93% - 97%)    Parameters below as of 31 Oct 2024 12:45  Patient On (Oxygen Delivery Method): nasal cannula  O2 Flow (L/min): 2      PHYSICAL EXAMINATION  General: Elderly female, sitting up in bed, comfortable  HEENT:  Anicteric sclera  NECK:  No JVD  CVS: regular rate and rhythm S1 S2. No edema  RESP:  Clear to auscultation bilaterally  GI:  Soft nondistended nontender BS+  :   MSK:  Full range of movement  CNS:  Awake, alert. No gross focal or global deficit appreciated  INTEG:  warm skin  PSYCH:  Fair mood

## 2024-10-31 NOTE — PROGRESS NOTE ADULT - ASSESSMENT
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   She was hypoxic, tachypneic and placed on bipap for increased WOB. Labs signifigant for mild transaminitis, leukocytosis and elevated BNP. Found to be in afib rvr.    Patient admitted and evaluate and seen by ICU placed on [facility].  Patient has much improved overnight with diuresis and is sating 98% on room air, has no edema and lungs are clear.   Patient has seen Dr. Ho in the past, but now requesting for [facility]  Patient last echo and NST was 4 years ago.  Patient states afib is new and heart failure is new.

## 2024-10-31 NOTE — DISCHARGE NOTE PROVIDER - NSDCACTIVITY_GEN_ALL_CORE
Showering allowed/Stairs allowed/Walking - Indoors allowed/Walking - Outdoors allowed/Activity as tolerated

## 2024-11-01 ENCOUNTER — NON-APPOINTMENT (OUTPATIENT)
Age: 79
End: 2024-11-01

## 2024-11-01 LAB
CULTURE RESULTS: SIGNIFICANT CHANGE UP
SPECIMEN SOURCE: SIGNIFICANT CHANGE UP

## 2024-11-06 ENCOUNTER — NON-APPOINTMENT (OUTPATIENT)
Age: 79
End: 2024-11-06

## 2024-11-12 ENCOUNTER — NON-APPOINTMENT (OUTPATIENT)
Age: 79
End: 2024-11-12

## 2024-11-12 ENCOUNTER — APPOINTMENT (OUTPATIENT)
Dept: RADIATION ONCOLOGY | Facility: CLINIC | Age: 79
End: 2024-11-12

## 2024-11-13 ENCOUNTER — NON-APPOINTMENT (OUTPATIENT)
Age: 79
End: 2024-11-13

## 2024-11-13 ENCOUNTER — APPOINTMENT (OUTPATIENT)
Dept: CARDIOLOGY | Facility: CLINIC | Age: 79
End: 2024-11-13
Payer: MEDICARE

## 2024-11-13 VITALS
BODY MASS INDEX: 27.97 KG/M2 | HEIGHT: 62 IN | DIASTOLIC BLOOD PRESSURE: 68 MMHG | HEART RATE: 67 BPM | WEIGHT: 152 LBS | SYSTOLIC BLOOD PRESSURE: 132 MMHG

## 2024-11-13 DIAGNOSIS — I48.91 UNSPECIFIED ATRIAL FIBRILLATION: ICD-10-CM

## 2024-11-13 DIAGNOSIS — Z82.49 FAMILY HISTORY OF ISCHEMIC HEART DISEASE AND OTHER DISEASES OF THE CIRCULATORY SYSTEM: ICD-10-CM

## 2024-11-13 DIAGNOSIS — Z83.438 FAMILY HISTORY OF OTHER DISORDER OF LIPOPROTEIN METABOLISM AND OTHER LIPIDEMIA: ICD-10-CM

## 2024-11-13 DIAGNOSIS — I50.20 UNSPECIFIED SYSTOLIC (CONGESTIVE) HEART FAILURE: ICD-10-CM

## 2024-11-13 DIAGNOSIS — I21.4 NON-ST ELEVATION (NSTEMI) MYOCARDIAL INFARCTION: ICD-10-CM

## 2024-11-13 DIAGNOSIS — K22.2 ESOPHAGEAL OBSTRUCTION: ICD-10-CM

## 2024-11-13 DIAGNOSIS — E78.5 HYPERLIPIDEMIA, UNSPECIFIED: ICD-10-CM

## 2024-11-13 PROCEDURE — 93000 ELECTROCARDIOGRAM COMPLETE: CPT

## 2024-11-13 PROCEDURE — 99215 OFFICE O/P EST HI 40 MIN: CPT | Mod: 25

## 2024-11-13 RX ORDER — AMLODIPINE BESYLATE 10 MG/1
10 TABLET ORAL DAILY
Refills: 0 | Status: ACTIVE | COMMUNITY

## 2024-11-22 RX ORDER — APIXABAN 5 MG/1
5 TABLET, FILM COATED ORAL
Qty: 180 | Refills: 1 | Status: ACTIVE | COMMUNITY
Start: 1900-01-01 | End: 1900-01-01

## 2024-11-22 RX ORDER — SACUBITRIL AND VALSARTAN 24; 26 MG/1; MG/1
24-26 TABLET, FILM COATED ORAL TWICE DAILY
Qty: 180 | Refills: 0 | Status: ACTIVE | COMMUNITY
Start: 1900-01-01 | End: 1900-01-01

## 2024-11-22 RX ORDER — AMIODARONE HYDROCHLORIDE 200 MG/1
200 TABLET ORAL
Qty: 90 | Refills: 1 | Status: ACTIVE | COMMUNITY
Start: 1900-01-01 | End: 1900-01-01

## 2024-12-04 ENCOUNTER — APPOINTMENT (OUTPATIENT)
Dept: PULMONOLOGY | Facility: CLINIC | Age: 79
End: 2024-12-04

## 2025-01-16 ENCOUNTER — OFFICE (OUTPATIENT)
Dept: URBAN - METROPOLITAN AREA CLINIC 115 | Facility: CLINIC | Age: 80
Setting detail: OPHTHALMOLOGY
End: 2025-01-16
Payer: MEDICARE

## 2025-01-16 DIAGNOSIS — H33.8: ICD-10-CM

## 2025-01-16 DIAGNOSIS — H34.8121: ICD-10-CM

## 2025-01-16 PROCEDURE — 92012 INTRM OPH EXAM EST PATIENT: CPT | Performed by: OPHTHALMOLOGY

## 2025-01-16 PROCEDURE — 92134 CPTRZ OPH DX IMG PST SGM RTA: CPT | Performed by: OPHTHALMOLOGY

## 2025-01-16 ASSESSMENT — KERATOMETRY
OD_K1POWER_DIOPTERS: 42.25
OS_AXISANGLE_DEGREES: 036
OS_K1POWER_DIOPTERS: 41.50
OD_AXISANGLE_DEGREES: 173
OD_K2POWER_DIOPTERS: 43.50
OS_K2POWER_DIOPTERS: 43.50

## 2025-01-16 ASSESSMENT — REFRACTION_CURRENTRX
OS_SPHERE: +1.50
OS_SPHERE: -1.25
OD_OVR_VA: 20/
OD_AXIS: 085
OS_AXIS: 112
OD_VPRISM_DIRECTION: SV
OD_SPHERE: +0.50
OD_CYLINDER: -1.25
OD_CYLINDER: -1.50
OD_CYLINDER: -1.75
OD_SPHERE: +0.50
OS_OVR_VA: 20/
OS_SPHERE: -1.75
OS_CYLINDER: -2.00
OD_SPHERE: +3.50
OS_OVR_VA: 20/
OD_VPRISM_DIRECTION: SV
OS_VPRISM_DIRECTION: SV
OD_AXIS: 086
OS_CYLINDER: -2.25
OD_AXIS: 087
OS_VPRISM_DIRECTION: SV
OD_SPHERE: +0.50
OD_OVR_VA: 20/
OS_AXIS: 115
OS_AXIS: 106
OD_CYLINDER: -2.00
OD_OVR_VA: 20/
OS_SPHERE: -1.25
OD_VPRISM_DIRECTION: SV
OS_AXIS: 117
OD_AXIS: 097
OS_CYLINDER: -1.75
OS_OVR_VA: 20/
OS_CYLINDER: -2.00

## 2025-01-16 ASSESSMENT — TONOMETRY
OD_IOP_MMHG: 18
OS_IOP_MMHG: 18

## 2025-01-16 ASSESSMENT — REFRACTION_AUTOREFRACTION
OD_CYLINDER: -2.22
OS_CYLINDER: -2.00
OD_SPHERE: 0.00
OS_SPHERE: -1.50
OD_AXIS: 104
OS_AXIS: 120

## 2025-01-16 ASSESSMENT — VISUAL ACUITY: OS_BCVA: 20/20

## 2025-01-28 ENCOUNTER — RX RENEWAL (OUTPATIENT)
Age: 80
End: 2025-01-28

## 2025-03-11 ENCOUNTER — NON-APPOINTMENT (OUTPATIENT)
Age: 80
End: 2025-03-11

## 2025-03-11 ENCOUNTER — APPOINTMENT (OUTPATIENT)
Dept: CARDIOLOGY | Facility: CLINIC | Age: 80
End: 2025-03-11
Payer: MEDICARE

## 2025-03-11 VITALS
DIASTOLIC BLOOD PRESSURE: 62 MMHG | OXYGEN SATURATION: 93 % | WEIGHT: 163 LBS | HEIGHT: 62 IN | BODY MASS INDEX: 30 KG/M2 | HEART RATE: 65 BPM | SYSTOLIC BLOOD PRESSURE: 118 MMHG

## 2025-03-11 DIAGNOSIS — I50.20 UNSPECIFIED SYSTOLIC (CONGESTIVE) HEART FAILURE: ICD-10-CM

## 2025-03-11 DIAGNOSIS — R60.0 LOCALIZED EDEMA: ICD-10-CM

## 2025-03-11 DIAGNOSIS — I48.91 UNSPECIFIED ATRIAL FIBRILLATION: ICD-10-CM

## 2025-03-11 DIAGNOSIS — Z01.810 ENCOUNTER FOR PREPROCEDURAL CARDIOVASCULAR EXAMINATION: ICD-10-CM

## 2025-03-11 PROCEDURE — 93000 ELECTROCARDIOGRAM COMPLETE: CPT | Mod: NC

## 2025-03-11 PROCEDURE — 99214 OFFICE O/P EST MOD 30 MIN: CPT

## 2025-03-26 ENCOUNTER — APPOINTMENT (OUTPATIENT)
Dept: CARDIOLOGY | Facility: CLINIC | Age: 80
End: 2025-03-26
Payer: MEDICARE

## 2025-03-26 PROCEDURE — 93306 TTE W/DOPPLER COMPLETE: CPT

## 2025-04-17 ENCOUNTER — OFFICE (OUTPATIENT)
Dept: URBAN - METROPOLITAN AREA CLINIC 115 | Facility: CLINIC | Age: 80
Setting detail: OPHTHALMOLOGY
End: 2025-04-17
Payer: MEDICARE

## 2025-04-17 DIAGNOSIS — H34.8121: ICD-10-CM

## 2025-04-17 DIAGNOSIS — H43.391: ICD-10-CM

## 2025-04-17 DIAGNOSIS — H35.033: ICD-10-CM

## 2025-04-17 DIAGNOSIS — H33.8: ICD-10-CM

## 2025-04-17 PROCEDURE — 92134 CPTRZ OPH DX IMG PST SGM RTA: CPT | Performed by: OPHTHALMOLOGY

## 2025-04-17 PROCEDURE — 92235 FLUORESCEIN ANGRPH MLTIFRAME: CPT | Performed by: OPHTHALMOLOGY

## 2025-04-17 PROCEDURE — 92012 INTRM OPH EXAM EST PATIENT: CPT | Performed by: OPHTHALMOLOGY

## 2025-04-17 ASSESSMENT — REFRACTION_CURRENTRX
OS_CYLINDER: -2.00
OS_OVR_VA: 20/
OD_VPRISM_DIRECTION: SV
OS_AXIS: 112
OD_VPRISM_DIRECTION: SV
OS_SPHERE: -1.25
OD_AXIS: 087
OS_CYLINDER: -2.00
OS_SPHERE: -1.75
OD_CYLINDER: -1.75
OD_OVR_VA: 20/
OD_SPHERE: +0.50
OS_AXIS: 115
OS_SPHERE: -1.25
OD_SPHERE: +0.50
OS_CYLINDER: -2.25
OS_VPRISM_DIRECTION: SV
OD_OVR_VA: 20/
OD_AXIS: 097
OD_SPHERE: +0.50
OS_AXIS: 106
OD_AXIS: 086
OD_CYLINDER: -1.50
OD_CYLINDER: -2.00
OD_CYLINDER: -1.25
OD_SPHERE: +3.50
OD_AXIS: 085
OD_OVR_VA: 20/
OS_VPRISM_DIRECTION: SV
OS_AXIS: 117
OS_CYLINDER: -1.75
OD_VPRISM_DIRECTION: SV
OS_SPHERE: +1.50
OS_OVR_VA: 20/
OS_OVR_VA: 20/

## 2025-04-17 ASSESSMENT — REFRACTION_AUTOREFRACTION
OD_CYLINDER: -2.22
OS_AXIS: 120
OS_SPHERE: -1.50
OD_AXIS: 104
OS_CYLINDER: -2.00
OD_SPHERE: 0.00

## 2025-04-17 ASSESSMENT — KERATOMETRY
OD_K1POWER_DIOPTERS: 42.25
OS_K1POWER_DIOPTERS: 41.50
OS_K2POWER_DIOPTERS: 43.50
OS_AXISANGLE_DEGREES: 036
OD_K2POWER_DIOPTERS: 43.50
OD_AXISANGLE_DEGREES: 173

## 2025-04-17 ASSESSMENT — LID EXAM ASSESSMENTS
OS_BLEPHARITIS: LUL T
OD_BLEPHARITIS: RUL T

## 2025-04-17 ASSESSMENT — TONOMETRY
OS_IOP_MMHG: 15
OD_IOP_MMHG: 12

## 2025-04-17 ASSESSMENT — VISUAL ACUITY: OS_BCVA: 20/25

## 2025-04-17 ASSESSMENT — LID POSITION - DERMATOCHALASIS
OS_DERMATOCHALASIS: LUL 1+ 2+
OD_DERMATOCHALASIS: RUL 1+ 2+

## 2025-05-20 ENCOUNTER — RX RENEWAL (OUTPATIENT)
Age: 80
End: 2025-05-20

## 2025-05-21 ENCOUNTER — APPOINTMENT (OUTPATIENT)
Dept: CARDIOLOGY | Facility: CLINIC | Age: 80
End: 2025-05-21
Payer: MEDICARE

## 2025-05-21 ENCOUNTER — NON-APPOINTMENT (OUTPATIENT)
Age: 80
End: 2025-05-21

## 2025-05-21 VITALS
WEIGHT: 155 LBS | HEIGHT: 62 IN | HEART RATE: 66 BPM | OXYGEN SATURATION: 96 % | SYSTOLIC BLOOD PRESSURE: 130 MMHG | BODY MASS INDEX: 28.52 KG/M2 | DIASTOLIC BLOOD PRESSURE: 60 MMHG

## 2025-05-21 DIAGNOSIS — E78.5 HYPERLIPIDEMIA, UNSPECIFIED: ICD-10-CM

## 2025-05-21 DIAGNOSIS — I48.91 UNSPECIFIED ATRIAL FIBRILLATION: ICD-10-CM

## 2025-05-21 DIAGNOSIS — I50.20 UNSPECIFIED SYSTOLIC (CONGESTIVE) HEART FAILURE: ICD-10-CM

## 2025-05-21 PROCEDURE — 93000 ELECTROCARDIOGRAM COMPLETE: CPT

## 2025-05-21 PROCEDURE — 99214 OFFICE O/P EST MOD 30 MIN: CPT

## 2025-06-05 DIAGNOSIS — N17.9 ACUTE KIDNEY FAILURE, UNSPECIFIED: ICD-10-CM

## 2025-06-05 DIAGNOSIS — I10 ESSENTIAL (PRIMARY) HYPERTENSION: ICD-10-CM

## 2025-06-05 DIAGNOSIS — I50.20 UNSPECIFIED SYSTOLIC (CONGESTIVE) HEART FAILURE: ICD-10-CM

## 2025-06-05 RX ORDER — HYDRALAZINE HYDROCHLORIDE 25 MG/1
25 TABLET ORAL
Qty: 180 | Refills: 0 | Status: ACTIVE | COMMUNITY
Start: 2025-06-05 | End: 1900-01-01

## 2025-06-05 RX ORDER — APIXABAN 2.5 MG/1
2.5 TABLET, FILM COATED ORAL
Qty: 180 | Refills: 0 | Status: ACTIVE | COMMUNITY
Start: 2025-06-05 | End: 1900-01-01

## 2025-06-30 ENCOUNTER — NON-APPOINTMENT (OUTPATIENT)
Age: 80
End: 2025-06-30

## 2025-06-30 ENCOUNTER — APPOINTMENT (OUTPATIENT)
Dept: ELECTROPHYSIOLOGY | Facility: CLINIC | Age: 80
End: 2025-06-30
Payer: MEDICARE

## 2025-06-30 VITALS
HEIGHT: 62 IN | OXYGEN SATURATION: 92 % | SYSTOLIC BLOOD PRESSURE: 138 MMHG | WEIGHT: 152 LBS | DIASTOLIC BLOOD PRESSURE: 72 MMHG | BODY MASS INDEX: 27.97 KG/M2 | HEART RATE: 55 BPM

## 2025-06-30 PROCEDURE — 99204 OFFICE O/P NEW MOD 45 MIN: CPT

## 2025-06-30 PROCEDURE — 93000 ELECTROCARDIOGRAM COMPLETE: CPT

## 2025-06-30 RX ORDER — FUROSEMIDE 40 MG/1
40 TABLET ORAL DAILY
Refills: 0 | Status: ACTIVE | COMMUNITY

## 2025-07-01 ENCOUNTER — RX RENEWAL (OUTPATIENT)
Age: 80
End: 2025-07-01

## 2025-07-22 ENCOUNTER — NON-APPOINTMENT (OUTPATIENT)
Age: 80
End: 2025-07-22

## 2025-08-08 ENCOUNTER — EMERGENCY (EMERGENCY)
Facility: HOSPITAL | Age: 80
LOS: 1 days | End: 2025-08-08
Attending: EMERGENCY MEDICINE
Payer: MEDICARE

## 2025-08-08 VITALS
RESPIRATION RATE: 20 BRPM | DIASTOLIC BLOOD PRESSURE: 106 MMHG | OXYGEN SATURATION: 91 % | HEART RATE: 59 BPM | SYSTOLIC BLOOD PRESSURE: 157 MMHG | TEMPERATURE: 98 F | WEIGHT: 160.06 LBS | HEIGHT: 66 IN

## 2025-08-08 VITALS
SYSTOLIC BLOOD PRESSURE: 158 MMHG | HEART RATE: 58 BPM | TEMPERATURE: 98 F | OXYGEN SATURATION: 91 % | DIASTOLIC BLOOD PRESSURE: 52 MMHG | RESPIRATION RATE: 20 BRPM

## 2025-08-08 DIAGNOSIS — Z98.51 TUBAL LIGATION STATUS: Chronic | ICD-10-CM

## 2025-08-08 DIAGNOSIS — Z96.642 PRESENCE OF LEFT ARTIFICIAL HIP JOINT: Chronic | ICD-10-CM

## 2025-08-08 DIAGNOSIS — I77.9 DISORDER OF ARTERIES AND ARTERIOLES, UNSPECIFIED: Chronic | ICD-10-CM

## 2025-08-08 DIAGNOSIS — Z90.49 ACQUIRED ABSENCE OF OTHER SPECIFIED PARTS OF DIGESTIVE TRACT: Chronic | ICD-10-CM

## 2025-08-08 DIAGNOSIS — Z98.49 CATARACT EXTRACTION STATUS, UNSPECIFIED EYE: Chronic | ICD-10-CM

## 2025-08-08 LAB
ALBUMIN SERPL ELPH-MCNC: 4 G/DL — SIGNIFICANT CHANGE UP (ref 3.3–5.2)
ALP SERPL-CCNC: 68 U/L — SIGNIFICANT CHANGE UP (ref 40–120)
ALT FLD-CCNC: 13 U/L — SIGNIFICANT CHANGE UP
ANION GAP SERPL CALC-SCNC: 15 MMOL/L — SIGNIFICANT CHANGE UP (ref 5–17)
APTT BLD: 32.8 SEC — SIGNIFICANT CHANGE UP (ref 26.1–36.8)
AST SERPL-CCNC: 21 U/L — SIGNIFICANT CHANGE UP
BASOPHILS # BLD AUTO: 0.05 K/UL — SIGNIFICANT CHANGE UP (ref 0–0.2)
BASOPHILS NFR BLD AUTO: 0.5 % — SIGNIFICANT CHANGE UP (ref 0–2)
BILIRUB SERPL-MCNC: 0.3 MG/DL — LOW (ref 0.4–2)
BUN SERPL-MCNC: 25.2 MG/DL — HIGH (ref 8–20)
CALCIUM SERPL-MCNC: 9 MG/DL — SIGNIFICANT CHANGE UP (ref 8.4–10.5)
CHLORIDE SERPL-SCNC: 103 MMOL/L — SIGNIFICANT CHANGE UP (ref 96–108)
CO2 SERPL-SCNC: 22 MMOL/L — SIGNIFICANT CHANGE UP (ref 22–29)
CREAT SERPL-MCNC: 1.53 MG/DL — HIGH (ref 0.5–1.3)
EGFR: 34 ML/MIN/1.73M2 — LOW
EGFR: 34 ML/MIN/1.73M2 — LOW
EOSINOPHIL # BLD AUTO: 0.02 K/UL — SIGNIFICANT CHANGE UP (ref 0–0.5)
EOSINOPHIL NFR BLD AUTO: 0.2 % — SIGNIFICANT CHANGE UP (ref 0–6)
GLUCOSE SERPL-MCNC: 114 MG/DL — HIGH (ref 70–99)
HCT VFR BLD CALC: 38.7 % — SIGNIFICANT CHANGE UP (ref 34.5–45)
HGB BLD-MCNC: 12.5 G/DL — SIGNIFICANT CHANGE UP (ref 11.5–15.5)
IMM GRANULOCYTES # BLD AUTO: 0.09 K/UL — HIGH (ref 0–0.07)
IMM GRANULOCYTES NFR BLD AUTO: 1 % — HIGH (ref 0–0.9)
INR BLD: 1.21 RATIO — HIGH (ref 0.85–1.16)
LYMPHOCYTES # BLD AUTO: 0.31 K/UL — LOW (ref 1–3.3)
LYMPHOCYTES NFR BLD AUTO: 3.3 % — LOW (ref 13–44)
MAGNESIUM SERPL-MCNC: 2.2 MG/DL — SIGNIFICANT CHANGE UP (ref 1.6–2.6)
MCHC RBC-ENTMCNC: 30 PG — SIGNIFICANT CHANGE UP (ref 27–34)
MCHC RBC-ENTMCNC: 32.3 G/DL — SIGNIFICANT CHANGE UP (ref 32–36)
MCV RBC AUTO: 93 FL — SIGNIFICANT CHANGE UP (ref 80–100)
MONOCYTES # BLD AUTO: 0.24 K/UL — SIGNIFICANT CHANGE UP (ref 0–0.9)
MONOCYTES NFR BLD AUTO: 2.6 % — SIGNIFICANT CHANGE UP (ref 2–14)
NEUTROPHILS # BLD AUTO: 8.65 K/UL — HIGH (ref 1.8–7.4)
NEUTROPHILS NFR BLD AUTO: 92.4 % — HIGH (ref 43–77)
NRBC # BLD AUTO: 0 K/UL — SIGNIFICANT CHANGE UP (ref 0–0)
NRBC # FLD: 0 K/UL — SIGNIFICANT CHANGE UP (ref 0–0)
NRBC BLD AUTO-RTO: 0 /100 WBCS — SIGNIFICANT CHANGE UP (ref 0–0)
PLATELET # BLD AUTO: 253 K/UL — SIGNIFICANT CHANGE UP (ref 150–400)
PMV BLD: 9.3 FL — SIGNIFICANT CHANGE UP (ref 7–13)
POTASSIUM SERPL-MCNC: 4.4 MMOL/L — SIGNIFICANT CHANGE UP (ref 3.5–5.3)
POTASSIUM SERPL-SCNC: 4.4 MMOL/L — SIGNIFICANT CHANGE UP (ref 3.5–5.3)
PROT SERPL-MCNC: 6.6 G/DL — SIGNIFICANT CHANGE UP (ref 6.6–8.7)
PROTHROM AB SERPL-ACNC: 13.6 SEC — HIGH (ref 9.9–13.4)
RBC # BLD: 4.16 M/UL — SIGNIFICANT CHANGE UP (ref 3.8–5.2)
RBC # FLD: 14.4 % — SIGNIFICANT CHANGE UP (ref 10.3–14.5)
SODIUM SERPL-SCNC: 139 MMOL/L — SIGNIFICANT CHANGE UP (ref 135–145)
TROPONIN T, HIGH SENSITIVITY RESULT: 16 NG/L — SIGNIFICANT CHANGE UP (ref 0–51)
TROPONIN T, HIGH SENSITIVITY RESULT: 17 NG/L — SIGNIFICANT CHANGE UP (ref 0–51)
WBC # BLD: 9.36 K/UL — SIGNIFICANT CHANGE UP (ref 3.8–10.5)
WBC # FLD AUTO: 9.36 K/UL — SIGNIFICANT CHANGE UP (ref 3.8–10.5)

## 2025-08-08 PROCEDURE — 71045 X-RAY EXAM CHEST 1 VIEW: CPT

## 2025-08-08 PROCEDURE — 36415 COLL VENOUS BLD VENIPUNCTURE: CPT

## 2025-08-08 PROCEDURE — 99285 EMERGENCY DEPT VISIT HI MDM: CPT

## 2025-08-08 PROCEDURE — 71045 X-RAY EXAM CHEST 1 VIEW: CPT | Mod: 26

## 2025-08-08 PROCEDURE — 99284 EMERGENCY DEPT VISIT MOD MDM: CPT

## 2025-08-08 PROCEDURE — 99285 EMERGENCY DEPT VISIT HI MDM: CPT | Mod: 25

## 2025-08-08 PROCEDURE — 80053 COMPREHEN METABOLIC PANEL: CPT

## 2025-08-08 PROCEDURE — 85730 THROMBOPLASTIN TIME PARTIAL: CPT

## 2025-08-08 PROCEDURE — 83735 ASSAY OF MAGNESIUM: CPT

## 2025-08-08 PROCEDURE — 85610 PROTHROMBIN TIME: CPT

## 2025-08-08 PROCEDURE — 93005 ELECTROCARDIOGRAM TRACING: CPT

## 2025-08-08 PROCEDURE — 84484 ASSAY OF TROPONIN QUANT: CPT

## 2025-08-08 PROCEDURE — 85025 COMPLETE CBC W/AUTO DIFF WBC: CPT

## 2025-08-08 PROCEDURE — 93010 ELECTROCARDIOGRAM REPORT: CPT

## 2025-08-08 RX ORDER — METOPROLOL SUCCINATE 50 MG/1
100 TABLET, EXTENDED RELEASE ORAL ONCE
Refills: 0 | Status: COMPLETED | OUTPATIENT
Start: 2025-08-08 | End: 2025-08-08

## 2025-08-08 RX ADMIN — METOPROLOL SUCCINATE 100 MILLIGRAM(S): 50 TABLET, EXTENDED RELEASE ORAL at 16:58

## 2025-08-20 ENCOUNTER — RX RENEWAL (OUTPATIENT)
Age: 80
End: 2025-08-20

## 2025-09-01 ENCOUNTER — RX RENEWAL (OUTPATIENT)
Age: 80
End: 2025-09-01

## 2025-09-04 ENCOUNTER — APPOINTMENT (OUTPATIENT)
Dept: CARDIOLOGY | Facility: CLINIC | Age: 80
End: 2025-09-04
Payer: MEDICARE

## 2025-09-04 VITALS
SYSTOLIC BLOOD PRESSURE: 128 MMHG | DIASTOLIC BLOOD PRESSURE: 68 MMHG | BODY MASS INDEX: 28.89 KG/M2 | OXYGEN SATURATION: 92 % | HEART RATE: 48 BPM | WEIGHT: 157 LBS | HEIGHT: 62 IN

## 2025-09-04 DIAGNOSIS — R00.1 BRADYCARDIA, UNSPECIFIED: ICD-10-CM

## 2025-09-04 DIAGNOSIS — I10 ESSENTIAL (PRIMARY) HYPERTENSION: ICD-10-CM

## 2025-09-04 DIAGNOSIS — I48.91 UNSPECIFIED ATRIAL FIBRILLATION: ICD-10-CM

## 2025-09-04 PROCEDURE — 99214 OFFICE O/P EST MOD 30 MIN: CPT

## 2025-09-04 PROCEDURE — 93000 ELECTROCARDIOGRAM COMPLETE: CPT
